# Patient Record
Sex: FEMALE | Race: WHITE | NOT HISPANIC OR LATINO | Employment: OTHER | URBAN - METROPOLITAN AREA
[De-identification: names, ages, dates, MRNs, and addresses within clinical notes are randomized per-mention and may not be internally consistent; named-entity substitution may affect disease eponyms.]

---

## 2017-01-13 DIAGNOSIS — I48.0 PAROXYSMAL ATRIAL FIBRILLATION (H): Primary | ICD-10-CM

## 2017-02-16 ENCOUNTER — OFFICE VISIT (OUTPATIENT)
Dept: OBGYN | Facility: CLINIC | Age: 82
End: 2017-02-16
Payer: MEDICARE

## 2017-02-16 VITALS
DIASTOLIC BLOOD PRESSURE: 78 MMHG | WEIGHT: 105 LBS | BODY MASS INDEX: 19.83 KG/M2 | SYSTOLIC BLOOD PRESSURE: 118 MMHG | HEART RATE: 60 BPM | HEIGHT: 61 IN

## 2017-02-16 DIAGNOSIS — M81.0 OSTEOPOROSIS, SENILE: Primary | ICD-10-CM

## 2017-02-16 PROCEDURE — 82310 ASSAY OF CALCIUM: CPT | Performed by: PATHOLOGY

## 2017-02-16 PROCEDURE — 36415 COLL VENOUS BLD VENIPUNCTURE: CPT | Performed by: OBSTETRICS & GYNECOLOGY

## 2017-02-16 PROCEDURE — 99213 OFFICE O/P EST LOW 20 MIN: CPT | Performed by: OBSTETRICS & GYNECOLOGY

## 2017-02-16 PROCEDURE — 82565 ASSAY OF CREATININE: CPT | Performed by: PATHOLOGY

## 2017-02-16 NOTE — PROGRESS NOTES
SUBJECTIVE:                                                   Mia Rodriguez is a 82 year old female who presents to clinic today for the following health issue(s):  Patient presents with:  Consult: prolia      Additional information:       HPI:  Here for f/u of osteoporosis. Last DEXA in   shows moderate osteoporosis of spine and hip. This appears stable. Done on new machine so can't do direct comparison.  No issues with pain, infection or side effects from Prolia. Started . Hx of IV Boniva before that.       Patient's last menstrual period was 1981..   Patient is not sexually active, .  Using hysterectomy for contraception.    reports that she quit smoking about 27 years ago. Her smoking use included Cigarettes. She has a 30.00 pack-year smoking history. She has never used smokeless tobacco.    STD testing offered?  Declined    Health maintenance updated:  yes    Today's PHQ-2 Score:   PHQ-2 (  [employer]) 2011   Q1: Little interest or pleasure in doing things 0   Q2: Feeling down, depressed or hopeless 0   PHQ-2 Score 0     Today's PHQ-9 Score: No flowsheet data found.  Today's TATE-7 Score: No flowsheet data found.    Problem list and histories reviewed & adjusted, as indicated.  Additional history: as documented.    Patient Active Problem List   Diagnosis     Cancer of breast (H)     Transient cerebral ischemia     Benign essential hypertension     Kidney stones     H/O: hysterectomy     Hyperlipidemia with target LDL less than 100     Shortness of breath     Atrial fibrillation (H)     Pulmonary hypertension (H)     COPD exacerbation (H)     Osteoporosis, senile     Past Surgical History   Procedure Laterality Date     Breast surgery  2008     Right Breast Lumpectomy     Biopsy  2008     Left /RightBreast Biopsy     Biopsy  2010     Right Breast Biopsy     Gyn surgery       LISA with ovaries intact-fibroid     Laser holmium lithotripsy ureter(s), insert stent,  combined  10/5/2012     Procedure: COMBINED CYSTOSCOPY, URETEROSCOPY, LASER HOLMIUM LITHOTRIPSY URETER(S), INSERT STENT;  RIGHT URETEROSCOPY ,right ureteral biopsy,WITH LASER HOLMIUM LITHOTRIPSY, INSERT STENT RIGHT URETER;  Surgeon: Kaushal Harris MD;  Location:  OR     Cystoscopy, dilate urethra, combined  10/5/2012     Procedure: COMBINED CYSTOSCOPY, DILATE URETHRA;  urethral dilation;  Surgeon: Kaushal Harris MD;  Location:  OR     Excise lesion lip N/A 10/22/2014     Procedure: EXCISE LESION LIP;  Surgeon: Brent Jolley MD;  Location:  SD     Laser holmium lithotripsy ureter(s), insert stent, combined Right 4/16/2015     Procedure: COMBINED CYSTOSCOPY, URETEROSCOPY, LASER HOLMIUM LITHOTRIPSY URETER(S), INSERT STENT;  Surgeon: Kaushal Harris MD;  Location:  OR     Cystoscopy, retrogrades, extract stone, combined Right 4/16/2015     Procedure: COMBINED CYSTOSCOPY, RETROGRADES, EXTRACT STONE;  Surgeon: Kaushal Harris MD;  Location:  OR      Social History   Substance Use Topics     Smoking status: Former Smoker     Packs/day: 1.00     Years: 30.00     Types: Cigarettes     Quit date: 12/1/1989     Smokeless tobacco: Never Used      Comment: Started:  Stopped:     Alcohol use No      Problem (# of Occurrences) Relation (Name,Age of Onset)    Alzheimer Disease (1) Mother    Breast Cancer (1) Maternal Grandmother    C.A.D. (1) Father    CANCER (1) Paternal Grandfather: stomach    CEREBROVASCULAR DISEASE (1) Father    Cardiovascular (1) Father    OSTEOPOROSIS (1) Mother    Unknown/Adopted (2) Maternal Grandfather, Paternal Grandmother: old age            Current Outpatient Prescriptions   Medication Sig     apixaban ANTICOAGULANT (ELIQUIS) 2.5 MG tablet Take 1 tablet (2.5 mg) by mouth 2 times daily     atorvastatin (LIPITOR) 40 MG tablet Take 1 tablet (40 mg) by mouth daily     diltiazem (DILACOR XR) 180 MG 24 hr ER capsule Take 1 capsule (180 mg) by mouth daily      "albuterol (PROAIR HFA, PROVENTIL HFA, VENTOLIN HFA) 108 (90 BASE) MCG/ACT inhaler Inhale 2 puffs into the lungs every 6 hours     denosumab (PROLIA) 60 MG/ML SOLN Inject 60 mg Subcutaneous. q 6 months     No current facility-administered medications for this visit.      Allergies   Allergen Reactions     Megace [Megestrol Acetate]      Increased LFTs         ROS:  12 point review of systems negative other than symptoms noted below.  Constitutional: Fatigue  Cardiovascular: Lightheadedness  Respiratory: Cough and Shortness of Breath  Psychiatric: Difficulty Sleeping    OBJECTIVE:     /78  Pulse 60  Ht 5' 0.5\" (1.537 m)  Wt 105 lb (47.6 kg)  LMP 12/07/1981  BMI 20.17 kg/m2  Body mass index is 20.17 kg/(m^2).    Exam:  Constitutional:  Appearance: Well nourished, well developed alert, in no acute distress  Neurologic/Psychiatric:  Mental Status:  Oriented X3      In-Clinic Test Results:  No results found for this or any previous visit (from the past 24 hour(s)).    ASSESSMENT/PLAN:                                                        ICD-10-CM    1. Osteoporosis, senile M81.0 Creatinine     Calcium     DX Hip/Pelvis/Spine         Will have labs today. RTC for Prolia injection and repeat DEXA.    Jhon Randle MD  [facility]  "

## 2017-02-16 NOTE — MR AVS SNAPSHOT
"              After Visit Summary   2017    Mia Rodriguez    MRN: [id number]           Patient Information     Date Of Birth          1934        Visit Information        Provider Department      2017 11:00 AM Jhon Randle MD [facility]        Today's Diagnoses     Osteoporosis, senile    -  1       Follow-ups after your visit        Future tests that were ordered for you today     Open Future Orders        Priority Expected Expires Ordered    DX Hip/Pelvis/Spine Routine  2018            Who to contact     If you have questions or need follow up information about today's clinic visit or your schedule please contact [facility] directly at [phone].  Normal or non-critical lab and imaging results will be communicated to you by MyChart, letter or phone within 4 business days after the clinic has received the results. If you do not hear from us within 7 days, please contact the clinic through [employer] or phone. If you have a critical or abnormal lab result, we will notify you by phone as soon as possible.  Submit refill requests through [employer] or call your pharmacy and they will forward the refill request to us. Please allow 3 business days for your refill to be completed.          Additional Information About Your Visit        MyChart Information     [employer] lets you send messages to your doctor, view your test results, renew your prescriptions, schedule appointments and more. To sign up, go to [email or web address] . Click on \"Log in\" on the left side of the screen, which will take you to the Welcome page. Then click on \"Sign up Now\" on the right side of the page.     You will be asked to enter the access code listed below, as well as some personal information. Please follow the directions to create your username and password.     Your access code is: [email or web address]  Expires: 3/7/2017 10:30 AM     Your access code will  " "in 90 days. If you need help or a new code, please call your [facility] clinic or [phone].        Care EveryWhere ID     This is your Care EveryWhere ID. This could be used by other organizations to access your [facility] medical records  [id number]        Your Vitals Were     Pulse Height Last Period BMI (Body Mass Index)          60 5' 0.5\" (1.537 m) 12/07/1981 20.17 kg/m2         Blood Pressure from Last 3 Encounters:   02/16/17 118/78   12/07/16 122/84   06/06/16 115/72    Weight from Last 3 Encounters:   02/16/17 105 lb (47.6 kg)   12/07/16 104 lb 8 oz (47.4 kg)   06/06/16 102 lb (46.3 kg)              We Performed the Following     Calcium     Creatinine        Primary Care Provider Office Phone # Fax #    Birgit Cordero [phone]       [facility] [address] [facility] [address]        Thank you!     Thank you for choosing [facility]  for your care. Our goal is always to provide you with excellent care. Hearing back from our patients is one way we can continue to improve our services. Please take a few minutes to complete the written survey that you may receive in the mail after your visit with us. Thank you!             Your Updated Medication List - Protect others around you: Learn how to safely use, store and throw away your medicines at www.disposemymeds.org.          This list is accurate as of: 2/16/17 11:35 AM.  Always use your most recent med list.                   Brand Name Dispense Instructions for use    albuterol 108 (90 BASE) MCG/ACT Inhaler    PROAIR HFA/PROVENTIL HFA/VENTOLIN HFA     Inhale 2 puffs into the lungs every 6 hours       apixaban ANTICOAGULANT 2.5 MG tablet    ELIQUIS    180 tablet    Take 1 tablet (2.5 mg) by mouth 2 times daily       atorvastatin 40 MG tablet    LIPITOR    90 tablet    Take 1 tablet (40 mg) by mouth daily       diltiazem 180 MG 24 hr capsule    DILACOR XR    90 capsule    Take 1 capsule (180 mg) by mouth daily "       PROLIA 60 MG/ML Soln injection   Generic drug:  denosumab      Inject 60 mg Subcutaneous. q 6 months

## 2017-02-17 LAB
CALCIUM SERPL-MCNC: 9.6 MG/DL (ref 8.5–10.1)
CREAT SERPL-MCNC: 0.93 MG/DL (ref 0.52–1.04)
GFR SERPL CREATININE-BSD FRML MDRD: 58 ML/MIN/1.7M2

## 2017-03-16 ENCOUNTER — ALLIED HEALTH/NURSE VISIT (OUTPATIENT)
Dept: NURSING | Facility: CLINIC | Age: 82
End: 2017-03-16
Payer: MEDICARE

## 2017-03-16 ENCOUNTER — RADIANT APPOINTMENT (OUTPATIENT)
Dept: BONE DENSITY | Facility: CLINIC | Age: 82
End: 2017-03-16
Payer: MEDICARE

## 2017-03-16 DIAGNOSIS — M81.0 OSTEOPOROSIS, SENILE: ICD-10-CM

## 2017-03-16 DIAGNOSIS — M81.0 OSTEOPOROSIS: Primary | ICD-10-CM

## 2017-03-16 PROCEDURE — 99207 ZZC NO CHARGE NURSE ONLY: CPT

## 2017-03-16 PROCEDURE — 96372 THER/PROPH/DIAG INJ SC/IM: CPT

## 2017-03-16 PROCEDURE — 77080 DXA BONE DENSITY AXIAL: CPT | Performed by: OBSTETRICS & GYNECOLOGY

## 2017-03-16 NOTE — MR AVS SNAPSHOT
"              After Visit Summary   3/16/2017    Mia Rodriguez    MRN: [id number]           Patient Information     Date Of Birth          1934        Visit Information        Provider Department      3/16/2017 9:30 AM [facility] Ysabel Thomson        Today's Diagnoses     Osteoporosis    -  1       Follow-ups after your visit        Who to contact     If you have questions or need follow up information about today's clinic visit or your schedule please contact [facility] directly at [phone].  Normal or non-critical lab and imaging results will be communicated to you by MyChart, letter or phone within 4 business days after the clinic has received the results. If you do not hear from us within 7 days, please contact the clinic through [employer] or phone. If you have a critical or abnormal lab result, we will notify you by phone as soon as possible.  Submit refill requests through [employer] or call your pharmacy and they will forward the refill request to us. Please allow 3 business days for your refill to be completed.          Additional Information About Your Visit        MyChart Information     [employer] lets you send messages to your doctor, view your test results, renew your prescriptions, schedule appointments and more. To sign up, go to [email or web address] . Click on \"Log in\" on the left side of the screen, which will take you to the Welcome page. Then click on \"Sign up Now\" on the right side of the page.     You will be asked to enter the access code listed below, as well as some personal information. Please follow the directions to create your username and password.     Your access code is: [email or web address]  Expires: 2017  9:45 AM     Your access code will  in 90 days. If you need help or a new code, please call your [facility] or [phone].        Care EveryWhere ID     This is your Care EveryWhere ID. This could be used by other " organizations to access your [facility] medical records  [id number]        Your Vitals Were     Last Period                   12/07/1981            Blood Pressure from Last 3 Encounters:   02/16/17 118/78   12/07/16 122/84   06/06/16 115/72    Weight from Last 3 Encounters:   02/16/17 105 lb (47.6 kg)   12/07/16 104 lb 8 oz (47.4 kg)   06/06/16 102 lb (46.3 kg)              We Performed the Following     INJECTION INTRAMUSCULAR OR SUB-Q        Primary Care Provider Office Phone # Fax #    Birgit Cordero [phone]       [facility] [address] [facility] [address]        Thank you!     Thank you for choosing [facility]  for your care. Our goal is always to provide you with excellent care. Hearing back from our patients is one way we can continue to improve our services. Please take a few minutes to complete the written survey that you may receive in the mail after your visit with us. Thank you!             Your Updated Medication List - Protect others around you: Learn how to safely use, store and throw away your medicines at www.disposemymeds.org.          This list is accurate as of: 3/16/17  9:45 AM.  Always use your most recent med list.                   Brand Name Dispense Instructions for use    albuterol 108 (90 BASE) MCG/ACT Inhaler    PROAIR HFA/PROVENTIL HFA/VENTOLIN HFA     Inhale 2 puffs into the lungs every 6 hours       apixaban ANTICOAGULANT 2.5 MG tablet    ELIQUIS    180 tablet    Take 1 tablet (2.5 mg) by mouth 2 times daily       atorvastatin 40 MG tablet    LIPITOR    90 tablet    Take 1 tablet (40 mg) by mouth daily       diltiazem 180 MG 24 hr capsule    DILACOR XR    90 capsule    Take 1 capsule (180 mg) by mouth daily       PROLIA 60 MG/ML Soln injection   Generic drug:  denosumab      Inject 60 mg Subcutaneous. q 6 months

## 2017-03-16 NOTE — NURSING NOTE
The following medication was given:     MEDICATION: Denosumab (Prolia) 60 mg/ml SOLN  ROUTE: SQ  SITE: Arm - Right  DOSE: 60mg  LOT #: [id number]  :  [employer]  EXPIRATION DATE:  12/18  NDC#: 5513-710-01    Pt has had injection in the past without side effect. Pt tolerated the injection with minimal pain and was brought back for her DEXA scan in stable condition.

## 2017-03-26 ENCOUNTER — HOSPITAL ENCOUNTER (EMERGENCY)
Facility: CLINIC | Age: 82
Discharge: HOME | End: 2017-03-26
Attending: EMERGENCY MEDICINE | Admitting: EMERGENCY MEDICINE
Payer: MEDICARE

## 2017-03-26 ENCOUNTER — APPOINTMENT (OUTPATIENT)
Dept: CT IMAGING | Facility: CLINIC | Age: 82
End: 2017-03-26
Attending: EMERGENCY MEDICINE
Payer: MEDICARE

## 2017-03-26 ENCOUNTER — TRANSFERRED RECORDS (OUTPATIENT)
Dept: HEALTH INFORMATION MANAGEMENT | Facility: CLINIC | Age: 82
End: 2017-03-26

## 2017-03-26 VITALS
DIASTOLIC BLOOD PRESSURE: 84 MMHG | WEIGHT: 103 LBS | OXYGEN SATURATION: 95 % | BODY MASS INDEX: 19.78 KG/M2 | SYSTOLIC BLOOD PRESSURE: 136 MMHG | RESPIRATION RATE: 20 BRPM | TEMPERATURE: 97.3 F

## 2017-03-26 DIAGNOSIS — R06.02 SOB (SHORTNESS OF BREATH): ICD-10-CM

## 2017-03-26 LAB
ANION GAP SERPL CALCULATED.3IONS-SCNC: 9 MMOL/L (ref 3–14)
BASOPHILS # BLD AUTO: 0 10E9/L (ref 0–0.2)
BASOPHILS NFR BLD AUTO: 0.3 %
BUN SERPL-MCNC: 28 MG/DL (ref 7–30)
CALCIUM SERPL-MCNC: 10.3 MG/DL (ref 8.5–10.1)
CHLORIDE SERPL-SCNC: 107 MMOL/L (ref 94–109)
CO2 SERPL-SCNC: 25 MMOL/L (ref 20–32)
CREAT SERPL-MCNC: 0.93 MG/DL (ref 0.52–1.04)
D DIMER PPP FEU-MCNC: 0.6 UG/ML FEU (ref 0–0.5)
DIFFERENTIAL METHOD BLD: ABNORMAL
EOSINOPHIL # BLD AUTO: 0.2 10E9/L (ref 0–0.7)
EOSINOPHIL NFR BLD AUTO: 2.4 %
ERYTHROCYTE [DISTWIDTH] IN BLOOD BY AUTOMATED COUNT: 13.4 % (ref 10–15)
GFR SERPL CREATININE-BSD FRML MDRD: 57 ML/MIN/1.7M2
GLUCOSE SERPL-MCNC: 86 MG/DL (ref 70–99)
HCT VFR BLD AUTO: 48.3 % (ref 35–47)
HGB BLD-MCNC: 16.9 G/DL (ref 11.7–15.7)
IMM GRANULOCYTES # BLD: 0 10E9/L (ref 0–0.4)
IMM GRANULOCYTES NFR BLD: 0.3 %
INTERPRETATION ECG - MUSE: NORMAL
LYMPHOCYTES # BLD AUTO: 1.5 10E9/L (ref 0.8–5.3)
LYMPHOCYTES NFR BLD AUTO: 20.9 %
MCH RBC QN AUTO: 33.2 PG (ref 26.5–33)
MCHC RBC AUTO-ENTMCNC: 35 G/DL (ref 31.5–36.5)
MCV RBC AUTO: 95 FL (ref 78–100)
MONOCYTES # BLD AUTO: 0.6 10E9/L (ref 0–1.3)
MONOCYTES NFR BLD AUTO: 9.1 %
NEUTROPHILS # BLD AUTO: 4.7 10E9/L (ref 1.6–8.3)
NEUTROPHILS NFR BLD AUTO: 67 %
NRBC # BLD AUTO: 0 10*3/UL
NRBC BLD AUTO-RTO: 0 /100
PLATELET # BLD AUTO: 165 10E9/L (ref 150–450)
POTASSIUM SERPL-SCNC: 4.1 MMOL/L (ref 3.4–5.3)
RBC # BLD AUTO: 5.09 10E12/L (ref 3.8–5.2)
SODIUM SERPL-SCNC: 141 MMOL/L (ref 133–144)
TROPONIN I SERPL-MCNC: NORMAL UG/L (ref 0–0.04)
TROPONIN I SERPL-MCNC: NORMAL UG/L (ref 0–0.04)
WBC # BLD AUTO: 7 10E9/L (ref 4–11)

## 2017-03-26 PROCEDURE — 93005 ELECTROCARDIOGRAM TRACING: CPT

## 2017-03-26 PROCEDURE — 71260 CT THORAX DX C+: CPT

## 2017-03-26 PROCEDURE — 80048 BASIC METABOLIC PNL TOTAL CA: CPT | Performed by: EMERGENCY MEDICINE

## 2017-03-26 PROCEDURE — 85379 FIBRIN DEGRADATION QUANT: CPT | Performed by: EMERGENCY MEDICINE

## 2017-03-26 PROCEDURE — 99285 EMERGENCY DEPT VISIT HI MDM: CPT | Mod: 25

## 2017-03-26 PROCEDURE — 25500064 ZZH RX 255 OP 636: Performed by: EMERGENCY MEDICINE

## 2017-03-26 PROCEDURE — 25000128 H RX IP 250 OP 636: Performed by: EMERGENCY MEDICINE

## 2017-03-26 PROCEDURE — 25000125 ZZHC RX 250: Performed by: EMERGENCY MEDICINE

## 2017-03-26 PROCEDURE — 85025 COMPLETE CBC W/AUTO DIFF WBC: CPT | Performed by: EMERGENCY MEDICINE

## 2017-03-26 PROCEDURE — 84484 ASSAY OF TROPONIN QUANT: CPT | Mod: 91 | Performed by: EMERGENCY MEDICINE

## 2017-03-26 RX ORDER — IOPAMIDOL 755 MG/ML
52 INJECTION, SOLUTION INTRAVASCULAR ONCE
Status: COMPLETED | OUTPATIENT
Start: 2017-03-26 | End: 2017-03-26

## 2017-03-26 RX ADMIN — SODIUM CHLORIDE 80 ML: 9 INJECTION, SOLUTION INTRAVENOUS at 17:46

## 2017-03-26 RX ADMIN — IOPAMIDOL 52 ML: 755 INJECTION, SOLUTION INTRAVENOUS at 17:46

## 2017-03-26 ASSESSMENT — ENCOUNTER SYMPTOMS
CHEST TIGHTNESS: 1
DIARRHEA: 0
CHILLS: 1
RHINORRHEA: 0
FEVER: 0
ABDOMINAL PAIN: 0
NAUSEA: 0
VOMITING: 0
FATIGUE: 1
SHORTNESS OF BREATH: 1
DYSURIA: 0

## 2017-03-26 NOTE — ED PROVIDER NOTES
History     Chief Complaint:  Shortness of breath     HPI   Mia Rodriguez is a 83 year old female with a history of CAD, COPD, atrial fibrillation, anticoagulated on Eliquis, who presents for evaluation of shortness of breath. The patient reports onset of increased exertional shortness of breath, fatigue and shaking this morning. The patient then developed chest tightness at noon while cooking which lasted for 1.5 hours. The patient then presented to urgent care where she had reported ECG changes and was sent to the ED for further evaluation and cardiac work up. Here, the patient states that she feels improved. She no longer has chest tightness and no shortness of breath while at rest. The patient did use several breathing treatments at home, last at 1300. The patient states that she has had similar symptoms intermittently in the past and has never been worked up for them. She states that this does not feel like COPD and her COPD has been stable recently. The patient denies any fevers, change in chronic cough, leg swelling, urinary symptoms, vomiting, nausea, abdominal pain or any other symptoms. She endorses medical compliance. She does not take aspirin. Patient has no history of CHF.      Cardiac/PE/DVT Risk Factors:  The patient has a history of hypertension and hyperlipidemia and is a former smoker.  She reports a family history of CAD.  She denies any personal or familial history of PE, DVT or clotting disorder.  She reports no recent travel, surgery or other immobilizations.  She is not on hormone therapy and has no known malignancy.      Allergies:  Megace [Megestrol Acetate]     Medications:    apixaban ANTICOAGULANT (ELIQUIS) 2.5 MG tablet  atorvastatin (LIPITOR) 40 MG tablet  diltiazem (DILACOR XR) 180 MG 24 hr ER capsule  albuterol (PROAIR HFA, PROVENTIL HFA, VENTOLIN HFA) 108 (90 BASE) MCG/ACT inhaler  denosumab (PROLIA) 60 MG/ML SOLN    Past Medical History:    Atrial fibrillation   Breast  cancer  COPD  CAD  Hypertension  Breast cancer  Osteoporosis  Renal disease  Thyroid disease  TIA  Kidney stones    Past Surgical History:    Bilateral breast biopsy   Cystoscopy   Lip lesion excision   Hysterectomy   Lithotripsy     Family History:    Mother - Alzheimer's disease, osteoporosis  Father - CAD  Grandmother - breast cancer    Social History:  Marital Status:  Single   Smoking status: Former smoker  Alcohol use: No   Presents to the ED with her daughter     Review of Systems   Constitutional: Positive for chills and fatigue. Negative for fever.   HENT: Negative for congestion and rhinorrhea.    Respiratory: Positive for chest tightness and shortness of breath.    Cardiovascular: Negative for chest pain.   Gastrointestinal: Negative for abdominal pain, diarrhea, nausea and vomiting.   Genitourinary: Negative for dysuria.   Skin: Negative for rash.   All other systems reviewed and are negative.      Physical Exam     Patient Vitals for the past 24 hrs:   BP Temp Temp src Heart Rate Resp SpO2 Weight   03/26/17 2012 136/84 - - - - - -   03/26/17 2000 - - - - - 95 % -   03/26/17 1720 - - - - 20 - -   03/26/17 1714 (!) 133/96 - - 73 - 93 % -   03/26/17 1625 - - - 68 18 93 % -   03/26/17 1555 - - - - - 96 % -   03/26/17 1554 141/89 - - - - - -   03/26/17 1537 135/77 97.3  F (36.3  C) Temporal 68 20 95 % 46.7 kg (103 lb)       Physical Exam  General: Resting comfortably on the gurney  Eyes:  The pupils are equal and round    Conjunctivae and sclerae are normal  ENT:    Moist mucous membranes  Neck:  Normal range of motion  CV:  Irregular rate and rhythm    Skin warm and well perfused   Resp:  Lungs are clear    Non-labored    No rales    No wheezing   GI:  Abdomen is soft, there is no rigidity    No distension    No rebound tenderness    No abdominal tenderness   MS:  Normal muscular tone    No leg swelling  Skin:  No rash or acute skin lesions noted  Neuro:   Awake, alert.      Speech is normal and  fluent.    Face is symmetric.     Moves all extremities  Psych:  Normal affect.  Appropriate interactions.     Emergency Department Course   ECG:  @ 1542  Indication: Shortness of breath   Vent. Rate 76 bpm. AR interval * ms. QRS duration 66 ms. QT/QTc 384/432 ms. P-R-T axis * 18 226.   Atrial fibrillation. Anterior infarct, age undetermined. T wave abnormality, consider inferolateral ischemia. Abnormal ECG.   Read @ 1610 by Dr. Morales.     Imaging:  Radiographic findings were communicated with the patient who voiced understanding of the findings.    CT Chest - PE Study  IMPRESSION:   1. No acute pulmonary embolus.  2. Prominent atherosclerotic vascular calcifications and  atherosclerotic change in the thoracic aorta.  3. Emphysema.  4. No acute pulmonary disease, probable dependent atelectasis lung  bases.  5. Granulomatous disease.  Preliminary radiology read.       Laboratory:  CBC:  WBC 7.0, HGB 16.9 (H),    BMP: GFR 57 (L), Ca 10.3 (H), otherwise WNL (Creatinine 0.93)   D dimer: 0.6 (H)    (1605) Troponin: <0.015  (1820) Troponin: <0.015    Emergency Department Course:  Nursing notes and vitals reviewed.  (1606) I performed an exam of the patient as documented above.    EKG was done, interpretation as above.   Blood was drawn from the patient. This was sent for laboratory testing, findings above.    The patient was sent for a chest CT while in the emergency department, findings above.      (1729) Patient update.      (2000) Findings and plan explained to the patient and family. Patient discharged home with instructions regarding supportive care, medications, and reasons to return. The importance of close follow-up was reviewed.     Impression & Plan      Medical Decision Making:  Mia Rodriguez is a 83 year old female who presents to the ED with shortness of breath. Her vital signs are normal. She is not hypoxic or tachypneic here in the ED. I do not hear any wheezing to suggest COPD and she does  not report a productive cough to suggest this. ECG was obtained that shows atrial fibrillation which she has a history of. No ischemic changes. She has inverted T waves in lateral leads though she has had this on prior ECG and unchanged. Labs obtained and D dimer is slightly elevated. She had 2x troponins in the ED that were also normal. She may be slightly dehydrated as her calcium and HGB were slightly elevated so she was given IV fluids. CT chest was performed and no PE was seen. She does have evidence of emphysema but no acute disease. Patient ambulated in the ED and did not become hypoxic. She overall feels well and feels comfortable with discharge home. They are going to follow up with cardiology. Discussed possibility of obtaining echocardiogram/stress test but they will folllow up with cardiology. Also recommended following up with PCP. Reasons to return to the ED were discussed with the patient.    Diagnosis:    ICD-10-CM   1. SOB (shortness of breath) R06.02     Disposition:  Patient is discharged to home.     Luis Eduardo Vidal  3/26/2017    EMERGENCY DEPARTMENT    I, Luis Eduardo Vidal, lorenzo serving as a scribe on 3/26/2017 at 4:06 PM to personally document services performed by Dr. Morales based on my observations and the provider's statements to me.       Sangeeta Morales MD  03/27/17 2967

## 2017-03-26 NOTE — ED AVS SNAPSHOT
Emergency Department    [address] [place] [address]    Phone:  [phone]    Fax:  [phone]                                       Mia Rodriguez   MRN: [id number]    Department:   Emergency Department   Date of Visit:  3/26/2017           After Visit Summary Signature Page     I have received my discharge instructions, and my questions have been answered. I have discussed any challenges I see with this plan with the nurse or doctor.    ..........................................................................................................................................  Patient/Patient Representative Signature      ..........................................................................................................................................  Patient Representative Print Name and Relationship to Patient    ..................................................               ................................................  Date                                            Time    ..........................................................................................................................................  Reviewed by Signature/Title    ...................................................              ..............................................  Date                                                            Time

## 2017-03-26 NOTE — ED AVS SNAPSHOT
Emergency Department    [address] [facility] [address]    Phone:  [phone]    Fax:  [phone]                                       Mia Rodriguez   MRN: [id number]    Department:   Emergency Department   Date of Visit:  3/26/2017           Patient Information     Date Of Birth          2/25/1934        Your diagnoses for this visit were:     SOB (shortness of breath)        You were seen by Sangeeta Morales MD.      Follow-up Information     Schedule an appointment as soon as possible for a visit with Birgit Cordero    Specialty:  Internal Medicine    Contact information:    [facility]  [address] [facility] [address] [facility] [address]  [phone]          Follow up with  Emergency Department.    Specialty:  EMERGENCY MEDICINE    Why:  If symptoms worsen    Contact information:    [address] [facility] [id number] [phone]        Discharge Instructions       Follow up with cardiology  May want to buy blood pressure cuff and monitor your blood pressure  Shortness of Breath (Dyspnea)  Shortness of breath is the feeling that you can't catch your breath or get enough air. It is also known as dyspnea.  Dyspnea can be caused by many different conditions. They include:    Acute asthma attack.    Worsening of chronic lung diseases such as chronic bronchitis and emphysema.     Heart failure. This is when weak heart muscle allows extra fluid to collect in the lungs.    Panic attacks or anxiety. Fear can cause rapid breathing (hyperventilation).    Pneumonia, or an infection in the lung tissue.    Exposure to toxic substances, fumes, smoke, or certain medicines.    Blood clot in the lung (pulmonary embolism). This is often from a piece of blood clot in a deep vein of the leg (deep vein thrombosis) that breaks off and travels to the lungs.     Heart attack or heart-related chest pain (angina).    Anemia.    Collapsed lung  (pneumothorax).    Dehydration.    Pregnancy.  Based on your visit today, the exact cause of your shortness of breath is not certain. Your tests don t show any of the serious causes of dyspnea. You may need other tests to find out if you have a serious problem. It s important to watch for any new symptoms or symptoms that get worse. Follow up with your healthcare provider as directed.  Home care  Follow these tips to take care of yourself at home:    When your symptoms are better, go back to your usual activities.    If you smoke, you should stop. Join a quit-smoking program or ask your healthcare provider for help.    Eat a healthy diet and get plenty of sleep.    Get regular exercise. Talk with your healthcare provider before starting to exercise, especially if you have other medical problems.    Cut down on the amount of caffeine and stimulants you consume.  Follow-up care  Follow up with your healthcare provider, or as advised.  If tests were done, you will be told if your treatment needs to be changed. You can call as directed for the results.  (Note: If an X-ray was taken, a specialist will review it. You will be notified of any new findings that may affect your care.)  Call 911 or get immediate medical care  Shortness of breath may be a sign of a serious medical problem. For example, it may be a problem with your heart or lungs. Call 911 if you have worsening shortness of breath or trouble breathing, especially with any of the symptoms below:    You are confused or it s difficult to wake you.    You faint or lose consciousness.    You have a fast heartbeat, or your heartbeat is irregular.     You are coughing up blood.    You have pain in your chest, arm, shoulder, neck, or upper back.    You break out in a sweat.  When to seek medical advice  Call your healthcare provider right away if any of these occur:    Slight shortness of breath or wheezing     Redness, pain or swelling in your leg, arm, or other body  area    Swelling in both legs or ankles    Fast weight gain    Dizziness or weakness    Fever of 100.4 F (38 C) or higher, or as directed by your healthcare provider    8413-0163 [employer] [address] [place] [address], [place] [address]. All rights reserved. This information is not intended as a substitute for professional medical care. Always follow your healthcare professional's instructions.          24 Hour Appointment Hotline       To make an appointment at any [facility], call [phone] ([phone]). If you don't have a family doctor or clinic, we will help you find one. [facility] clinics are conveniently located to serve the needs of you and your family.             Review of your medicines      Our records show that you are taking the medicines listed below. If these are incorrect, please call your family doctor or clinic.        Dose / Directions Last dose taken    albuterol 108 (90 BASE) MCG/ACT Inhaler   Commonly known as:  PROAIR HFA/PROVENTIL HFA/VENTOLIN HFA   Dose:  2 puff        Inhale 2 puffs into the lungs every 6 hours   Refills:  0        apixaban ANTICOAGULANT 2.5 MG tablet   Commonly known as:  ELIQUIS   Dose:  2.5 mg   Quantity:  180 tablet        Take 1 tablet (2.5 mg) by mouth 2 times daily   Refills:  3        atorvastatin 40 MG tablet   Commonly known as:  LIPITOR   Dose:  40 mg   Quantity:  90 tablet        Take 1 tablet (40 mg) by mouth daily   Refills:  3        diltiazem 180 MG 24 hr capsule   Commonly known as:  DILACOR XR   Dose:  180 mg   Quantity:  90 capsule        Take 1 capsule (180 mg) by mouth daily   Refills:  3        PROLIA 60 MG/ML Soln injection   Dose:  60 mg   Generic drug:  denosumab        Inject 60 mg Subcutaneous. q 6 months   Refills:  0                Procedures and tests performed during your visit     Basic metabolic panel    CBC with platelets differential    Chest CT, IV contrast only - PE protocol    D dimer quantitative    EKG 12 lead     Troponin I    Troponin I (now)      Orders Needing Specimen Collection     None      Pending Results     Date and Time Order Name Status Description    3/26/2017 1718 Chest CT, IV contrast only - PE protocol Preliminary             Pending Culture Results     No orders found from 3/24/2017 to 3/27/2017.             Test Results from your hospital stay     3/26/2017  4:21 PM - Interface, Flexilab Results      Component Results     Component Value Ref Range & Units Status    WBC 7.0 4.0 - 11.0 10e9/L Final    RBC Count 5.09 3.8 - 5.2 10e12/L Final    Hemoglobin 16.9 (H) 11.7 - 15.7 g/dL Final    Hematocrit 48.3 (H) 35.0 - 47.0 % Final    MCV 95 78 - 100 fl Final    MCH 33.2 (H) 26.5 - 33.0 pg Final    MCHC 35.0 31.5 - 36.5 g/dL Final    RDW 13.4 10.0 - 15.0 % Final    Platelet Count 165 150 - 450 10e9/L Final    Diff Method Automated Method  Final    % Neutrophils 67.0 % Final    % Lymphocytes 20.9 % Final    % Monocytes 9.1 % Final    % Eosinophils 2.4 % Final    % Basophils 0.3 % Final    % Immature Granulocytes 0.3 % Final    Nucleated RBCs 0 0 /100 Final    Absolute Neutrophil 4.7 1.6 - 8.3 10e9/L Final    Absolute Lymphocytes 1.5 0.8 - 5.3 10e9/L Final    Absolute Monocytes 0.6 0.0 - 1.3 10e9/L Final    Absolute Eosinophils 0.2 0.0 - 0.7 10e9/L Final    Absolute Basophils 0.0 0.0 - 0.2 10e9/L Final    Abs Immature Granulocytes 0.0 0 - 0.4 10e9/L Final    Absolute Nucleated RBC 0.0  Final         3/26/2017  4:44 PM - Interface, Flexilab Results      Component Results     Component Value Ref Range & Units Status    Sodium 141 133 - 144 mmol/L Final    Potassium 4.1 3.4 - 5.3 mmol/L Final    Chloride 107 94 - 109 mmol/L Final    Carbon Dioxide 25 20 - 32 mmol/L Final    Anion Gap 9 3 - 14 mmol/L Final    Glucose 86 70 - 99 mg/dL Final    Urea Nitrogen 28 7 - 30 mg/dL Final    Creatinine 0.93 0.52 - 1.04 mg/dL Final    GFR Estimate 57 (L) >60 mL/min/1.7m2 Final    Non  GFR Calc    GFR Estimate If  Black 70 >60 mL/min/1.7m2 Final    African American GFR Calc    Calcium 10.3 (H) 8.5 - 10.1 mg/dL Final         3/26/2017  5:14 PM - Interface, Flexilab Results      Component Results     Component Value Ref Range & Units Status    D Dimer 0.6 (H) 0.0 - 0.50 ug/ml FEU Final    This D-dimer assay is intended for use in conjuntion with a clinical pretest   probability assessment model to exclude pulmonary embolism (PE) and as an aid   in the diagnosis of deep venous thrombosis (DVT) in outpatients suspected of   PE   or DVT. The cut-off value is 0.5 g/mL FEU.           3/26/2017  4:51 PM - Interface, Flexilab Results      Component Results     Component Value Ref Range & Units Status    Troponin I ES  0.000 - 0.045 ug/L Final    <0.015  The 99th percentile for upper reference range is 0.045 ug/L.  Troponin values in   the range of 0.045 - 0.120 ug/L may be associated with risks of adverse   clinical events.           3/26/2017  6:57 PM - Interface, Flexilab Results      Component Results     Component Value Ref Range & Units Status    Troponin I ES  0.000 - 0.045 ug/L Final    <0.015  The 99th percentile for upper reference range is 0.045 ug/L.  Troponin values in   the range of 0.045 - 0.120 ug/L may be associated with risks of adverse   clinical events.           3/26/2017  6:07 PM - Interface, Radiant Ib      Narrative     CT CHEST PULMONARY EMBOLISM WITH CONTRAST3/26/2017 5:50 PM    HISTORY: Elevated D-dimer, shortness of breath.    TECHNIQUE: Axial images from thoracic inlet to diaphragm. 52 mL  Isovue-370 IV contrast  Radiation dose for this scan was reduced using  automated exposure control, adjustment of the mA and/or kV according  to patient size, or iterative reconstruction technique.    COMPARISON: 3/2/2004 CT chest.    FINDINGS:   CHEST: Prominent atherosclerotic changes in the thoracic aorta which  is poorly opacified on this scan. Coronary artery calcifications. No  evidence for aortic aneurysm, mid  ascending aorta 3.6 cm in diameter.    No acute pulmonary embolus. A few borderline prominent mediastinal  nodes in the aorticopulmonary window. Tiny calcified granuloma at the  anterior inferior right middle lobe series 5 image 106 and splenic  calcifications compatible with granulomatous disease.    Marked emphysematous changes. Posterior lower lobe opacities would be  consistent with dependent atelectasis. The lungs are otherwise clear.  No aggressive bone lesions.        Impression     IMPRESSION:   1. No acute pulmonary embolus.  2. Prominent atherosclerotic vascular calcifications and  atherosclerotic change in the thoracic aorta.  3. Emphysema.  4. No acute pulmonary disease, probable dependent atelectasis lung  bases.  5. Granulomatous disease.                Clinical Quality Measure: Blood Pressure Screening     Your blood pressure was checked while you were in the emergency department today. The last reading we obtained was  BP: (!) 133/96 . Please read the guidelines below about what these numbers mean and what you should do about them.  If your systolic blood pressure (the top number) is less than 120 and your diastolic blood pressure (the bottom number) is less than 80, then your blood pressure is normal. There is nothing more that you need to do about it.  If your systolic blood pressure (the top number) is 120-139 or your diastolic blood pressure (the bottom number) is 80-89, your blood pressure may be higher than it should be. You should have your blood pressure rechecked within a year by a primary care provider.  If your systolic blood pressure (the top number) is 140 or greater or your diastolic blood pressure (the bottom number) is 90 or greater, you may have high blood pressure. High blood pressure is treatable, but if left untreated over time it can put you at risk for heart attack, stroke, or kidney failure. You should have your blood pressure rechecked by a primary care provider within the  "next 4 weeks.  If your provider in the emergency department today gave you specific instructions to follow-up with your doctor or provider even sooner than that, you should follow that instruction and not wait for up to 4 weeks for your follow-up visit.        Thank you for choosing [facility]       Thank you for choosing [facility] for your care. Our goal is always to provide you with excellent care. Hearing back from our patients is one way we can continue to improve our services. Please take a few minutes to complete the written survey that you may receive in the mail after you visit with us. Thank you!        [employer] Information     [employer] lets you send messages to your doctor, view your test results, renew your prescriptions, schedule appointments and more. To sign up, go to [email or web address] . Click on \"Log in\" on the left side of the screen, which will take you to the Welcome page. Then click on \"Sign up Now\" on the right side of the page.     You will be asked to enter the access code listed below, as well as some personal information. Please follow the directions to create your username and password.     Your access code is: [email or web address]  Expires: 2017  9:45 AM     Your access code will  in 90 days. If you need help or a new code, please call your [facility] clinic or [phone].        Care EveryWhere ID     This is your Care EveryWhere ID. This could be used by other organizations to access your [facility] medical records  [id number]        After Visit Summary       This is your record. Keep this with you and show to your community pharmacist(s) and doctor(s) at your next visit.                  "

## 2017-03-27 ENCOUNTER — OFFICE VISIT (OUTPATIENT)
Dept: CARDIOLOGY | Facility: CLINIC | Age: 82
End: 2017-03-27
Payer: MEDICARE

## 2017-03-27 ENCOUNTER — TELEPHONE (OUTPATIENT)
Dept: CARDIOLOGY | Facility: CLINIC | Age: 82
End: 2017-03-27

## 2017-03-27 VITALS
WEIGHT: 107.3 LBS | HEART RATE: 90 BPM | HEIGHT: 61 IN | DIASTOLIC BLOOD PRESSURE: 80 MMHG | BODY MASS INDEX: 20.26 KG/M2 | SYSTOLIC BLOOD PRESSURE: 132 MMHG

## 2017-03-27 DIAGNOSIS — R06.02 SHORTNESS OF BREATH: ICD-10-CM

## 2017-03-27 DIAGNOSIS — R07.9 ACUTE CHEST PAIN: Primary | ICD-10-CM

## 2017-03-27 DIAGNOSIS — I48.20 CHRONIC ATRIAL FIBRILLATION (H): ICD-10-CM

## 2017-03-27 DIAGNOSIS — E78.5 HYPERLIPIDEMIA WITH TARGET LDL LESS THAN 100: ICD-10-CM

## 2017-03-27 DIAGNOSIS — I27.20 PULMONARY HYPERTENSION (H): ICD-10-CM

## 2017-03-27 DIAGNOSIS — I10 BENIGN ESSENTIAL HYPERTENSION: ICD-10-CM

## 2017-03-27 PROCEDURE — 99214 OFFICE O/P EST MOD 30 MIN: CPT | Performed by: INTERNAL MEDICINE

## 2017-03-27 NOTE — TELEPHONE ENCOUNTER
Patient's daughter called in requesting cardiology visit with Dr. Canrtell. Was seen in Ed yesterday for sob/chest tightness. R/O for PE, ACS. Has persistent afib and rate was controlled. ED recommended cardiology visit to consider either echo or stress test. Echo in 2016 showed normal EF and 1-2+ TR. Last seen by Dr. Cantrell in December 2016. We will have patient come in this afternoon at 2:15. This slot was cleared by [facility]. [place]

## 2017-03-27 NOTE — ED NOTES
ERT escorted patient to the bathroom per patient request. Patient used the assistance of a walker of which she claimed helped her tremendously. Patient had stats of 92% when ambulating. Patient returned to 97% upon returning to bed.

## 2017-03-27 NOTE — PROGRESS NOTES
HPI and Plan:   See dictation    Orders Placed This Encounter   Procedures     NM Lexiscan stress test (nuc card)     Follow-Up with Cardiac Advanced Practice Provider       No orders of the defined types were placed in this encounter.      There are no discontinued medications.      Encounter Diagnoses   Name Primary?     Acute chest pain Yes     Benign essential hypertension      Hyperlipidemia with target LDL less than 100      Shortness of breath      Pulmonary hypertension (H)      Chronic atrial fibrillation (H)        CURRENT MEDICATIONS:  Current Outpatient Prescriptions   Medication Sig Dispense Refill     apixaban ANTICOAGULANT (ELIQUIS) 2.5 MG tablet Take 1 tablet (2.5 mg) by mouth 2 times daily 180 tablet 3     atorvastatin (LIPITOR) 40 MG tablet Take 1 tablet (40 mg) by mouth daily 90 tablet 3     diltiazem (DILACOR XR) 180 MG 24 hr ER capsule Take 1 capsule (180 mg) by mouth daily 90 capsule 3     albuterol (PROAIR HFA, PROVENTIL HFA, VENTOLIN HFA) 108 (90 BASE) MCG/ACT inhaler Inhale 2 puffs into the lungs every 6 hours       denosumab (PROLIA) 60 MG/ML SOLN Inject 60 mg Subcutaneous. q 6 months         ALLERGIES     Allergies   Allergen Reactions     Megace [Megestrol Acetate]      Increased LFTs         PAST MEDICAL HISTORY:  Past Medical History:   Diagnosis Date     Arrhythmia     atrial fib.-on coumadin     Atrial fibrillation (H)      Breast cancer (H)      COPD (chronic obstructive pulmonary disease) (H)      COPD exacerbation (H) 9/2/2015     Coronary artery disease      Hypertension      Malignant neoplasm (H) 6/2/2008    Right Breast Cancer     Osteoporosis      Renal disease     kidney stones     Thyroid disease     Hypothyroid     Unspecified cerebral artery occlusion with cerebral infarction     TIA     Uterine fibroid        PAST SURGICAL HISTORY:  Past Surgical History:   Procedure Laterality Date     BIOPSY  6/2/2008    Left /RightBreast Biopsy     BIOPSY  6/1/2010    Right Breast  Biopsy     BREAST SURGERY  6/12/2008    Right Breast Lumpectomy     CYSTOSCOPY, DILATE URETHRA, COMBINED  10/5/2012    Procedure: COMBINED CYSTOSCOPY, DILATE URETHRA;  urethral dilation;  Surgeon: Kaushal Harris MD;  Location:  OR     CYSTOSCOPY, RETROGRADES, EXTRACT STONE, COMBINED Right 4/16/2015    Procedure: COMBINED CYSTOSCOPY, RETROGRADES, EXTRACT STONE;  Surgeon: Kaushal Harris MD;  Location:  OR     EXCISE LESION LIP N/A 10/22/2014    Procedure: EXCISE LESION LIP;  Surgeon: Brent Jolley MD;  Location:  [facility]     GYN SURGERY  1981    LISA with ovaries intact-fibroid     LASER HOLMIUM LITHOTRIPSY URETER(S), INSERT STENT, COMBINED  10/5/2012    Procedure: COMBINED CYSTOSCOPY, URETEROSCOPY, LASER HOLMIUM LITHOTRIPSY URETER(S), INSERT STENT;  RIGHT URETEROSCOPY ,right ureteral biopsy,WITH LASER HOLMIUM LITHOTRIPSY, INSERT STENT RIGHT URETER;  Surgeon: Kaushal Harris MD;  Location:  OR     LASER HOLMIUM LITHOTRIPSY URETER(S), INSERT STENT, COMBINED Right 4/16/2015    Procedure: COMBINED CYSTOSCOPY, URETEROSCOPY, LASER HOLMIUM LITHOTRIPSY URETER(S), INSERT STENT;  Surgeon: Kaushal Harris MD;  Location:  OR       FAMILY HISTORY:  Family History   Problem Relation Age of Onset     Alzheimer Disease Mother      OSTEOPOROSIS Mother      C.A.D. Father      Cardiovascular Father      CEREBROVASCULAR DISEASE Father      Breast Cancer Maternal Grandmother      Unknown/Adopted Maternal Grandfather      Unknown/Adopted Paternal Grandmother      old age     CANCER Paternal Grandfather      stomach       SOCIAL HISTORY:  Social History     Social History     Marital status: Single     Spouse name: N/A     Number of children: 4     Years of education: N/A     Social History Main Topics     Smoking status: Former Smoker     Packs/day: 1.00     Years: 30.00     Types: Cigarettes     Quit date: 12/1/1989     Smokeless tobacco: Never Used      Comment: Started:  Stopped:     Alcohol use  "No     Drug use: No     Sexual activity: Not Currently     Birth control/ protection: Female Surgical      Comment: LISA     Other Topics Concern     Parent/Sibling W/ Cabg, Mi Or Angioplasty Before 65f 55m? No     Caffeine Concern Yes     2 cups caffeine per day     Sleep Concern Yes     Stress Concern No     Weight Concern No     Special Diet No     Back Care No     Exercise No     Seat Belt Yes     Social History Narrative       Review of Systems:  Skin:  Negative       Eyes:  Positive for glasses    ENT:  Negative      Respiratory:  Positive for dyspnea on exertion     Cardiovascular:  Negative      Gastroenterology: Negative      Genitourinary:  not assessed      Musculoskeletal:  Positive for joint stiffness knees after Prolia inj. but gone now  Neurologic:  Negative      Psychiatric:  Negative      Heme/Lymph/Imm:  Negative      Endocrine:  Negative        Physical Exam:  Vitals: /80  Pulse 90  Ht 1.537 m (5' 0.5\")  Wt 48.7 kg (107 lb 4.8 oz)  LMP 12/07/1981  BMI 20.61 kg/m2    Constitutional:  cooperative;alert and oriented;well developed;well nourished;in no acute distress        Skin:  warm and dry to the touch        Head:  normocephalic        Eyes:  pupils equal and round        ENT:  no pallor or cyanosis        Neck:  carotid pulses are full and equal bilaterally, JVP normal, no carotid bruit, no thyromegaly        Chest:  clear to auscultation          Cardiac: regular rhythm;normal S1 and S2                  Abdomen:  not assessed this visit        Vascular: not assessed this visit                                        Extremities and Back:  no edema              Neurological:  affect appropriate, oriented to time, person and place              CC  No referring provider defined for this encounter.              "

## 2017-03-27 NOTE — MR AVS SNAPSHOT
After Visit Summary   3/27/2017    Mia Rodriguez    MRN: [id number]           Patient Information     Date Of Birth          2/25/1934        Visit Information        Provider Department      3/27/2017 2:15 PM Parish Cantrell MD [facility]        Today's Diagnoses     Acute chest pain    -  1    Benign essential hypertension        Hyperlipidemia with target LDL less than 100        Shortness of breath        Pulmonary hypertension (H)        Chronic atrial fibrillation (H)           Follow-ups after your visit        Additional Services     Follow-Up with Cardiac Advanced Practice Provider                 Future tests that were ordered for you today     Open Future Orders        Priority Expected Expires Ordered    Follow-Up with Cardiac Advanced Practice Provider Routine 3/31/2017 3/27/2018 3/27/2017    NM Lexiscan stress test (nuc card) Routine 3/28/2017 3/27/2018 3/27/2017            Who to contact     If you have questions or need follow up information about today's clinic visit or your schedule please contact [facility] directly at [phone].  Normal or non-critical lab and imaging results will be communicated to you by Fotofeedbackhart, letter or phone within 4 business days after the clinic has received the results. If you do not hear from us within 7 days, please contact the clinic through Fotofeedbackhart or phone. If you have a critical or abnormal lab result, we will notify you by phone as soon as possible.  Submit refill requests through [employer] or call your pharmacy and they will forward the refill request to us. Please allow 3 business days for your refill to be completed.          Additional Information About Your Visit        Fotofeedbackhart Information     [employer] lets you send messages to your doctor, view your test results, renew your prescriptions, schedule appointments and more. To sign up, go to  [email or web address] . Click on \"Log in\" on the left side of the screen, which will take you to the Welcome page. Then click on \"Sign up Now\" on the right side of the page.     You will be asked to enter the access code listed below, as well as some personal information. Please follow the directions to create your username and password.     Your access code is: [email or web address]  Expires: 2017  9:45 AM     Your access code will  in 90 days. If you need help or a new code, please call your [facility] clinic or [phone].        Care EveryWhere ID     This is your Care EveryWhere ID. This could be used by other organizations to access your [facility] medical records  [id number]        Your Vitals Were     Pulse Height Last Period BMI (Body Mass Index)          90 1.537 m (5' 0.5\") 1981 20.61 kg/m2         Blood Pressure from Last 3 Encounters:   17 132/80   17 136/84   17 118/78    Weight from Last 3 Encounters:   17 48.7 kg (107 lb 4.8 oz)   17 46.7 kg (103 lb)   17 47.6 kg (105 lb)               Primary Care Provider Office Phone # Fax #    Birgit Cordero [phone]       [address] [address]        Thank you!     Thank you for choosing [facility] [place]  for your care. Our goal is always to provide you with excellent care. Hearing back from our patients is one way we can continue to improve our services. Please take a few minutes to complete the written survey that you may receive in the mail after your visit with us. Thank you!             Your Updated Medication List - Protect others around you: Learn how to safely use, store and throw away your medicines at www.disposemymeds.org.          This list is accurate as of: 3/27/17  2:54 PM.  Always use your most recent med list.                   Brand Name Dispense Instructions for use    albuterol 108 (90 BASE) MCG/ACT Inhaler    " PROAIR HFA/PROVENTIL HFA/VENTOLIN HFA     Inhale 2 puffs into the lungs every 6 hours       apixaban ANTICOAGULANT 2.5 MG tablet    ELIQUIS    180 tablet    Take 1 tablet (2.5 mg) by mouth 2 times daily       atorvastatin 40 MG tablet    LIPITOR    90 tablet    Take 1 tablet (40 mg) by mouth daily       diltiazem 180 MG 24 hr capsule    DILACOR XR    90 capsule    Take 1 capsule (180 mg) by mouth daily       PROLIA 60 MG/ML Soln injection   Generic drug:  denosumab      Inject 60 mg Subcutaneous. q 6 months

## 2017-03-27 NOTE — LETTER
3/27/2017    Birgit Cordero  [facility]   [address]  [facility] [address]    RE: Mia Rodriguez       Dear Colleague,    I had the opportunity to see Ms. Mia Rodriguez in Cardiology Clinic today at the [facility] in [place] for evaluation of chest pain symptoms.  As you may recall, I have seen Ms. Rodriguez many times over the years for management of her chronic atrial fibrillation, hypertension and dyslipidemia.  She also has COPD and pulmonary hypertension.  I had an opening today, and she came in to follow up on her emergency room visit yesterday.      She woke up yesterday feeling short of breath.  Prior to yesterday, she was feeling quite well without any concerning symptoms or issues.  Her shortness of breath persisted throughout the morning, and at about noon she developed chest tightness symptoms.  She had planned to go shopping with her daughter but canceled that shopping trip, which alarmed her daughter.  They sent her to the local urgent care, and from there she was sent to the emergency room.  Her EKG demonstrated atrial fibrillation with some downsloping inferior and lateral ST segment depression and T-wave inversion, although the EKG was quite similar to previous ones but perhaps with less significant depression.  Her troponins were negative, and her D-dimer was slightly abnormal, which prompted a CT scan of the chest.  She did not have evidence of pulmonary embolism, although she did have evidence of coronary calcifications and atherosclerotic disease within her aorta.  She was discharged from the emergency room after her symptoms resolved, and she is here today to discuss them further.      She has not had further problems with shortness of breath or chest tightness today but has had continued problems with low energy.      PHYSICAL EXAMINATION:  On examination today her blood pressure is 132/80, heart rate 90 and weight 107 pounds.  Her lungs are clear.  Heart  rhythm is irregularly irregular without cardiac murmur.  She has no carotid bruits or edema.     Outpatient Encounter Prescriptions as of 3/27/2017   Medication Sig Dispense Refill     apixaban ANTICOAGULANT (ELIQUIS) 2.5 MG tablet Take 1 tablet (2.5 mg) by mouth 2 times daily 180 tablet 3     atorvastatin (LIPITOR) 40 MG tablet Take 1 tablet (40 mg) by mouth daily 90 tablet 3     diltiazem (DILACOR XR) 180 MG 24 hr ER capsule Take 1 capsule (180 mg) by mouth daily 90 capsule 3     albuterol (PROAIR HFA, PROVENTIL HFA, VENTOLIN HFA) 108 (90 BASE) MCG/ACT inhaler Inhale 2 puffs into the lungs every 6 hours       denosumab (PROLIA) 60 MG/ML SOLN Inject 60 mg Subcutaneous. q 6 months       No facility-administered encounter medications on file as of 3/27/2017.       IMPRESSIONS:  Ms. Mia Rodriguez is an 83-year-old woman with hypertension, dyslipidemia and chronic atrial fibrillation.  She has some coronary calcifications on CT scan and yesterday had an episode of shortness of breath developing into chest tightness concerning for angina.  She had no evidence of myocardial infarction in the emergency room, and she is following up here to discuss this further.      She has not had a stress test since 2009, and I suggested we reevaluate her coronary artery disease status with an exercise nuclear perfusion imaging study.  If she is not able to achieve adequate heart rate then we can convert that to a Lexiscan nuclear perfusion study.  I will have her followup after that with one of our advanced practice providers to go over those results and discuss any further evaluation that might be necessary such as coronary angiography.  I will not make any medication changes today.      Thank you for allowing me to participate in Ms. Rodriguez's care.     Sincerely,    ROBERT BARRETO MD     [facility]

## 2017-03-27 NOTE — DISCHARGE INSTRUCTIONS
Follow up with cardiology  May want to buy blood pressure cuff and monitor your blood pressure  Shortness of Breath (Dyspnea)  Shortness of breath is the feeling that you can't catch your breath or get enough air. It is also known as dyspnea.  Dyspnea can be caused by many different conditions. They include:    Acute asthma attack.    Worsening of chronic lung diseases such as chronic bronchitis and emphysema.     Heart failure. This is when weak heart muscle allows extra fluid to collect in the lungs.    Panic attacks or anxiety. Fear can cause rapid breathing (hyperventilation).    Pneumonia, or an infection in the lung tissue.    Exposure to toxic substances, fumes, smoke, or certain medicines.    Blood clot in the lung (pulmonary embolism). This is often from a piece of blood clot in a deep vein of the leg (deep vein thrombosis) that breaks off and travels to the lungs.     Heart attack or heart-related chest pain (angina).    Anemia.    Collapsed lung (pneumothorax).    Dehydration.    Pregnancy.  Based on your visit today, the exact cause of your shortness of breath is not certain. Your tests don t show any of the serious causes of dyspnea. You may need other tests to find out if you have a serious problem. It s important to watch for any new symptoms or symptoms that get worse. Follow up with your healthcare provider as directed.  Home care  Follow these tips to take care of yourself at home:    When your symptoms are better, go back to your usual activities.    If you smoke, you should stop. Join a quit-smoking program or ask your healthcare provider for help.    Eat a healthy diet and get plenty of sleep.    Get regular exercise. Talk with your healthcare provider before starting to exercise, especially if you have other medical problems.    Cut down on the amount of caffeine and stimulants you consume.  Follow-up care  Follow up with your healthcare provider, or as advised.  If tests were done, you will be  told if your treatment needs to be changed. You can call as directed for the results.  (Note: If an X-ray was taken, a specialist will review it. You will be notified of any new findings that may affect your care.)  Call 911 or get immediate medical care  Shortness of breath may be a sign of a serious medical problem. For example, it may be a problem with your heart or lungs. Call 911 if you have worsening shortness of breath or trouble breathing, especially with any of the symptoms below:    You are confused or it s difficult to wake you.    You faint or lose consciousness.    You have a fast heartbeat, or your heartbeat is irregular.     You are coughing up blood.    You have pain in your chest, arm, shoulder, neck, or upper back.    You break out in a sweat.  When to seek medical advice  Call your healthcare provider right away if any of these occur:    Slight shortness of breath or wheezing     Redness, pain or swelling in your leg, arm, or other body area    Swelling in both legs or ankles    Fast weight gain    Dizziness or weakness    Fever of 100.4 F (38 C) or higher, or as directed by your healthcare provider    0737-8152 [employer]. [address] [place] [address], [place] [address]. All rights reserved. This information is not intended as a substitute for professional medical care. Always follow your healthcare professional's instructions.

## 2017-03-28 NOTE — PROGRESS NOTES
HISTORY OF PRESENT ILLNESS:  I had the opportunity to see Ms. Mia Rodriguez in Cardiology Clinic today at the [facility] in [place] for evaluation of chest pain symptoms.  As you may recall, I have seen Ms. Rodriguez many times over the years for management of her chronic atrial fibrillation, hypertension and dyslipidemia.  She also has COPD and pulmonary hypertension.  I had an opening today, and she came in to follow up on her emergency room visit yesterday.      She woke up yesterday feeling short of breath.  Prior to yesterday, she was feeling quite well without any concerning symptoms or issues.  Her shortness of breath persisted throughout the morning, and at about noon she developed chest tightness symptoms.  She had planned to go shopping with her daughter but canceled that shopping trip, which alarmed her daughter.  They sent her to the local urgent care, and from there she was sent to the emergency room.  Her EKG demonstrated atrial fibrillation with some downsloping inferior and lateral ST segment depression and T-wave inversion, although the EKG was quite similar to previous ones but perhaps with less significant depression.  Her troponins were negative, and her D-dimer was slightly abnormal, which prompted a CT scan of the chest.  She did not have evidence of pulmonary embolism, although she did have evidence of coronary calcifications and atherosclerotic disease within her aorta.  She was discharged from the emergency room after her symptoms resolved, and she is here today to discuss them further.      She has not had further problems with shortness of breath or chest tightness today but has had continued problems with low energy.      PHYSICAL EXAMINATION:  On examination today her blood pressure is 132/80, heart rate 90 and weight 107 pounds.  Her lungs are clear.  Heart rhythm is irregularly irregular without cardiac murmur.  She has no carotid bruits or edema.      IMPRESSIONS:  Ms.  Rosalind Bernabe is an 83-year-old woman with hypertension, dyslipidemia and chronic atrial fibrillation.  She has some coronary calcifications on CT scan and yesterday had an episode of shortness of breath developing into chest tightness concerning for angina.  She had no evidence of myocardial infarction in the emergency room, and she is following up here to discuss this further.      She has not had a stress test since , and I suggested we reevaluate her coronary artery disease status with an exercise nuclear perfusion imaging study.  If she is not able to achieve adequate heart rate then we can convert that to a Lexiscan nuclear perfusion study.  I will have her followup after that with one of our advanced practice providers to go over those results and discuss any further evaluation that might be necessary such as coronary angiography.  I will not make any medication changes today.      Thank you for allowing me to participate in Ms. Bernabe's care.      Robert Barreto MD, FACC       cc:   [place] [address]         ROBERT BARRETO MD, FACC             D: 2017 15:01   T: 2017 02:09   MT: REBECCA      Name:     ROSALIND BERNABE   MRN:      [id number]        Account:      [id number]   :      1934           Service Date: 2017      Document: [id number]

## 2017-03-29 ENCOUNTER — DOCUMENTATION ONLY (OUTPATIENT)
Dept: CARDIOLOGY | Facility: CLINIC | Age: 82
End: 2017-03-29

## 2017-03-29 ENCOUNTER — HOSPITAL ENCOUNTER (OUTPATIENT)
Dept: CARDIOLOGY | Facility: CLINIC | Age: 82
Discharge: HOME | End: 2017-03-29
Attending: INTERNAL MEDICINE | Admitting: INTERNAL MEDICINE
Payer: MEDICARE

## 2017-03-29 VITALS — SYSTOLIC BLOOD PRESSURE: 128 MMHG | HEART RATE: 61 BPM | DIASTOLIC BLOOD PRESSURE: 70 MMHG

## 2017-03-29 DIAGNOSIS — R07.9 ACUTE CHEST PAIN: ICD-10-CM

## 2017-03-29 PROCEDURE — 93016 CV STRESS TEST SUPVJ ONLY: CPT | Performed by: INTERNAL MEDICINE

## 2017-03-29 PROCEDURE — 78452 HT MUSCLE IMAGE SPECT MULT: CPT

## 2017-03-29 PROCEDURE — 78452 HT MUSCLE IMAGE SPECT MULT: CPT | Mod: 26 | Performed by: INTERNAL MEDICINE

## 2017-03-29 PROCEDURE — A9502 TC99M TETROFOSMIN: HCPCS | Performed by: INTERNAL MEDICINE

## 2017-03-29 PROCEDURE — 34300033 ZZH RX 343: Performed by: INTERNAL MEDICINE

## 2017-03-29 PROCEDURE — 93018 CV STRESS TEST I&R ONLY: CPT | Performed by: INTERNAL MEDICINE

## 2017-03-29 PROCEDURE — 25000128 H RX IP 250 OP 636: Performed by: INTERNAL MEDICINE

## 2017-03-29 RX ORDER — ALBUTEROL SULFATE 90 UG/1
2 AEROSOL, METERED RESPIRATORY (INHALATION) EVERY 5 MIN PRN
Status: DISCONTINUED | OUTPATIENT
Start: 2017-03-29 | End: 2017-03-30 | Stop reason: HOSPADM

## 2017-03-29 RX ORDER — AMINOPHYLLINE 25 MG/ML
50-100 INJECTION, SOLUTION INTRAVENOUS
Status: DISCONTINUED | OUTPATIENT
Start: 2017-03-29 | End: 2017-03-30 | Stop reason: HOSPADM

## 2017-03-29 RX ORDER — REGADENOSON 0.08 MG/ML
0.4 INJECTION, SOLUTION INTRAVENOUS ONCE
Status: COMPLETED | OUTPATIENT
Start: 2017-03-29 | End: 2017-03-29

## 2017-03-29 RX ORDER — ACYCLOVIR 200 MG/1
0-1 CAPSULE ORAL
Status: DISCONTINUED | OUTPATIENT
Start: 2017-03-29 | End: 2017-03-30 | Stop reason: HOSPADM

## 2017-03-29 RX ADMIN — REGADENOSON 0.4 MG: 0.08 INJECTION, SOLUTION INTRAVENOUS at 12:55

## 2017-03-29 RX ADMIN — TETROFOSMIN 8.91 MCI.: 0.23 INJECTION, POWDER, LYOPHILIZED, FOR SOLUTION INTRAVENOUS at 13:00

## 2017-03-29 RX ADMIN — TETROFOSMIN 3.23 MCI.: 0.23 INJECTION, POWDER, LYOPHILIZED, FOR SOLUTION INTRAVENOUS at 11:23

## 2017-03-29 NOTE — PROGRESS NOTES
Results of NM Lexiscan stress test 3/29/17 shown below. Ordered at OV w/ Dr. Cantrell 3/27/17 due to a recent episode of shortness of breath/chest pain. NM stress test showing no evidence of ischemia or infarction. Patient reported chest tigtness/shortness of breath with the Lexiscan infusion. Call made to patient to update her that stress test did not show evidence of any blockages. Patient stated that the chest tightness that she experienced during the stress test was brief, resolved, and she has not had any further episodes of chest pain/shortness of breath. Patient is scheduled for f/u w/ Xuan Arrington CNP 4/7/17. Advised that she keep that appt and call if she has any further episodes of chest pain/shortness of breath in the mean time. She stated understanding and agreement with this plan. Will route to Dr. Cantrell for review. DEZ Barrera RN    Study Result   GATED MYOCARDIAL PERFUSION SCINTIGRAPHY WITH INTRAVENOUS PHARMACOLOGIC  VASODILATATION LEXISCAN -ONE DAY STUDY     3/29/2017 1:02 PM ROSALIND BERNABE 83 years Female 2/25/1934.     Indication/Clinical History: Chest pain     Impression  1. Myocardial perfusion imaging using single isotope technique  demonstrated likely normal perfusion without evidence of significant  ischemia or injury. Of note, the patient complained of shortness of  breath and chest tightness with Lexiscan infusion however and clinical  correlation is recommended  2. Gated images demonstrated normal wall motion and thickening. The  left ventricular systolic function is 63% at rest and 61% post stress.  3. Compared to the prior study from to/17/2009, prior study also  described normal perfusion .     Procedure  Pharmacologic stress testing was performed with Lexiscan at a rate of  0.08 mg/ml rapid bolus injection, for 15 seconds, 0.4 mg/5ml  intravenously. Low-level exercise was not performed along with the  vasodilator infusion. The heart rate was 61 at baseline and shyann to  82 beats per  minute during the Lexiscan infusion. The rest blood  pressure was 128/70 mmHg and was 108/58 mm Hg during Lexiscan  infusion. The patient experienced shortness of breath and chest  tightness during the test.     Myocardial perfusion imaging was performed at rest, approximately 45  minutes after the injection intravenously of 3.23 mCi of Tc-99m  Myoview. At peak pharmacologic effect, 10-20 seconds after Lexiscan,   the patient was injected intravenously with 8.91 mCi of Tc-99m  Myoview. The post-stress tomographic imaging was performed  approximately 60 minutes after stress.     EKG Findings  The resting EKG demonstrated atrial fibrillation (versus flutter  suggested on several strips) with nonspecific ST-T abnormalities. The  stress EKG demonstrated no ST-T abnormalities over baseline that would  be diagnostic of ischemia or injury.     Tomographic Findings  Overall, the study quality is good . On the stress images, perfusion  is normal. With the exception of a small mild area of decreased uptake  at the anteroseptal apex. On the rest images, perfusion is normal with  the exception of a small mild area of decreased uptake at the  anteroseptal apex . This most likely reflects breast attenuation  artifact Gated images demonstrated normal wall motion and thickening.  The left ventricular ejection fraction was calculated to be 61% post  stress. TID was at the upper limits of normal for Lexiscan study at  1.33. Substantial visual transient ischemic dilatation is not seen on  review of images. It appears artifactual likely related to the small  cardiac volume     CELI BENSON MD

## 2017-04-07 ENCOUNTER — OFFICE VISIT (OUTPATIENT)
Dept: CARDIOLOGY | Facility: CLINIC | Age: 82
End: 2017-04-07
Attending: INTERNAL MEDICINE
Payer: MEDICARE

## 2017-04-07 VITALS
DIASTOLIC BLOOD PRESSURE: 78 MMHG | HEIGHT: 61 IN | HEART RATE: 94 BPM | WEIGHT: 107 LBS | SYSTOLIC BLOOD PRESSURE: 124 MMHG | BODY MASS INDEX: 20.2 KG/M2

## 2017-04-07 DIAGNOSIS — R07.9 ACUTE CHEST PAIN: ICD-10-CM

## 2017-04-07 PROCEDURE — 99213 OFFICE O/P EST LOW 20 MIN: CPT | Performed by: NURSE PRACTITIONER

## 2017-04-07 NOTE — PROGRESS NOTES
"Cardiology Clinic Progress Note  Mia Rodriguez MRN# [id number]   YOB: 1934 Age: 83 year old     Reason for visit: Follow up nuclear stress test           Assessment and Plan:     1. Shortness of breath    No ischemia or injury noted on Lexiscan nuclear stress test    Recommend pulmonary evaluation    Follow up with Dr. Cantrell in December    2. Chronic atrial fibrillation    Rate controlled on Diltiazem    Anticoagulated on Eliquis         History of Presenting Illness:    Mia Rodriguez is a very pleasant 83 year old patient of Dr. Cantrell who presents today for a follow up to review her nuclear stress test.  She has a past medical history significant for chronic atrial fibrillation with a history of TIA while on Coumadin.  She is now on Eliquis.  She also has COPD, pulmonary hypertension, hypertension and dyslipidemia.     She was recently seen by Dr. Cantrell following an emergency room visit for shortness of breath and chest tightness.  She presented to urgent care and then was directed to the emergency room.  The EKG demonstrated atrial fibrillation with some downsloping inferior and lateral ST segment depression and T-wave inversion, although the EKG was quite similar to previous ones but perhaps with less significant depression.  She had serial troponins which were negative, but her d-dimer was slightly abnormal.  She underwent a CT scan and did not have evidence of a pulmonary embolism but did show coronary calcifications and atherosclerotic disease within her aorta.  Dr. Cantrell recommended that she undergo a Lexiscan nuclear stress test for further evaluation.    This test test demonstrated normal perfusion without evidence of ischemia or injury.  Her LV systolic function was normal with a LVEF 63% at rest and 61% post.  She did complain of shortness of breath and chest tightness during the Lexiscan infusion.  She states after her stress test she felt like her lungs\" cleared up\" and she " "was able to breathe easier.  Since that time her shortness of breath has returned.  She notes that it is associated with prolonged episodes of standing.  He does have underlying COPD and pulmonary hypertension and has not been evaluated by pulmonology.  She does have an albuterol inhaler that she uses as needed.  Reviewed with the patient and her daughter in detail the results of the stress test and recommended a pursue a pulmonary cause for her symptoms of shortness of breath.          This note was completed in part using Dragon voice recognition software. Although reviewed after completion, some word and grammatical errors may occur       Review of Systems:   Review of Systems:  Skin:  Negative     Eyes:  Positive for glasses  ENT:  Negative    Respiratory:  Positive for dyspnea on exertion  Cardiovascular:    Positive for;lightheadedness  Gastroenterology: Negative    Genitourinary:  Positive for nocturia  Musculoskeletal:  Negative joint stiffness  Neurologic:  Negative    Psychiatric:  Negative    Heme/Lymph/Imm:  Negative    Endocrine:  Negative                Physical Exam:     Vitals: /78  Pulse 94  Ht 1.537 m (5' 0.5\")  Wt 48.5 kg (107 lb)  LMP 12/07/1981  BMI 20.55 kg/m2  Constitutional:  cooperative;alert and oriented;well developed;well nourished;in no acute distress        Skin:  warm and dry to the touch        Head:  normocephalic        Eyes:  pupils equal and round        ENT:  no pallor or cyanosis        Neck:           Chest:  clear to auscultation        Cardiac: regular rhythm;normal S1 and S2                  Abdomen:  not assessed this visit        Vascular: not assessed this visit                                      Extremities and Back:  no edema        Neurological:  no gross motor deficits;affect appropriate                 Medications:     Current Outpatient Prescriptions   Medication Sig Dispense Refill     apixaban ANTICOAGULANT (ELIQUIS) 2.5 MG tablet Take 1 tablet (2.5 " mg) by mouth 2 times daily 180 tablet 3     atorvastatin (LIPITOR) 40 MG tablet Take 1 tablet (40 mg) by mouth daily 90 tablet 3     diltiazem (DILACOR XR) 180 MG 24 hr ER capsule Take 1 capsule (180 mg) by mouth daily 90 capsule 3     albuterol (PROAIR HFA, PROVENTIL HFA, VENTOLIN HFA) 108 (90 BASE) MCG/ACT inhaler Inhale 2 puffs into the lungs every 6 hours       denosumab (PROLIA) 60 MG/ML SOLN Inject 60 mg Subcutaneous. q 6 months             Family History   Problem Relation Age of Onset     Alzheimer Disease Mother      OSTEOPOROSIS Mother      C.A.D. Father      Cardiovascular Father      CEREBROVASCULAR DISEASE Father      Breast Cancer Maternal Grandmother      Unknown/Adopted Maternal Grandfather      Unknown/Adopted Paternal Grandmother      old age     CANCER Paternal Grandfather      stomach       Social History     Social History     Marital status: Single     Spouse name: N/A     Number of children: 4     Years of education: N/A     Occupational History     Not on file.     Social History Main Topics     Smoking status: Former Smoker     Packs/day: 1.00     Years: 30.00     Types: Cigarettes     Quit date: 12/1/1989     Smokeless tobacco: Never Used      Comment: Started:  Stopped:     Alcohol use No     Drug use: No     Sexual activity: Not Currently     Birth control/ protection: Female Surgical      Comment: LISA     Other Topics Concern     Parent/Sibling W/ Cabg, Mi Or Angioplasty Before 65f 55m? No     Caffeine Concern Yes     2 cups caffeine per day     Sleep Concern Yes     Stress Concern No     Weight Concern No     Special Diet No     Back Care No     Exercise No     Seat Belt Yes     Social History Narrative            Past Medical History:     Past Medical History:   Diagnosis Date     Arrhythmia     atrial fib.-on coumadin     Atrial fibrillation (H)      Breast cancer (H)      COPD (chronic obstructive pulmonary disease) (H)      COPD exacerbation (H) 9/2/2015     Coronary artery disease       Hypertension      Malignant neoplasm (H) 6/2/2008    Right Breast Cancer     Osteoporosis      Renal disease     kidney stones     Thyroid disease     Hypothyroid     Unspecified cerebral artery occlusion with cerebral infarction     TIA     Uterine fibroid               Past Surgical History:     Past Surgical History:   Procedure Laterality Date     BIOPSY  6/2/2008    Left /RightBreast Biopsy     BIOPSY  6/1/2010    Right Breast Biopsy     BREAST SURGERY  6/12/2008    Right Breast Lumpectomy     CYSTOSCOPY, DILATE URETHRA, COMBINED  10/5/2012    Procedure: COMBINED CYSTOSCOPY, DILATE URETHRA;  urethral dilation;  Surgeon: Kaushal Harris MD;  Location:  OR     CYSTOSCOPY, RETROGRADES, EXTRACT STONE, COMBINED Right 4/16/2015    Procedure: COMBINED CYSTOSCOPY, RETROGRADES, EXTRACT STONE;  Surgeon: Kaushal Harris MD;  Location:  OR     EXCISE LESION LIP N/A 10/22/2014    Procedure: EXCISE LESION LIP;  Surgeon: Brent Jolley MD;  Location: [facility]     GYN SURGERY  1981    LISA with ovaries intact-fibroid     LASER HOLMIUM LITHOTRIPSY URETER(S), INSERT STENT, COMBINED  10/5/2012    Procedure: COMBINED CYSTOSCOPY, URETEROSCOPY, LASER HOLMIUM LITHOTRIPSY URETER(S), INSERT STENT;  RIGHT URETEROSCOPY ,right ureteral biopsy,WITH LASER HOLMIUM LITHOTRIPSY, INSERT STENT RIGHT URETER;  Surgeon: Kaushal Harris MD;  Location:  OR     LASER HOLMIUM LITHOTRIPSY URETER(S), INSERT STENT, COMBINED Right 4/16/2015    Procedure: COMBINED CYSTOSCOPY, URETEROSCOPY, LASER HOLMIUM LITHOTRIPSY URETER(S), INSERT STENT;  Surgeon: Kaushal Harris MD;  Location:  OR              Allergies:   Megace [megestrol acetate]       Data:   All laboratory data reviewed:    LAST CHOLESTEROL:  Lab Results   Component Value Date    CHOL 149 05/06/2016     Lab Results   Component Value Date    HDL 71 05/06/2016     Lab Results   Component Value Date    LDL 64 05/06/2016     Lab Results   Component Value Date     TRIG 69 05/06/2016     Lab Results   Component Value Date    CHOLHDLRATIO 3.5 02/09/2014       LAST BMP:  Lab Results   Component Value Date     03/26/2017      Lab Results   Component Value Date    POTASSIUM 4.1 03/26/2017     Lab Results   Component Value Date    CHLORIDE 107 03/26/2017     Lab Results   Component Value Date    CYNDEE 10.3 03/26/2017     Lab Results   Component Value Date    CO2 25 03/26/2017     Lab Results   Component Value Date    BUN 28 03/26/2017     Lab Results   Component Value Date    CR 0.93 03/26/2017     Lab Results   Component Value Date    GLC 86 03/26/2017       LAST CBC:  Lab Results   Component Value Date    WBC 7.0 03/26/2017     Lab Results   Component Value Date    RBC 5.09 03/26/2017     Lab Results   Component Value Date    HGB 16.9 03/26/2017     Lab Results   Component Value Date    HCT 48.3 03/26/2017     Lab Results   Component Value Date    MCV 95 03/26/2017     Lab Results   Component Value Date    MCH 33.2 03/26/2017     Lab Results   Component Value Date    MCHC 35.0 03/26/2017     Lab Results   Component Value Date    RDW 13.4 03/26/2017     Lab Results   Component Value Date     03/26/2017         HARRISON MAYNARD, APRN, CNP

## 2017-04-07 NOTE — MR AVS SNAPSHOT
"              After Visit Summary   2017    Mia Rodriguez    MRN: [id number]           Patient Information     Date Of Birth          1934        Visit Information        Provider Department      2017 10:50 AM Xuan Correa APRN CNP [facility]        Today's Diagnoses     Acute chest pain          Care Instructions    Follow up with Dr. Cantrell in December or sooner if needed        Follow-ups after your visit        Who to contact     If you have questions or need follow up information about today's clinic visit or your schedule please contact [facility] directly at [phone].  Normal or non-critical lab and imaging results will be communicated to you by MyChart, letter or phone within 4 business days after the clinic has received the results. If you do not hear from us within 7 days, please contact the clinic through MyChart or phone. If you have a critical or abnormal lab result, we will notify you by phone as soon as possible.  Submit refill requests through [employer] or call your pharmacy and they will forward the refill request to us. Please allow 3 business days for your refill to be completed.          Additional Information About Your Visit        MyChart Information     [employer] lets you send messages to your doctor, view your test results, renew your prescriptions, schedule appointments and more. To sign up, go to [email or web address] . Click on \"Log in\" on the left side of the screen, which will take you to the Welcome page. Then click on \"Sign up Now\" on the right side of the page.     You will be asked to enter the access code listed below, as well as some personal information. Please follow the directions to create your username and password.     Your access code is: [email or web address]  Expires: 2017  9:45 AM     Your access code will  in 90 days. If you need help or a new code, please call " "your [place] clinic or [phone].        Care EveryWhere ID     This is your Care EveryWhere ID. This could be used by other organizations to access your [place] medical records  [id number]        Your Vitals Were     Pulse Height Last Period BMI (Body Mass Index)          94 1.537 m (5' 0.5\") 12/07/1981 20.55 kg/m2         Blood Pressure from Last 3 Encounters:   04/07/17 124/78   03/29/17 128/70   03/27/17 132/80    Weight from Last 3 Encounters:   04/07/17 48.5 kg (107 lb)   03/27/17 48.7 kg (107 lb 4.8 oz)   03/26/17 46.7 kg (103 lb)              We Performed the Following     Follow-Up with Cardiac Advanced Practice Provider        Primary Care Provider Office Phone # Fax #    Birgit Cordero [phone]       [facility] [address] [facility] [address]        Thank you!     Thank you for choosing [facility] [place]  for your care. Our goal is always to provide you with excellent care. Hearing back from our patients is one way we can continue to improve our services. Please take a few minutes to complete the written survey that you may receive in the mail after your visit with us. Thank you!             Your Updated Medication List - Protect others around you: Learn how to safely use, store and throw away your medicines at www.disposemymeds.org.          This list is accurate as of: 4/7/17 11:08 AM.  Always use your most recent med list.                   Brand Name Dispense Instructions for use    albuterol 108 (90 BASE) MCG/ACT Inhaler    PROAIR HFA/PROVENTIL HFA/VENTOLIN HFA     Inhale 2 puffs into the lungs every 6 hours       apixaban ANTICOAGULANT 2.5 MG tablet    ELIQUIS    180 tablet    Take 1 tablet (2.5 mg) by mouth 2 times daily       atorvastatin 40 MG tablet    LIPITOR    90 tablet    Take 1 tablet (40 mg) by mouth daily       diltiazem 180 MG 24 hr capsule    DILACOR XR    90 capsule    Take 1 capsule (180 mg) by mouth daily       " PROLIA 60 MG/ML Soln injection   Generic drug:  denosumab      Inject 60 mg Subcutaneous. q 6 months

## 2017-04-07 NOTE — LETTER
4/7/2017    Birgit Cordero  [facility]   [address]  [place] [address]    RE: Mia Rodriguez       Dear Colleague,    I had the pleasure of seeing Mia Rodriguez in the [facility].    Cardiology Clinic Progress Note  Mia Rodriguez MRN# [id number]   YOB: 1934 Age: 83 year old     Reason for visit: Follow up nuclear stress test           Assessment and Plan:     1. Shortness of breath    No ischemia or injury noted on Lexiscan nuclear stress test    Recommend pulmonary evaluation    Follow up with Dr. Cantrell in December    2. Chronic atrial fibrillation    Rate controlled on Diltiazem    Anticoagulated on Eliquis         History of Presenting Illness:    Mia Rodriguez is a very pleasant 83 year old patient of Dr. Cantrell who presents today for a follow up to review her nuclear stress test.  She has a past medical history significant for chronic atrial fibrillation with a history of TIA while on Coumadin.  She is now on Eliquis.  She also has COPD, pulmonary hypertension, hypertension and dyslipidemia.     She was recently seen by Dr. Cantrell following an emergency room visit for shortness of breath and chest tightness.  She presented to urgent care and then was directed to the emergency room.  The EKG demonstrated atrial fibrillation with some downsloping inferior and lateral ST segment depression and T-wave inversion, although the EKG was quite similar to previous ones but perhaps with less significant depression.  She had serial troponins which were negative, but her d-dimer was slightly abnormal.  She underwent a CT scan and did not have evidence of a pulmonary embolism but did show coronary calcifications and atherosclerotic disease within her aorta.  Dr. Cantrell recommended that she undergo a Lexiscan nuclear stress test for further evaluation.    This test test demonstrated normal perfusion without evidence of ischemia or injury.   "Her LV systolic function was normal with a LVEF 63% at rest and 61% post.  She did complain of shortness of breath and chest tightness during the Lexiscan infusion.  She states after her stress test she felt like her lungs\" cleared up\" and she was able to breathe easier.  Since that time her shortness of breath has returned.  She notes that it is associated with prolonged episodes of standing.  He does have underlying COPD and pulmonary hypertension and has not been evaluated by pulmonology.  She does have an albuterol inhaler that she uses as needed.  Reviewed with the patient and her daughter in detail the results of the stress test and recommended a pursue a pulmonary cause for her symptoms of shortness of breath.          This note was completed in part using Dragon voice recognition software. Although reviewed after completion, some word and grammatical errors may occur       Review of Systems:   Review of Systems:  Skin:  Negative     Eyes:  Positive for glasses  ENT:  Negative    Respiratory:  Positive for dyspnea on exertion  Cardiovascular:    Positive for;lightheadedness  Gastroenterology: Negative    Genitourinary:  Positive for nocturia  Musculoskeletal:  Negative joint stiffness  Neurologic:  Negative    Psychiatric:  Negative    Heme/Lymph/Imm:  Negative    Endocrine:  Negative                Physical Exam:     Vitals: /78  Pulse 94  Ht 1.537 m (5' 0.5\")  Wt 48.5 kg (107 lb)  LMP 12/07/1981  BMI 20.55 kg/m2  Constitutional:  cooperative;alert and oriented;well developed;well nourished;in no acute distress        Skin:  warm and dry to the touch        Head:  normocephalic        Eyes:  pupils equal and round        ENT:  no pallor or cyanosis        Neck:           Chest:  clear to auscultation        Cardiac: regular rhythm;normal S1 and S2                  Abdomen:  not assessed this visit        Vascular: not assessed this visit                                      Extremities and Back:  " no edema        Neurological:  no gross motor deficits;affect appropriate                 Medications:     Current Outpatient Prescriptions   Medication Sig Dispense Refill     apixaban ANTICOAGULANT (ELIQUIS) 2.5 MG tablet Take 1 tablet (2.5 mg) by mouth 2 times daily 180 tablet 3     atorvastatin (LIPITOR) 40 MG tablet Take 1 tablet (40 mg) by mouth daily 90 tablet 3     diltiazem (DILACOR XR) 180 MG 24 hr ER capsule Take 1 capsule (180 mg) by mouth daily 90 capsule 3     albuterol (PROAIR HFA, PROVENTIL HFA, VENTOLIN HFA) 108 (90 BASE) MCG/ACT inhaler Inhale 2 puffs into the lungs every 6 hours       denosumab (PROLIA) 60 MG/ML SOLN Inject 60 mg Subcutaneous. q 6 months             Family History   Problem Relation Age of Onset     Alzheimer Disease Mother      OSTEOPOROSIS Mother      C.A.D. Father      Cardiovascular Father      CEREBROVASCULAR DISEASE Father      Breast Cancer Maternal Grandmother      Unknown/Adopted Maternal Grandfather      Unknown/Adopted Paternal Grandmother      old age     CANCER Paternal Grandfather      stomach       Social History     Social History     Marital status: Single     Spouse name: N/A     Number of children: 4     Years of education: N/A     Occupational History     Not on file.     Social History Main Topics     Smoking status: Former Smoker     Packs/day: 1.00     Years: 30.00     Types: Cigarettes     Quit date: 12/1/1989     Smokeless tobacco: Never Used      Comment: Started:  Stopped:     Alcohol use No     Drug use: No     Sexual activity: Not Currently     Birth control/ protection: Female Surgical      Comment: [facility]     Other Topics Concern     Parent/Sibling W/ Cabg, Mi Or Angioplasty Before 65f 55m? No     Caffeine Concern Yes     2 cups caffeine per day     Sleep Concern Yes     Stress Concern No     Weight Concern No     Special Diet No     Back Care No     Exercise No     Seat Belt Yes     Social History Narrative            Past Medical History:     Past  Medical History:   Diagnosis Date     Arrhythmia     atrial fib.-on coumadin     Atrial fibrillation (H)      Breast cancer (H)      COPD (chronic obstructive pulmonary disease) (H)      COPD exacerbation (H) 9/2/2015     Coronary artery disease      Hypertension      Malignant neoplasm (H) 6/2/2008    Right Breast Cancer     Osteoporosis      Renal disease     kidney stones     Thyroid disease     Hypothyroid     Unspecified cerebral artery occlusion with cerebral infarction     TIA     Uterine fibroid               Past Surgical History:     Past Surgical History:   Procedure Laterality Date     BIOPSY  6/2/2008    Left /RightBreast Biopsy     BIOPSY  6/1/2010    Right Breast Biopsy     BREAST SURGERY  6/12/2008    Right Breast Lumpectomy     CYSTOSCOPY, DILATE URETHRA, COMBINED  10/5/2012    Procedure: COMBINED CYSTOSCOPY, DILATE URETHRA;  urethral dilation;  Surgeon: Kaushal Harris MD;  Location:  OR     CYSTOSCOPY, RETROGRADES, EXTRACT STONE, COMBINED Right 4/16/2015    Procedure: COMBINED CYSTOSCOPY, RETROGRADES, EXTRACT STONE;  Surgeon: Kaushal Harris MD;  Location:  OR     EXCISE LESION LIP N/A 10/22/2014    Procedure: EXCISE LESION LIP;  Surgeon: Brent Jolley MD;  Location: [facility]     GYN SURGERY  1981    LISA with ovaries intact-fibroid     LASER HOLMIUM LITHOTRIPSY URETER(S), INSERT STENT, COMBINED  10/5/2012    Procedure: COMBINED CYSTOSCOPY, URETEROSCOPY, LASER HOLMIUM LITHOTRIPSY URETER(S), INSERT STENT;  RIGHT URETEROSCOPY ,right ureteral biopsy,WITH LASER HOLMIUM LITHOTRIPSY, INSERT STENT RIGHT URETER;  Surgeon: Kaushal Harris MD;  Location:  OR     LASER HOLMIUM LITHOTRIPSY URETER(S), INSERT STENT, COMBINED Right 4/16/2015    Procedure: COMBINED CYSTOSCOPY, URETEROSCOPY, LASER HOLMIUM LITHOTRIPSY URETER(S), INSERT STENT;  Surgeon: Kaushal Harris MD;  Location:  OR              Allergies:   Megace [megestrol acetate]       Data:   All laboratory data  reviewed:    LAST CHOLESTEROL:  Lab Results   Component Value Date    CHOL 149 05/06/2016     Lab Results   Component Value Date    HDL 71 05/06/2016     Lab Results   Component Value Date    LDL 64 05/06/2016     Lab Results   Component Value Date    TRIG 69 05/06/2016     Lab Results   Component Value Date    CHOLHDLRATIO 3.5 02/09/2014       LAST BMP:  Lab Results   Component Value Date     03/26/2017      Lab Results   Component Value Date    POTASSIUM 4.1 03/26/2017     Lab Results   Component Value Date    CHLORIDE 107 03/26/2017     Lab Results   Component Value Date    CYNDEE 10.3 03/26/2017     Lab Results   Component Value Date    CO2 25 03/26/2017     Lab Results   Component Value Date    BUN 28 03/26/2017     Lab Results   Component Value Date    CR 0.93 03/26/2017     Lab Results   Component Value Date    GLC 86 03/26/2017       LAST CBC:  Lab Results   Component Value Date    WBC 7.0 03/26/2017     Lab Results   Component Value Date    RBC 5.09 03/26/2017     Lab Results   Component Value Date    HGB 16.9 03/26/2017     Lab Results   Component Value Date    HCT 48.3 03/26/2017     Lab Results   Component Value Date    MCV 95 03/26/2017     Lab Results   Component Value Date    MCH 33.2 03/26/2017     Lab Results   Component Value Date    MCHC 35.0 03/26/2017     Lab Results   Component Value Date    RDW 13.4 03/26/2017     Lab Results   Component Value Date     03/26/2017     Thank you for allowing me to participate in the care of your patient.    Sincerely,     SONAM CEBALLOS [facility]

## 2017-09-01 ENCOUNTER — TELEPHONE (OUTPATIENT)
Dept: OBGYN | Facility: CLINIC | Age: 82
End: 2017-09-01

## 2017-09-01 DIAGNOSIS — M81.0 OSTEOPOROSIS: ICD-10-CM

## 2017-09-01 DIAGNOSIS — M81.0 OSTEOPOROSIS, SENILE: ICD-10-CM

## 2017-09-01 DIAGNOSIS — Z13.228 SCREENING FOR METABOLIC DISORDER: Primary | ICD-10-CM

## 2017-09-01 NOTE — TELEPHONE ENCOUNTER
OK to continue with Prolia after last DEXA. She is due in September. Any labs needed?    Benefits read out: No PA needed, ok for buy/bill. Subject to $183 deductible (met) and a 20% coinsurnace for the admin and cost of Prolia.

## 2017-09-03 ENCOUNTER — HOSPITAL ENCOUNTER (OUTPATIENT)
Facility: CLINIC | Age: 82
Setting detail: OBSERVATION
Discharge: HOME | End: 2017-09-04
Attending: EMERGENCY MEDICINE | Admitting: HOSPITALIST
Payer: MEDICARE

## 2017-09-03 ENCOUNTER — APPOINTMENT (OUTPATIENT)
Dept: CT IMAGING | Facility: CLINIC | Age: 82
End: 2017-09-03
Attending: EMERGENCY MEDICINE
Payer: MEDICARE

## 2017-09-03 DIAGNOSIS — N20.1 URETERAL STONE: ICD-10-CM

## 2017-09-03 DIAGNOSIS — N13.30 HYDRONEPHROSIS, UNSPECIFIED HYDRONEPHROSIS TYPE: ICD-10-CM

## 2017-09-03 PROBLEM — N13.8 NEPHROPATHY, OBSTRUCTIVE: Status: ACTIVE | Noted: 2017-09-03

## 2017-09-03 LAB
ALBUMIN UR-MCNC: NEGATIVE MG/DL
ANION GAP SERPL CALCULATED.3IONS-SCNC: 9 MMOL/L (ref 3–14)
APPEARANCE UR: ABNORMAL
BACTERIA #/AREA URNS HPF: ABNORMAL /HPF
BASOPHILS # BLD AUTO: 0 10E9/L (ref 0–0.2)
BASOPHILS NFR BLD AUTO: 0.2 %
BILIRUB UR QL STRIP: NEGATIVE
BUN SERPL-MCNC: 14 MG/DL (ref 7–30)
CALCIUM SERPL-MCNC: 8.8 MG/DL (ref 8.5–10.1)
CHLORIDE SERPL-SCNC: 108 MMOL/L (ref 94–109)
CO2 SERPL-SCNC: 24 MMOL/L (ref 20–32)
COLOR UR AUTO: ABNORMAL
CREAT SERPL-MCNC: 0.83 MG/DL (ref 0.52–1.04)
DIFFERENTIAL METHOD BLD: ABNORMAL
EOSINOPHIL # BLD AUTO: 0.1 10E9/L (ref 0–0.7)
EOSINOPHIL NFR BLD AUTO: 1.1 %
ERYTHROCYTE [DISTWIDTH] IN BLOOD BY AUTOMATED COUNT: 13.8 % (ref 10–15)
GFR SERPL CREATININE-BSD FRML MDRD: 65 ML/MIN/1.7M2
GLUCOSE SERPL-MCNC: 106 MG/DL (ref 70–99)
GLUCOSE UR STRIP-MCNC: NEGATIVE MG/DL
HCT VFR BLD AUTO: 44.7 % (ref 35–47)
HGB BLD-MCNC: 15.3 G/DL (ref 11.7–15.7)
HGB UR QL STRIP: ABNORMAL
HYALINE CASTS #/AREA URNS LPF: 3 /LPF (ref 0–2)
IMM GRANULOCYTES # BLD: 0 10E9/L (ref 0–0.4)
IMM GRANULOCYTES NFR BLD: 0.2 %
KETONES UR STRIP-MCNC: 5 MG/DL
LEUKOCYTE ESTERASE UR QL STRIP: ABNORMAL
LYMPHOCYTES # BLD AUTO: 1 10E9/L (ref 0.8–5.3)
LYMPHOCYTES NFR BLD AUTO: 9 %
MCH RBC QN AUTO: 32.4 PG (ref 26.5–33)
MCHC RBC AUTO-ENTMCNC: 34.2 G/DL (ref 31.5–36.5)
MCV RBC AUTO: 95 FL (ref 78–100)
MONOCYTES # BLD AUTO: 0.7 10E9/L (ref 0–1.3)
MONOCYTES NFR BLD AUTO: 7 %
MUCOUS THREADS #/AREA URNS LPF: PRESENT /LPF
NEUTROPHILS # BLD AUTO: 8.7 10E9/L (ref 1.6–8.3)
NEUTROPHILS NFR BLD AUTO: 82.5 %
NITRATE UR QL: NEGATIVE
NRBC # BLD AUTO: 0 10*3/UL
NRBC BLD AUTO-RTO: 0 /100
PH UR STRIP: 5.5 PH (ref 5–7)
PLATELET # BLD AUTO: 154 10E9/L (ref 150–450)
POTASSIUM SERPL-SCNC: 3.8 MMOL/L (ref 3.4–5.3)
RBC # BLD AUTO: 4.72 10E12/L (ref 3.8–5.2)
RBC #/AREA URNS AUTO: 7 /HPF (ref 0–2)
SODIUM SERPL-SCNC: 141 MMOL/L (ref 133–144)
SOURCE: ABNORMAL
SP GR UR STRIP: 1.01 (ref 1–1.03)
SQUAMOUS #/AREA URNS AUTO: 2 /HPF (ref 0–1)
UROBILINOGEN UR STRIP-MCNC: NORMAL MG/DL (ref 0–2)
WBC # BLD AUTO: 10.5 10E9/L (ref 4–11)
WBC #/AREA URNS AUTO: 7 /HPF (ref 0–2)

## 2017-09-03 PROCEDURE — 96361 HYDRATE IV INFUSION ADD-ON: CPT

## 2017-09-03 PROCEDURE — 96376 TX/PRO/DX INJ SAME DRUG ADON: CPT

## 2017-09-03 PROCEDURE — 25000132 ZZH RX MED GY IP 250 OP 250 PS 637: Mod: GY | Performed by: HOSPITALIST

## 2017-09-03 PROCEDURE — G0378 HOSPITAL OBSERVATION PER HR: HCPCS

## 2017-09-03 PROCEDURE — 80048 BASIC METABOLIC PNL TOTAL CA: CPT | Performed by: EMERGENCY MEDICINE

## 2017-09-03 PROCEDURE — 96375 TX/PRO/DX INJ NEW DRUG ADDON: CPT

## 2017-09-03 PROCEDURE — 85025 COMPLETE CBC W/AUTO DIFF WBC: CPT | Performed by: EMERGENCY MEDICINE

## 2017-09-03 PROCEDURE — 25000128 H RX IP 250 OP 636: Performed by: HOSPITALIST

## 2017-09-03 PROCEDURE — 25000128 H RX IP 250 OP 636: Performed by: EMERGENCY MEDICINE

## 2017-09-03 PROCEDURE — 96374 THER/PROPH/DIAG INJ IV PUSH: CPT

## 2017-09-03 PROCEDURE — 99285 EMERGENCY DEPT VISIT HI MDM: CPT | Mod: 25

## 2017-09-03 PROCEDURE — 81001 URINALYSIS AUTO W/SCOPE: CPT | Performed by: EMERGENCY MEDICINE

## 2017-09-03 PROCEDURE — 99220 ZZC INITIAL OBSERVATION CARE,LEVL III: CPT | Performed by: HOSPITALIST

## 2017-09-03 PROCEDURE — 74176 CT ABD & PELVIS W/O CONTRAST: CPT

## 2017-09-03 RX ORDER — PROCHLORPERAZINE MALEATE 5 MG
5 TABLET ORAL EVERY 6 HOURS PRN
Status: DISCONTINUED | OUTPATIENT
Start: 2017-09-03 | End: 2017-09-04 | Stop reason: HOSPADM

## 2017-09-03 RX ORDER — ONDANSETRON 2 MG/ML
4 INJECTION INTRAMUSCULAR; INTRAVENOUS EVERY 6 HOURS PRN
Status: DISCONTINUED | OUTPATIENT
Start: 2017-09-03 | End: 2017-09-04 | Stop reason: HOSPADM

## 2017-09-03 RX ORDER — ONDANSETRON 4 MG/1
4 TABLET, ORALLY DISINTEGRATING ORAL EVERY 6 HOURS PRN
Status: DISCONTINUED | OUTPATIENT
Start: 2017-09-03 | End: 2017-09-04 | Stop reason: HOSPADM

## 2017-09-03 RX ORDER — ACETAMINOPHEN 325 MG/1
650 TABLET ORAL EVERY 4 HOURS PRN
Status: DISCONTINUED | OUTPATIENT
Start: 2017-09-03 | End: 2017-09-04 | Stop reason: HOSPADM

## 2017-09-03 RX ORDER — LIDOCAINE 40 MG/G
CREAM TOPICAL
Status: DISCONTINUED | OUTPATIENT
Start: 2017-09-03 | End: 2017-09-04 | Stop reason: HOSPADM

## 2017-09-03 RX ORDER — SODIUM CHLORIDE 9 MG/ML
INJECTION, SOLUTION INTRAVENOUS CONTINUOUS
Status: DISCONTINUED | OUTPATIENT
Start: 2017-09-03 | End: 2017-09-04 | Stop reason: HOSPADM

## 2017-09-03 RX ORDER — HYDROMORPHONE HYDROCHLORIDE 1 MG/ML
.3-.5 INJECTION, SOLUTION INTRAMUSCULAR; INTRAVENOUS; SUBCUTANEOUS
Status: DISCONTINUED | OUTPATIENT
Start: 2017-09-03 | End: 2017-09-04 | Stop reason: HOSPADM

## 2017-09-03 RX ORDER — MORPHINE SULFATE 2 MG/ML
2 INJECTION, SOLUTION INTRAMUSCULAR; INTRAVENOUS
Status: DISCONTINUED | OUTPATIENT
Start: 2017-09-03 | End: 2017-09-03

## 2017-09-03 RX ORDER — ALBUTEROL SULFATE 90 UG/1
2 AEROSOL, METERED RESPIRATORY (INHALATION) EVERY 4 HOURS PRN
Status: DISCONTINUED | OUTPATIENT
Start: 2017-09-03 | End: 2017-09-04 | Stop reason: HOSPADM

## 2017-09-03 RX ORDER — POLYETHYLENE GLYCOL 3350 17 G/17G
17 POWDER, FOR SOLUTION ORAL DAILY PRN
Status: DISCONTINUED | OUTPATIENT
Start: 2017-09-03 | End: 2017-09-04 | Stop reason: HOSPADM

## 2017-09-03 RX ORDER — OXYCODONE HYDROCHLORIDE 5 MG/1
5-10 TABLET ORAL
Status: DISCONTINUED | OUTPATIENT
Start: 2017-09-03 | End: 2017-09-04 | Stop reason: HOSPADM

## 2017-09-03 RX ORDER — ACETAMINOPHEN 650 MG/1
650 SUPPOSITORY RECTAL EVERY 4 HOURS PRN
Status: DISCONTINUED | OUTPATIENT
Start: 2017-09-03 | End: 2017-09-04 | Stop reason: HOSPADM

## 2017-09-03 RX ORDER — BISACODYL 10 MG
10 SUPPOSITORY, RECTAL RECTAL DAILY PRN
Status: DISCONTINUED | OUTPATIENT
Start: 2017-09-03 | End: 2017-09-04 | Stop reason: HOSPADM

## 2017-09-03 RX ORDER — TAMSULOSIN HYDROCHLORIDE 0.4 MG/1
0.4 CAPSULE ORAL DAILY
Status: DISCONTINUED | OUTPATIENT
Start: 2017-09-03 | End: 2017-09-04 | Stop reason: HOSPADM

## 2017-09-03 RX ORDER — DILTIAZEM HYDROCHLORIDE 180 MG/1
180 CAPSULE, EXTENDED RELEASE ORAL DAILY
Status: DISCONTINUED | OUTPATIENT
Start: 2017-09-04 | End: 2017-09-04 | Stop reason: HOSPADM

## 2017-09-03 RX ORDER — ONDANSETRON 2 MG/ML
4 INJECTION INTRAMUSCULAR; INTRAVENOUS ONCE
Status: COMPLETED | OUTPATIENT
Start: 2017-09-03 | End: 2017-09-03

## 2017-09-03 RX ORDER — NALOXONE HYDROCHLORIDE 0.4 MG/ML
.1-.4 INJECTION, SOLUTION INTRAMUSCULAR; INTRAVENOUS; SUBCUTANEOUS
Status: DISCONTINUED | OUTPATIENT
Start: 2017-09-03 | End: 2017-09-04 | Stop reason: HOSPADM

## 2017-09-03 RX ORDER — AMOXICILLIN 250 MG
1-2 CAPSULE ORAL 2 TIMES DAILY PRN
Status: DISCONTINUED | OUTPATIENT
Start: 2017-09-03 | End: 2017-09-04 | Stop reason: HOSPADM

## 2017-09-03 RX ORDER — PROCHLORPERAZINE 25 MG
12.5 SUPPOSITORY, RECTAL RECTAL EVERY 12 HOURS PRN
Status: DISCONTINUED | OUTPATIENT
Start: 2017-09-03 | End: 2017-09-04 | Stop reason: HOSPADM

## 2017-09-03 RX ADMIN — SODIUM CHLORIDE 1000 ML: 9 INJECTION, SOLUTION INTRAVENOUS at 09:41

## 2017-09-03 RX ADMIN — MORPHINE SULFATE 2 MG: 2 INJECTION, SOLUTION INTRAMUSCULAR; INTRAVENOUS at 10:40

## 2017-09-03 RX ADMIN — MORPHINE SULFATE 2 MG: 2 INJECTION, SOLUTION INTRAMUSCULAR; INTRAVENOUS at 09:41

## 2017-09-03 RX ADMIN — MORPHINE SULFATE 2 MG: 2 INJECTION, SOLUTION INTRAMUSCULAR; INTRAVENOUS at 10:13

## 2017-09-03 RX ADMIN — SODIUM CHLORIDE: 9 INJECTION, SOLUTION INTRAVENOUS at 12:53

## 2017-09-03 RX ADMIN — TAMSULOSIN HYDROCHLORIDE 0.4 MG: 0.4 CAPSULE ORAL at 14:03

## 2017-09-03 RX ADMIN — ONDANSETRON 4 MG: 2 SOLUTION INTRAMUSCULAR; INTRAVENOUS at 09:41

## 2017-09-03 ASSESSMENT — ENCOUNTER SYMPTOMS
FEVER: 0
FLANK PAIN: 1
ABDOMINAL PAIN: 1

## 2017-09-03 NOTE — PLAN OF CARE
"Problem: Goal Outcome Summary  Goal: Goal Outcome Summary  Outcome: No Change  VS stable, minimal pain, has some nausea, no emesis, up with one assist and did well.  C/o being cold all of the time.  Dr. De La Rosa was consulted and \"will take pt to surgery tomorrow\", family is informed, start some clear liquid diet at this time, will NPO after midnight. Continue to monitor.      "

## 2017-09-03 NOTE — IP AVS SNAPSHOT
[facility] Observation Unit    [address] [place] [id number]    Phone:  [phone]                                       After Visit Summary   9/3/2017    Mia Rodriguez    MRN: [id number]           After Visit Summary Signature Page     I have received my discharge instructions, and my questions have been answered. I have discussed any challenges I see with this plan with the nurse or doctor.    ..........................................................................................................................................  Patient/Patient Representative Signature      ..........................................................................................................................................  Patient Representative Print Name and Relationship to Patient    ..................................................               ................................................  Date                                            Time    ..........................................................................................................................................  Reviewed by Signature/Title    ...................................................              ..............................................  Date                                                            Time

## 2017-09-03 NOTE — PLAN OF CARE
Problem: Discharge Planning  Goal: Discharge Planning (Adult, OB, Behavioral, Peds)  Outcome: No Change  Cont to be chilled, extra linen on. vss tolerating diet up to rest room voiding in small amounts with out pain or diff. Slight c/o of pain flank area.

## 2017-09-03 NOTE — ED NOTES
Bed: ED13  Expected date:   Expected time:   Means of arrival:   Comments:  411  83 F flank pain  0908

## 2017-09-03 NOTE — PLAN OF CARE
Observation goals PRIOR TO DISCHARGE     Comments: -diagnostic tests and consults completed and resulted: not met  -vital signs normal or at patient baseline: met  -tolerating oral intake to maintain hydration: not met, NPO awaiting for urologist  -adequate pain control on oral analgesics: partially met  -returns to baseline functional status: partially met  -safe disposition plan has been identified: partially met  Nurse to notify provider when observation goals have been met and patient is ready for discharge.        VS stable, minimal pain at this time, awaiting for urologist, NPO at this time, voiding once.  IV continues at 50 cc/hr.

## 2017-09-03 NOTE — IP AVS SNAPSHOT
MRN:[id number]                      After Visit Summary   9/3/2017    Mia Rodriguez    MRN: [id number]           Thank you!     Thank you for choosing [facility] for your care. Our goal is always to provide you with excellent care. Hearing back from our patients is one way we can continue to improve our services. Please take a few minutes to complete the written survey that you may receive in the mail after you visit with us. Thank you!        Patient Information     Date Of Birth          2/25/1934        About your hospital stay     You were admitted on:  September 3, 2017 [facility]    You were discharged on:  September 4, 2017        Reason for your hospital stay       Right sided kidney stone.                  Who to Call     For medical emergencies, please call 911.  For non-urgent questions about your medical care, please call your primary care provider or clinic, [phone]  For questions related to your surgery, please call your surgery clinic        Attending Provider     Provider Juan Conte MD Emergency Medicine    [facility]       Primary Care Provider Office Phone # Fax #    Birgit ROMERO Gil [phone]      After Care Instructions     Activity       Your activity upon discharge: activity as tolerated.            Diet       Follow this diet upon discharge:    Regular Diet Adult                  Follow-up Appointments     Follow-up and recommended labs and tests        Follow up with Dr. Cordero or Dr. Harris as needed.                  Further instructions from your care team       Follow up with Dr. Cordero if you develop signs of infection like fever, chills, burning with urination, urinary frequency, urinary urgency, abdominal pain, nausea, or vomiting.    Follow up with Dr. Harris as needed.    Continue to sip on fluids throughout the day.    Pending Results   "   No orders found for last 3 day(s).            Statement of Approval     Ordered          17 0924  I have reviewed and agree with all the recommendations and orders detailed in this document.  EFFECTIVE NOW     Approved and electronically signed by:  Barthell, Joanna Kersten Ulmen, PA-C             Admission Information     Date & Time Provider Department Dept. Phone    9/3/2017 Konrad Lock MD [facility] Observation Unit [phone]      Your Vitals Were     Blood Pressure Pulse Temperature Respirations Height Weight    107/65 (BP Location: Right arm) 105 99.2  F (37.3  C) (Oral) 16 1.549 m (5' 1\") 46.1 kg (101 lb 9.6 oz)    Last Period Pulse Oximetry BMI (Body Mass Index)             1981 91% 19.2 kg/m2         [employer] Information     [employer] lets you send messages to your doctor, view your test results, renew your prescriptions, schedule appointments and more. To sign up, go to [email or web address] . Click on \"Log in\" on the left side of the screen, which will take you to the Welcome page. Then click on \"Sign up Now\" on the right side of the page.     You will be asked to enter the access code listed below, as well as some personal information. Please follow the directions to create your username and password.     Your access code is: [email or web address]  Expires: 12/3/2017  9:26 AM     Your access code will  in 90 days. If you need help or a new code, please call your [facility] clinic or [phone].        Care EveryWhere ID     This is your Care EveryWhere ID. This could be used by other organizations to access your [facility] medical records  [id number]        Equal Access to Services     [facility] LIZBETH : Miladis Sparks, mayo durán, reynaldo preciadoalnahun fountain, fatimah duvall. So [facility] [phone].    ATENCIÓN: Si habla español, tiene a velasquez disposición servicios gratuitos de asistencia lingüística. Llame al [phone].    We comply with " applicable federal civil rights laws and [place] laws. We do not discriminate on the basis of race, color, national origin, age, disability sex, sexual orientation or gender identity.               Review of your medicines      CONTINUE these medicines which have NOT CHANGED        Dose / Directions    albuterol 108 (90 BASE) MCG/ACT Inhaler   Commonly known as:  PROAIR HFA/PROVENTIL HFA/VENTOLIN HFA        Dose:  2 puff   Inhale 2 puffs into the lungs every 6 hours   Refills:  0       apixaban ANTICOAGULANT 2.5 MG tablet   Commonly known as:  ELIQUIS   Used for:  Paroxysmal atrial fibrillation (H)        Dose:  2.5 mg   Take 1 tablet (2.5 mg) by mouth 2 times daily   Quantity:  180 tablet   Refills:  3       atorvastatin 40 MG tablet   Commonly known as:  LIPITOR   Used for:  Hyperlipidemia LDL goal <100        Dose:  40 mg   Take 1 tablet (40 mg) by mouth daily   Quantity:  90 tablet   Refills:  3       BOOST PO        Dose:  1 Bottle   Take 1 Bottle by mouth daily   Refills:  0       diltiazem 180 MG 24 hr capsule   Commonly known as:  DILACOR XR   Used for:  Atrial fibrillation (H)        Dose:  180 mg   Take 1 capsule (180 mg) by mouth daily   Quantity:  90 capsule   Refills:  3       MELATONIN PO        Dose:  2.5 mg   Take 2.5 mg by mouth nightly as needed   Refills:  0       PROLIA 60 MG/ML Soln injection   Generic drug:  denosumab        Dose:  60 mg   Inject 60 mg Subcutaneous. q 6 months   Refills:  0                Protect others around you: Learn how to safely use, store and throw away your medicines at www.disposemymeds.org.             Medication List: This is a list of all your medications and when to take them. Check marks below indicate your daily home schedule. Keep this list as a reference.      Medications           Morning Afternoon Evening Bedtime As Needed    albuterol 108 (90 BASE) MCG/ACT Inhaler   Commonly known as:  PROAIR HFA/PROVENTIL HFA/VENTOLIN HFA   Inhale 2 puffs into the lungs  every 6 hours                                   apixaban ANTICOAGULANT 2.5 MG tablet   Commonly known as:  ELIQUIS   Take 1 tablet (2.5 mg) by mouth 2 times daily                                      atorvastatin 40 MG tablet   Commonly known as:  LIPITOR   Take 1 tablet (40 mg) by mouth daily                                   BOOST PO   Take 1 Bottle by mouth daily                                   diltiazem 180 MG 24 hr capsule   Commonly known as:  DILACOR XR   Take 1 capsule (180 mg) by mouth daily   Last time this was given:  180 mg on 9/4/2017  7:58 AM                                   MELATONIN PO   Take 2.5 mg by mouth nightly as needed                                   PROLIA 60 MG/ML Soln injection   Inject 60 mg Subcutaneous. q 6 months   Generic drug:  denosumab

## 2017-09-03 NOTE — H&P
[facility]    History and Physical  Hospitalist       Date of Admission:  9/3/2017  Date of Service (when I saw the patient): 09/03/17    Assessment & Plan   Mia Rodriguez is a markedly pleasant 83 year old woman with multiple prior episodes of nephrolithiasis, as well as chronic atrial fibrillation on Eliquis, hypertension, dyslipidemia, COPD, pulmonary hypertension, prior breast cancer and osteoporosis; who presents with acute right sided flank pain and is found to have obstructive right ureterolithiasis.    1) Obstructive right ureterolithiasis: Ms. Rodriguez is followed by Dr. Harris of Urology and has had many prior episodes of stones; most recently she had lithotripsy in 2015. The stones are calcium oxalate reportedly by analysis. She presents for acute onset right flank pain last night; she is afebrile in the ED with stable vital signs. CBC and BMP are unremarkable. CT shows a 0.8 mm right UV junction stone causing hydronephrosis and perinephric stranding; there is also a non-obstructive left intra-renal stone.     -- Observation for pain relief    -- NPO for now; Urology consulted    -- Tylenol, Oxycodone, IV Dilaudid as needed for pain    -- Cautious IV NS 50 cc/hour    -- Anti-emetics    -- Will start Flomax    -- Will hold off on antibiotics but given perinephric stranding would have a low threshold to start them    2) Chronic atrial fibrillation: Holding Eliquis; continue Diltiazem    3) Dyslipidemia: Resume statin at discharge    4) COPD and pulmonary hypertension: Lexiscan 3/2017 was negative for inducible ischemia.    -- Continue prn Albuterol    5) Osteoporosis: She takes Prolia injections for this as an outpatient    DVT Prophylaxis: Pneumatic Compression Devices  Code Status: Full Code    Disposition: Expected discharge in 1-2 days pending Urology procedures.    Konrad Lock MD    Primary Care Physician   *Birgit Cordero    Chief Complaint   Right flank  pain    History is obtained from the patient, her daughter at the bedside, and the ED physician whom I have spoken with    History of Present Illness   Mia Rodriguez is a markedly pleasant 83 year old woman who presents with sudden onset pain in the right flank that started last night and is sharp, severe, and constant in onset, while at rest or on exertion, radiating to the right lower part of the belly, associated with nausea (no vomiting), and relieved partially by Morphine given in the ED. She denies fever, chills, sweats, dysuria, hematuria, or other acute complaints.    Past Medical History    I have reviewed this patient's medical history and updated it with pertinent information if needed.   Past Medical History:   Diagnosis Date     Arrhythmia     atrial fib.-on coumadin     Atrial fibrillation (H)      Breast cancer (H)      COPD (chronic obstructive pulmonary disease) (H)      COPD exacerbation (H) 9/2/2015     Coronary artery disease      Hypertension      Malignant neoplasm (H) 6/2/2008    Right Breast Cancer     Osteoporosis      Renal disease     kidney stones     Thyroid disease     Hypothyroid     Unspecified cerebral artery occlusion with cerebral infarction     TIA     Uterine fibroid        Past Surgical History   I have reviewed this patient's surgical history and updated it with pertinent information if needed.  Past Surgical History:   Procedure Laterality Date     BIOPSY  6/2/2008    Left /RightBreast Biopsy     BIOPSY  6/1/2010    Right Breast Biopsy     BREAST SURGERY  6/12/2008    Right Breast Lumpectomy     CYSTOSCOPY, DILATE URETHRA, COMBINED  10/5/2012    Procedure: COMBINED CYSTOSCOPY, DILATE URETHRA;  urethral dilation;  Surgeon: Kaushal Harris MD;  Location:  OR     CYSTOSCOPY, RETROGRADES, EXTRACT STONE, COMBINED Right 4/16/2015    Procedure: COMBINED CYSTOSCOPY, RETROGRADES, EXTRACT STONE;  Surgeon: Kaushal Harris MD;  Location:  OR     EXCISE LESION LIP  N/A 10/22/2014    Procedure: EXCISE LESION LIP;  Surgeon: Brent Jolley MD;  Location:  SD     GYN SURGERY  1981    LISA with ovaries intact-fibroid     LASER HOLMIUM LITHOTRIPSY URETER(S), INSERT STENT, COMBINED  10/5/2012    Procedure: COMBINED CYSTOSCOPY, URETEROSCOPY, LASER HOLMIUM LITHOTRIPSY URETER(S), INSERT STENT;  RIGHT URETEROSCOPY ,right ureteral biopsy,WITH LASER HOLMIUM LITHOTRIPSY, INSERT STENT RIGHT URETER;  Surgeon: Kaushal Harris MD;  Location:  OR     LASER HOLMIUM LITHOTRIPSY URETER(S), INSERT STENT, COMBINED Right 4/16/2015    Procedure: COMBINED CYSTOSCOPY, URETEROSCOPY, LASER HOLMIUM LITHOTRIPSY URETER(S), INSERT STENT;  Surgeon: Kaushal Harris MD;  Location:  OR       Prior to Admission Medications   Prior to Admission Medications   Prescriptions Last Dose Informant Patient Reported? Taking?   MELATONIN PO Past Week at pm  Yes Yes   Sig: Take 2.5 mg by mouth nightly as needed   Nutritional Supplements (BOOST PO)   Yes Yes   Sig: Take 1 Bottle by mouth daily   albuterol (PROAIR HFA, PROVENTIL HFA, VENTOLIN HFA) 108 (90 BASE) MCG/ACT inhaler Past Week at prn  Yes Yes   Sig: Inhale 2 puffs into the lungs every 6 hours   apixaban ANTICOAGULANT (ELIQUIS) 2.5 MG tablet 9/3/2017 at am  No Yes   Sig: Take 1 tablet (2.5 mg) by mouth 2 times daily   atorvastatin (LIPITOR) 40 MG tablet 9/2/2017 at pm  No Yes   Sig: Take 1 tablet (40 mg) by mouth daily   denosumab (PROLIA) 60 MG/ML SOLN 5/1/2017 Self Yes Yes   Sig: Inject 60 mg Subcutaneous. q 6 months   diltiazem (DILACOR XR) 180 MG 24 hr ER capsule 9/3/2017 at am  No Yes   Sig: Take 1 capsule (180 mg) by mouth daily      Facility-Administered Medications: None     Allergies   Allergies   Allergen Reactions     Megace [Megestrol Acetate]      Increased LFTs         Social History   I have reviewed this patient's social history and updated it with pertinent information if needed. Mia Albarraneula  reports that she quit  smoking about 27 years ago. Her smoking use included Cigarettes. She has a 30.00 pack-year smoking history. She has never used smokeless tobacco. She reports that she does not drink alcohol or use illicit drugs.    Family History   Family history assessed and, except as above, is non-contributory.    Family History   Problem Relation Age of Onset     Alzheimer Disease Mother      OSTEOPOROSIS Mother      C.A.D. Father      Cardiovascular Father      CEREBROVASCULAR DISEASE Father      Breast Cancer Maternal Grandmother      Unknown/Adopted Maternal Grandfather      Unknown/Adopted Paternal Grandmother      old age     CANCER Paternal Grandfather      stomach       Review of Systems   The 10 point Review of Systems is negative other than noted in the HPI or here.     Physical Exam   Temp: 97.7  F (36.5  C) Temp src: Oral BP: (!) 158/102 Pulse: 79   Resp: 16 SpO2: 95 % O2 Device: None (Room air)    Vital Signs with Ranges  Temp:  [97.7  F (36.5  C)] 97.7  F (36.5  C)  Pulse:  [79] 79  Resp:  [16] 16  BP: (158)/(102) 158/102  SpO2:  [95 %] 95 %  0 lbs 0 oz    Constitutional: Alert and oriented to person, place and time; no apparent distress; appears thin and frail  HEENT: normocephalic moist mucus membranes  Respiratory: lungs clear to auscultation bilaterally  Cardiovascular: Irregular S1 S2 no murmurs rubs or gallops  GI: abdomen soft non tender non distended bowel sounds positive  Lymph/Hematologic: no pallor, no cervical lymphadenopathy  Skin: no rash, good turgor  Musculoskeletal: no clubbing, cyanosis or edema  Neurologic: extra-ocular muscles intact; moves all four extremities  Psychiatric: appropriate affect, insight and judgment    Data   Data reviewed today:  I personally reviewed the abdominal CT image(s) showing right UV junction stone .    Recent Labs  Lab 09/03/17  0935   WBC 10.5   HGB 15.3   MCV 95         POTASSIUM 3.8   CHLORIDE 108   CO2 24   BUN 14   CR 0.83   ANIONGAP 9   CYNDEE 8.8   GLC  106*       Recent Results (from the past 24 hour(s))   CT Abdomen Pelvis w/o Contrast    Narrative    CT ABDOMEN PELVIS WITHOUT CONTRAST 9/3/2017 9:57 AM    TECHNIQUE: Images from diaphragm to pubic symphysis without oral or IV  contrast.  Radiation dose for this scan was reduced using automated exposure  control, adjustment of the mA and/or kV according to patient size, or  iterative reconstruction technique.    HISTORY: Flank pain, evaluate kidney stone, right    COMPARISON: 6/6/2016 CT abdomen and pelvis    FINDINGS:   Abdomen and Pelvis: 0.8 cm stone at the right ureteral vesicle  junction with prominent right hydronephrosis and hydroureter. The  right renal pelvis is 3.8 cm AP dimension and right pelvic ureter 1.6  cm in diameter. There is perinephric fluid on the right, no additional  right urinary tract calculi.    On the left there is no hydronephrosis or ureteral calculi.  Nonobstructing stone lower pole left kidney 0.7 cm in size.  Atelectasis at the lung bases. Splenic and few hepatic calcifications  compatible with granulomatous disease. Atherosclerotic vascular  calcifications. Large fatty mass anterior proximal left thigh  partially included on this exam suggests a lipoma although this is  incompletely included or characterized, 7 cm transverse and 4 cm AP  dimension where visualized.    Uncomplicated sigmoid diverticulosis. No acute appearing bowel  abnormality. No aggressive bone lesions.      Impression    IMPRESSION:   1. 0.8 cm right ureteral vesicle junction stone with marked right  hydronephrosis and perinephric stranding.    2. Nonobstructing 0.7 cm lower left intrarenal kidney stone without  ureteral calculi or hydronephrosis.  3. Uncomplicated diverticulosis of the sigmoid colon.  4. Redemonstrated fatty mass anterior upper right thigh which is  incompletely included and incompletely characterized on this exam but  stable since 6/6/2016 where visualized.               ANITRA TREJO MD

## 2017-09-03 NOTE — PHARMACY-ADMISSION MEDICATION HISTORY
Admission medication history interview status for the 9/3/2017  admission is complete. See EPIC admission navigator for prior to admission medications     Medication history source reliability:Good    Actions taken by pharmacist (provider contacted, etc):None     Additional medication history information not noted on PTA med list :    1. Medications added: Melatonin and Boost  2. Medications deleted: none  3. Medications modified: none    Medication reconciliation/reorder completed by provider prior to medication history? No    Time spent in this activity: 15 minutes    Prior to Admission medications    Medication Sig Last Dose Taking? Auth Provider   MELATONIN PO Take 2.5 mg by mouth nightly as needed Past Week at pm Yes Unknown, Entered By History   Nutritional Supplements (BOOST PO) Take 1 Bottle by mouth daily  Yes Unknown, Entered By History   apixaban ANTICOAGULANT (ELIQUIS) 2.5 MG tablet Take 1 tablet (2.5 mg) by mouth 2 times daily 9/3/2017 at am Yes Parish Cantrell MD   atorvastatin (LIPITOR) 40 MG tablet Take 1 tablet (40 mg) by mouth daily 9/2/2017 at pm Yes Parish Cantrell MD   diltiazem (DILACOR XR) 180 MG 24 hr ER capsule Take 1 capsule (180 mg) by mouth daily 9/3/2017 at am Yes Parish Cantrell MD   albuterol (PROAIR HFA, PROVENTIL HFA, VENTOLIN HFA) 108 (90 BASE) MCG/ACT inhaler Inhale 2 puffs into the lungs every 6 hours Past Week at prn Yes Reported, Patient   denosumab (PROLIA) 60 MG/ML SOLN Inject 60 mg Subcutaneous. q 6 months 5/1/2017 Yes Reported, Patient

## 2017-09-04 ENCOUNTER — SURGERY (OUTPATIENT)
Age: 82
End: 2017-09-04

## 2017-09-04 VITALS
WEIGHT: 101.6 LBS | HEART RATE: 105 BPM | TEMPERATURE: 99.2 F | HEIGHT: 61 IN | OXYGEN SATURATION: 91 % | DIASTOLIC BLOOD PRESSURE: 65 MMHG | SYSTOLIC BLOOD PRESSURE: 107 MMHG | RESPIRATION RATE: 16 BRPM | BODY MASS INDEX: 19.18 KG/M2

## 2017-09-04 PROCEDURE — 25000132 ZZH RX MED GY IP 250 OP 250 PS 637: Mod: GY | Performed by: HOSPITALIST

## 2017-09-04 PROCEDURE — A9270 NON-COVERED ITEM OR SERVICE: HCPCS | Mod: GY | Performed by: HOSPITALIST

## 2017-09-04 PROCEDURE — 96361 HYDRATE IV INFUSION ADD-ON: CPT

## 2017-09-04 PROCEDURE — G0378 HOSPITAL OBSERVATION PER HR: HCPCS

## 2017-09-04 PROCEDURE — 99217 ZZC OBSERVATION CARE DISCHARGE: CPT | Performed by: PHYSICIAN ASSISTANT

## 2017-09-04 RX ORDER — CEFAZOLIN SODIUM 1 G/3ML
1 INJECTION, POWDER, FOR SOLUTION INTRAMUSCULAR; INTRAVENOUS SEE ADMIN INSTRUCTIONS
Status: CANCELLED | OUTPATIENT
Start: 2017-09-04

## 2017-09-04 RX ORDER — CHLORHEXIDINE GLUCONATE 40 MG/ML
SOLUTION TOPICAL ONCE
Status: CANCELLED | OUTPATIENT
Start: 2017-09-04 | End: 2017-09-04

## 2017-09-04 RX ORDER — CEFAZOLIN SODIUM 2 G/100ML
2 INJECTION, SOLUTION INTRAVENOUS
Status: CANCELLED | OUTPATIENT
Start: 2017-09-04

## 2017-09-04 RX ADMIN — DILTIAZEM HYDROCHLORIDE 180 MG: 180 CAPSULE, EXTENDED RELEASE ORAL at 07:58

## 2017-09-04 NOTE — PLAN OF CARE
"Problem: Discharge Planning  Goal: Discharge Planning (Adult, OB, Behavioral, Peds)  Outcome: No Change  PRIMARY DIAGNOSIS: ACUTE RENAL COLIC     OUTPATIENT/OBSERVATION GOALS TO BE MET BEFORE DISCHARGE  1. Pain Status: Improved-controlled with oral pain medications.     2. Tolerating adequate PO diet: Yes     3. Surgical Intervention planned: Yes     4. Cleared by consultants (if involved): No     5. Return to near baseline physical activity: No     Discharge Planner Nurse   Safe discharge environment identified: Yes  Barriers to discharge: Yes  Patient rates her pain at a \"2\" right flank area.  Rests comfortably in bed. Urine strained chaparro with no stone seen.  She is up to the BR with SBA and her walker.  NPO after 3am for urology procedure.  Patient states she has had many stones in the past.         Entered by: Prashanth Corey 09/04/2017 1:54 AM     Please review provider order for any additional goals.   Nurse to notify provider when observation goals have been met and patient is ready for discharge.      "

## 2017-09-04 NOTE — PLAN OF CARE
Problem: Discharge Planning  Goal: Discharge Planning (Adult, OB, Behavioral, Peds)  Outcome: Improving  -diagnostic tests and consults completed and resulted: met  -vital signs normal or at patient baseline: met  -tolerating oral intake to maintain hydration: not met  -adequate pain control on oral analgesics: met    VSS on RA. Denies pain. Ambulating SBA. Will order breakfast and discharge later this morning.

## 2017-09-04 NOTE — PLAN OF CARE
Problem: Goal Outcome Summary  Goal: Goal Outcome Summary  Outcome: Improving  Patient denied any pain or discomfort.  Rests comfortably in bed. Urine strained blood mixed with one stone seen.  She is up to the BR with SBA and her walker.  NPO after 3am for urology procedure.  Patient states she has had many stones in the past. Continue to monitor.

## 2017-09-04 NOTE — PROVIDER NOTIFICATION
Updated Dr. De La Rosa that pt passed stone overnight. Dr. De La Rosa stated he will cancel procedure for today, pt can eat and discharge home this morning.

## 2017-09-04 NOTE — PLAN OF CARE
Problem: Discharge Planning  Goal: Discharge Planning (Adult, OB, Behavioral, Peds)  PRIMARY DIAGNOSIS: ACUTE RENAL COLIC     OUTPATIENT/OBSERVATION GOALS TO BE MET BEFORE DISCHARGE  1. Pain Status: Pain free.     2. Tolerating adequate PO diet: Yes     3. Surgical Intervention planned: N/A     4. Cleared by consultants (if involved): Yes     5. Return to near baseline physical activity: Yes     Discharge Planner Nurse   Safe discharge environment identified: Yes  Barriers to discharge: No       Entered by: Birgit Larson 09/04/2017 10:24 AM     Please review provider order for any additional goals.   Nurse to notify provider when observation goals have been met and patient is ready for discharge.     Denies pain. Tolerating regular diet. Ambulating SBA. Will discharge home this morning.

## 2017-09-04 NOTE — DISCHARGE SUMMARY
[facility]    Discharge Summary  Hospitalist    Date of Admission:  9/3/2017  Date of Discharge:  9/4/2017  Discharging Provider: JoAnna K. Barthell, PA-C    Discharge Diagnoses   Right sided ureteral stone  Hydronephrosis, unspecified hydronephrosis type  Chronic atrial fibrillation on Eliquis anticoagulation  COPD  Hypertension  History of Present Illness   Mia Rodriguez is a markedly pleasant 83 year old woman with multiple prior episodes of nephrolithiasis, as well as chronic atrial fibrillation on Eliquis, hypertension, dyslipidemia, COPD, pulmonary hypertension, prior breast cancer and osteoporosis; who presents with acute right sided flank pain and is found to have obstructive right ureterolithiasis.    For complete details of admission, please see H&P by Dr. Konrad Lock from 9/3/2017.    Hospital Course   Mia Rodriguez was admitted on 9/3/2017.  The following problems were addressed during her hospitalization:     1) Obstructive right ureterolithiasis: Ms. Rodriguez is followed by Dr. Harris of Urology and has had many prior episodes of stones; most recently she had lithotripsy in 2015. The stones are calcium oxalate reportedly by analysis. She presents for acute onset right flank pain last night; she is afebrile in the ED with stable vital signs. CBC and BMP are unremarkable. CT shows a 0.8 mm right UV junction stone causing hydronephrosis and perinephric stranding; there is also a non-obstructive left intra-renal stone.   - There was perinephric stranding on CT, but afebrile and no other sxs for infection therefore abx not initiated.  - Urology was consulted, however patient passed stone on her own with resolution of pain. Tolerating PO. Stone analysis not sent.  - Follow up with Birgit Cordero and/or Dr. Harris as needed.    2) Chronic atrial fibrillation: rate controlled. Continues on Eliquis and Diltiazem     3) Dyslipidemia: Continues on statin     4) COPD and  pulmonary hypertension: Lexiscan 3/2017 was negative for inducible ischemia.    -- Continue prn Albuterol     5) Osteoporosis: She takes Prolia injections for this as an outpatient    This patient was discussed with Dr. Lock of the Hospitalist Service who agrees with current plans as outlined above.    JoAnna K. Barthell, PA-C    Significant Results and Procedures   As below.    Code Status   Full Code       Primary Care Physician   Birgit Cordero    Physical Exam   Temp: 99.2  F (37.3  C) Temp src: Oral BP: 107/65 Pulse: 105   Resp: 16 SpO2: 91 % O2 Device: None (Room air) Oxygen Delivery: 1.5 LPM  Vitals:    09/03/17 1200 09/04/17 0601   Weight: 46.7 kg (103 lb) 46.1 kg (101 lb 9.6 oz)     Vital Signs with Ranges  Temp:  [96.2  F (35.7  C)-99.2  F (37.3  C)] 99.2  F (37.3  C)  Pulse:  [] 105  Resp:  [16-18] 16  BP: (107-123)/(61-70) 107/65  SpO2:  [85 %-92 %] 91 %  I/O last 3 completed shifts:  In: 255.83 [P.O.:170; I.V.:85.83]  Out: 550 [Urine:550]  Constitutional: Pleasant elderly female who appears stated age. Looks comfortable sitting upright in bed..  Respiratory: Breath sounds CTA. No increased work of breathing.  Cardiovascular: RRR, no rub or murmur. No peripheral edema.  GI: Soft, non-tender, non-distended. Bowel sounds present. No CVA tenderness.  Skin/Integumen: Warm, dry, no rashes or lesions.    Discharge Disposition   Discharged to home  Condition at discharge: Stable    Consultations This Hospital Stay   UROLOGY IP CONSULT    Time Spent on this Encounter   I, JoAnna K. Barthell, personally saw the patient today and spent less than or equal to 30 minutes discharging this patient.    Discharge Orders     Reason for your hospital stay   Right sided kidney stone.     Follow-up and recommended labs and tests    Follow up with Dr. Cordero or Dr. Harris as needed.     Activity   Your activity upon discharge: activity as tolerated.     Full Code     Diet   Follow this diet upon discharge:     Regular Diet Adult       Discharge Medications   Current Discharge Medication List      CONTINUE these medications which have NOT CHANGED    Details   MELATONIN PO Take 2.5 mg by mouth nightly as needed      Nutritional Supplements (BOOST PO) Take 1 Bottle by mouth daily      apixaban ANTICOAGULANT (ELIQUIS) 2.5 MG tablet Take 1 tablet (2.5 mg) by mouth 2 times daily  Qty: 180 tablet, Refills: 3    Associated Diagnoses: Paroxysmal atrial fibrillation (H)      atorvastatin (LIPITOR) 40 MG tablet Take 1 tablet (40 mg) by mouth daily  Qty: 90 tablet, Refills: 3    Associated Diagnoses: Hyperlipidemia LDL goal <100      diltiazem (DILACOR XR) 180 MG 24 hr ER capsule Take 1 capsule (180 mg) by mouth daily  Qty: 90 capsule, Refills: 3    Associated Diagnoses: Atrial fibrillation (H)      albuterol (PROAIR HFA, PROVENTIL HFA, VENTOLIN HFA) 108 (90 BASE) MCG/ACT inhaler Inhale 2 puffs into the lungs every 6 hours      denosumab (PROLIA) 60 MG/ML SOLN Inject 60 mg Subcutaneous. q 6 months           Allergies   Allergies   Allergen Reactions     Megace [Megestrol Acetate]      Increased LFTs       Data   Most Recent 3 CBC's:  Recent Labs   Lab Test  09/03/17   0935  03/26/17   1605  06/05/16   0657   WBC  10.5  7.0  8.1   HGB  15.3  16.9*  14.8   MCV  95  95  94   PLT  154  165  151      Most Recent 3 BMP's:  Recent Labs   Lab Test  09/03/17   0935  03/26/17   1605  02/16/17   1110   06/05/16   0657   NA  141  141   --    --   145*   POTASSIUM  3.8  4.1   --    --   3.8   CHLORIDE  108  107   --    --   113*   CO2  24  25   --    --   21   BUN  14  28   --    --   19   CR  0.83  0.93  0.93   --   0.74   ANIONGAP  9  9   --    --   11   CYNDEE  8.8  10.3*  9.6   < >  8.4*   GLC  106*  86   --    --   90    < > = values in this interval not displayed.     Most Recent 2 LFT's:  Recent Labs   Lab Test  05/06/16   1554  10/02/14   1136  09/26/14   1755   AST   --   13  15   ALT  35  15  15   ALKPHOS   --   46  46   BILITOTAL   --    0.5  1.0     Most Recent INR's and Anticoagulation Dosing History:  Anticoagulation Dose History     Recent Dosing and Labs Latest Ref Rng & Units 2/17/2009 10/5/2012 2/8/2014 2/9/2014 2/10/2014 10/2/2014 10/22/2014    Warfarin 2.5 mg - - - - - 2.5 mg - -    Warfarin 3 mg - - - 3 mg 3 mg - - -    INR 0.86 - 1.14 3.45(H) 1.08 1.31(H) 1.36(H) 1.80(H) 1.78(H) 1.03        Most Recent 3 Troponin's:  Recent Labs   Lab Test  03/26/17   1820  03/26/17   1605  08/17/15   1506   TROPI  <0.015  The 99th percentile for upper reference range is 0.045 ug/L.  Troponin values in   the range of 0.045 - 0.120 ug/L may be associated with risks of adverse   clinical events.    <0.015  The 99th percentile for upper reference range is 0.045 ug/L.  Troponin values in   the range of 0.045 - 0.120 ug/L may be associated with risks of adverse   clinical events.    <0.015  The 99th percentile for upper reference range is 0.045 ug/L.  Troponin values in   the range of 0.045 - 0.120 ug/L may be associated with risks of adverse   clinical events.       Most Recent Cholesterol Panel:  Recent Labs   Lab Test  05/06/16   1554   CHOL  149   LDL  64   HDL  71   TRIG  69     Most Recent 6 Bacteria Isolates From Any Culture (See EPIC Reports for Culture Details):  Recent Labs   Lab Test  09/26/14   1927   CULT  10,000 to 50,000 colonies/mL Proteus mirabilis*     Most Recent TSH, T4 and A1c Labs:  Recent Labs   Lab Test  02/08/14   1735  08/07/12   1025   TSH  2.76  2.24   T4   --   1.84     Results for orders placed or performed during the hospital encounter of 09/03/17   CT Abdomen Pelvis w/o Contrast    Narrative    CT ABDOMEN PELVIS WITHOUT CONTRAST 9/3/2017 9:57 AM    TECHNIQUE: Images from diaphragm to pubic symphysis without oral or IV  contrast.  Radiation dose for this scan was reduced using automated exposure  control, adjustment of the mA and/or kV according to patient size, or  iterative reconstruction technique.    HISTORY: Flank pain,  evaluate kidney stone, right    COMPARISON: 6/6/2016 CT abdomen and pelvis    FINDINGS:   Abdomen and Pelvis: 0.8 cm stone at the right ureteral vesicle  junction with prominent right hydronephrosis and hydroureter. The  right renal pelvis is 3.8 cm AP dimension and right pelvic ureter 1.6  cm in diameter. There is perinephric fluid on the right, no additional  right urinary tract calculi.    On the left there is no hydronephrosis or ureteral calculi.  Nonobstructing stone lower pole left kidney 0.7 cm in size.  Atelectasis at the lung bases. Splenic and few hepatic calcifications  compatible with granulomatous disease. Atherosclerotic vascular  calcifications. Large fatty mass anterior proximal left thigh  partially included on this exam suggests a lipoma although this is  incompletely included or characterized, 7 cm transverse and 4 cm AP  dimension where visualized.    Uncomplicated sigmoid diverticulosis. No acute appearing bowel  abnormality. No aggressive bone lesions.      Impression    IMPRESSION:   1. 0.8 cm right ureteral vesicle junction stone with marked right  hydronephrosis and perinephric stranding.    2. Nonobstructing 0.7 cm lower left intrarenal kidney stone without  ureteral calculi or hydronephrosis.  3. Uncomplicated diverticulosis of the sigmoid colon.  4. Redemonstrated fatty mass anterior upper right thigh which is  incompletely included and incompletely characterized on this exam but  stable since 6/6/2016 where visualized.               ANITRA TREJO MD

## 2017-09-04 NOTE — PLAN OF CARE
"Problem: Discharge Planning  Goal: Discharge Planning (Adult, OB, Behavioral, Peds)  Outcome: No Change  PRIMARY DIAGNOSIS: ACUTE RENAL COLIC     OUTPATIENT/OBSERVATION GOALS TO BE MET BEFORE DISCHARGE  1. Pain Status: Improved-controlled with oral pain medications.     2. Tolerating adequate PO diet: Yes     3. Surgical Intervention planned: Yes     4. Cleared by consultants (if involved): No     5. Return to near baseline physical activity: No     Discharge Planner Nurse   Safe discharge environment identified: Yes  Barriers to discharge: Yes  Patient rates her pain at a \"2\" right flank area.  Rests comfortably in bed. Urine strained chaparro with no stone seen.  She is up to the BR with SBA and her walker.  NPO after 3am for urology procedure.  Patient states she has had many stones in the past.         Entered by: Kandi Silva 09/03/2017 9:06 PM     Please review provider order for any additional goals.   Nurse to notify provider when observation goals have been met and patient is ready for discharge.      "

## 2017-09-04 NOTE — PLAN OF CARE
Problem: Discharge Planning  Goal: Discharge Planning (Adult, OB, Behavioral, Peds)  Outcome: No Change  PRIMARY DIAGNOSIS: ACUTE RENAL COLIC     OUTPATIENT/OBSERVATION GOALS TO BE MET BEFORE DISCHARGE  1. Pain Status: Improved-controlled with oral pain medications.     2. Tolerating adequate PO diet: Yes     3. Surgical Intervention planned: Yes     4. Cleared by consultants (if involved): No     5. Return to near baseline physical activity: No     Discharge Planner Nurse   Safe discharge environment identified: Yes  Barriers to discharge: Yes  Patient Denied any pain or discomfort.  Rests comfortably in bed. Urine strained and one stone came out. [facility]  She is up to the BR with SBA and her walker.  NPO after 3am for urology procedure.  Patient states she has had many stones in the past.         Entered by: Prashanth Corey 09/04/2017 5:16 AM     Please review provider order for any additional goals.   Nurse to notify provider when observation goals have been met and patient is ready for discharge.

## 2017-09-04 NOTE — DISCHARGE SUMMARY
Home medication regimen discussed with patient and patient verbalizes understanding. Diet and activity discussed with patient and patient verbalizes understanding. Upcoming appointments also discussed. Patient had no questions.

## 2017-09-04 NOTE — DISCHARGE INSTRUCTIONS
Follow up with Dr. Cordero if you develop signs of infection like fever, chills, burning with urination, urinary frequency, urinary urgency, abdominal pain, nausea, or vomiting.    Follow up with Dr. Harris as needed.    Continue to sip on fluids throughout the day.

## 2017-10-16 DIAGNOSIS — I48.91 ATRIAL FIBRILLATION (H): ICD-10-CM

## 2017-10-16 DIAGNOSIS — E78.5 HYPERLIPIDEMIA LDL GOAL <100: ICD-10-CM

## 2017-10-16 RX ORDER — ATORVASTATIN CALCIUM 40 MG/1
40 TABLET, FILM COATED ORAL DAILY
Qty: 90 TABLET | Refills: 1 | Status: SHIPPED | OUTPATIENT
Start: 2017-10-16 | End: 2018-05-01

## 2017-10-16 RX ORDER — DILTIAZEM HYDROCHLORIDE 180 MG/1
180 CAPSULE, EXTENDED RELEASE ORAL DAILY
Qty: 90 CAPSULE | Refills: 1 | Status: SHIPPED | OUTPATIENT
Start: 2017-10-16 | End: 2018-04-17

## 2017-11-09 NOTE — TELEPHONE ENCOUNTER
Scheduling (Annalise) on the phone stating that pt's daughter is calling and wanting to get her mother's appt set up for labs/Prolia. Informed that the lab appt and Prolia appt have to be on separate days because need to know if labs are ok that she can even receive the Prolia. Informed Annalise that Prolia injection can be scheduled one to two days after her lab appt. Annalise voiced understanding and that she would inform the daughter the this information. Creatinine lab ordered/futured out also, Dr. Randle aware and ok withlab being added on also, even though it's not on the [facility] standard labs (Calcium, Magnesium, Phosphorous).    When pt comes in for labs she will be getting: Creatinine, Calcium, Magnesium and Phosphorous drawn.

## 2017-11-09 NOTE — TELEPHONE ENCOUNTER
LM on daughter voicemail, as in past calls to Agustina she would rather we call daughter as she arranges appts.

## 2017-11-17 ENCOUNTER — RADIANT APPOINTMENT (OUTPATIENT)
Dept: MAMMOGRAPHY | Facility: CLINIC | Age: 82
End: 2017-11-17
Payer: MEDICARE

## 2017-11-17 DIAGNOSIS — Z12.31 ENCOUNTER FOR SCREENING MAMMOGRAM FOR MALIGNANT NEOPLASM OF BREAST: Primary | ICD-10-CM

## 2017-11-17 DIAGNOSIS — M81.0 OSTEOPOROSIS: ICD-10-CM

## 2017-11-17 DIAGNOSIS — Z13.228 SCREENING FOR METABOLIC DISORDER: ICD-10-CM

## 2017-11-17 PROCEDURE — 84100 ASSAY OF PHOSPHORUS: CPT | Performed by: OBSTETRICS & GYNECOLOGY

## 2017-11-17 PROCEDURE — 82565 ASSAY OF CREATININE: CPT | Performed by: OBSTETRICS & GYNECOLOGY

## 2017-11-17 PROCEDURE — 36415 COLL VENOUS BLD VENIPUNCTURE: CPT | Performed by: OBSTETRICS & GYNECOLOGY

## 2017-11-17 PROCEDURE — G0202 SCR MAMMO BI INCL CAD: HCPCS | Mod: TC

## 2017-11-17 PROCEDURE — 83735 ASSAY OF MAGNESIUM: CPT | Performed by: OBSTETRICS & GYNECOLOGY

## 2017-11-18 LAB
CREAT SERPL-MCNC: 0.94 MG/DL (ref 0.52–1.04)
GFR SERPL CREATININE-BSD FRML MDRD: 57 ML/MIN/1.7M2
MAGNESIUM SERPL-MCNC: 2.3 MG/DL (ref 1.6–2.3)
PHOSPHATE SERPL-MCNC: 3.8 MG/DL (ref 2.5–4.5)

## 2017-11-28 ENCOUNTER — TELEPHONE (OUTPATIENT)
Dept: NURSING | Facility: CLINIC | Age: 82
End: 2017-11-28

## 2017-11-28 DIAGNOSIS — Z13.228 SCREENING FOR METABOLIC DISORDER: ICD-10-CM

## 2017-11-28 PROCEDURE — 36415 COLL VENOUS BLD VENIPUNCTURE: CPT | Performed by: OBSTETRICS & GYNECOLOGY

## 2017-11-28 PROCEDURE — 82310 ASSAY OF CALCIUM: CPT | Performed by: OBSTETRICS & GYNECOLOGY

## 2017-11-28 NOTE — TELEPHONE ENCOUNTER
Sebaceous cysts or type of infection on the leg, pt's daughter wondering if she needs to wait for her Prolia injection. Informed pt's daughter to have MD she is seeing today look at it and if they state it is ok for her to receive Prolia injection, ok to get it.   Also informed pt's daughter that pt needed calcium levels draw prior to injection but that she could still get the shot today according to on call MD Dr. Holbrook. Pt's daughter stated if calcium is a lab value they need to monitor for the injection she would rather wait for the results before her mother gets the injection. Lab appt scheduled for today, injection appointment cancelled. Pt's daughter stated they would schedule Prolia injection after calcium results came back. Closing encounter.

## 2017-11-28 NOTE — TELEPHONE ENCOUNTER
"Pt coming into clinic today for Prolia injection.     11/17/17 Lab results:  Creatinine: result of 0.94 (reference range: 0.52-1.04)  GRF Estimate: result of 57: Low (reference range: >60)  Phosphorus: result of 3.8 (reference range: 2.5-4.5)  Magnesium: result of 2.3 (reference range: 1.6-203)    Calcium was futured out from 9/5/17 and was not drawn at the time her other labs were drawn (1/1/17/17). Most recent Calcium on file is 9/3/17 (from [facility] lab result) with result of 8.8 (reference range: 8.5-10.1).   Dr. Holbrook aware pt did not have Calcium drawn when rest of labs were and gave verbal that \"Ok\" to give Prolia injection today. After review of labs noticed that pt's calcium level has decreased since she received her last Prolia on 3/16/17.    Want to confirm that with the drop in calcium level from 9.6 (reference range: 8.5-10.1) on 2/16/17, she then received the Prolia on 3/16/17, lab on 9/3/17 was then 8.8. Want to confirm still ok for pt to receive?      Routing to Dr. Holbrook (Dr. Randle out of office).  "

## 2017-11-29 LAB — CALCIUM SERPL-MCNC: 9.7 MG/DL (ref 8.5–10.1)

## 2017-12-01 ENCOUNTER — ALLIED HEALTH/NURSE VISIT (OUTPATIENT)
Dept: NURSING | Facility: CLINIC | Age: 82
End: 2017-12-01
Payer: MEDICARE

## 2017-12-01 DIAGNOSIS — M81.0 OSTEOPOROSIS, SENILE: Primary | ICD-10-CM

## 2017-12-01 PROCEDURE — 99207 ZZC NO CHARGE NURSE ONLY: CPT

## 2017-12-01 PROCEDURE — 96372 THER/PROPH/DIAG INJ SC/IM: CPT

## 2017-12-01 NOTE — MR AVS SNAPSHOT
After Visit Summary   12/1/2017    Mia Rodriguez    MRN: [id number]           Patient Information     Date Of Birth          2/25/1934        Visit Information        Provider Department      12/1/2017 11:00 AM [facility]        Today's Diagnoses     Osteoporosis, senile    -  1       Follow-ups after your visit        Your next 10 appointments already scheduled     Jan 12, 2018  8:40 AM CST   LAB with [facility]   [facility] ([facility])    [address] [place] [address] [address]  [place]  [place] [address] [phone]           Please do not eat 10-12 hours before your appointment if you are coming in fasting for labs on lipids, cholesterol, or glucose (sugar). This does not apply to pregnant women. Water, hot tea and black coffee (with nothing added) are okay. Do not drink other fluids, diet soda or chew gum.            Jan 12, 2018  9:45 AM CST   Return Visit with Parish Cantrell MD   [facility] ([facility])    [address] [place], [place] [id number]  [place] [address] [phone]              Who to contact     If you have questions or need follow up information about today's clinic visit or your schedule please contact [facility] directly at [phone].  Normal or non-critical lab and imaging results will be communicated to you by MyChart, letter or phone within 4 business days after the clinic has received the results. If you do not hear from us within 7 days, please contact the clinic through MyChart or phone. If you have a critical or abnormal lab result, we will notify you by phone as soon as possible.  Submit refill requests through [facility] or call your pharmacy and they will forward the refill request to us. Please allow 3 business days for your refill to be completed.          Additional Information About Your Visit        MyChart Information      "[employer] lets you send messages to your doctor, view your test results, renew your prescriptions, schedule appointments and more. To sign up, go to [email or web address] . Click on \"Log in\" on the left side of the screen, which will take you to the Welcome page. Then click on \"Sign up Now\" on the right side of the page.     You will be asked to enter the access code listed below, as well as some personal information. Please follow the directions to create your username and password.     Your access code is: [email or web address]  Expires: 12/3/2017  8:26 AM     Your access code will  in 90 days. If you need help or a new code, please call your [facility] clinic or [phone].        Care EveryWhere ID     This is your Care EveryWhere ID. This could be used by other organizations to access your [facility] medical records  [id number]        Your Vitals Were     Last Period                   1981            Blood Pressure from Last 3 Encounters:   17 107/65   17 124/78   17 128/70    Weight from Last 3 Encounters:   17 101 lb 9.6 oz (46.1 kg)   17 107 lb (48.5 kg)   17 107 lb 4.8 oz (48.7 kg)              We Performed the Following     C DENOSUMAB INJECTION, 1 MG     INJECTION INTRAMUSCULAR OR SUB-Q        Primary Care Provider Office Phone # Fax #    Birgit Cordero [phone]       [facility] [address] [facility] [address]        Equal Access to Services     [facility]: Hadii aad ku hadasho Soomaali, waaxda luqadaha, qaybta kaalmada essie, fatimah field . So [facility] [phone].    ATENCIÓN: Si habla español, tiene a velasquez disposición servicios gratuitos de asistencia lingüística. Llame al [phone].    We comply with applicable federal civil rights laws and [place] laws. We do not discriminate on the basis of race, color, national origin, age, disability, sex, sexual orientation, or gender identity.          "   Thank you!     Thank you for choosing [facility] FOR [facility]  for your care. Our goal is always to provide you with excellent care. Hearing back from our patients is one way we can continue to improve our services. Please take a few minutes to complete the written survey that you may receive in the mail after your visit with us. Thank you!             Your Updated Medication List - Protect others around you: Learn how to safely use, store and throw away your medicines at www.disposemymeds.org.          This list is accurate as of: 12/1/17 11:28 AM.  Always use your most recent med list.                   Brand Name Dispense Instructions for use Diagnosis    albuterol 108 (90 BASE) MCG/ACT Inhaler    PROAIR HFA/PROVENTIL HFA/VENTOLIN HFA     Inhale 2 puffs into the lungs every 6 hours        apixaban ANTICOAGULANT 2.5 MG tablet    ELIQUIS    180 tablet    Take 1 tablet (2.5 mg) by mouth 2 times daily    Paroxysmal atrial fibrillation (H)       atorvastatin 40 MG tablet    LIPITOR    90 tablet    Take 1 tablet (40 mg) by mouth daily    Hyperlipidemia LDL goal <100       BOOST PO      Take 1 Bottle by mouth daily        diltiazem 180 MG 24 hr capsule    DILACOR XR    90 capsule    Take 1 capsule (180 mg) by mouth daily    Atrial fibrillation (H)       MELATONIN PO      Take 2.5 mg by mouth nightly as needed        PROLIA 60 MG/ML Soln injection   Generic drug:  denosumab      Inject 60 mg Subcutaneous. q 6 months

## 2017-12-01 NOTE — NURSING NOTE
The following medication was given:     MEDICATION: Denosumab (Prolia) 60 mg/ml SOLN  ROUTE: SQ  SITE: Arm - Left  DOSE: 60 mg  LOT #: [id number]  :  [employer]  EXPIRATION DATE:  9/19  NDC#: 00959-810-77     Pt here for Prolia injection. Patient given manufacture information regarding risks and side effects. Pt had no questions. Used name and birth date as identifiers. Tolerated injection well with minimal pain. Waited in clinic for 10 mins. Discharged to home ins table condition.

## 2018-01-12 ENCOUNTER — OFFICE VISIT (OUTPATIENT)
Dept: CARDIOLOGY | Facility: CLINIC | Age: 83
End: 2018-01-12
Attending: INTERNAL MEDICINE
Payer: MEDICARE

## 2018-01-12 VITALS
WEIGHT: 95 LBS | BODY MASS INDEX: 18.65 KG/M2 | HEART RATE: 67 BPM | OXYGEN SATURATION: 96 % | DIASTOLIC BLOOD PRESSURE: 74 MMHG | SYSTOLIC BLOOD PRESSURE: 129 MMHG | HEIGHT: 60 IN

## 2018-01-12 DIAGNOSIS — I27.20 PULMONARY HYPERTENSION (H): ICD-10-CM

## 2018-01-12 DIAGNOSIS — R06.02 SHORTNESS OF BREATH: ICD-10-CM

## 2018-01-12 DIAGNOSIS — E78.5 HYPERLIPIDEMIA LDL GOAL <100: ICD-10-CM

## 2018-01-12 DIAGNOSIS — I48.20 CHRONIC ATRIAL FIBRILLATION (H): Primary | ICD-10-CM

## 2018-01-12 LAB
ALT SERPL W P-5'-P-CCNC: 10 U/L (ref 5–30)
CHOLEST SERPL-MCNC: 188 MG/DL
HDLC SERPL-MCNC: 65 MG/DL
LDLC SERPL CALC-MCNC: 97 MG/DL
NONHDLC SERPL-MCNC: 123 MG/DL
TRIGL SERPL-MCNC: 128 MG/DL

## 2018-01-12 PROCEDURE — 84460 ALANINE AMINO (ALT) (SGPT): CPT | Performed by: INTERNAL MEDICINE

## 2018-01-12 PROCEDURE — 80061 LIPID PANEL: CPT | Performed by: INTERNAL MEDICINE

## 2018-01-12 PROCEDURE — 36415 COLL VENOUS BLD VENIPUNCTURE: CPT | Performed by: INTERNAL MEDICINE

## 2018-01-12 PROCEDURE — 99214 OFFICE O/P EST MOD 30 MIN: CPT | Performed by: INTERNAL MEDICINE

## 2018-01-12 NOTE — MR AVS SNAPSHOT
"              After Visit Summary   1/12/2018    Mia Rodriguez    MRN: [id number]           Patient Information     Date Of Birth          2/25/1934        Visit Information        Provider Department      1/12/2018 9:45 AM Parish Cantrell MD [facility]        Today's Diagnoses     Chronic atrial fibrillation (H)    -  1    Pulmonary hypertension        Shortness of breath        Hyperlipidemia LDL goal <100           Follow-ups after your visit        Additional Services     Follow-Up with Cardiologist                 Future tests that were ordered for you today     Open Future Orders        Priority Expected Expires Ordered    Basic metabolic panel Routine 1/12/2019 1/13/2019 1/12/2018    Lipid Profile Routine 1/12/2019 1/13/2019 1/12/2018    Follow-Up with Cardiologist Routine 1/12/2019 1/13/2019 1/12/2018            Who to contact     If you have questions or need follow up information about today's clinic visit or your schedule please contact [facility] directly at [phone].  Normal or non-critical lab and imaging results will be communicated to you by [employer], letter or phone within 4 business days after the clinic has received the results. If you do not hear from us within 7 days, please contact the clinic through [employer] or phone. If you have a critical or abnormal lab result, we will notify you by phone as soon as possible.  Submit refill requests through [employer] or call your pharmacy and they will forward the refill request to us. Please allow 3 business days for your refill to be completed.          Additional Information About Your Visit        Nuvehart Information     [employer] lets you send messages to your doctor, view your test results, renew your prescriptions, schedule appointments and more. To sign up, go to [email or web address] . Click on \"Log in\" on the left side of the screen, which will take you to the " "Welcome page. Then click on \"Sign up Now\" on the right side of the page.     You will be asked to enter the access code listed below, as well as some personal information. Please follow the directions to create your username and password.     Your access code is: [email or web address]  Expires: 2018 10:28 AM     Your access code will  in 90 days. If you need help or a new code, please call your [place] clinic or [phone].        Care EveryWhere ID     This is your Care EveryWhere ID. This could be used by other organizations to access your [place] medical records  [id number]        Your Vitals Were     Pulse Height Last Period Pulse Oximetry BMI (Body Mass Index)       67 1.524 m (5') 1981 96% 18.55 kg/m2        Blood Pressure from Last 3 Encounters:   18 129/74   17 107/65   17 124/78    Weight from Last 3 Encounters:   18 43.1 kg (95 lb)   17 46.1 kg (101 lb 9.6 oz)   17 48.5 kg (107 lb)              We Performed the Following     Follow-Up with Cardiologist        Primary Care Provider Office Phone # Fax #    Birgit Cordero [phone]       [facility] [address]  [facility] [address]        Equal Access to Services     [facility]: Hadii aad ku hadasho Sosunshineali, waaxda luqadaha, qaybta kaalmada adeegyada, fatimah field . So [facility] [phone].    ATENCIÓN: Si habla español, tiene a velasquez disposición servicios gratuitos de asistencia lingüística. Llame al [phone].    We comply with applicable federal civil rights laws and [place] laws. We do not discriminate on the basis of race, color, national origin, age, disability, sex, sexual orientation, or gender identity.            Thank you!     Thank you for choosing [facility]  for your care. Our goal is always to provide you with excellent care. Hearing back from our patients is one way we can continue to improve our " services. Please take a few minutes to complete the written survey that you may receive in the mail after your visit with us. Thank you!             Your Updated Medication List - Protect others around you: Learn how to safely use, store and throw away your medicines at www.disposemymeds.org.          This list is accurate as of: 1/12/18 10:28 AM.  Always use your most recent med list.                   Brand Name Dispense Instructions for use Diagnosis    albuterol 108 (90 BASE) MCG/ACT Inhaler    PROAIR HFA/PROVENTIL HFA/VENTOLIN HFA     Inhale 2 puffs into the lungs every 6 hours        apixaban ANTICOAGULANT 2.5 MG tablet    ELIQUIS    180 tablet    Take 1 tablet (2.5 mg) by mouth 2 times daily    Paroxysmal atrial fibrillation (H)       atorvastatin 40 MG tablet    LIPITOR    90 tablet    Take 1 tablet (40 mg) by mouth daily    Hyperlipidemia LDL goal <100       BOOST PO      Take 1 Bottle by mouth daily        diltiazem 180 MG 24 hr capsule    DILACOR XR    90 capsule    Take 1 capsule (180 mg) by mouth daily    Atrial fibrillation (H)       MELATONIN PO      Take 2.5 mg by mouth nightly as needed        PROLIA 60 MG/ML Soln injection   Generic drug:  denosumab      Inject 60 mg Subcutaneous. q 6 months

## 2018-01-12 NOTE — LETTER
1/12/2018      Birgit Cordero  [facility] [address]  [facility] [address]      RE: Mia Ceballosjack       Dear Colleague,    I had the pleasure of seeing Mia Rodriguez in the [facility].    HISTORY OF PRESENT ILLNESS:  I had the opportunity to see Ms. Mia Rodriguez in Cardiology Clinic today at the [facility] in [place] for reevaluation of chronic atrial fibrillation and discussion of some recent chest pain symptoms.  I saw her in 03/2017 at my last visit because she had had some chest tightness and was in the emergency room.  She has had some shortness of breath symptoms leading up to that chest tightness but there was no clear cardiac abnormality identified during her emergency room visit.  I referred her for stress testing with nuclear imaging, which looked fine.  There was no evidence of any significant coronary artery disease identified on that study.  Since then, she has not had any recurrent chest tightness symptoms.      She continues to have shortness of breath as her primary concern.  She gets short of breath with any more strenuous activity such as walking or climbing stairs.  She is not short of breath at rest.  She does carry a diagnosis of COPD and only has an albuterol inhaler.  She had been on some different inhalers in the past which had unpleasant side effects.      She does have some occasional mild lightheadedness symptoms when she is upright.  These have not caused her any severe concern and she has not had syncope.  She is wondering about her medications in this regard.      PHYSICAL EXAMINATION:  Today her blood pressure is 129/74, heart rate 67, weight 95 pounds.  She is down 12 pounds since last spring.  Her lungs sound clear.  Heart rhythm is irregularly irregular without significant murmur.  She has no carotid bruits, no edema.      IMPRESSIONS:  Ms. Regino Rodriguez is an 83-year-old woman with hypertension,  dyslipidemia and chronic atrial fibrillation.  She has a history of some coronary calcifications on CT scan and some chest tightness but her stress test was normal last spring.  She has chronic shortness of breath with exertion which is probably mostly due to COPD.  She has some mild orthostatic lightheadedness symptoms but her blood pressure and heart rate appear to be well controlled with medical therapy.  I will not make any changes.  I did encourage her to stay well hydrated and take it slow and not to get up too fast.      I will have her follow up with me again in 1 year for reevaluation of these issues.       Outpatient Encounter Prescriptions as of 1/12/2018   Medication Sig Dispense Refill     atorvastatin (LIPITOR) 40 MG tablet Take 1 tablet (40 mg) by mouth daily 90 tablet 1     diltiazem (DILACOR XR) 180 MG 24 hr capsule Take 1 capsule (180 mg) by mouth daily 90 capsule 1     MELATONIN PO Take 2.5 mg by mouth nightly as needed       Nutritional Supplements (BOOST PO) Take 1 Bottle by mouth daily       apixaban ANTICOAGULANT (ELIQUIS) 2.5 MG tablet Take 1 tablet (2.5 mg) by mouth 2 times daily 180 tablet 3     albuterol (PROAIR HFA, PROVENTIL HFA, VENTOLIN HFA) 108 (90 BASE) MCG/ACT inhaler Inhale 2 puffs into the lungs every 6 hours       denosumab (PROLIA) 60 MG/ML SOLN Inject 60 mg Subcutaneous. q 6 months       No facility-administered encounter medications on file as of 1/12/2018.        Again, thank you for allowing me to participate in the care of your patient.      Sincerely,    ROBERT BARRETO MD     [facility]

## 2018-01-12 NOTE — PROGRESS NOTES
HPI and Plan:   See dictation    Orders Placed This Encounter   Procedures     Basic metabolic panel     Lipid Profile     Follow-Up with Cardiologist       No orders of the defined types were placed in this encounter.      There are no discontinued medications.      Encounter Diagnoses   Name Primary?     Chronic atrial fibrillation (H) Yes     Pulmonary hypertension      Shortness of breath      Hyperlipidemia LDL goal <100        CURRENT MEDICATIONS:  Current Outpatient Prescriptions   Medication Sig Dispense Refill     atorvastatin (LIPITOR) 40 MG tablet Take 1 tablet (40 mg) by mouth daily 90 tablet 1     diltiazem (DILACOR XR) 180 MG 24 hr capsule Take 1 capsule (180 mg) by mouth daily 90 capsule 1     MELATONIN PO Take 2.5 mg by mouth nightly as needed       Nutritional Supplements (BOOST PO) Take 1 Bottle by mouth daily       apixaban ANTICOAGULANT (ELIQUIS) 2.5 MG tablet Take 1 tablet (2.5 mg) by mouth 2 times daily 180 tablet 3     albuterol (PROAIR HFA, PROVENTIL HFA, VENTOLIN HFA) 108 (90 BASE) MCG/ACT inhaler Inhale 2 puffs into the lungs every 6 hours       denosumab (PROLIA) 60 MG/ML SOLN Inject 60 mg Subcutaneous. q 6 months         ALLERGIES     Allergies   Allergen Reactions     Megace [Megestrol Acetate]      Increased LFTs         PAST MEDICAL HISTORY:  Past Medical History:   Diagnosis Date     Arrhythmia     atrial fib.-on coumadin     Atrial fibrillation (H)      Breast cancer (H)      COPD (chronic obstructive pulmonary disease) (H)      COPD exacerbation (H) 9/2/2015     Coronary artery disease      Hypertension      Malignant neoplasm (H) 6/2/2008    Right Breast Cancer     Osteoporosis      Renal disease     kidney stones     Thyroid disease     Hypothyroid     Unspecified cerebral artery occlusion with cerebral infarction     TIA     Uterine fibroid        PAST SURGICAL HISTORY:  Past Surgical History:   Procedure Laterality Date     BIOPSY  6/2/2008    Left /RightBreast Biopsy     BIOPSY   6/1/2010    Right Breast Biopsy     BREAST SURGERY  6/12/2008    Right Breast Lumpectomy     CYSTOSCOPY, DILATE URETHRA, COMBINED  10/5/2012    Procedure: COMBINED CYSTOSCOPY, DILATE URETHRA;  urethral dilation;  Surgeon: Kaushal Harris MD;  Location:  OR     CYSTOSCOPY, RETROGRADES, EXTRACT STONE, COMBINED Right 4/16/2015    Procedure: COMBINED CYSTOSCOPY, RETROGRADES, EXTRACT STONE;  Surgeon: Kaushal Harris MD;  Location:  OR     EXCISE LESION LIP N/A 10/22/2014    Procedure: EXCISE LESION LIP;  Surgeon: Brent Jolley MD;  Location:  SD     GYN SURGERY  1981    LISA with ovaries intact-fibroid     LASER HOLMIUM LITHOTRIPSY URETER(S), INSERT STENT, COMBINED  10/5/2012    Procedure: COMBINED CYSTOSCOPY, URETEROSCOPY, LASER HOLMIUM LITHOTRIPSY URETER(S), INSERT STENT;  RIGHT URETEROSCOPY ,right ureteral biopsy,WITH LASER HOLMIUM LITHOTRIPSY, INSERT STENT RIGHT URETER;  Surgeon: Kaushal Harris MD;  Location:  OR     LASER HOLMIUM LITHOTRIPSY URETER(S), INSERT STENT, COMBINED Right 4/16/2015    Procedure: COMBINED CYSTOSCOPY, URETEROSCOPY, LASER HOLMIUM LITHOTRIPSY URETER(S), INSERT STENT;  Surgeon: Kaushal Harris MD;  Location:  OR       FAMILY HISTORY:  Family History   Problem Relation Age of Onset     Alzheimer Disease Mother      OSTEOPOROSIS Mother      C.A.D. Father      Cardiovascular Father      CEREBROVASCULAR DISEASE Father      Breast Cancer Maternal Grandmother      Unknown/Adopted Maternal Grandfather      Unknown/Adopted Paternal Grandmother      old age     CANCER Paternal Grandfather      stomach       SOCIAL HISTORY:  Social History     Social History     Marital status: Single     Spouse name: N/A     Number of children: 4     Years of education: N/A     Social History Main Topics     Smoking status: Former Smoker     Packs/day: 1.00     Years: 30.00     Types: Cigarettes     Quit date: 12/1/1989     Smokeless tobacco: Never Used      Comment: Started:   Stopped:     Alcohol use No     Drug use: No     Sexual activity: Not Currently     Birth control/ protection: Female Surgical      Comment: LISA     Other Topics Concern     Parent/Sibling W/ Cabg, Mi Or Angioplasty Before 65f 55m? No     Caffeine Concern Yes     2 cups caffeine per day     Sleep Concern Yes     Stress Concern No     Weight Concern No     Special Diet No     Back Care No     Exercise No     Seat Belt Yes     Social History Narrative       Review of Systems:  Skin:  Negative       Eyes:  Positive for glasses    ENT:  Negative      Respiratory:  Positive for dyspnea on exertion;cough     Cardiovascular:  Negative;palpitations;syncope or near-syncope;cyanosis;edema exercise intolerance;lightheadedness;Positive for;fatigue    Gastroenterology: Positive for poor appetite    Genitourinary:  Negative      Musculoskeletal:  Positive for back pain    Neurologic:  Negative      Psychiatric:  Positive for sleep disturbances    Heme/Lymph/Imm:  Positive for weight loss;easy bruising    Endocrine:  Negative        Physical Exam:  Vitals: /74 (BP Location: Right arm, Cuff Size: Adult Small)  Pulse 67  Ht 1.524 m (5')  Wt 43.1 kg (95 lb)  LMP 12/07/1981  SpO2 96%  BMI 18.55 kg/m2    Constitutional:  cooperative, alert and oriented, well developed, well nourished, in no acute distress        Skin:  warm and dry to the touch          Head:  normocephalic        Eyes:  pupils equal and round, conjunctivae and lids unremarkable, sclera white, no xanthalasma, EOMS intact, no nystagmus        Lymph:No Cervical lymphadenopathy present     ENT:  no pallor or cyanosis        Neck:  carotid pulses are full and equal bilaterally, JVP normal, no carotid bruit        Respiratory:  clear to auscultation         Cardiac:   irregularly irregular rhythm   no presence of murmur          not assessed this visit                                        GI:  abdomen soft;BS normoactive        Extremities and Muscular  Skeletal:  no deformities, clubbing, cyanosis, erythema observed              Neurological:  no gross motor deficits;affect appropriate        Psych:  affect appropriate, oriented to time, person and place        CC  Parish Cantrell MD  [address]  [place] [address]

## 2018-01-12 NOTE — PROGRESS NOTES
HISTORY OF PRESENT ILLNESS:  I had the opportunity to see Ms. Mia Rodriguez in Cardiology Clinic today at the [facility] in [place] for reevaluation of chronic atrial fibrillation and discussion of some recent chest pain symptoms.  I saw her in 03/2017 at my last visit because she had had some chest tightness and was in the emergency room.  She has had some shortness of breath symptoms leading up to that chest tightness but there was no clear cardiac abnormality identified during her emergency room visit.  I referred her for stress testing with nuclear imaging, which looked fine.  There was no evidence of any significant coronary artery disease identified on that study.  Since then, she has not had any recurrent chest tightness symptoms.      She continues to have shortness of breath as her primary concern.  She gets short of breath with any more strenuous activity such as walking or climbing stairs.  She is not short of breath at rest.  She does carry a diagnosis of COPD and only has an albuterol inhaler.  She had been on some different inhalers in the past which had unpleasant side effects.      She does have some occasional mild lightheadedness symptoms when she is upright.  These have not caused her any severe concern and she has not had syncope.  She is wondering about her medications in this regard.      PHYSICAL EXAMINATION:  Today her blood pressure is 129/74, heart rate 67, weight 95 pounds.  She is down 12 pounds since last spring.  Her lungs sound clear.  Heart rhythm is irregularly irregular without significant murmur.  She has no carotid bruits, no edema.      IMPRESSIONS:  Ms. Regino Rodriguez is an 83-year-old woman with hypertension, dyslipidemia and chronic atrial fibrillation.  She has a history of some coronary calcifications on CT scan and some chest tightness but her stress test was normal last spring.  She has chronic shortness of breath with exertion which is probably mostly due  to COPD.  She has some mild orthostatic lightheadedness symptoms but her blood pressure and heart rate appear to be well controlled with medical therapy.  I will not make any changes.  I did encourage her to stay well hydrated and take it slow and not to get up too fast.      I will have her follow up with me again in 1 year for reevaluation of these issues.      cc:      Birgit Cordero MD    [address] [place] [address]         ROBERT BARRETO MD, [facility]             D: 2018 10:33   T: 2018 11:41   MT: EVELINA      Name:     ROSALIND BERNABE   MRN:      [id number]        Account:      [id number]   :      1934           Service Date: 2018      Document: [id number]

## 2018-01-25 DIAGNOSIS — I48.0 PAROXYSMAL ATRIAL FIBRILLATION (H): ICD-10-CM

## 2018-04-17 DIAGNOSIS — I48.20 CHRONIC ATRIAL FIBRILLATION (H): ICD-10-CM

## 2018-04-17 RX ORDER — DILTIAZEM HYDROCHLORIDE 180 MG/1
180 CAPSULE, EXTENDED RELEASE ORAL DAILY
Qty: 90 CAPSULE | Refills: 3 | Status: SHIPPED | OUTPATIENT
Start: 2018-04-17 | End: 2018-09-06

## 2018-05-01 DIAGNOSIS — E78.5 HYPERLIPIDEMIA LDL GOAL <100: ICD-10-CM

## 2018-05-01 RX ORDER — ATORVASTATIN CALCIUM 40 MG/1
40 TABLET, FILM COATED ORAL DAILY
Qty: 90 TABLET | Refills: 3 | Status: SHIPPED | OUTPATIENT
Start: 2018-05-01 | End: 2019-06-10

## 2018-05-04 ENCOUNTER — TELEPHONE (OUTPATIENT)
Dept: OBGYN | Facility: CLINIC | Age: 83
End: 2018-05-04

## 2018-05-04 NOTE — TELEPHONE ENCOUNTER
Benefits reverification sent. Patient due to see Dr. Randle, last seen 2/2017. LM on daughter number to schedule appointment to see Dr. Randle prior to being able to do prolia.    RNs: appears patient is UTD on labs, please verify. Please do not sign encounter.

## 2018-05-04 NOTE — TELEPHONE ENCOUNTER
Pt's daughter Bonita calling to find out if her mom needs any labs done for her prolia shot. She would like to have the labs done at the same time of her clinic appt when they come into see Dr Randle. Will send msg to Dr Randle to clarify he he wants labs more updated from !!/2017. She will call back next week to hear what Dr Randle would like done.  Routing to Dr Randle to advise.

## 2018-05-11 NOTE — TELEPHONE ENCOUNTER
Benefits read as no PA needed, ok for buy/bill. Subject to $183 ded ($107.71 met), and 20% coinsurance for the admin and cost of Prolia. Left voicemail with daughter that they can call back to get benefits, otherwise needs to schedule to see Dr. Randle and schedule for Prolia on 6/2/18 or shortly after to stay on track.

## 2018-06-04 ENCOUNTER — TELEPHONE (OUTPATIENT)
Dept: NURSING | Facility: CLINIC | Age: 83
End: 2018-06-04

## 2018-06-04 NOTE — TELEPHONE ENCOUNTER
Pt calling to question when can she have her next prolia injection. Pt appears to be UTD on her labwork. She needs to schedule her appt. Transferred to scheduling

## 2018-06-05 ENCOUNTER — ALLIED HEALTH/NURSE VISIT (OUTPATIENT)
Dept: NURSING | Facility: CLINIC | Age: 83
End: 2018-06-05
Payer: MEDICARE

## 2018-06-05 DIAGNOSIS — M81.0 OSTEOPOROSIS, SENILE: Primary | ICD-10-CM

## 2018-06-05 PROCEDURE — 96372 THER/PROPH/DIAG INJ SC/IM: CPT

## 2018-06-05 NOTE — PROGRESS NOTES
The following medication was given:     MEDICATION: Denosumab (Prolia) 60 mg/ml SOLN  ROUTE: SQ  SITE: Arm - Left  DOSE: 60mg  LOT #: [id number]  :  [employer]  EXPIRATION DATE:  06/2020  NDC#: 77636-736-58     Pt is accompanied by her daughter Bonita today. She reports no concerns about previous injection. Her last labs 11/17/17 were WNL.   Pt was provided with Prolia patient packet from administration kit. She verbalizes understanding of possible AE and reports she is familiar with the informational contents of the packet.   Pt elected to have injection on L side again today; reports she sleeps on right side.   Due to injection administration, patient instructed to remain in clinic for 15 minutes afterwards, and to report any adverse reaction to me immediately. Pt declines to wait today, but she does agree to call clinic immediately with any injection site concerns. She was discharged home in stable condition. Pt to return for labs around 11/2018 and for next injection after 12/5/2018.     Noted in completing pt's chart that she has not been seen by Dr. Randle since 2/2017; called pt's daughter Bonita (ok per most recent CTC) to have her call and schedule Annual appt asap. She has clinic number to make this appt.

## 2018-06-05 NOTE — MR AVS SNAPSHOT
After Visit Summary   6/5/2018    Mia Rodriguez    MRN: [id number]           Patient Information     Date Of Birth          2/25/1934        Visit Information        Provider Department      6/5/2018 1:50 PM WE TRIAGE [facility] Janet Kohli        Today's Diagnoses     Osteoporosis, senile    -  1       Follow-ups after your visit        Follow-up notes from your care team     Return in about 1 day (around 6/6/2018) for PT NEEDS ANNUAL EXAM, Physical Exam.      Who to contact     If you have questions or need follow up information about today's clinic visit or your schedule please contact [facility] JANET KOHLI directly at [phone].  Normal or non-critical lab and imaging results will be communicated to you by MyChart, letter or phone within 4 business days after the clinic has received the results. If you do not hear from us within 7 days, please contact the clinic through MyChart or phone. If you have a critical or abnormal lab result, we will notify you by phone as soon as possible.  Submit refill requests through [email or web address] or call your pharmacy and they will forward the refill request to us. Please allow 3 business days for your refill to be completed.          Additional Information About Your Visit        Care EveryWhere ID     This is your Care EveryWhere ID. This could be used by other organizations to access your [facility] medical records  [id number]        Your Vitals Were     Last Period                   12/07/1981            Blood Pressure from Last 3 Encounters:   01/12/18 129/74   09/04/17 107/65   04/07/17 124/78    Weight from Last 3 Encounters:   01/12/18 95 lb (43.1 kg)   09/04/17 101 lb 9.6 oz (46.1 kg)   04/07/17 107 lb (48.5 kg)              We Performed the Following     C DENOSUMAB INJECTION, 1 MG     INJECTION INTRAMUSCULAR OR SUB-Q        Primary Care Provider Office Phone # Fax #    Birgit Cordero [phone]       SHANE JOEL  [address]  [place] [address]        Equal Access to Services     ISABELA NIEVES : Hadii aad ku hadhermiloo Sosunshineali, waaxda luqadaha, qaybta kaalmada garrettevertkate, fatimah wei radhamargaret samaniegokristoferariel duvall. So [facility] [phone].    ATENCIÓN: Si habla español, tiene a velasquez disposición servicios gratuitos de asistencia lingüística. [facility] [phone].    We comply with applicable federal civil rights laws and [place] laws. We do not discriminate on the basis of race, color, national origin, age, disability, sex, sexual orientation, or gender identity.            Thank you!     Thank you for choosing [facility] [place]  for your care. Our goal is always to provide you with excellent care. Hearing back from our patients is one way we can continue to improve our services. Please take a few minutes to complete the written survey that you may receive in the mail after your visit with us. Thank you!             Your Updated Medication List - Protect others around you: Learn how to safely use, store and throw away your medicines at www.disposemymeds.org.          This list is accurate as of 6/5/18  2:47 PM.  Always use your most recent med list.                   Brand Name Dispense Instructions for use Diagnosis    albuterol 108 (90 Base) MCG/ACT Inhaler    PROAIR HFA/PROVENTIL HFA/VENTOLIN HFA     Inhale 2 puffs into the lungs every 6 hours        apixaban ANTICOAGULANT 2.5 MG tablet    ELIQUIS    180 tablet    Take 1 tablet (2.5 mg) by mouth 2 times daily    Paroxysmal atrial fibrillation (H)       atorvastatin 40 MG tablet    LIPITOR    90 tablet    Take 1 tablet (40 mg) by mouth daily    Hyperlipidemia LDL goal <100       BOOST PO      Take 1 Bottle by mouth daily        diltiazem 180 MG 24 hr capsule    DILACOR XR    90 capsule    Take 1 capsule (180 mg) by mouth daily    Chronic atrial fibrillation (H)       MELATONIN PO      Take 2.5 mg by mouth nightly as needed        PROLIA 60 MG/ML Soln  injection   Generic drug:  denosumab      Inject 60 mg Subcutaneous. q 6 months

## 2018-06-26 ENCOUNTER — OFFICE VISIT (OUTPATIENT)
Dept: OBGYN | Facility: CLINIC | Age: 83
End: 2018-06-26
Payer: MEDICARE

## 2018-06-26 VITALS
SYSTOLIC BLOOD PRESSURE: 112 MMHG | DIASTOLIC BLOOD PRESSURE: 72 MMHG | WEIGHT: 96 LBS | BODY MASS INDEX: 18.12 KG/M2 | HEIGHT: 61 IN | HEART RATE: 68 BPM

## 2018-06-26 DIAGNOSIS — Z85.3 PERSONAL HISTORY OF BREAST CANCER: ICD-10-CM

## 2018-06-26 DIAGNOSIS — M81.0 OSTEOPOROSIS, SENILE: Primary | ICD-10-CM

## 2018-06-26 PROCEDURE — 99213 OFFICE O/P EST LOW 20 MIN: CPT | Performed by: OBSTETRICS & GYNECOLOGY

## 2018-06-26 NOTE — PROGRESS NOTES
"  Mia is a 84 year old  female who presents for Medicare Limited exam.     Do you have a Health Care Directive?: {HEALTHCARE DIRECTIVE STATUS:764081}    Fall risk:   {Fall Risk for Medicare Annual Wellness Visit:156174::\"Fall Risk Assessment completed per order.\"}    HPI :  ***    GYNECOLOGIC HISTORY:  Patient's last menstrual period was 1981..   reports that she quit smoking about 28 years ago. Her smoking use included Cigarettes. She has a 30.00 pack-year smoking history. She has never used smokeless tobacco.  {Tobacco Cessation -- Delete if patient is a non-smoker:944460}  STD testing offered?  {GC/CHLAMYDIA:425179}  Last PHQ-9 score on record= No flowsheet data found.  Last GAD7 score on record= No flowsheet data found.    HEALTH MAINTENANCE:  Cholesterol: (  Cholesterol   Date Value Ref Range Status   2018 188 <200 mg/dL Final   2016 149 <200 mg/dL Final      Last Mammo: 17, Result: normal, Next Mammo: {[facility] next mammo:692611::\"today\"}   Pap: LISA  DEXA:  3/16/17  Colonoscopy:  ***, Result:  {[facility] normal:656551::\"normal\"}, Next Colonoscopy: *** years.    HISTORY:  Obstetric History       T0      L4     SAB0   TAB0   Ectopic0   Multiple0   Live Births0       # Outcome Date GA Lbr Edwin/2nd Weight Sex Delivery Anes PTL Lv   4 Para            3 Para            2 Para            1 Para                 Past Medical History:   Diagnosis Date     Arrhythmia     atrial fib.-on coumadin     Atrial fibrillation (H)      Breast cancer (H)      COPD (chronic obstructive pulmonary disease) (H)      COPD exacerbation (H) 2015     Coronary artery disease      Hypertension      Malignant neoplasm (H) 2008    Right Breast Cancer     Osteoporosis      Renal disease     kidney stones     Thyroid disease     Hypothyroid     Unspecified cerebral artery occlusion with cerebral infarction     TIA     Uterine fibroid      Past Surgical History:   Procedure Laterality Date     BIOPSY  " 6/2/2008    Left /RightBreast Biopsy     BIOPSY  6/1/2010    Right Breast Biopsy     BREAST SURGERY  6/12/2008    Right Breast Lumpectomy     CYSTOSCOPY, DILATE URETHRA, COMBINED  10/5/2012    Procedure: COMBINED CYSTOSCOPY, DILATE URETHRA;  urethral dilation;  Surgeon: Kaushal Harris MD;  Location:  OR     CYSTOSCOPY, RETROGRADES, EXTRACT STONE, COMBINED Right 4/16/2015    Procedure: COMBINED CYSTOSCOPY, RETROGRADES, EXTRACT STONE;  Surgeon: Kaushal Harris MD;  Location:  OR     EXCISE LESION LIP N/A 10/22/2014    Procedure: EXCISE LESION LIP;  Surgeon: Brent Jolley MD;  Location:  SD     GYN SURGERY  1981    LISA with ovaries intact-fibroid     LASER HOLMIUM LITHOTRIPSY URETER(S), INSERT STENT, COMBINED  10/5/2012    Procedure: COMBINED CYSTOSCOPY, URETEROSCOPY, LASER HOLMIUM LITHOTRIPSY URETER(S), INSERT STENT;  RIGHT URETEROSCOPY ,right ureteral biopsy,WITH LASER HOLMIUM LITHOTRIPSY, INSERT STENT RIGHT URETER;  Surgeon: Kaushal Harris MD;  Location:  OR     LASER HOLMIUM LITHOTRIPSY URETER(S), INSERT STENT, COMBINED Right 4/16/2015    Procedure: COMBINED CYSTOSCOPY, URETEROSCOPY, LASER HOLMIUM LITHOTRIPSY URETER(S), INSERT STENT;  Surgeon: Kaushal Harris MD;  Location:  OR     Family History   Problem Relation Age of Onset     Alzheimer Disease Mother      Osteoperosis Mother      C.A.D. Father      Cardiovascular Father      Cerebrovascular Disease Father      Breast Cancer Maternal Grandmother      Unknown/Adopted Maternal Grandfather      Unknown/Adopted Paternal Grandmother      old age     Cancer Paternal Grandfather      stomach     Social History     Social History     Marital status: Single     Spouse name: N/A     Number of children: 4     Years of education: N/A     Social History Main Topics     Smoking status: Former Smoker     Packs/day: 1.00     Years: 30.00     Types: Cigarettes     Quit date: 12/1/1989     Smokeless tobacco: Never Used       Comment: Started:  Stopped:     Alcohol use No     Drug use: No     Sexual activity: Not Currently     Birth control/ protection: Female Surgical      Comment: LISA     Other Topics Concern     Parent/Sibling W/ Cabg, Mi Or Angioplasty Before 65f 55m? No     Caffeine Concern Yes     2 cups caffeine per day     Sleep Concern Yes     Stress Concern No     Weight Concern No     Special Diet No     Back Care No     Exercise No     Seat Belt Yes     Social History Narrative     Current Outpatient Prescriptions   Medication Sig     albuterol (PROAIR HFA, PROVENTIL HFA, VENTOLIN HFA) 108 (90 BASE) MCG/ACT inhaler Inhale 2 puffs into the lungs every 6 hours     apixaban ANTICOAGULANT (ELIQUIS) 2.5 MG tablet Take 1 tablet (2.5 mg) by mouth 2 times daily     atorvastatin (LIPITOR) 40 MG tablet Take 1 tablet (40 mg) by mouth daily     denosumab (PROLIA) 60 MG/ML SOLN Inject 60 mg Subcutaneous. q 6 months     diltiazem (DILACOR XR) 180 MG 24 hr capsule Take 1 capsule (180 mg) by mouth daily     MELATONIN PO Take 2.5 mg by mouth nightly as needed     Nutritional Supplements (BOOST PO) Take 1 Bottle by mouth daily     No current facility-administered medications for this visit.      Allergies   Allergen Reactions     Megace [Megestrol Acetate]      Increased LFTs         Past medical, surgical, social and family history were reviewed and updated in [facility].    EXAM:  LMP 12/07/1981   BMI: There is no height or weight on file to calculate BMI.    Constitutional: Appearance: Well nourished, well developed alert, in no acute distress  Breasts: Inspection of Breasts:  No lymphadenopathy present    Palpation of Breasts and Axillae:  No masses present on palpation, no  breast tenderness    Axillary Lymph Nodes:  No lymphadenopathy present  Neurologic/Psychiatric:    Mental Status:  Oriented X3     {Pelvic Exam:940772}    There is no height or weight on file to calculate BMI.  {Weight Management Plan -- Delete if patient has a normal  "BMI:586532}   reports that she quit smoking about 28 years ago. Her smoking use included Cigarettes. She has a 30.00 pack-year smoking history. She has never used smokeless tobacco.  {Tobacco Cessation -- Delete if patient is a non-smoker:801247}    ASSESSMENT:  84 year old female with satisfactory annual exam.    ICD-10-CM    1. Encounter for gynecological examination without abnormal finding Z01.419        COUNSELING:   {Female:699414::\"Reviewed preventive health counseling, as reflected in patient instructions\"}    PLAN/PATIENT INSTRUCTIONS:    There are no Patient Instructions on file for this visit.    Jhon Randle MD          "

## 2018-06-26 NOTE — PROGRESS NOTES
SUBJECTIVE:                                                   Mia Rodriguez is a 84 year old female who presents to clinic today for the following health issue(s):  Patient presents with:  Medication Follow-up: here to follow up on Prolia        HPI:  Getting Prolia twice a year for osteoporosis. No side effects. Wants to continue. Last DEXA was last year.Different machine but overall same if not better.  Needs labs yearly.     Declines physical exam.  PMH significant for breast cancer    Patient's last menstrual period was 1981..   Patient is not sexually active, .  Using hysterectomy and not sexually active for contraception.    reports that she quit smoking about 28 years ago. Her smoking use included Cigarettes. She has a 30.00 pack-year smoking history. She has never used smokeless tobacco.    STD testing offered?  Declined    Health maintenance updated:  no    Today's PHQ-2 Score:   PHQ-2 (  [employer]) 2011   Q1: Little interest or pleasure in doing things 0   Q2: Feeling down, depressed or hopeless 0   PHQ-2 Score 0     Today's PHQ-9 Score: No flowsheet data found.  Today's TATE-7 Score: No flowsheet data found.    Problem list and histories reviewed & adjusted, as indicated.  Additional history: as documented.    Patient Active Problem List   Diagnosis     Cancer of breast (H)     Transient cerebral ischemia     Benign essential hypertension     Kidney stones     H/O: hysterectomy     Hyperlipidemia with target LDL less than 100     Shortness of breath     Atrial fibrillation (H)     Pulmonary hypertension     COPD exacerbation (H)     Osteoporosis, senile     Nephropathy, obstructive     Past Surgical History:   Procedure Laterality Date     BIOPSY  2008    Left /RightBreast Biopsy     BIOPSY  2010    Right Breast Biopsy     BREAST SURGERY  2008    Right Breast Lumpectomy     CYSTOSCOPY, DILATE URETHRA, COMBINED  10/5/2012    Procedure: COMBINED CYSTOSCOPY, DILATE  URETHRA;  urethral dilation;  Surgeon: Kaushal Harris MD;  Location:  OR     CYSTOSCOPY, RETROGRADES, EXTRACT STONE, COMBINED Right 4/16/2015    Procedure: COMBINED CYSTOSCOPY, RETROGRADES, EXTRACT STONE;  Surgeon: Kaushal Harris MD;  Location:  OR     EXCISE LESION LIP N/A 10/22/2014    Procedure: EXCISE LESION LIP;  Surgeon: Brent Jolley MD;  Location:  SD     GYN SURGERY  1981    LISA with ovaries intact-fibroid     LASER HOLMIUM LITHOTRIPSY URETER(S), INSERT STENT, COMBINED  10/5/2012    Procedure: COMBINED CYSTOSCOPY, URETEROSCOPY, LASER HOLMIUM LITHOTRIPSY URETER(S), INSERT STENT;  RIGHT URETEROSCOPY ,right ureteral biopsy,WITH LASER HOLMIUM LITHOTRIPSY, INSERT STENT RIGHT URETER;  Surgeon: Kaushal Harris MD;  Location:  OR     LASER HOLMIUM LITHOTRIPSY URETER(S), INSERT STENT, COMBINED Right 4/16/2015    Procedure: COMBINED CYSTOSCOPY, URETEROSCOPY, LASER HOLMIUM LITHOTRIPSY URETER(S), INSERT STENT;  Surgeon: Kaushal Harris MD;  Location:  OR      Social History   Substance Use Topics     Smoking status: Former Smoker     Packs/day: 1.00     Years: 30.00     Types: Cigarettes     Quit date: 12/1/1989     Smokeless tobacco: Never Used      Comment: Started:  Stopped:     Alcohol use No      Problem (# of Occurrences) Relation (Name,Age of Onset)    Alzheimer Disease (1) Mother    Breast Cancer (1) Maternal Grandmother    C.A.D. (1) Father    Cancer (1) Paternal Grandfather: stomach    Cardiovascular (1) Father    Cerebrovascular Disease (1) Father    Osteoperosis (1) Mother    Unknown/Adopted (2) Maternal Grandfather, Paternal Grandmother: old age            Current Outpatient Prescriptions   Medication Sig     albuterol (PROAIR HFA, PROVENTIL HFA, VENTOLIN HFA) 108 (90 BASE) MCG/ACT inhaler Inhale 2 puffs into the lungs every 6 hours     apixaban ANTICOAGULANT (ELIQUIS) 2.5 MG tablet Take 1 tablet (2.5 mg) by mouth 2 times daily     atorvastatin (LIPITOR) 40 MG  "tablet Take 1 tablet (40 mg) by mouth daily     denosumab (PROLIA) 60 MG/ML SOLN Inject 60 mg Subcutaneous. q 6 months     diltiazem (DILACOR XR) 180 MG 24 hr capsule Take 1 capsule (180 mg) by mouth daily     MELATONIN PO Take 2.5 mg by mouth nightly as needed     Nutritional Supplements (BOOST PO) Take 1 Bottle by mouth daily     No current facility-administered medications for this visit.      Allergies   Allergen Reactions     Megace [Megestrol Acetate]      Increased LFTs         ROS:  12 point review of systems negative other than symptoms noted below.  Cardiovascular: Lightheadedness  Respiratory: Shortness of Breath  Neurologic: Dizziness  Musculoskeletal: Joint Pain    OBJECTIVE:     /72  Pulse 68  Ht 5' 0.5\" (1.537 m)  Wt 96 lb (43.5 kg)  LMP 12/07/1981  BMI 18.44 kg/m2  Body mass index is 18.44 kg/(m^2).    Exam:  Constitutional:  Appearance: Well nourished, well developed alert, in no acute distress  Neurologic/Psychiatric:  Mental Status:  Oriented X3      In-Clinic Test Results:  No results found for this or any previous visit (from the past 24 hour(s)).    ASSESSMENT/PLAN:                                                        ICD-10-CM    1. Osteoporosis, senile M81.0    2. Personal history of breast cancer Z85.3        Will need labs in Nov and has future labs from cardiology as well.  OK for prolia if labs ok.  DEXA next year.      Jhon Randle MD  [facility]  "

## 2018-06-26 NOTE — MR AVS SNAPSHOT
"              After Visit Summary   6/26/2018    Mia Rodriguez    MRN: [id number]           Patient Information     Date Of Birth          2/25/1934        Visit Information        Provider Department      6/26/2018 1:30 PM Jhon Randle MD [facility] [place]        Today's Diagnoses     Osteoporosis, senile    -  1    Personal history of breast cancer           Follow-ups after your visit        Future tests that were ordered for you today     Open Future Orders        Priority Expected Expires Ordered    Calcium Routine 12/1/2018 1/1/2019 6/26/2018    Creatinine Routine 12/1/2018 1/1/2019 6/26/2018    Phosphorus Routine 12/1/2018 1/1/2019 6/26/2018    Magnesium Routine 12/1/2018 1/1/2019 6/26/2018            Who to contact     If you have questions or need follow up information about today's clinic visit or your schedule please contact [facility] [place] directly at [phone].  Normal or non-critical lab and imaging results will be communicated to you by MyChart, letter or phone within 4 business days after the clinic has received the results. If you do not hear from us within 7 days, please contact the clinic through MyChart or phone. If you have a critical or abnormal lab result, we will notify you by phone as soon as possible.  Submit refill requests through [employer] or call your pharmacy and they will forward the refill request to us. Please allow 3 business days for your refill to be completed.          Additional Information About Your Visit        Care EveryWhere ID     This is your Care EveryWhere ID. This could be used by other organizations to access your [place] medical records  [id number]        Your Vitals Were     Pulse Height Last Period BMI (Body Mass Index)          68 5' 0.5\" (1.537 m) 12/07/1981 18.44 kg/m2         Blood Pressure from Last 3 Encounters:   06/26/18 112/72   01/12/18 129/74   09/04/17 107/65    Weight from Last 3 Encounters:   06/26/18 " 96 lb (43.5 kg)   01/12/18 95 lb (43.1 kg)   09/04/17 101 lb 9.6 oz (46.1 kg)               Primary Care Provider Office Phone # Fax #    Birgit Cordero [phone]       [facility] [place] [address] [place] [address]        Equal Access to Services     [facility] Hospital[facility] : Hadii aad ku hadasho Soomaali, waaxda luqadaha, qaybta kaalmada adeegyada, waxay idiin hayaan adeeg kharash la'aan . So [facility] [phone].    ATENCIÓN: Si habla español, tiene a velasquez disposición servicios gratuitos de asistencia lingüística. Edwin al [phone].    We comply with applicable federal civil rights laws and [place] laws. We do not discriminate on the basis of race, color, national origin, age, disability, sex, sexual orientation, or gender identity.            Thank you!     Thank you for choosing [facility]  for your care. Our goal is always to provide you with excellent care. Hearing back from our patients is one way we can continue to improve our services. Please take a few minutes to complete the written survey that you may receive in the mail after your visit with us. Thank you!             Your Updated Medication List - Protect others around you: Learn how to safely use, store and throw away your medicines at www.disposemymeds.org.          This list is accurate as of 6/26/18  5:45 PM.  Always use your most recent med list.                   Brand Name Dispense Instructions for use Diagnosis    albuterol 108 (90 Base) MCG/ACT Inhaler    PROAIR HFA/PROVENTIL HFA/VENTOLIN HFA     Inhale 2 puffs into the lungs every 6 hours        apixaban ANTICOAGULANT 2.5 MG tablet    ELIQUIS    180 tablet    Take 1 tablet (2.5 mg) by mouth 2 times daily    Paroxysmal atrial fibrillation (H)       atorvastatin 40 MG tablet    LIPITOR    90 tablet    Take 1 tablet (40 mg) by mouth daily    Hyperlipidemia LDL goal <100       BOOST PO      Take 1 Bottle by mouth daily        diltiazem 180 MG 24 hr capsule     DILACOR XR    90 capsule    Take 1 capsule (180 mg) by mouth daily    Chronic atrial fibrillation (H)       MELATONIN PO      Take 2.5 mg by mouth nightly as needed        PROLIA 60 MG/ML Soln injection   Generic drug:  denosumab      Inject 60 mg Subcutaneous. q 6 months

## 2018-09-05 ENCOUNTER — CARE COORDINATION (OUTPATIENT)
Dept: CARDIOLOGY | Facility: CLINIC | Age: 83
End: 2018-09-05

## 2018-09-05 DIAGNOSIS — I48.20 CHRONIC ATRIAL FIBRILLATION (H): Primary | ICD-10-CM

## 2018-09-05 NOTE — TELEPHONE ENCOUNTER
I am not sure about the brand change of diltiazem and the effectiveness of the medication. I can see that the HR is too high now and the BP looks ok. I would suggest that we increase the diltiazem to 240 mg daily. If she is not comfortable with that, we could do a 24 hour holter monitor to confirm the high HR's before we change the medication. No need for an OV at this time unless they want to come in and see an TARIQ. EE

## 2018-09-05 NOTE — PROGRESS NOTES
"Received VM from pt's daughter, Yamilka, who reports pt is having \"dizzy spells\" and requests return call.       Dr. Cantrell's OV note from 1/12/18 states:  \"IMPRESSIONS:  Ms. Regino Rodriguez is an 83-year-old woman with hypertension, dyslipidemia and chronic atrial fibrillation.  She has a history of some coronary calcifications on CT scan and some chest tightness but her stress test was normal last spring.  She has chronic shortness of breath with exertion which is probably mostly due to COPD.  She has some mild orthostatic lightheadedness symptoms but her blood pressure and heart rate appear to be well controlled with medical therapy.  I will not make any changes.  I did encourage her to stay well hydrated and take it slow and not to get up too fast.\"    Called and spoke with Yamilka.  She reports pt has reported increased dizziness when standing (not getting up from sitting) for the past week.  She confirms pt is staying well hydrated and does get up slowly when going from sitting to standing.  Pt picked up new Rx of diltiazem a week ago and is wondering if her increased dizziness could be related to the fact that the diltiazem  has changed.  Yamilka reports pt checks BP and HR daily after medications and recent BP readings have been 113/75 and 120/82 and recent HR readings have been between 95-105bpm (at OV on 1/12/18 BP was 129/74 and HR was 67). Pt continues to have SOB which is not new, but seems to have \"less oomph\" lately.  Yamilka reports pt does not eat a lot and pt's wt has been around 95# recently (at OV with Dr. Cantrell on 1/12/18 wt was 95#).  Yamilka states pt is wondering if she needs to be seen by Dr. Cantrell sooner than 1/2019.  Inquired if pt has been seen by her [facility], Dr. Cordero, recently.  Yamilka reports no recent PMD visit recently (per Care Everywhere, most recent PMD visit was 10/9/17).     Discussed with Yamilka that will review with Dr. Cantrell and call back with recommendations " when received, which may not be until later this week.  She verbalized understanding and agrees with this plan.     Routed to Dr. Cantrell for review.    NESTOR Mitchell 3:43 PM 9/5/2018

## 2018-09-05 NOTE — PROGRESS NOTES
I left message for pt's daughter to call back to review 's recommendations regarding recent episodes of dizziness and elevated HRs. Jocelynn BAEZ September 5, 2018, 4:59 PM

## 2018-09-06 RX ORDER — DILTIAZEM HYDROCHLORIDE 240 MG/1
240 CAPSULE, EXTENDED RELEASE ORAL DAILY
Qty: 30 CAPSULE | Refills: 11 | Status: SHIPPED | OUTPATIENT
Start: 2018-09-06 | End: 2018-09-14

## 2018-09-06 NOTE — PROGRESS NOTES
Pt's daughter called back to review instructions from . Pt's daughter said pt does not like wearing the heart monitor. She will have pt try the diltiazem 240 mg daily. Pt checks her BP and HR 2x per day. She will call us in one week with update on whether she is noticing improvement in her BPs and HRs, and dizziness. Script sent to pt's pharmacy.

## 2018-09-14 ENCOUNTER — HOSPITAL ENCOUNTER (OUTPATIENT)
Dept: CARDIOLOGY | Facility: CLINIC | Age: 83
Discharge: HOME | End: 2018-09-14
Attending: INTERNAL MEDICINE | Admitting: INTERNAL MEDICINE
Payer: MEDICARE

## 2018-09-14 DIAGNOSIS — I48.20 CHRONIC ATRIAL FIBRILLATION (H): ICD-10-CM

## 2018-09-14 PROCEDURE — 93226 XTRNL ECG REC<48 HR SCAN A/R: CPT | Performed by: INTERNAL MEDICINE

## 2018-09-14 PROCEDURE — 93227 XTRNL ECG REC<48 HR R&I: CPT | Performed by: INTERNAL MEDICINE

## 2018-09-14 RX ORDER — DILTIAZEM HYDROCHLORIDE 180 MG/1
180 CAPSULE, EXTENDED RELEASE ORAL DAILY
COMMUNITY
Start: 2018-09-14 | End: 2019-05-07

## 2018-09-14 NOTE — PROGRESS NOTES
Pt's daughter called back and said pt went back to the smaller dose of diltiazem (from 280 back to 140 mg daily) after only a couple of days because she thought the larger dose worsened her dizziness. Pt continues to have intermittent episodes of dizziness and pounding sensation in her chest, along with occasional shortness of breath. Pt going to see PMD today.  offered pt a 24 hour holter monitor when pt's daughter called to report pt's symptoms. She would like to proceed with pt having that at this time. I spoke to scheduling and was able to get a spot to have 24 hour monitor placed today. Pt's daughter has been checking BP and HR at home and has gotten following readings recently: 150/99, , 139/94, 110/73, 143/95, 137/98. HR usus ally 90s-100 when she has checked it. Jocelynn BAEZ September 14, 2018, 1:42 PM

## 2018-09-14 NOTE — PROGRESS NOTES
I left message for pt's daughter to call back with an update on how she is feeling, and whether HR and dizziness has improved. Jocelynn BAEZ September 14, 2018, 10:14 AM

## 2018-09-14 NOTE — PROGRESS NOTES
Pt's daughter left message that she does have an update on pt. Also, pt is going to OV with her PMD today. Left message for her to call back to review. Jocelynn BAEZ September 14, 2018, 1:28 PM

## 2018-09-20 LAB — INTERPRETATION MONITOR -MUSE: NORMAL

## 2018-09-20 NOTE — PROGRESS NOTES
24 hour holter monitor results from 9/14/18 noted. Will send update to  and call back pt's daughter with further recommendations. Holter done because pt has been having intermittent episodes of dizziness and pounding sensation in her chest and occasional SOB. Pt tried increasing her diltiazem to 280 mg daily, but did not tolerate as she felt it made her dizziness worse. She is now taking 140 mg daily. Will send update on holter to . No f/u scheduled at this time. Message left for pt's daughter to call back to review. Jocelynn BAEZ September 20, 2018, 1:59     Holter showed chronic afib.   Heart Rates  50 Minimum at 11:17:04 15-Sep  88 Average  157 Maximum at 18:31:28 14-Sep  20368 Beats in tachycardia (>=100 bpm), 19% total  575 Beats in bradycardia (<=60 bpm), < 1% total  1.92 Seconds Max R-R at 23:46:18 14-Sep

## 2018-09-20 NOTE — PROGRESS NOTES
Pt's daughter called back and I reviewed holter results with her. She stated pt saw PMD on 9/14 and he thought she was dehydrated. Pt has been drinking more fluids, and is feeling a little better, but still having episodes of dizziness. Pt's daughter concerned that she is not as strong and energetic lately. She would like to know what type of f/u  thinks she needs at this time.

## 2018-09-20 NOTE — PROGRESS NOTES
I reviewed holter results with . He recommended pt see ISELA ROJAS to discuss treatment of her afib further. I called pt's daughter back and set pt up with  on 9/25@ 1:15 in [place]. Jocelynn BAEZ

## 2018-09-25 ENCOUNTER — OFFICE VISIT (OUTPATIENT)
Dept: CARDIOLOGY | Facility: CLINIC | Age: 83
End: 2018-09-25
Attending: INTERNAL MEDICINE
Payer: MEDICARE

## 2018-09-25 VITALS
WEIGHT: 96 LBS | BODY MASS INDEX: 18.85 KG/M2 | SYSTOLIC BLOOD PRESSURE: 134 MMHG | DIASTOLIC BLOOD PRESSURE: 78 MMHG | OXYGEN SATURATION: 93 % | HEART RATE: 80 BPM | HEIGHT: 60 IN

## 2018-09-25 DIAGNOSIS — I48.20 CHRONIC ATRIAL FIBRILLATION (H): ICD-10-CM

## 2018-09-25 PROCEDURE — 99214 OFFICE O/P EST MOD 30 MIN: CPT | Performed by: INTERNAL MEDICINE

## 2018-09-25 NOTE — LETTER
9/25/2018      Birgit Cordero  [facility] [address]  [facility] [address]      RE: Mia Albarraneula       Dear Colleague,    I had the pleasure of seeing Mia Rodriguez in the [facility].    Service Date: 09/25/2018      REASON FOR VISIT:  Evaluation of permanent atrial fibrillation.      HISTORY OF PRESENT ILLNESS:  Ms. Rodriguez is a delightful 84-year-old lady with a history of hypertension, hyperlipidemia, TIA, breast cancer (previous right partial mastectomy) and permanent atrial fibrillation who is here for evaluation.      The patient has a history of permanent atrial fibrillation and has been rate control strategy.  She is followed by Dr. Cantrell.  Most recently she became more symptomatic, having episodes of palpitations throughout the day.  A Holter monitor confirmed episodes of AFib with RVR, and she was referred here for evaluation.      At the moment, she is doing well.  She denies any other symptoms such as chest pain or shortness of breath.  She admits to having intermittent episodes of lightheadedness as well, which she blames is due to the palpitations.  Holter monitor revealed an average heart rate of 88, however, minimum of 50, maximum of 157.  No pauses greater than 2 seconds were seen.  Nuclear stress test obtained in 03/2017 was negative for ischemia and some imaging artifact suggestive of breast attenuation.  Echocardiogram obtained in 05/2016 revealed normal cardiac function, EF of 60%-65%.      ASSESSMENT AND PLAN:   1.  Permanent atrial fibrillation.  She has been a long time of rate control strategy with diltiazem.  However, now her heart rates are more elevated and she became more symptomatic.  We discussed options including increase the dose of diltiazem and add another medication (metoprolol or digoxin).  We also discussed about possibility of a pacemaker and AV node ablation.  I explained the procedure in detail.  At this time, she would  like to attempt a higher dose of diltiazem.  We will increase diltiazem from 180 to 240.  She will follow up here in a month to reevaluate symptomatology.   2.  Embolic prevention.  CHADS-VASc score of 6.  Continue anticoagulation with Eliquis indefinitely.   3.  Hypertension.  Blood pressure is well controlled.         RONAK MA MD             D: 2018   T: 2018   MT: REBECCA      Name:     ROSALIND BERNABE   MRN:      [id number]        Account:      [id number]   :      1934           Service Date: 2018      Document: [id number]         Outpatient Encounter Prescriptions as of 2018   Medication Sig Dispense Refill     albuterol (PROAIR HFA, PROVENTIL HFA, VENTOLIN HFA) 108 (90 BASE) MCG/ACT inhaler Inhale 2 puffs into the lungs every 6 hours       apixaban ANTICOAGULANT (ELIQUIS) 2.5 MG tablet Take 1 tablet (2.5 mg) by mouth 2 times daily 180 tablet 3     atorvastatin (LIPITOR) 40 MG tablet Take 1 tablet (40 mg) by mouth daily 90 tablet 3     denosumab (PROLIA) 60 MG/ML SOLN Inject 60 mg Subcutaneous. q 6 months       diltiazem (DILACOR XR) 180 MG 24 hr capsule Take 1 capsule (180 mg) by mouth daily       MELATONIN PO Take 2.5 mg by mouth nightly as needed       Nutritional Supplements (BOOST PO) Take 1 Bottle by mouth daily       No facility-administered encounter medications on file as of 2018.        Again, thank you for allowing me to participate in the care of your patient.      Sincerely,    Ronak Ma MD     [facility]

## 2018-09-25 NOTE — MR AVS SNAPSHOT
After Visit Summary   9/25/2018    Mia Rodriguez    MRN: [id number]           Patient Information     Date Of Birth          2/25/1934        Visit Information        Provider Department      9/25/2018 1:15 PM Ronak Lindsey MD [facility]   [place]        Today's Diagnoses     Chronic atrial fibrillation (H)           Follow-ups after your visit        Additional Services     Follow-Up with Cardiac Advanced Practice Provider                 Future tests that were ordered for you today     Open Future Orders        Priority Expected Expires Ordered    Follow-Up with Cardiac Advanced Practice Provider Routine 10/25/2018 9/25/2019 9/25/2018            Who to contact     If you have questions or need follow up information about today's clinic visit or your schedule please contact [facility] directly at [phone].  Normal or non-critical lab and imaging results will be communicated to you by MyChart, letter or phone within 4 business days after the clinic has received the results. If you do not hear from us within 7 days, please contact the clinic through MyChart or phone. If you have a critical or abnormal lab result, we will notify you by phone as soon as possible.  Submit refill requests through [employer] or call your pharmacy and they will forward the refill request to us. Please allow 3 business days for your refill to be completed.          Additional Information About Your Visit        Care EveryWhere ID     This is your Care EveryWhere ID. This could be used by other organizations to access your [place] medical records  [id number]        Your Vitals Were     Pulse Height Last Period Pulse Oximetry BMI (Body Mass Index)       80 1.524 m (5') 12/07/1981 93% 18.75 kg/m2        Blood Pressure from Last 3 Encounters:   09/25/18 134/78   06/26/18 112/72   01/12/18 129/74    Weight from Last 3 Encounters:   09/25/18 43.5  kg (96 lb)   06/26/18 43.5 kg (96 lb)   01/12/18 43.1 kg (95 lb)              We Performed the Following     Follow-Up with Electrophysiologist        Primary Care Provider Office Phone # Fax #    Birgit Cordero [phone]       [facility] [place] [address] [facility] [address]        Equal Access to Services     MELISSA NIEVES : Hadii aad ku hadasho Soomaali, waaxda luqadaha, qaybta kaalmada adeegyada, waxay idiin hayaan adeeg kharash la'aan . So [facility] [phone].    ATENCIÓN: Si habla espvnagie, tiene a velasquez disposición servicios gratuitos de asistencia lingüística. Edwin al [phone].    We comply with applicable federal civil rights laws and [place] laws. We do not discriminate on the basis of race, color, national origin, age, disability, sex, sexual orientation, or gender identity.            Thank you!     Thank you for choosing [facility]  for your care. Our goal is always to provide you with excellent care. Hearing back from our patients is one way we can continue to improve our services. Please take a few minutes to complete the written survey that you may receive in the mail after your visit with us. Thank you!             Your Updated Medication List - Protect others around you: Learn how to safely use, store and throw away your medicines at www.disposemymeds.org.          This list is accurate as of 9/25/18  1:59 PM.  Always use your most recent med list.                   Brand Name Dispense Instructions for use Diagnosis    albuterol 108 (90 Base) MCG/ACT inhaler    PROAIR HFA/PROVENTIL HFA/VENTOLIN HFA     Inhale 2 puffs into the lungs every 6 hours        apixaban ANTICOAGULANT 2.5 MG tablet    ELIQUIS    180 tablet    Take 1 tablet (2.5 mg) by mouth 2 times daily    Paroxysmal atrial fibrillation (H)       atorvastatin 40 MG tablet    LIPITOR    90 tablet    Take 1 tablet (40 mg) by mouth daily    Hyperlipidemia LDL goal <100       BOOST  PO      Take 1 Bottle by mouth daily        diltiazem 180 MG 24 hr capsule    DILACOR XR     Take 1 capsule (180 mg) by mouth daily    Chronic atrial fibrillation (H)       MELATONIN PO      Take 2.5 mg by mouth nightly as needed        PROLIA 60 MG/ML Soln injection   Generic drug:  denosumab      Inject 60 mg Subcutaneous. q 6 months

## 2018-09-25 NOTE — LETTER
9/25/2018    Birgit Cordero  [facility] [address]  [place] [address]    RE: Mia Frankel Michael       Dear Colleague,    I had the pleasure of seeing Mia Rodriguez in the [facility].    [id number]    Electrophysiology/ Clinic Note         H&P and Plan:       Ronak Lindsey MD    Physical Exam:  Vitals: /78 (BP Location: Left arm, Patient Position: Chair, Cuff Size: Adult Small)  Pulse 80  Ht 1.524 m (5')  Wt 43.5 kg (96 lb)  LMP 12/07/1981  SpO2 93%  BMI 18.75 kg/m2    Constitutional:  AAO x3.  Pt is in NAD.  HEAD: normocephalic.  SKIN: Skin normal color, texture and turgor with no lesions or eruptions.  Eyes: PERRL, EOMI.  ENT:  Supple, normal JVP. No lymphadenopathy or thyroid enlargement.  Chest:  CTAB.  Cardiac:  IIR, variable sound of S1 and Normal S2.No murmurs rubs or gallop.    Abdomen:  Normal BS.  Soft, non-tender and non-distended.  No rebound or guarding.    Extremities:  Pedious pulses palpable B/L.  No LE edema noticed.   Neurological: Strength and sensation grossly symmetric and intact throughout.       CURRENT MEDICATIONS:  Current Outpatient Prescriptions   Medication Sig Dispense Refill     albuterol (PROAIR HFA, PROVENTIL HFA, VENTOLIN HFA) 108 (90 BASE) MCG/ACT inhaler Inhale 2 puffs into the lungs every 6 hours       apixaban ANTICOAGULANT (ELIQUIS) 2.5 MG tablet Take 1 tablet (2.5 mg) by mouth 2 times daily 180 tablet 3     atorvastatin (LIPITOR) 40 MG tablet Take 1 tablet (40 mg) by mouth daily 90 tablet 3     denosumab (PROLIA) 60 MG/ML SOLN Inject 60 mg Subcutaneous. q 6 months       diltiazem (DILACOR XR) 180 MG 24 hr capsule Take 1 capsule (180 mg) by mouth daily       MELATONIN PO Take 2.5 mg by mouth nightly as needed       Nutritional Supplements (BOOST PO) Take 1 Bottle by mouth daily         ALLERGIES     Allergies   Allergen Reactions     Megace [Megestrol Acetate]      Increased LFTs         PAST MEDICAL HISTORY:  Past  Medical History:   Diagnosis Date     Arrhythmia     atrial fib.-on coumadin     Atrial fibrillation (H)      Breast cancer (H)      COPD (chronic obstructive pulmonary disease) (H)      COPD exacerbation (H) 9/2/2015     Coronary artery disease      Hypertension      Malignant neoplasm (H) 6/2/2008    Right Breast Cancer     Osteoporosis      Renal disease     kidney stones     Thyroid disease     Hypothyroid     Unspecified cerebral artery occlusion with cerebral infarction     TIA     Uterine fibroid        PAST SURGICAL HISTORY:  Past Surgical History:   Procedure Laterality Date     BIOPSY  6/2/2008    Left /RightBreast Biopsy     BIOPSY  6/1/2010    Right Breast Biopsy     BREAST SURGERY  6/12/2008    Right Breast Lumpectomy     CYSTOSCOPY, DILATE URETHRA, COMBINED  10/5/2012    Procedure: COMBINED CYSTOSCOPY, DILATE URETHRA;  urethral dilation;  Surgeon: Kaushal Harris MD;  Location:  OR     CYSTOSCOPY, RETROGRADES, EXTRACT STONE, COMBINED Right 4/16/2015    Procedure: COMBINED CYSTOSCOPY, RETROGRADES, EXTRACT STONE;  Surgeon: Kaushal Harris MD;  Location:  OR     EXCISE LESION LIP N/A 10/22/2014    Procedure: EXCISE LESION LIP;  Surgeon: Brent Jolley MD;  Location: [facility]     GYN SURGERY  1981    LISA with ovaries intact-fibroid     LASER HOLMIUM LITHOTRIPSY URETER(S), INSERT STENT, COMBINED  10/5/2012    Procedure: COMBINED CYSTOSCOPY, URETEROSCOPY, LASER HOLMIUM LITHOTRIPSY URETER(S), INSERT STENT;  RIGHT URETEROSCOPY ,right ureteral biopsy,WITH LASER HOLMIUM LITHOTRIPSY, INSERT STENT RIGHT URETER;  Surgeon: Kaushal Harris MD;  Location:  OR     LASER HOLMIUM LITHOTRIPSY URETER(S), INSERT STENT, COMBINED Right 4/16/2015    Procedure: COMBINED CYSTOSCOPY, URETEROSCOPY, LASER HOLMIUM LITHOTRIPSY URETER(S), INSERT STENT;  Surgeon: Kaushal Harris MD;  Location:  OR       FAMILY HISTORY:  Family History   Problem Relation Age of Onset     Alzheimer Disease Mother       Osteoporosis Mother      C.A.D. Father      Cardiovascular Father      Cerebrovascular Disease Father      Breast Cancer Maternal Grandmother      Unknown/Adopted Maternal Grandfather      Unknown/Adopted Paternal Grandmother      old age     Cancer Paternal Grandfather      stomach       SOCIAL HISTORY:  Social History     Social History     Marital status: Single     Spouse name: N/A     Number of children: 4     Years of education: N/A     Social History Main Topics     Smoking status: Former Smoker     Packs/day: 1.00     Years: 30.00     Types: Cigarettes     Quit date: 12/1/1989     Smokeless tobacco: Never Used      Comment: Started:  Stopped:     Alcohol use No     Drug use: No     Sexual activity: Not Currently     Birth control/ protection: Female Surgical      Comment: [facility]     Other Topics Concern     Parent/Sibling W/ Cabg, Mi Or Angioplasty Before 65f 55m? No     Caffeine Concern Yes     2 cups caffeine per day     Sleep Concern Yes     Stress Concern No     Weight Concern No     Special Diet No     Back Care No     Exercise No     Seat Belt Yes     Social History Narrative       Review of Systems:  Skin:  Negative rash   Eyes:  Positive for glasses  ENT:  Negative hearing loss  Respiratory:  Positive for dyspnea on exertion;cough  Cardiovascular:  Negative;palpitations;syncope or near-syncope;cyanosis;edema;dizziness exercise intolerance;lightheadedness;Positive for;fatigue;palpitations  Gastroenterology: Positive for poor appetite  Genitourinary:  Negative nocturia  Musculoskeletal:  Positive for back pain  Neurologic:  Negative    Psychiatric:  Positive for sleep disturbances  Heme/Lymph/Imm:  Positive for weight loss;easy bruising  Endocrine:  Negative thyroid disorder      Recent Lab Results:  LIPID RESULTS:  Lab Results   Component Value Date    CHOL 188 01/12/2018    HDL 65 01/12/2018    LDL 97 01/12/2018    TRIG 128 01/12/2018    CHOLHDLRATIO 3.5 02/09/2014       LIVER ENZYME RESULTS:  Lab  Results   Component Value Date    AST 13 10/02/2014    ALT 10 01/12/2018       CBC RESULTS:  Lab Results   Component Value Date    WBC 10.5 09/03/2017    RBC 4.72 09/03/2017    HGB 15.3 09/03/2017    HCT 44.7 09/03/2017    MCV 95 09/03/2017    MCH 32.4 09/03/2017    MCHC 34.2 09/03/2017    RDW 13.8 09/03/2017     09/03/2017       BMP RESULTS:  Lab Results   Component Value Date     09/03/2017    POTASSIUM 3.8 09/03/2017    CHLORIDE 108 09/03/2017    CO2 24 09/03/2017    ANIONGAP 9 09/03/2017     (H) 09/03/2017    BUN 14 09/03/2017    CR 0.94 11/17/2017    GFRESTIMATED 57 (L) 11/17/2017    GFRESTBLACK 69 11/17/2017    CYNDEE 9.7 11/28/2017        A1C RESULTS:  No results found for: A1C    INR RESULTS:  Lab Results   Component Value Date    INR 1.03 10/22/2014    INR 1.78 (H) 10/02/2014         ECHOCARDIOGRAM  No results found for this or any previous visit (from the past 8760 hour(s)).      Orders Placed This Encounter   Procedures     Follow-Up with Cardiac Advanced Practice Provider     No orders of the defined types were placed in this encounter.    There are no discontinued medications.      Encounter Diagnosis   Name Primary?     Chronic atrial fibrillation (H)          CC  Parish Cantrell MD  [address]  [place] [address]                Thank you for allowing me to participate in the care of your patient.      Sincerely,     Ronak Lindsey MD     [facility]    cc:   Parish Cantrell MD  [address]  [place] [address]

## 2018-09-25 NOTE — PROGRESS NOTES
[id number]    Electrophysiology/ Clinic Note         H&P and Plan:       Ronak Lindsey MD    Physical Exam:  Vitals: /78 (BP Location: Left arm, Patient Position: Chair, Cuff Size: Adult Small)  Pulse 80  Ht 1.524 m (5')  Wt 43.5 kg (96 lb)  LMP 12/07/1981  SpO2 93%  BMI 18.75 kg/m2    Constitutional:  AAO x3.  Pt is in NAD.  HEAD: normocephalic.  SKIN: Skin normal color, texture and turgor with no lesions or eruptions.  Eyes: PERRL, EOMI.  ENT:  Supple, normal JVP. No lymphadenopathy or thyroid enlargement.  Chest:  CTAB.  Cardiac:  IIR, variable sound of S1 and Normal S2.No murmurs rubs or gallop.    Abdomen:  Normal BS.  Soft, non-tender and non-distended.  No rebound or guarding.    Extremities:  Pedious pulses palpable B/L.  No LE edema noticed.   Neurological: Strength and sensation grossly symmetric and intact throughout.       CURRENT MEDICATIONS:  Current Outpatient Prescriptions   Medication Sig Dispense Refill     albuterol (PROAIR HFA, PROVENTIL HFA, VENTOLIN HFA) 108 (90 BASE) MCG/ACT inhaler Inhale 2 puffs into the lungs every 6 hours       apixaban ANTICOAGULANT (ELIQUIS) 2.5 MG tablet Take 1 tablet (2.5 mg) by mouth 2 times daily 180 tablet 3     atorvastatin (LIPITOR) 40 MG tablet Take 1 tablet (40 mg) by mouth daily 90 tablet 3     denosumab (PROLIA) 60 MG/ML SOLN Inject 60 mg Subcutaneous. q 6 months       diltiazem (DILACOR XR) 180 MG 24 hr capsule Take 1 capsule (180 mg) by mouth daily       MELATONIN PO Take 2.5 mg by mouth nightly as needed       Nutritional Supplements (BOOST PO) Take 1 Bottle by mouth daily         ALLERGIES     Allergies   Allergen Reactions     Megace [Megestrol Acetate]      Increased LFTs         PAST MEDICAL HISTORY:  Past Medical History:   Diagnosis Date     Arrhythmia     atrial fib.-on coumadin     Atrial fibrillation (H)      Breast cancer (H)      COPD (chronic obstructive pulmonary disease) (H)      COPD exacerbation (H) 9/2/2015     Coronary artery  disease      Hypertension      Malignant neoplasm (H) 6/2/2008    Right Breast Cancer     Osteoporosis      Renal disease     kidney stones     Thyroid disease     Hypothyroid     Unspecified cerebral artery occlusion with cerebral infarction     TIA     Uterine fibroid        PAST SURGICAL HISTORY:  Past Surgical History:   Procedure Laterality Date     BIOPSY  6/2/2008    Left /RightBreast Biopsy     BIOPSY  6/1/2010    Right Breast Biopsy     BREAST SURGERY  6/12/2008    Right Breast Lumpectomy     CYSTOSCOPY, DILATE URETHRA, COMBINED  10/5/2012    Procedure: COMBINED CYSTOSCOPY, DILATE URETHRA;  urethral dilation;  Surgeon: Kaushal Harris MD;  Location:  OR     CYSTOSCOPY, RETROGRADES, EXTRACT STONE, COMBINED Right 4/16/2015    Procedure: COMBINED CYSTOSCOPY, RETROGRADES, EXTRACT STONE;  Surgeon: Kaushal Harris MD;  Location:  OR     EXCISE LESION LIP N/A 10/22/2014    Procedure: EXCISE LESION LIP;  Surgeon: Brent Jolley MD;  Location: [facility]     GYN SURGERY  1981    LISA with ovaries intact-fibroid     LASER HOLMIUM LITHOTRIPSY URETER(S), INSERT STENT, COMBINED  10/5/2012    Procedure: COMBINED CYSTOSCOPY, URETEROSCOPY, LASER HOLMIUM LITHOTRIPSY URETER(S), INSERT STENT;  RIGHT URETEROSCOPY ,right ureteral biopsy,WITH LASER HOLMIUM LITHOTRIPSY, INSERT STENT RIGHT URETER;  Surgeon: Kaushal Harris MD;  Location:  OR     LASER HOLMIUM LITHOTRIPSY URETER(S), INSERT STENT, COMBINED Right 4/16/2015    Procedure: COMBINED CYSTOSCOPY, URETEROSCOPY, LASER HOLMIUM LITHOTRIPSY URETER(S), INSERT STENT;  Surgeon: Kaushal Harris MD;  Location:  OR       FAMILY HISTORY:  Family History   Problem Relation Age of Onset     Alzheimer Disease Mother      Osteoporosis Mother      C.A.D. Father      Cardiovascular Father      Cerebrovascular Disease Father      Breast Cancer Maternal Grandmother      Unknown/Adopted Maternal Grandfather      Unknown/Adopted Paternal Grandmother      old  age     Cancer Paternal Grandfather      stomach       SOCIAL HISTORY:  Social History     Social History     Marital status: Single     Spouse name: N/A     Number of children: 4     Years of education: N/A     Social History Main Topics     Smoking status: Former Smoker     Packs/day: 1.00     Years: 30.00     Types: Cigarettes     Quit date: 12/1/1989     Smokeless tobacco: Never Used      Comment: Started:  Stopped:     Alcohol use No     Drug use: No     Sexual activity: Not Currently     Birth control/ protection: Female Surgical      Comment: LISA     Other Topics Concern     Parent/Sibling W/ Cabg, Mi Or Angioplasty Before 65f 55m? No     Caffeine Concern Yes     2 cups caffeine per day     Sleep Concern Yes     Stress Concern No     Weight Concern No     Special Diet No     Back Care No     Exercise No     Seat Belt Yes     Social History Narrative       Review of Systems:  Skin:  Negative rash   Eyes:  Positive for glasses  ENT:  Negative hearing loss  Respiratory:  Positive for dyspnea on exertion;cough  Cardiovascular:  Negative;palpitations;syncope or near-syncope;cyanosis;edema;dizziness exercise intolerance;lightheadedness;Positive for;fatigue;palpitations  Gastroenterology: Positive for poor appetite  Genitourinary:  Negative nocturia  Musculoskeletal:  Positive for back pain  Neurologic:  Negative    Psychiatric:  Positive for sleep disturbances  Heme/Lymph/Imm:  Positive for weight loss;easy bruising  Endocrine:  Negative thyroid disorder      Recent Lab Results:  LIPID RESULTS:  Lab Results   Component Value Date    CHOL 188 01/12/2018    HDL 65 01/12/2018    LDL 97 01/12/2018    TRIG 128 01/12/2018    CHOLHDLRATIO 3.5 02/09/2014       LIVER ENZYME RESULTS:  Lab Results   Component Value Date    AST 13 10/02/2014    ALT 10 01/12/2018       CBC RESULTS:  Lab Results   Component Value Date    WBC 10.5 09/03/2017    RBC 4.72 09/03/2017    HGB 15.3 09/03/2017    HCT 44.7 09/03/2017    MCV 95 09/03/2017     MCH 32.4 09/03/2017    MCHC 34.2 09/03/2017    RDW 13.8 09/03/2017     09/03/2017       BMP RESULTS:  Lab Results   Component Value Date     09/03/2017    POTASSIUM 3.8 09/03/2017    CHLORIDE 108 09/03/2017    CO2 24 09/03/2017    ANIONGAP 9 09/03/2017     (H) 09/03/2017    BUN 14 09/03/2017    CR 0.94 11/17/2017    GFRESTIMATED 57 (L) 11/17/2017    GFRESTBLACK 69 11/17/2017    CYNDEE 9.7 11/28/2017        A1C RESULTS:  No results found for: A1C    INR RESULTS:  Lab Results   Component Value Date    INR 1.03 10/22/2014    INR 1.78 (H) 10/02/2014         ECHOCARDIOGRAM  No results found for this or any previous visit (from the past 8760 hour(s)).      Orders Placed This Encounter   Procedures     Follow-Up with Cardiac Advanced Practice Provider     No orders of the defined types were placed in this encounter.    There are no discontinued medications.      Encounter Diagnosis   Name Primary?     Chronic atrial fibrillation (H)          CC  Parish Cantrell MD  [address]  [place] [id number]

## 2018-09-26 NOTE — PROGRESS NOTES
Service Date: 09/25/2018      REASON FOR VISIT:  Evaluation of permanent atrial fibrillation.      HISTORY OF PRESENT ILLNESS:  Ms. Rodriguez is a delightful 84-year-old lady with a history of hypertension, hyperlipidemia, TIA, breast cancer (previous right partial mastectomy) and permanent atrial fibrillation who is here for evaluation.      The patient has a history of permanent atrial fibrillation and has been rate control strategy.  She is followed by Dr. Cantrell.  Most recently she became more symptomatic, having episodes of palpitations throughout the day.  A Holter monitor confirmed episodes of AFib with RVR, and she was referred here for evaluation.      At the moment, she is doing well.  She denies any other symptoms such as chest pain or shortness of breath.  She admits having intermittent episodes of lightheadedness as well, which she blames to the arrhythmia.      Holter monitor revealed an average heart rate of 88, however, minimum of 50, maximum of 157.  No pauses greater than 2 seconds were seen.  Nuclear stress test obtained in 03/2017 was negative for ischemia and some imaging artifact suggestive of breast attenuation.  Echocardiogram obtained in 05/2016 revealed normal cardiac function, EF of 60%-65%.      ASSESSMENT AND PLAN:   1.  Permanent atrial fibrillation.  She has been a long time of rate control strategy with diltiazem.  However, now her heart rates are more elevated and she became more symptomatic.  We discussed options including increase the dose of diltiazem and add another medication (metoprolol or digoxin).  We also discussed about possibility of a pacemaker and AV node ablation.  I explained the procedure in detail.      At this time, she would like to attempt a higher dose of diltiazem.  We will increase diltiazem from 180 to 240.  She will follow up here in a month to reevaluate symptomatology.     2.  Embolic prevention.  CHADS-VASc score of 6.  Continue anticoagulation with Eliquis  indefinitely.   3.  Hypertension.  Blood pressure is well controlled.         MANA MA MD             D: 2018   T: 2018   MT: REBECCA      Name:     ROSALIND BERNABE   MRN:      [id number]        Account:      [id number]   :      1934           Service Date: 2018      Document: [id number]

## 2018-10-05 ENCOUNTER — CARE COORDINATION (OUTPATIENT)
Dept: CARDIOLOGY | Facility: CLINIC | Age: 83
End: 2018-10-05

## 2018-10-05 NOTE — PROGRESS NOTES
Pt's daughter called and stated that  mentioned possibly enrolling pt in the cannabis registry for her insomnia. She would like to know if pt can proceed with this option for her insomnia. I reviewed with  and he said pt should see PMD to discuss further as that is not his realm of expertise. Jocelynn BAEZ October 5, 2018, 4:32 PM

## 2018-10-26 ENCOUNTER — OFFICE VISIT (OUTPATIENT)
Dept: CARDIOLOGY | Facility: CLINIC | Age: 83
End: 2018-10-26
Attending: INTERNAL MEDICINE
Payer: MEDICARE

## 2018-10-26 VITALS
HEART RATE: 91 BPM | HEIGHT: 60 IN | WEIGHT: 98 LBS | DIASTOLIC BLOOD PRESSURE: 77 MMHG | BODY MASS INDEX: 19.24 KG/M2 | SYSTOLIC BLOOD PRESSURE: 144 MMHG

## 2018-10-26 DIAGNOSIS — I48.20 CHRONIC ATRIAL FIBRILLATION (H): Primary | ICD-10-CM

## 2018-10-26 PROCEDURE — 93000 ELECTROCARDIOGRAM COMPLETE: CPT | Performed by: PHYSICIAN ASSISTANT

## 2018-10-26 PROCEDURE — 99214 OFFICE O/P EST MOD 30 MIN: CPT | Performed by: PHYSICIAN ASSISTANT

## 2018-10-26 NOTE — LETTER
"10/26/2018    Birgit NICK Gil  [facility] [address]  [facility] [address]    RE: Mia Frankel iMchael       Dear Colleague,    I had the pleasure of seeing Mia Rodriguez in the [facility].    HPI:   I had the pleasure of seeing Agustina and her daughter Yamilka when she came for follow up of atrial fibrillation and recent increase in Diltiazem to 240 mg daily. She sees Dr. Cantrell and Dr. Lindsey for her history of:    1. Permanent AFib, with rate control/AC strategy with E no no slight liquis for CHADSVASc 6 (HTN, CVA, age, sex). She has not had a TIA since switching warfarin to Eliquis  2. Benign Essential HTN  3. CAD noted by calcification seen on Chest CT 3/2017. Follow up stress test 3/2017 was normal.    Agustina saw Dr. Cantrell last in 1/2018 at which time no changes were made.  In 9/2018, her daughter contacted our office due to \"dizzy spells.\"  Her daughter noted that heart rates were getting into the low 100s, and she had less \".\"  A 24-hour Holter monitor was ordered, which revealed atrial fibrillation with an average heart rate of 88.  Range was  bpm.  She then met with Dr. Lindsey, and due to episodes of rapid atrial fibrillation, discussed other options for rate control.  They opted to increase her diltiazem from 180 mg daily to 240 mg daily with close follow-up.    Agustina tells me that she was only able to last on the higher dose of diltiazem for 5 days.  She then stopped it and went back to diltiazem 180 mg due to significant fatigue.  She states her blood pressure and heart rate did not drop significantly while she was on the higher dose of diltiazem.  Since she got back on diltiazem 180 mg daily, she actually feels like she is been quite a bit better.  She states her palpitations have \"calms down.\"  She is not had chest pain, pressure, tightness or edema.  She continues to note \"unsteadiness,\" and her daughter points out that they have seen physical therapy " "for this exact issue and Agustina felt like the exercises were unreasonable and therefore did not do them.      EKG today, which I overread, showed atrial fibrillation at 87 bpm.   Echo 5/2016 showed an EF 60-65%   Stress Test 3/2017 was negative for ischemia.    Assessment & Plan:    1. Chronic AFib    Remains on low dose Eliquis given weight/age without bleeding issues or recurrent TIA    Heart rate today in the 80s and palpitations have largely improved    Did not tolerate Diltiazem 240 given fatigue    PLAN:    Discussed options for improved rate control, including switching Diltiazem to metoprolol, adding low-dose metoprolol to Diltiazem or adding digoxin. Dr. Lindsey noted that AVN ablation/PPM could be done as well    As sxs have improved, she'd like to consider her options and make no changes today but will call me in the coming weeks with how she is doing    Offered Follow-up with me to discuss again, but as has appt with Dr. Cantrell 1/2019, she will plan to just call me with her decision and wait to come in for Dr. Cantrell's appt    Continue AC with Eliquis given CHADSVASc 6 (HTN, TIA, age, sex)      2. Hypertension    SPBs at home 130s, and notes lightheadedness worsens when SBP <120 mmHg    Increase in Diltiazem did not seem to improve SBP    PLAN:    No changes today but continue to monitor at home    3. Orthostasis and feeling \"off-balance\"    2 different sensations - one is lightheadedness when stands up quickly, which did not worsen with increased Diltiazem dose. Feels this is better when SBP >120 mmHg    \"Off balance\" has been looked at before and PT exercises recommended, which pt opted not to proceed with    PLAN:    Explained rationale of exercises from PT to improve core strength, etc    Continue to monitor    Barbara Ovalle PA-C, MSPAS      Orders Placed This Encounter   Procedures     EKG 12-lead complete w/read - Clinics (performed today)     No orders of the defined types were placed in this " encounter.    There are no discontinued medications.      Encounter Diagnosis   Name Primary?     Chronic atrial fibrillation (H)        CURRENT MEDICATIONS:  Current Outpatient Prescriptions   Medication Sig Dispense Refill     albuterol (PROAIR HFA, PROVENTIL HFA, VENTOLIN HFA) 108 (90 BASE) MCG/ACT inhaler Inhale 2 puffs into the lungs every 6 hours       apixaban ANTICOAGULANT (ELIQUIS) 2.5 MG tablet Take 1 tablet (2.5 mg) by mouth 2 times daily 180 tablet 3     atorvastatin (LIPITOR) 40 MG tablet Take 1 tablet (40 mg) by mouth daily 90 tablet 3     denosumab (PROLIA) 60 MG/ML SOLN Inject 60 mg Subcutaneous. q 6 months       diltiazem (DILACOR XR) 180 MG 24 hr capsule Take 1 capsule (180 mg) by mouth daily       MELATONIN PO Take 2.5 mg by mouth nightly as needed       Nutritional Supplements (BOOST PO) Take 1 Bottle by mouth daily         ALLERGIES     Allergies   Allergen Reactions     Megace [Megestrol Acetate]      Increased LFTs         PAST MEDICAL HISTORY:  Past Medical History:   Diagnosis Date     Arrhythmia     atrial fib.-on coumadin     Atrial fibrillation (H)      Breast cancer (H)      COPD (chronic obstructive pulmonary disease) (H)      COPD exacerbation (H) 9/2/2015     Coronary artery disease      Hypertension      Malignant neoplasm (H) 6/2/2008    Right Breast Cancer     Osteoporosis      Renal disease     kidney stones     Thyroid disease     Hypothyroid     Unspecified cerebral artery occlusion with cerebral infarction     TIA     Uterine fibroid        PAST SURGICAL HISTORY:  Past Surgical History:   Procedure Laterality Date     BIOPSY  6/2/2008    Left /RightBreast Biopsy     BIOPSY  6/1/2010    Right Breast Biopsy     BREAST SURGERY  6/12/2008    Right Breast Lumpectomy     CYSTOSCOPY, DILATE URETHRA, COMBINED  10/5/2012    Procedure: COMBINED CYSTOSCOPY, DILATE URETHRA;  urethral dilation;  Surgeon: Kaushal Harris MD;  Location:  OR     CYSTOSCOPY, RETROGRADES, EXTRACT STONE,  COMBINED Right 4/16/2015    Procedure: COMBINED CYSTOSCOPY, RETROGRADES, EXTRACT STONE;  Surgeon: Kaushal Harris MD;  Location:  OR     EXCISE LESION LIP N/A 10/22/2014    Procedure: EXCISE LESION LIP;  Surgeon: Brent Jolley MD;  Location:  [facility]     GYN SURGERY  1981    LISA with ovaries intact-fibroid     LASER HOLMIUM LITHOTRIPSY URETER(S), INSERT STENT, COMBINED  10/5/2012    Procedure: COMBINED CYSTOSCOPY, URETEROSCOPY, LASER HOLMIUM LITHOTRIPSY URETER(S), INSERT STENT;  RIGHT URETEROSCOPY ,right ureteral biopsy,WITH LASER HOLMIUM LITHOTRIPSY, INSERT STENT RIGHT URETER;  Surgeon: Kaushal Harris MD;  Location:  OR     LASER HOLMIUM LITHOTRIPSY URETER(S), INSERT STENT, COMBINED Right 4/16/2015    Procedure: COMBINED CYSTOSCOPY, URETEROSCOPY, LASER HOLMIUM LITHOTRIPSY URETER(S), INSERT STENT;  Surgeon: Kaushal Harris MD;  Location:  OR       FAMILY HISTORY:  Family History   Problem Relation Age of Onset     Alzheimer Disease Mother      Osteoporosis Mother      C.A.D. Father      Cardiovascular Father      Cerebrovascular Disease Father      Breast Cancer Maternal Grandmother      Unknown/Adopted Maternal Grandfather      Unknown/Adopted Paternal Grandmother      old age     Cancer Paternal Grandfather      stomach       SOCIAL HISTORY:  Social History     Social History     Marital status: Single     Spouse name: N/A     Number of children: 4     Years of education: N/A     Social History Main Topics     Smoking status: Former Smoker     Packs/day: 1.00     Years: 30.00     Types: Cigarettes     Quit date: 12/1/1989     Smokeless tobacco: Never Used      Comment: Started:  Stopped:     Alcohol use No     Drug use: No     Sexual activity: Not Currently     Birth control/ protection: Female Surgical      Comment: LISA     Other Topics Concern     Parent/Sibling W/ Cabg, Mi Or Angioplasty Before 65f 55m? No     Caffeine Concern Yes     2 cups caffeine per day     Sleep Concern Yes      Stress Concern No     Weight Concern No     Special Diet No     Back Care No     Exercise No     Seat Belt Yes     Social History Narrative       Review of Systems:  Skin:  Negative rash   Eyes:  Positive for glasses  ENT:  Negative hearing loss  Respiratory:  Positive for dyspnea on exertion;cough  Cardiovascular:  Negative;palpitations;syncope or near-syncope;cyanosis;edema;dizziness exercise intolerance;lightheadedness;Positive for;fatigue;palpitations  Gastroenterology: Positive for poor appetite  Genitourinary:  Negative nocturia  Musculoskeletal:  Positive for back pain  Neurologic:  Negative    Psychiatric:  Positive for sleep disturbances  Heme/Lymph/Imm:  Positive for weight loss;easy bruising  Endocrine:  Negative thyroid disorder    Physical Exam:  Vitals: /77  Pulse 91  Ht 1.524 m (5')  Wt 44.5 kg (98 lb)  LMP 12/07/1981  BMI 19.14 kg/m2    Constitutional:  cooperative, alert and oriented, well developed, well nourished, in no acute distress        Skin:  warm and dry to the touch        Head:  normocephalic        Eyes:  pupils equal and round, conjunctivae and lids unremarkable, sclera white, no xanthalasma, EOMS intact, no nystagmus        ENT:  no pallor or cyanosis        Neck:  carotid pulses are full and equal bilaterally, JVP normal, no carotid bruit        Chest:  clear to auscultation        Cardiac:   irregularly irregular rhythm   no presence of murmur            Abdomen:  abdomen soft;BS normoactive        Vascular: not assessed this visit                                      Extremities and Back:  no deformities, clubbing, cyanosis, erythema observed        Neurological:  no gross motor deficits;affect appropriate          Recent Lab Results:  LIPID RESULTS:  Lab Results   Component Value Date    CHOL 188 01/12/2018    HDL 65 01/12/2018    LDL 97 01/12/2018    TRIG 128 01/12/2018    CHOLHDLRATIO 3.5 02/09/2014       LIVER ENZYME RESULTS:  Lab Results   Component Value Date     AST 13 10/02/2014    ALT 10 01/12/2018       CBC RESULTS:  Lab Results   Component Value Date    WBC 10.5 09/03/2017    RBC 4.72 09/03/2017    HGB 15.3 09/03/2017    HCT 44.7 09/03/2017    MCV 95 09/03/2017    MCH 32.4 09/03/2017    MCHC 34.2 09/03/2017    RDW 13.8 09/03/2017     09/03/2017       BMP RESULTS:  Lab Results   Component Value Date     09/03/2017    POTASSIUM 3.8 09/03/2017    CHLORIDE 108 09/03/2017    CO2 24 09/03/2017    ANIONGAP 9 09/03/2017     (H) 09/03/2017    BUN 14 09/03/2017    CR 0.94 11/17/2017    GFRESTIMATED 57 (L) 11/17/2017    GFRESTBLACK 69 11/17/2017    CYNDEE 9.7 11/28/2017        A1C RESULTS:  No results found for: A1C    INR RESULTS:  Lab Results   Component Value Date    INR 1.03 10/22/2014    INR 1.78 (H) 10/02/2014                 Thank you for allowing me to participate in the care of your patient.      Sincerely,     Kirstin Ovalle PA-C     [facility]    cc:   Ronka Lindsey MD  [address]  [place] [id number]

## 2018-10-26 NOTE — LETTER
"10/26/2018    Birgit NICK Gil  [facility] [address]  [facility] [address]    RE: Mia Frankel Michael       Dear Colleague,    I had the pleasure of seeing Mia Rodriguez in the [facility].    HPI:   I had the pleasure of seeing Agustina and her daughter Yamilka when she came for follow up of atrial fibrillation and recent increase in Diltiazem to 240 mg daily. She sees Dr. Cantrell and Dr. Lindsey for her history of:    1. Permanent AFib, with rate control/AC strategy with E no no slight liquis for CHADSVASc 6 (HTN, CVA, age, sex). She has not had a TIA since switching warfarin to Eliquis  2. Benign Essential HTN  3. CAD noted by calcification seen on Chest CT 3/2017. Follow up stress test 3/2017 was normal.    Agustina saw Dr. Cantrell last in 1/2018 at which time no changes were made.  In 9/2018, her daughter contacted our office due to \"dizzy spells.\"  Her daughter noted that heart rates were getting into the low 100s, and she had less \".\"  A 24-hour Holter monitor was ordered, which revealed atrial fibrillation with an average heart rate of 88.  Range was  bpm.  She then met with Dr. Lindsey, and due to episodes of rapid atrial fibrillation, discussed other options for rate control.  They opted to increase her diltiazem from 180 mg daily to 240 mg daily with close follow-up.    Agustina tells me that she was only able to last on the higher dose of diltiazem for 5 days.  She then stopped it and went back to diltiazem 180 mg due to significant fatigue.  She states her blood pressure and heart rate did not drop significantly while she was on the higher dose of diltiazem.  Since she got back on diltiazem 180 mg daily, she actually feels like she is been quite a bit better.  She states her palpitations have \"calms down.\"  She is not had chest pain, pressure, tightness or edema.  She continues to note \"unsteadiness,\" and her daughter points out that they have seen physical therapy " "for this exact issue and Agustina felt like the exercises were unreasonable and therefore did not do them.      EKG today, which I overread, showed atrial fibrillation at 87 bpm.   Echo 5/2016 showed an EF 60-65%   Stress Test 3/2017 was negative for ischemia.    Assessment & Plan:    1. Chronic AFib    Remains on low dose Eliquis given weight/age without bleeding issues or recurrent TIA    Heart rate today in the 80s and palpitations have largely improved    Did not tolerate Diltiazem 240 given fatigue    PLAN:    Discussed options for improved rate control, including switching Diltiazem to metoprolol, adding low-dose metoprolol to Diltiazem or adding digoxin. Dr. Lindsey noted that AVN ablation/PPM could be done as well    As sxs have improved, she'd like to consider her options and make no changes today but will call me in the coming weeks with how she is doing    Offered Follow-up with me to discuss again, but as has appt with Dr. Cantrell 1/2019, she will plan to just call me with her decision and wait to come in for Dr. Cantrell's appt    Continue AC with Eliquis given CHADSVASc 6 (HTN, TIA, age, sex)      2. Hypertension    SPBs at home 130s, and notes lightheadedness worsens when SBP <120 mmHg    Increase in Diltiazem did not seem to improve SBP    PLAN:    No changes today but continue to monitor at home    3. Orthostasis and feeling \"off-balance\"    2 different sensations - one is lightheadedness when stands up quickly, which did not worsen with increased Diltiazem dose. Feels this is better when SBP >120 mmHg    \"Off balance\" has been looked at before and PT exercises recommended, which pt opted not to proceed with    PLAN:    Explained rationale of exercises from PT to improve core strength, etc    Continue to monitor    Barbara Ovalle PA-C, MSPAS      Orders Placed This Encounter   Procedures     EKG 12-lead complete w/read - Clinics (performed today)     No orders of the defined types were placed in this " encounter.    There are no discontinued medications.      Encounter Diagnosis   Name Primary?     Chronic atrial fibrillation (H)        CURRENT MEDICATIONS:  Current Outpatient Prescriptions   Medication Sig Dispense Refill     albuterol (PROAIR HFA, PROVENTIL HFA, VENTOLIN HFA) 108 (90 BASE) MCG/ACT inhaler Inhale 2 puffs into the lungs every 6 hours       apixaban ANTICOAGULANT (ELIQUIS) 2.5 MG tablet Take 1 tablet (2.5 mg) by mouth 2 times daily 180 tablet 3     atorvastatin (LIPITOR) 40 MG tablet Take 1 tablet (40 mg) by mouth daily 90 tablet 3     denosumab (PROLIA) 60 MG/ML SOLN Inject 60 mg Subcutaneous. q 6 months       diltiazem (DILACOR XR) 180 MG 24 hr capsule Take 1 capsule (180 mg) by mouth daily       MELATONIN PO Take 2.5 mg by mouth nightly as needed       Nutritional Supplements (BOOST PO) Take 1 Bottle by mouth daily         ALLERGIES     Allergies   Allergen Reactions     Megace [Megestrol Acetate]      Increased LFTs         PAST MEDICAL HISTORY:  Past Medical History:   Diagnosis Date     Arrhythmia     atrial fib.-on coumadin     Atrial fibrillation (H)      Breast cancer (H)      COPD (chronic obstructive pulmonary disease) (H)      COPD exacerbation (H) 9/2/2015     Coronary artery disease      Hypertension      Malignant neoplasm (H) 6/2/2008    Right Breast Cancer     Osteoporosis      Renal disease     kidney stones     Thyroid disease     Hypothyroid     Unspecified cerebral artery occlusion with cerebral infarction     TIA     Uterine fibroid        PAST SURGICAL HISTORY:  Past Surgical History:   Procedure Laterality Date     BIOPSY  6/2/2008    Left /RightBreast Biopsy     BIOPSY  6/1/2010    Right Breast Biopsy     BREAST SURGERY  6/12/2008    Right Breast Lumpectomy     CYSTOSCOPY, DILATE URETHRA, COMBINED  10/5/2012    Procedure: COMBINED CYSTOSCOPY, DILATE URETHRA;  urethral dilation;  Surgeon: Kaushal Harris MD;  Location:  OR     CYSTOSCOPY, RETROGRADES, EXTRACT STONE,  COMBINED Right 4/16/2015    Procedure: COMBINED CYSTOSCOPY, RETROGRADES, EXTRACT STONE;  Surgeon: Kaushal Harris MD;  Location:  OR     EXCISE LESION LIP N/A 10/22/2014    Procedure: EXCISE LESION LIP;  Surgeon: Brent Jolley MD;  Location:  [facility]     GYN SURGERY  1981    LISA with ovaries intact-fibroid     LASER HOLMIUM LITHOTRIPSY URETER(S), INSERT STENT, COMBINED  10/5/2012    Procedure: COMBINED CYSTOSCOPY, URETEROSCOPY, LASER HOLMIUM LITHOTRIPSY URETER(S), INSERT STENT;  RIGHT URETEROSCOPY ,right ureteral biopsy,WITH LASER HOLMIUM LITHOTRIPSY, INSERT STENT RIGHT URETER;  Surgeon: Kaushal Harris MD;  Location:  OR     LASER HOLMIUM LITHOTRIPSY URETER(S), INSERT STENT, COMBINED Right 4/16/2015    Procedure: COMBINED CYSTOSCOPY, URETEROSCOPY, LASER HOLMIUM LITHOTRIPSY URETER(S), INSERT STENT;  Surgeon: Kaushal Harris MD;  Location:  OR       FAMILY HISTORY:  Family History   Problem Relation Age of Onset     Alzheimer Disease Mother      Osteoporosis Mother      C.A.D. Father      Cardiovascular Father      Cerebrovascular Disease Father      Breast Cancer Maternal Grandmother      Unknown/Adopted Maternal Grandfather      Unknown/Adopted Paternal Grandmother      old age     Cancer Paternal Grandfather      stomach       SOCIAL HISTORY:  Social History     Social History     Marital status: Single     Spouse name: N/A     Number of children: 4     Years of education: N/A     Social History Main Topics     Smoking status: Former Smoker     Packs/day: 1.00     Years: 30.00     Types: Cigarettes     Quit date: 12/1/1989     Smokeless tobacco: Never Used      Comment: Started:  Stopped:     Alcohol use No     Drug use: No     Sexual activity: Not Currently     Birth control/ protection: Female Surgical      Comment: [facility]     Other Topics Concern     Parent/Sibling W/ Cabg, Mi Or Angioplasty Before 65f 55m? No     Caffeine Concern Yes     2 cups caffeine per day     Sleep Concern Yes      Stress Concern No     Weight Concern No     Special Diet No     Back Care No     Exercise No     Seat Belt Yes     Social History Narrative       Review of Systems:  Skin:  Negative rash   Eyes:  Positive for glasses  ENT:  Negative hearing loss  Respiratory:  Positive for dyspnea on exertion;cough  Cardiovascular:  Negative;palpitations;syncope or near-syncope;cyanosis;edema;dizziness exercise intolerance;lightheadedness;Positive for;fatigue;palpitations  Gastroenterology: Positive for poor appetite  Genitourinary:  Negative nocturia  Musculoskeletal:  Positive for back pain  Neurologic:  Negative    Psychiatric:  Positive for sleep disturbances  Heme/Lymph/Imm:  Positive for weight loss;easy bruising  Endocrine:  Negative thyroid disorder    Physical Exam:  Vitals: /77  Pulse 91  Ht 1.524 m (5')  Wt 44.5 kg (98 lb)  LMP 12/07/1981  BMI 19.14 kg/m2    Constitutional:  cooperative, alert and oriented, well developed, well nourished, in no acute distress        Skin:  warm and dry to the touch        Head:  normocephalic        Eyes:  pupils equal and round, conjunctivae and lids unremarkable, sclera white, no xanthalasma, EOMS intact, no nystagmus        ENT:  no pallor or cyanosis        Neck:  carotid pulses are full and equal bilaterally, JVP normal, no carotid bruit        Chest:  clear to auscultation        Cardiac:   irregularly irregular rhythm   no presence of murmur            Abdomen:  abdomen soft;BS normoactive        Vascular: not assessed this visit                                      Extremities and Back:  no deformities, clubbing, cyanosis, erythema observed        Neurological:  no gross motor deficits;affect appropriate          Recent Lab Results:  LIPID RESULTS:  Lab Results   Component Value Date    CHOL 188 01/12/2018    HDL 65 01/12/2018    LDL 97 01/12/2018    TRIG 128 01/12/2018    CHOLHDLRATIO 3.5 02/09/2014       LIVER ENZYME RESULTS:  Lab Results   Component Value Date     AST 13 10/02/2014    ALT 10 01/12/2018       CBC RESULTS:  Lab Results   Component Value Date    WBC 10.5 09/03/2017    RBC 4.72 09/03/2017    HGB 15.3 09/03/2017    HCT 44.7 09/03/2017    MCV 95 09/03/2017    MCH 32.4 09/03/2017    MCHC 34.2 09/03/2017    RDW 13.8 09/03/2017     09/03/2017       BMP RESULTS:  Lab Results   Component Value Date     09/03/2017    POTASSIUM 3.8 09/03/2017    CHLORIDE 108 09/03/2017    CO2 24 09/03/2017    ANIONGAP 9 09/03/2017     (H) 09/03/2017    BUN 14 09/03/2017    CR 0.94 11/17/2017    GFRESTIMATED 57 (L) 11/17/2017    GFRESTBLACK 69 11/17/2017    CYNDEE 9.7 11/28/2017        A1C RESULTS:  No results found for: A1C    INR RESULTS:  Lab Results   Component Value Date    INR 1.03 10/22/2014    INR 1.78 (H) 10/02/2014         Thank you for allowing me to participate in the care of your patient.    Sincerely,     Kirstin Ovalle PA-C     [facility]

## 2018-10-26 NOTE — PROGRESS NOTES
"HPI:   I had the pleasure of seeing Agustina and her daughter Yamilka when she came for follow up of atrial fibrillation and recent increase in Diltiazem to 240 mg daily. She sees Dr. Cantrell and Dr. Lindsey for her history of:    1. Permanent AFib, with rate control/AC strategy with E no no slight liquis for CHADSVASc 6 (HTN, CVA, age, sex). She has not had a TIA since switching warfarin to Eliquis  2. Benign Essential HTN  3. CAD noted by calcification seen on Chest CT 3/2017. Follow up stress test 3/2017 was normal.    Agustina saw Dr. Cantrell last in 1/2018 at which time no changes were made.  In 9/2018, her daughter contacted our office due to \"dizzy spells.\"  Her daughter noted that heart rates were getting into the low 100s, and she had less \".\"  A 24-hour Holter monitor was ordered, which revealed atrial fibrillation with an average heart rate of 88.  Range was  bpm.  She then met with Dr. Lindsey, and due to episodes of rapid atrial fibrillation, discussed other options for rate control.  They opted to increase her diltiazem from 180 mg daily to 240 mg daily with close follow-up.    Agustina tells me that she was only able to last on the higher dose of diltiazem for 5 days.  She then stopped it and went back to diltiazem 180 mg due to significant fatigue.  She states her blood pressure and heart rate did not drop significantly while she was on the higher dose of diltiazem.  Since she got back on diltiazem 180 mg daily, she actually feels like she is been quite a bit better.  She states her palpitations have \"calms down.\"  She is not had chest pain, pressure, tightness or edema.  She continues to note \"unsteadiness,\" and her daughter points out that they have seen physical therapy for this exact issue and Agustina felt like the exercises were unreasonable and therefore did not do them.      EKG today, which I overread, showed atrial fibrillation at 87 bpm.   Echo 5/2016 showed an EF 60-65%   Stress Test 3/2017 was negative " "for ischemia.    Assessment & Plan:    1. Chronic AFib    Remains on low dose Eliquis given weight/age without bleeding issues or recurrent TIA    Heart rate today in the 80s and palpitations have largely improved    Did not tolerate Diltiazem 240 given fatigue    PLAN:    Discussed options for improved rate control, including switching Diltiazem to metoprolol, adding low-dose metoprolol to Diltiazem or adding digoxin. Dr. Lindsey noted that AVN ablation/PPM could be done as well    As sxs have improved, she'd like to consider her options and make no changes today but will call me in the coming weeks with how she is doing    Offered Follow-up with me to discuss again, but as has appt with Dr. Cantrell 1/2019, she will plan to just call me with her decision and wait to come in for Dr. Cantrell's appt    Continue AC with Eliquis given CHADSVASc 6 (HTN, TIA, age, sex)      2. Hypertension    SPBs at home 130s, and notes lightheadedness worsens when SBP <120 mmHg    Increase in Diltiazem did not seem to improve SBP    PLAN:    No changes today but continue to monitor at home    3. Orthostasis and feeling \"off-balance\"    2 different sensations - one is lightheadedness when stands up quickly, which did not worsen with increased Diltiazem dose. Feels this is better when SBP >120 mmHg    \"Off balance\" has been looked at before and PT exercises recommended, which pt opted not to proceed with    PLAN:    Explained rationale of exercises from PT to improve core strength, etc    Continue to monitor    Barbara Ovalle PA-C, MSPAS      Orders Placed This Encounter   Procedures     EKG 12-lead complete w/read - Clinics (performed today)     No orders of the defined types were placed in this encounter.    There are no discontinued medications.      Encounter Diagnosis   Name Primary?     Chronic atrial fibrillation (H)        CURRENT MEDICATIONS:  Current Outpatient Prescriptions   Medication Sig Dispense Refill     albuterol (PROAIR HFA, " PROVENTIL HFA, VENTOLIN HFA) 108 (90 BASE) MCG/ACT inhaler Inhale 2 puffs into the lungs every 6 hours       apixaban ANTICOAGULANT (ELIQUIS) 2.5 MG tablet Take 1 tablet (2.5 mg) by mouth 2 times daily 180 tablet 3     atorvastatin (LIPITOR) 40 MG tablet Take 1 tablet (40 mg) by mouth daily 90 tablet 3     denosumab (PROLIA) 60 MG/ML SOLN Inject 60 mg Subcutaneous. q 6 months       diltiazem (DILACOR XR) 180 MG 24 hr capsule Take 1 capsule (180 mg) by mouth daily       MELATONIN PO Take 2.5 mg by mouth nightly as needed       Nutritional Supplements (BOOST PO) Take 1 Bottle by mouth daily         ALLERGIES     Allergies   Allergen Reactions     Megace [Megestrol Acetate]      Increased LFTs         PAST MEDICAL HISTORY:  Past Medical History:   Diagnosis Date     Arrhythmia     atrial fib.-on coumadin     Atrial fibrillation (H)      Breast cancer (H)      COPD (chronic obstructive pulmonary disease) (H)      COPD exacerbation (H) 9/2/2015     Coronary artery disease      Hypertension      Malignant neoplasm (H) 6/2/2008    Right Breast Cancer     Osteoporosis      Renal disease     kidney stones     Thyroid disease     Hypothyroid     Unspecified cerebral artery occlusion with cerebral infarction     TIA     Uterine fibroid        PAST SURGICAL HISTORY:  Past Surgical History:   Procedure Laterality Date     BIOPSY  6/2/2008    Left /RightBreast Biopsy     BIOPSY  6/1/2010    Right Breast Biopsy     BREAST SURGERY  6/12/2008    Right Breast Lumpectomy     CYSTOSCOPY, DILATE URETHRA, COMBINED  10/5/2012    Procedure: COMBINED CYSTOSCOPY, DILATE URETHRA;  urethral dilation;  Surgeon: Kaushal Harris MD;  Location:  OR     CYSTOSCOPY, RETROGRADES, EXTRACT STONE, COMBINED Right 4/16/2015    Procedure: COMBINED CYSTOSCOPY, RETROGRADES, EXTRACT STONE;  Surgeon: Kaushal Harris MD;  Location: [facility]     EXCISE LESION LIP N/A 10/22/2014    Procedure: EXCISE LESION LIP;  Surgeon: Brent Jolley MD;   Location:  [facility]     GYN SURGERY  1981    LISA with ovaries intact-fibroid     LASER HOLMIUM LITHOTRIPSY URETER(S), INSERT STENT, COMBINED  10/5/2012    Procedure: COMBINED CYSTOSCOPY, URETEROSCOPY, LASER HOLMIUM LITHOTRIPSY URETER(S), INSERT STENT;  RIGHT URETEROSCOPY ,right ureteral biopsy,WITH LASER HOLMIUM LITHOTRIPSY, INSERT STENT RIGHT URETER;  Surgeon: Kaushal Harris MD;  Location:  OR     LASER HOLMIUM LITHOTRIPSY URETER(S), INSERT STENT, COMBINED Right 4/16/2015    Procedure: COMBINED CYSTOSCOPY, URETEROSCOPY, LASER HOLMIUM LITHOTRIPSY URETER(S), INSERT STENT;  Surgeon: Kaushal Harris MD;  Location:  OR       FAMILY HISTORY:  Family History   Problem Relation Age of Onset     Alzheimer Disease Mother      Osteoporosis Mother      C.A.D. Father      Cardiovascular Father      Cerebrovascular Disease Father      Breast Cancer Maternal Grandmother      Unknown/Adopted Maternal Grandfather      Unknown/Adopted Paternal Grandmother      old age     Cancer Paternal Grandfather      stomach       SOCIAL HISTORY:  Social History     Social History     Marital status: Single     Spouse name: N/A     Number of children: 4     Years of education: N/A     Social History Main Topics     Smoking status: Former Smoker     Packs/day: 1.00     Years: 30.00     Types: Cigarettes     Quit date: 12/1/1989     Smokeless tobacco: Never Used      Comment: Started:  Stopped:     Alcohol use No     Drug use: No     Sexual activity: Not Currently     Birth control/ protection: Female Surgical      Comment: [facility]     Other Topics Concern     Parent/Sibling W/ Cabg, Mi Or Angioplasty Before 65f 55m? No     Caffeine Concern Yes     2 cups caffeine per day     Sleep Concern Yes     Stress Concern No     Weight Concern No     Special Diet No     Back Care No     Exercise No     Seat Belt Yes     Social History Narrative       Review of Systems:  Skin:  Negative rash   Eyes:  Positive for glasses  ENT:  Negative hearing  loss  Respiratory:  Positive for dyspnea on exertion;cough  Cardiovascular:  Negative;palpitations;syncope or near-syncope;cyanosis;edema;dizziness exercise intolerance;lightheadedness;Positive for;fatigue;palpitations  Gastroenterology: Positive for poor appetite  Genitourinary:  Negative nocturia  Musculoskeletal:  Positive for back pain  Neurologic:  Negative    Psychiatric:  Positive for sleep disturbances  Heme/Lymph/Imm:  Positive for weight loss;easy bruising  Endocrine:  Negative thyroid disorder    Physical Exam:  Vitals: /77  Pulse 91  Ht 1.524 m (5')  Wt 44.5 kg (98 lb)  LMP 12/07/1981  BMI 19.14 kg/m2    Constitutional:  cooperative, alert and oriented, well developed, well nourished, in no acute distress        Skin:  warm and dry to the touch        Head:  normocephalic        Eyes:  pupils equal and round, conjunctivae and lids unremarkable, sclera white, no xanthalasma, EOMS intact, no nystagmus        ENT:  no pallor or cyanosis        Neck:  carotid pulses are full and equal bilaterally, JVP normal, no carotid bruit        Chest:  clear to auscultation        Cardiac:   irregularly irregular rhythm   no presence of murmur            Abdomen:  abdomen soft;BS normoactive        Vascular: not assessed this visit                                      Extremities and Back:  no deformities, clubbing, cyanosis, erythema observed        Neurological:  no gross motor deficits;affect appropriate          Recent Lab Results:  LIPID RESULTS:  Lab Results   Component Value Date    CHOL 188 01/12/2018    HDL 65 01/12/2018    LDL 97 01/12/2018    TRIG 128 01/12/2018    CHOLHDLRATIO 3.5 02/09/2014       LIVER ENZYME RESULTS:  Lab Results   Component Value Date    AST 13 10/02/2014    ALT 10 01/12/2018       CBC RESULTS:  Lab Results   Component Value Date    WBC 10.5 09/03/2017    RBC 4.72 09/03/2017    HGB 15.3 09/03/2017    HCT 44.7 09/03/2017    MCV 95 09/03/2017    MCH 32.4 09/03/2017    MCHC 34.2  09/03/2017    RDW 13.8 09/03/2017     09/03/2017       BMP RESULTS:  Lab Results   Component Value Date     09/03/2017    POTASSIUM 3.8 09/03/2017    CHLORIDE 108 09/03/2017    CO2 24 09/03/2017    ANIONGAP 9 09/03/2017     (H) 09/03/2017    BUN 14 09/03/2017    CR 0.94 11/17/2017    GFRESTIMATED 57 (L) 11/17/2017    GFRESTBLACK 69 11/17/2017    CYNDEE 9.7 11/28/2017        A1C RESULTS:  No results found for: A1C    INR RESULTS:  Lab Results   Component Value Date    INR 1.03 10/22/2014    INR 1.78 (H) 10/02/2014

## 2018-10-26 NOTE — MR AVS SNAPSHOT
After Visit Summary   10/26/2018    Mia Rodriguez    MRN: [id number]           Patient Information     Date Of Birth          2/25/1934        Visit Information        Provider Department      10/26/2018 1:10 PM Kirstin Ovalle PA-C [facility]        Today's Diagnoses     Chronic atrial fibrillation (H)          Care Instructions    1. EKG today showed atrial fibrillation with adequate heart rate control.    2. As you were not able to tolerate Diltiazem 240 mg daily, stay on the 180 mg capsules    3. See Dr. Cantrell 1/2019 with labs but CALL if issues prior (especially with palpitations) - my nurses are Luz Maria and Pat: [phone]          Follow-ups after your visit        Who to contact     If you have questions or need follow up information about today's clinic visit or your schedule please contact [facility] directly at [phone].  Normal or non-critical lab and imaging results will be communicated to you by MyChart, letter or phone within 4 business days after the clinic has received the results. If you do not hear from us within 7 days, please contact the clinic through MyChart or phone. If you have a critical or abnormal lab result, we will notify you by phone as soon as possible.  Submit refill requests through [employer] or call your pharmacy and they will forward the refill request to us. Please allow 3 business days for your refill to be completed.          Additional Information About Your Visit        Care EveryWhere ID     This is your Care EveryWhere ID. This could be used by other organizations to access your [facility] medical records  [id number]        Your Vitals Were     Pulse Height Last Period BMI (Body Mass Index)          91 1.524 m (5') 12/07/1981 19.14 kg/m2         Blood Pressure from Last 3 Encounters:   10/26/18 144/77   09/25/18 134/78   06/26/18 112/72    Weight from Last 3  Encounters:   10/26/18 44.5 kg (98 lb)   09/25/18 43.5 kg (96 lb)   06/26/18 43.5 kg (96 lb)              We Performed the Following     EKG 12-lead complete w/read - Clinics (performed today)     Follow-Up with Cardiac Advanced Practice Provider        Primary Care Provider Office Phone # Fax #    Birgit Cordero [phone]       [facility] [address] [facility] [address]        Equal Access to Services     ISABELA NIEVES : Hadii aad ku hadasho Soomaali, waaxda luqadaha, qaybta kaalmada adeegyada, waxay idiin hayaan adeeg kharash lacarl . So [facility] [phone].    ATENCIÓN: Si habla español, tiene a velasquez disposición servicios gratuitos de asistencia lingüística. [facility] [phone].    We comply with applicable federal civil rights laws and [place] laws. We do not discriminate on the basis of race, color, national origin, age, disability, sex, sexual orientation, or gender identity.            Thank you!     Thank you for choosing [facility]  for your care. Our goal is always to provide you with excellent care. Hearing back from our patients is one way we can continue to improve our services. Please take a few minutes to complete the written survey that you may receive in the mail after your visit with us. Thank you!             Your Updated Medication List - Protect others around you: Learn how to safely use, store and throw away your medicines at www.disposemymeds.org.          This list is accurate as of 10/26/18  1:44 PM.  Always use your most recent med list.                   Brand Name Dispense Instructions for use Diagnosis    albuterol 108 (90 Base) MCG/ACT inhaler    PROAIR HFA/PROVENTIL HFA/VENTOLIN HFA     Inhale 2 puffs into the lungs every 6 hours        apixaban ANTICOAGULANT 2.5 MG tablet    ELIQUIS    180 tablet    Take 1 tablet (2.5 mg) by mouth 2 times daily    Paroxysmal atrial fibrillation (H)       atorvastatin 40 MG tablet     LIPITOR    90 tablet    Take 1 tablet (40 mg) by mouth daily    Hyperlipidemia LDL goal <100       BOOST PO      Take 1 Bottle by mouth daily        diltiazem 180 MG 24 hr capsule    DILACOR XR     Take 1 capsule (180 mg) by mouth daily    Chronic atrial fibrillation (H)       MELATONIN PO      Take 2.5 mg by mouth nightly as needed        PROLIA 60 MG/ML Soln injection   Generic drug:  denosumab      Inject 60 mg Subcutaneous. q 6 months

## 2018-10-26 NOTE — PATIENT INSTRUCTIONS
1. EKG today showed atrial fibrillation with adequate heart rate control.    2. As you were not able to tolerate Diltiazem 240 mg daily, stay on the 180 mg capsules    3. See Dr. Cantrell 1/2019 with labs but CALL if issues prior (especially with palpitations) - my nurses are Luz Maria and Pat: [phone]

## 2018-11-12 ENCOUNTER — TELEPHONE (OUTPATIENT)
Dept: OBGYN | Facility: CLINIC | Age: 83
End: 2018-11-12

## 2018-11-12 NOTE — TELEPHONE ENCOUNTER
Patient due for Prolia 12/6/18, patient will need labs per last OV note and labs are pended-to be done 2 weeks ahead of time..     Benefits: subject to $183 ded (has been met) and 20% co-ins for the admin and cost of prolia.     As always left detailed message on daughter sánchez voicemail about when to schedule.

## 2018-11-23 ENCOUNTER — TRANSFERRED RECORDS (OUTPATIENT)
Dept: HEALTH INFORMATION MANAGEMENT | Facility: CLINIC | Age: 83
End: 2018-11-23

## 2018-11-23 DIAGNOSIS — M81.0 OSTEOPOROSIS, SENILE: ICD-10-CM

## 2018-11-23 DIAGNOSIS — Z23 NEED FOR PROPHYLACTIC VACCINATION AND INOCULATION AGAINST INFLUENZA: Primary | ICD-10-CM

## 2018-11-23 PROCEDURE — 84100 ASSAY OF PHOSPHORUS: CPT | Performed by: OBSTETRICS & GYNECOLOGY

## 2018-11-23 PROCEDURE — G0008 ADMIN INFLUENZA VIRUS VAC: HCPCS

## 2018-11-23 PROCEDURE — 90662 IIV NO PRSV INCREASED AG IM: CPT

## 2018-11-23 PROCEDURE — 36415 COLL VENOUS BLD VENIPUNCTURE: CPT | Performed by: OBSTETRICS & GYNECOLOGY

## 2018-11-23 PROCEDURE — 82565 ASSAY OF CREATININE: CPT | Performed by: OBSTETRICS & GYNECOLOGY

## 2018-11-23 PROCEDURE — 83735 ASSAY OF MAGNESIUM: CPT | Performed by: OBSTETRICS & GYNECOLOGY

## 2018-11-23 PROCEDURE — 82310 ASSAY OF CALCIUM: CPT | Performed by: OBSTETRICS & GYNECOLOGY

## 2018-11-23 NOTE — PROGRESS NOTES

## 2018-11-24 LAB
CALCIUM SERPL-MCNC: 9.5 MG/DL (ref 8.5–10.1)
CREAT SERPL-MCNC: 0.89 MG/DL (ref 0.52–1.04)
GFR SERPL CREATININE-BSD FRML MDRD: 60 ML/MIN/1.7M2
MAGNESIUM SERPL-MCNC: 1.8 MG/DL (ref 1.6–2.3)
PHOSPHATE SERPL-MCNC: 2.9 MG/DL (ref 2.5–4.5)

## 2018-11-29 ENCOUNTER — TELEPHONE (OUTPATIENT)
Dept: NURSING | Facility: CLINIC | Age: 83
End: 2018-11-29

## 2018-11-29 NOTE — TELEPHONE ENCOUNTER
Pt and daughter calling to inquire if she has had any thyroid labs drawn at her last appt with Dr Randle. They are currently at her PCP having some labs drawn.  Reviewed labs- No thyroid levels drawn.

## 2019-01-10 ENCOUNTER — ALLIED HEALTH/NURSE VISIT (OUTPATIENT)
Dept: NURSING | Facility: CLINIC | Age: 84
End: 2019-01-10
Payer: MEDICARE

## 2019-01-10 DIAGNOSIS — M81.0 OSTEOPOROSIS, SENILE: Primary | ICD-10-CM

## 2019-01-10 PROCEDURE — 96372 THER/PROPH/DIAG INJ SC/IM: CPT

## 2019-01-10 NOTE — NURSING NOTE
Prior to injection, verified patient identity using patient's name and date of birth.  Due to injection administration, patient instructed to remain in clinic for 15 minutes  afterwards, and to report any adverse reaction to me immediately.    Indication: Prolia  (denosumab) is a prescription medicine used to treat osteoporosis in patients who:   Are at high risk for fracture, meaning patients who have had a fracture related to osteoporosis, or who have multiple risk factors for fracture   Cannot use another osteoporosis medicine or other osteoporosis medicines did not work well   The timeline for early/late injections would be 4 weeks early and any time after the 6 month yuniel. If a patient receives their injection late, then the subsequent injection would be 6 months from the date that they actually received the injection    1.  When was the last injection?  6/5/2018  2.  Did they check with their insurance for this calendar year?  yes  3.  Is there an order in the chart?  yes  4.  Has the patient had dental work involving the bone in the past month or will have work in the next 6 months?  Nothing but routine cleanings    The following steps were completed to comply with the REMS program for Prolia:  Reviewed information in the Medication Guide and Patient Counseling Chart, including the serious risks of Prolia  and the symptoms of each risk.  Advised patient to seek prompt medical attention if they have signs or symptoms of any of the serious risks.  Provided each patient a copy of the Medication Guide and Patient Brochure. Pt declined to keep.    Drug Amount Wasted:  Yes: 0 mg/ml   Vial/Syringe: Syringe  Expiration Date:  11/2020

## 2019-01-14 DIAGNOSIS — I25.10 CAD (CORONARY ARTERY DISEASE): Primary | ICD-10-CM

## 2019-01-15 ENCOUNTER — OFFICE VISIT (OUTPATIENT)
Dept: CARDIOLOGY | Facility: CLINIC | Age: 84
End: 2019-01-15
Payer: MEDICARE

## 2019-01-15 VITALS
BODY MASS INDEX: 18.53 KG/M2 | WEIGHT: 94.4 LBS | HEIGHT: 60 IN | DIASTOLIC BLOOD PRESSURE: 68 MMHG | HEART RATE: 96 BPM | SYSTOLIC BLOOD PRESSURE: 112 MMHG

## 2019-01-15 DIAGNOSIS — I48.20 CHRONIC ATRIAL FIBRILLATION (H): Primary | ICD-10-CM

## 2019-01-15 DIAGNOSIS — I25.10 CAD (CORONARY ARTERY DISEASE): ICD-10-CM

## 2019-01-15 DIAGNOSIS — I10 BENIGN ESSENTIAL HYPERTENSION: ICD-10-CM

## 2019-01-15 DIAGNOSIS — E78.5 HYPERLIPIDEMIA LDL GOAL <100: ICD-10-CM

## 2019-01-15 LAB
ANION GAP SERPL CALCULATED.3IONS-SCNC: 10.9 MMOL/L (ref 6–17)
BUN SERPL-MCNC: 15 MG/DL (ref 7–30)
CALCIUM SERPL-MCNC: 9.7 MG/DL (ref 8.5–10.5)
CHLORIDE SERPL-SCNC: 108 MMOL/L (ref 98–107)
CHOLEST SERPL-MCNC: 158 MG/DL
CO2 SERPL-SCNC: 25 MMOL/L (ref 23–29)
CREAT SERPL-MCNC: 0.92 MG/DL (ref 0.7–1.3)
GFR SERPL CREATININE-BSD FRML MDRD: 58 ML/MIN/{1.73_M2}
GLUCOSE SERPL-MCNC: 113 MG/DL (ref 70–105)
HDLC SERPL-MCNC: 51 MG/DL
LDLC SERPL CALC-MCNC: 88 MG/DL
NONHDLC SERPL-MCNC: 107 MG/DL
POTASSIUM SERPL-SCNC: 3.9 MMOL/L (ref 3.5–5.1)
SODIUM SERPL-SCNC: 140 MMOL/L (ref 136–145)
TRIGL SERPL-MCNC: 93 MG/DL

## 2019-01-15 PROCEDURE — 80048 BASIC METABOLIC PNL TOTAL CA: CPT | Performed by: INTERNAL MEDICINE

## 2019-01-15 PROCEDURE — 36415 COLL VENOUS BLD VENIPUNCTURE: CPT | Performed by: INTERNAL MEDICINE

## 2019-01-15 PROCEDURE — 99214 OFFICE O/P EST MOD 30 MIN: CPT | Performed by: INTERNAL MEDICINE

## 2019-01-15 PROCEDURE — 80061 LIPID PANEL: CPT | Performed by: INTERNAL MEDICINE

## 2019-01-15 RX ORDER — LEVOTHYROXINE SODIUM 25 UG/1
25 TABLET ORAL
COMMUNITY
Start: 2019-01-02 | End: 2020-10-06

## 2019-01-15 ASSESSMENT — MIFFLIN-ST. JEOR: SCORE: 799.7

## 2019-01-15 NOTE — LETTER
1/15/2019      Birgit Cordero  [facility] [address]  [facility] [address]      RE: Mia Rodriguez       Dear Colleague,    I had the pleasure of seeing Mia Rodriguez in the [facility].    Service Date: 01/15/2019      HISTORY OF PRESENT ILLNESS:  I had the opportunity to see Ms. Agustina Rodriguez in Cardiology Clinic today at the [facility] in [place] for re-evaluation of chronic atrial fibrillation and rapid ventricular rates.  She has had chronic atrial fibrillation for many years managed with a strategy of rate control and anticoagulation.  Over the past several years, it seems that her heart rates have been increasing and the medication that we have been using for rate control has not been effective.  Unfortunately, her blood pressures have been lower and our attempts at increasing her rate control medications have resulted in lightheadedness and fatigue.  I referred her to Electrophysiology for consideration of AV node ablation and pacemaker implantation and she met with Dr. Lindsey on 09/25/2018.  He confirmed the rapid heart rates with Holter monitoring and agreed with the consideration for AV node ablation and pacemaker implantation.  She chose not to do that procedure, but rather try increasing her diltiazem instead.  Unfortunately, she cannot tolerate a dose of diltiazem of 240 mg a day due to fatigue and went back down to 180.  She seems to feel better with that.      Unfortunately, she also continues to have significant shortness of breath with exertion.  Even walking a short distance causes her to be out of breath.  She may have some underlying pulmonary disease, but she says that the inhalers do not really help much.  Her last echo in 2016 demonstrated normal left ventricular size and function and a nuclear stress test in 2017 also showed normal left ventricular function.  We have not clearly had evidence of a rate-related  cardiomyopathy.        On examination today, her blood pressure is 112/68, heart rate 96 and weight 94 pounds.  She has lost about 4 pounds since October.  Her lungs are clear.  Heart rhythm is irregular without significant murmur and she has no edema.      IMPRESSIONS:  Ms. Agustina Bernabe is an 84-year-old woman with chronic atrial fibrillation, now with somewhat rapid rates, which have been challenging to control.  She has shortness of breath with exertion, even with walking just short distances, which I suspect may be due in part to her rapid AFib.  I suggested that she reconsider the AV node ablation pacemaker option, but had difficulty promising her that her breathing would be normal again.  I suggested that we do not have any other good options for medical management at this point and that AV node ablation and pacemaker implantation was really our only option.  She understands but still wishes to defer that procedure.  I discussed the risks of that procedure including pneumothorax, pacemaker infection, bleeding and the fact that the risk of pneumothorax may be higher given her very thin and small frame.      She wished to come back and see me again in 6 months to discuss these issues again and I agreed.  I will set that up.      cc:   [place] [id number]         ROBERT BARRETO MD, [facility]             D: 01/15/2019   T: 01/15/2019   MT: KASI      Name:     ROSALIND BERNABE   MRN:      [id number]        Account:      [id number]   :      1934           Service Date: 01/15/2019      Document: [id number]         Outpatient Encounter Medications as of 1/15/2019   Medication Sig Dispense Refill     albuterol (PROAIR HFA, PROVENTIL HFA, VENTOLIN HFA) 108 (90 BASE) MCG/ACT inhaler Inhale 2 puffs into the lungs every 6 hours       atorvastatin (LIPITOR) 40 MG tablet Take 1 tablet (40 mg) by mouth daily 90 tablet 3     denosumab (PROLIA) 60 MG/ML  SOLN Inject 60 mg Subcutaneous. q 6 months       diltiazem (DILACOR XR) 180 MG 24 hr capsule Take 1 capsule (180 mg) by mouth daily       levothyroxine (SYNTHROID/LEVOTHROID) 25 MCG tablet Take 25 mcg by mouth       MELATONIN PO Take 2.5 mg by mouth nightly as needed       Nutritional Supplements (BOOST PO) Take 1 Bottle by mouth daily       apixaban ANTICOAGULANT (ELIQUIS) 2.5 MG tablet Take 1 tablet (2.5 mg) by mouth 2 times daily 180 tablet 3     [DISCONTINUED] denosumab (PROLIA) 60 MG/ML SOLN Inject 1 mL (60 mg) Subcutaneous once for 1 dose 1 mL 1     No facility-administered encounter medications on file as of 1/15/2019.        Again, thank you for allowing me to participate in the care of your patient.      Sincerely,    ROBERT BARRETO MD     [facility]

## 2019-01-15 NOTE — PROGRESS NOTES
HPI and Plan:   See dictation    Orders Placed This Encounter   Procedures     Follow-Up with Cardiologist       Orders Placed This Encounter   Medications     levothyroxine (SYNTHROID/LEVOTHROID) 25 MCG tablet     Sig: Take 25 mcg by mouth       Medications Discontinued During This Encounter   Medication Reason     denosumab (PROLIA) 60 MG/ML SOLN Therapy completed         Encounter Diagnoses   Name Primary?     Chronic atrial fibrillation (H) Yes     Hyperlipidemia LDL goal <100      Benign essential hypertension        CURRENT MEDICATIONS:  Current Outpatient Medications   Medication Sig Dispense Refill     albuterol (PROAIR HFA, PROVENTIL HFA, VENTOLIN HFA) 108 (90 BASE) MCG/ACT inhaler Inhale 2 puffs into the lungs every 6 hours       atorvastatin (LIPITOR) 40 MG tablet Take 1 tablet (40 mg) by mouth daily 90 tablet 3     denosumab (PROLIA) 60 MG/ML SOLN Inject 60 mg Subcutaneous. q 6 months       diltiazem (DILACOR XR) 180 MG 24 hr capsule Take 1 capsule (180 mg) by mouth daily       levothyroxine (SYNTHROID/LEVOTHROID) 25 MCG tablet Take 25 mcg by mouth       MELATONIN PO Take 2.5 mg by mouth nightly as needed       Nutritional Supplements (BOOST PO) Take 1 Bottle by mouth daily       apixaban ANTICOAGULANT (ELIQUIS) 2.5 MG tablet Take 1 tablet (2.5 mg) by mouth 2 times daily 180 tablet 3       ALLERGIES     Allergies   Allergen Reactions     Megace [Megestrol Acetate]      Increased LFTs         PAST MEDICAL HISTORY:  Past Medical History:   Diagnosis Date     Arrhythmia     atrial fib.-on coumadin     Atrial fibrillation (H)      Breast cancer (H)      COPD (chronic obstructive pulmonary disease) (H)      COPD exacerbation (H) 9/2/2015     Coronary artery disease      Hypertension      Malignant neoplasm (H) 6/2/2008    Right Breast Cancer     Osteoporosis      Renal disease     kidney stones     Thyroid disease     Hypothyroid     Unspecified cerebral artery occlusion with cerebral infarction     TIA      Uterine fibroid        PAST SURGICAL HISTORY:  Past Surgical History:   Procedure Laterality Date     BIOPSY  6/2/2008    Left /RightBreast Biopsy     BIOPSY  6/1/2010    Right Breast Biopsy     BREAST SURGERY  6/12/2008    Right Breast Lumpectomy     CYSTOSCOPY, DILATE URETHRA, COMBINED  10/5/2012    Procedure: COMBINED CYSTOSCOPY, DILATE URETHRA;  urethral dilation;  Surgeon: Kaushal Harris MD;  Location:  OR     CYSTOSCOPY, RETROGRADES, EXTRACT STONE, COMBINED Right 4/16/2015    Procedure: COMBINED CYSTOSCOPY, RETROGRADES, EXTRACT STONE;  Surgeon: Kaushal Harris MD;  Location:  OR     EXCISE LESION LIP N/A 10/22/2014    Procedure: EXCISE LESION LIP;  Surgeon: Brent Jolley MD;  Location:  SD     GYN SURGERY  1981    LISA with ovaries intact-fibroid     LASER HOLMIUM LITHOTRIPSY URETER(S), INSERT STENT, COMBINED  10/5/2012    Procedure: COMBINED CYSTOSCOPY, URETEROSCOPY, LASER HOLMIUM LITHOTRIPSY URETER(S), INSERT STENT;  RIGHT URETEROSCOPY ,right ureteral biopsy,WITH LASER HOLMIUM LITHOTRIPSY, INSERT STENT RIGHT URETER;  Surgeon: Kaushal Harris MD;  Location:  OR     LASER HOLMIUM LITHOTRIPSY URETER(S), INSERT STENT, COMBINED Right 4/16/2015    Procedure: COMBINED CYSTOSCOPY, URETEROSCOPY, LASER HOLMIUM LITHOTRIPSY URETER(S), INSERT STENT;  Surgeon: Kaushal aHrris MD;  Location:  OR       FAMILY HISTORY:  Family History   Problem Relation Age of Onset     Alzheimer Disease Mother      Osteoporosis Mother      C.A.D. Father      Cardiovascular Father      Cerebrovascular Disease Father      Breast Cancer Maternal Grandmother      Unknown/Adopted Maternal Grandfather      Unknown/Adopted Paternal Grandmother         old age     Cancer Paternal Grandfather         stomach       SOCIAL HISTORY:  Social History     Socioeconomic History     Marital status: Single     Spouse name: Not on file     Number of children: 4     Years of education: Not on file     Highest  education level: Not on file   Social Needs     Financial resource strain: Not on file     Food insecurity - worry: Not on file     Food insecurity - inability: Not on file     Transportation needs - medical: Not on file     Transportation needs - non-medical: Not on file   Occupational History     Not on file   Tobacco Use     Smoking status: Former Smoker     Packs/day: 1.00     Years: 30.00     Pack years: 30.00     Types: Cigarettes     Last attempt to quit: 1989     Years since quittin.1     Smokeless tobacco: Never Used     Tobacco comment: Started:  Stopped:   Substance and Sexual Activity     Alcohol use: No     Alcohol/week: 0.0 oz     Drug use: No     Sexual activity: Not Currently     Birth control/protection: Female Surgical     Comment: LISA   Other Topics Concern     Parent/sibling w/ CABG, MI or angioplasty before 65F 55M? No      Service Not Asked     Blood Transfusions Not Asked     Caffeine Concern Yes     Comment: 2 cups caffeine per day     Occupational Exposure Not Asked     Hobby Hazards Not Asked     Sleep Concern Yes     Stress Concern No     Weight Concern No     Special Diet No     Back Care No     Exercise No     Bike Helmet Not Asked     Seat Belt Yes     Self-Exams Not Asked   Social History Narrative     Not on file       Review of Systems:  Skin:  Negative rash     Eyes:  Positive for glasses recent cataract surgeries both eyes  ENT:  Negative hearing loss hearing aids  Respiratory:  Positive for dyspnea on exertion;cough COPD    Cardiovascular:  Negative lightheadedness;Positive for;fatigue;palpitations Occ palpitations   Gastroenterology: Positive for poor appetite    Genitourinary:  Negative nocturia    Musculoskeletal:  Negative      Neurologic:  Negative   TIA, 10/2/2014  Psychiatric:  Positive for sleep disturbances trouble staying asleep   Heme/Lymph/Imm:  Positive for weight loss;easy bruising    Endocrine:  Positive for thyroid disorder      Physical  Exam:  Vitals: /68   Pulse 96   Ht 1.524 m (5')   Wt 42.8 kg (94 lb 6.4 oz)   LMP 12/07/1981   BMI 18.44 kg/m      Constitutional:  cooperative, alert and oriented, well developed, well nourished, in no acute distress        Skin:  warm and dry to the touch          Head:  normocephalic        Eyes:  pupils equal and round, conjunctivae and lids unremarkable, sclera white, no xanthalasma, EOMS intact, no nystagmus        Lymph:No Cervical lymphadenopathy present     ENT:  no pallor or cyanosis        Neck:  carotid pulses are full and equal bilaterally, JVP normal, no carotid bruit        Respiratory:  clear to auscultation         Cardiac:   irregularly irregular rhythm   no presence of murmur          not assessed this visit                                        GI:  abdomen soft;BS normoactive        Extremities and Muscular Skeletal:  no deformities, clubbing, cyanosis, erythema observed              Neurological:  no gross motor deficits;affect appropriate        Psych:  affect appropriate, oriented to time, person and place        CC  No referring provider defined for this encounter.

## 2019-01-15 NOTE — PROGRESS NOTES
Service Date: 01/15/2019      HISTORY OF PRESENT ILLNESS:  I had the opportunity to see Ms. Agustina Rodriguez in Cardiology Clinic today at the [facility] in [place] for re-evaluation of chronic atrial fibrillation and rapid ventricular rates.  She has had chronic atrial fibrillation for many years managed with a strategy of rate control and anticoagulation.  Over the past several years, it seems that her heart rates have been increasing and the medication that we have been using for rate control has not been effective.  Unfortunately, her blood pressures have been lower and our attempts at increasing her rate control medications have resulted in lightheadedness and fatigue.  I referred her to Electrophysiology for consideration of AV node ablation and pacemaker implantation and she met with Dr. Lindsey on 09/25/2018.  He confirmed the rapid heart rates with Holter monitoring and agreed with the consideration for AV node ablation and pacemaker implantation.  She chose not to do that procedure, but rather try increasing her diltiazem instead.  Unfortunately, she cannot tolerate a dose of diltiazem of 240 mg a day due to fatigue and went back down to 180.  She seems to feel better with that.      Unfortunately, she also continues to have significant shortness of breath with exertion.  Even walking a short distance causes her to be out of breath.  She may have some underlying pulmonary disease, but she says that the inhalers do not really help much.  Her last echo in 2016 demonstrated normal left ventricular size and function and a nuclear stress test in 2017 also showed normal left ventricular function.  We have not clearly had evidence of a rate-related cardiomyopathy.        On examination today, her blood pressure is 112/68, heart rate 96 and weight 94 pounds.  She has lost about 4 pounds since October.  Her lungs are clear.  Heart rhythm is irregular without significant murmur and she has no edema.       IMPRESSIONS:  Ms. Agustina Bernabe is an 84-year-old woman with chronic atrial fibrillation, now with somewhat rapid rates, which have been challenging to control.  She has shortness of breath with exertion, even with walking just short distances, which I suspect may be due in part to her rapid AFib.  I suggested that she reconsider the AV node ablation pacemaker option, but had difficulty promising her that her breathing would be normal again.  I suggested that we do not have any other good options for medical management at this point and that AV node ablation and pacemaker implantation was really our only option.  She understands but still wishes to defer that procedure.  I discussed the risks of that procedure including pneumothorax, pacemaker infection, bleeding and the fact that the risk of pneumothorax may be higher given her very thin and small frame.      She wished to come back and see me again in 6 months to discuss these issues again and I agreed.  I will set that up.      cc:   [place] [address]         RBOERT BARRETO MD, [facility]             D: 01/15/2019   T: 01/15/2019   MT: KASI      Name:     ROSALIND BERNABE   MRN:      [id number]        Account:      [id number]   :      1934           Service Date: 01/15/2019      Document: [id number]

## 2019-01-15 NOTE — LETTER
1/15/2019    Birgit Cordero  [facility] [address] [place] [address]    RE: Mia Rodriguez       Dear Colleague,    I had the pleasure of seeing Mia Rodriguez in the [facility].    HPI and Plan:   See dictation    Orders Placed This Encounter   Procedures     Follow-Up with Cardiologist       Orders Placed This Encounter   Medications     levothyroxine (SYNTHROID/LEVOTHROID) 25 MCG tablet     Sig: Take 25 mcg by mouth       Medications Discontinued During This Encounter   Medication Reason     denosumab (PROLIA) 60 MG/ML SOLN Therapy completed         Encounter Diagnoses   Name Primary?     Chronic atrial fibrillation (H) Yes     Hyperlipidemia LDL goal <100      Benign essential hypertension        CURRENT MEDICATIONS:  Current Outpatient Medications   Medication Sig Dispense Refill     albuterol (PROAIR HFA, PROVENTIL HFA, VENTOLIN HFA) 108 (90 BASE) MCG/ACT inhaler Inhale 2 puffs into the lungs every 6 hours       atorvastatin (LIPITOR) 40 MG tablet Take 1 tablet (40 mg) by mouth daily 90 tablet 3     denosumab (PROLIA) 60 MG/ML SOLN Inject 60 mg Subcutaneous. q 6 months       diltiazem (DILACOR XR) 180 MG 24 hr capsule Take 1 capsule (180 mg) by mouth daily       levothyroxine (SYNTHROID/LEVOTHROID) 25 MCG tablet Take 25 mcg by mouth       MELATONIN PO Take 2.5 mg by mouth nightly as needed       Nutritional Supplements (BOOST PO) Take 1 Bottle by mouth daily       apixaban ANTICOAGULANT (ELIQUIS) 2.5 MG tablet Take 1 tablet (2.5 mg) by mouth 2 times daily 180 tablet 3       ALLERGIES     Allergies   Allergen Reactions     Megace [Megestrol Acetate]      Increased LFTs         PAST MEDICAL HISTORY:  Past Medical History:   Diagnosis Date     Arrhythmia     atrial fib.-on coumadin     Atrial fibrillation (H)      Breast cancer (H)      COPD (chronic obstructive pulmonary disease) (H)      COPD exacerbation (H) 9/2/2015     Coronary artery disease       Hypertension      Malignant neoplasm (H) 6/2/2008    Right Breast Cancer     Osteoporosis      Renal disease     kidney stones     Thyroid disease     Hypothyroid     Unspecified cerebral artery occlusion with cerebral infarction     TIA     Uterine fibroid        PAST SURGICAL HISTORY:  Past Surgical History:   Procedure Laterality Date     BIOPSY  6/2/2008    Left /RightBreast Biopsy     BIOPSY  6/1/2010    Right Breast Biopsy     BREAST SURGERY  6/12/2008    Right Breast Lumpectomy     CYSTOSCOPY, DILATE URETHRA, COMBINED  10/5/2012    Procedure: COMBINED CYSTOSCOPY, DILATE URETHRA;  urethral dilation;  Surgeon: Kaushal Harris MD;  Location:  OR     CYSTOSCOPY, RETROGRADES, EXTRACT STONE, COMBINED Right 4/16/2015    Procedure: COMBINED CYSTOSCOPY, RETROGRADES, EXTRACT STONE;  Surgeon: Kaushal Harris MD;  Location:  OR     EXCISE LESION LIP N/A 10/22/2014    Procedure: EXCISE LESION LIP;  Surgeon: Brent Jolley MD;  Location: [facility]     GYN SURGERY  1981    LISA with ovaries intact-fibroid     LASER HOLMIUM LITHOTRIPSY URETER(S), INSERT STENT, COMBINED  10/5/2012    Procedure: COMBINED CYSTOSCOPY, URETEROSCOPY, LASER HOLMIUM LITHOTRIPSY URETER(S), INSERT STENT;  RIGHT URETEROSCOPY ,right ureteral biopsy,WITH LASER HOLMIUM LITHOTRIPSY, INSERT STENT RIGHT URETER;  Surgeon: Kaushal Harris MD;  Location:  OR     LASER HOLMIUM LITHOTRIPSY URETER(S), INSERT STENT, COMBINED Right 4/16/2015    Procedure: COMBINED CYSTOSCOPY, URETEROSCOPY, LASER HOLMIUM LITHOTRIPSY URETER(S), INSERT STENT;  Surgeon: Kaushal Harris MD;  Location:  OR       FAMILY HISTORY:  Family History   Problem Relation Age of Onset     Alzheimer Disease Mother      Osteoporosis Mother      C.A.D. Father      Cardiovascular Father      Cerebrovascular Disease Father      Breast Cancer Maternal Grandmother      Unknown/Adopted Maternal Grandfather      Unknown/Adopted Paternal Grandmother         old age      Cancer Paternal Grandfather         stomach       SOCIAL HISTORY:  Social History     Socioeconomic History     Marital status: Single     Spouse name: Not on file     Number of children: 4     Years of education: Not on file     Highest education level: Not on file   Social Needs     Financial resource strain: Not on file     Food insecurity - worry: Not on file     Food insecurity - inability: Not on file     Transportation needs - medical: Not on file     Transportation needs - non-medical: Not on file   Occupational History     Not on file   Tobacco Use     Smoking status: Former Smoker     Packs/day: 1.00     Years: 30.00     Pack years: 30.00     Types: Cigarettes     Last attempt to quit: 1989     Years since quittin.1     Smokeless tobacco: Never Used     Tobacco comment: Started:  Stopped:   Substance and Sexual Activity     Alcohol use: No     Alcohol/week: 0.0 oz     Drug use: No     Sexual activity: Not Currently     Birth control/protection: Female Surgical     Comment: LISA   Other Topics Concern     Parent/sibling w/ CABG, MI or angioplasty before 65F 55M? No      Service Not Asked     Blood Transfusions Not Asked     Caffeine Concern Yes     Comment: 2 cups caffeine per day     Occupational Exposure Not Asked     Hobby Hazards Not Asked     Sleep Concern Yes     Stress Concern No     Weight Concern No     Special Diet No     Back Care No     Exercise No     Bike Helmet Not Asked     Seat Belt Yes     Self-Exams Not Asked   Social History Narrative     Not on file       Review of Systems:  Skin:  Negative rash     Eyes:  Positive for glasses recent cataract surgeries both eyes  ENT:  Negative hearing loss hearing aids  Respiratory:  Positive for dyspnea on exertion;cough COPD    Cardiovascular:  Negative lightheadedness;Positive for;fatigue;palpitations Occ palpitations   Gastroenterology: Positive for poor appetite    Genitourinary:  Negative nocturia    Musculoskeletal:  Negative       Neurologic:  Negative   TIA, 10/2/2014  Psychiatric:  Positive for sleep disturbances trouble staying asleep   Heme/Lymph/Imm:  Positive for weight loss;easy bruising    Endocrine:  Positive for thyroid disorder      Physical Exam:  Vitals: /68   Pulse 96   Ht 1.524 m (5')   Wt 42.8 kg (94 lb 6.4 oz)   LMP 12/07/1981   BMI 18.44 kg/m       Constitutional:  cooperative, alert and oriented, well developed, well nourished, in no acute distress        Skin:  warm and dry to the touch          Head:  normocephalic        Eyes:  pupils equal and round, conjunctivae and lids unremarkable, sclera white, no xanthalasma, EOMS intact, no nystagmus        Lymph:No Cervical lymphadenopathy present     ENT:  no pallor or cyanosis        Neck:  carotid pulses are full and equal bilaterally, JVP normal, no carotid bruit        Respiratory:  clear to auscultation         Cardiac:   irregularly irregular rhythm   no presence of murmur          not assessed this visit                                        GI:  abdomen soft;BS normoactive        Extremities and Muscular Skeletal:  no deformities, clubbing, cyanosis, erythema observed              Neurological:  no gross motor deficits;affect appropriate        Psych:  affect appropriate, oriented to time, person and place        CC  No referring provider defined for this encounter.                Thank you for allowing me to participate in the care of your patient.      Sincerely,     ROBERT BARRETO MD     [facility]    cc:   No referring provider defined for this encounter.

## 2019-02-04 DIAGNOSIS — I48.0 PAROXYSMAL ATRIAL FIBRILLATION (H): ICD-10-CM

## 2019-05-02 ENCOUNTER — TELEPHONE (OUTPATIENT)
Dept: OBGYN | Facility: CLINIC | Age: 84
End: 2019-05-02

## 2019-05-02 NOTE — TELEPHONE ENCOUNTER
Labs were drawn last November 23,18  Ca, creat, phos, mag  Notes Recorded by Jhon Randle MD on 11/25/2018 at 8:48 AM  Labs are stable.Ok for Prolia    Routing back to KR

## 2019-05-02 NOTE — TELEPHONE ENCOUNTER
Patient is due for injection 7/10/18. Please review and order any needed labs and route back to [facility].    Benefit re-verification faxed to [employer].

## 2019-05-07 DIAGNOSIS — I48.20 CHRONIC ATRIAL FIBRILLATION (H): ICD-10-CM

## 2019-05-07 RX ORDER — DILTIAZEM HYDROCHLORIDE 180 MG/1
180 CAPSULE, EXTENDED RELEASE ORAL DAILY
Qty: 90 CAPSULE | Refills: 0 | Status: SHIPPED | OUTPATIENT
Start: 2019-05-07 | End: 2019-08-07

## 2019-05-28 ENCOUNTER — TELEPHONE (OUTPATIENT)
Dept: LAB | Facility: CLINIC | Age: 84
End: 2019-05-28

## 2019-05-28 NOTE — TELEPHONE ENCOUNTER
Dr Cantrell wanting pt to be seen by Dr Lindsey to discuss possible PPM.  None available in a reasonable time.  LM for pt daughter that this writer will check schedule in the AM and call her back with what is available. Torri

## 2019-05-29 NOTE — TELEPHONE ENCOUNTER
Second message left for pt daughter Yamilka to call back and discuss OV with Dr Lindsey.  I can get pt in on 6/7 at 1130 and if wanting to proceed with PPM/Ablation this can be done on 6/10. Await call back. Torri

## 2019-05-29 NOTE — TELEPHONE ENCOUNTER
Pt daughter Yamilka called back and made aware of the date of 6/7 with the possibility of doing the AVN and PPM on Monday 6/10.  Daughter states to make the OV and if this does not work will let clinic know. A hold has also been made for the 6/10 date. Torri

## 2019-06-07 ENCOUNTER — OFFICE VISIT (OUTPATIENT)
Dept: CARDIOLOGY | Facility: CLINIC | Age: 84
End: 2019-06-07
Payer: MEDICARE

## 2019-06-07 VITALS
OXYGEN SATURATION: 96 % | DIASTOLIC BLOOD PRESSURE: 84 MMHG | BODY MASS INDEX: 17.77 KG/M2 | WEIGHT: 90.5 LBS | HEART RATE: 94 BPM | SYSTOLIC BLOOD PRESSURE: 128 MMHG | HEIGHT: 60 IN

## 2019-06-07 DIAGNOSIS — R00.2 PALPITATIONS: ICD-10-CM

## 2019-06-07 DIAGNOSIS — I48.0 PAROXYSMAL ATRIAL FIBRILLATION (H): Primary | ICD-10-CM

## 2019-06-07 PROCEDURE — 99214 OFFICE O/P EST MOD 30 MIN: CPT | Performed by: INTERNAL MEDICINE

## 2019-06-07 PROCEDURE — 93000 ELECTROCARDIOGRAM COMPLETE: CPT | Performed by: INTERNAL MEDICINE

## 2019-06-07 ASSESSMENT — MIFFLIN-ST. JEOR: SCORE: 777.01

## 2019-06-07 NOTE — PROGRESS NOTES
Electrophysiology/ Clinic Note         H&P and Plan:     REASON FOR VISIT:  Evaluation of permanent atrial fibrillation.      HISTORY OF PRESENT ILLNESS:  Ms. Rodriguez is a delightful 85-year-old lady with a history of hypertension, hyperlipidemia, TIA, breast cancer (previous right partial mastectomy) and permanent atrial fibrillation who is here for consideration for pacemaker implantation and AV node ablation.      The patient has a long history of permanent atrial fibrillation and has been rate control strategy.  Pharmacotherapy has been limited due to borderline low blood pressure and she continues to be symptomatic despite of maximum tolerated dose of diltiazem.  She complains of palpitation and shortness of breath with minimal exertion.  She was recently evaluated by Dr. Cantrell, and was referred here for consideration for AV node ablation pacemaker implantation.      Today, she denies any other symptoms such as chest pain, syncope or syncope but she admits having intermittent episodes of lightheadedness.       Holter monitor:  average heart rate of 88, however, minimum of 50, maximum of 157.    Nuclear stress test (03/2017): negative for ischemia and some imaging artifact suggestive of breast attenuation. Echocardiogram (05/2016): normal cardiac function, EF of 60%-65%.      ASSESSMENT AND PLAN:   1.  Permanent atrial fibrillation.  She failed rate control strategy with diltiazem.  She seems to be symptomatic with A. fib having episodes of A. fib with RVR.  We discussed the possibility of adding digoxin or pursuing AV node ablation/pacemaker implantation which is a more definitive procedure.    I explained the procedure in detail.  She understands that there is a 1 to 2% risk of complication associated procedure and the procedure is irreversible.    After our discussion, she would like to think about the options.  We will follow-up with her over the phone next week and finalize plan.  In the meantime, I recommend  repeating an echocardiogram.     2.  Embolic prevention.  CHADS-VASc score of 6.  Continue anticoagulation with Eliquis indefinitely.   3.  Hypertension.  Blood pressure is well controlled.           Ronak Lindsey MD    Physical Exam:  Vitals: /84   Pulse 94   Ht 1.524 m (5')   Wt 41.1 kg (90 lb 8 oz)   LMP 12/07/1981   SpO2 96%   BMI 17.67 kg/m      Constitutional:  AAO x3.  Pt is in NAD.  HEAD: normocephalic.  SKIN: Skin normal color, texture and turgor with no lesions or eruptions.  Eyes: PERRL, EOMI.  ENT:  Supple, normal JVP. No lymphadenopathy or thyroid enlargement.  Chest:  CTAB.  Cardiac:  IIR, variable sound of S1 and Normal S2.  No murmurs rubs or gallop.    Abdomen:  Normal BS.  Soft, non-tender and non-distended.  No rebound or guarding.    Extremities:  Pedious pulses palpable B/L.  No LE edema noticed.   Neurological: Strength and sensation grossly symmetric and intact throughout.       CURRENT MEDICATIONS:  Current Outpatient Medications   Medication Sig Dispense Refill     albuterol (PROAIR HFA, PROVENTIL HFA, VENTOLIN HFA) 108 (90 BASE) MCG/ACT inhaler Inhale 2 puffs into the lungs every 6 hours       apixaban ANTICOAGULANT (ELIQUIS) 2.5 MG tablet Take 1 tablet (2.5 mg) by mouth 2 times daily 180 tablet 3     atorvastatin (LIPITOR) 40 MG tablet Take 1 tablet (40 mg) by mouth daily 90 tablet 3     denosumab (PROLIA) 60 MG/ML SOLN Inject 60 mg Subcutaneous. q 6 months       diltiazem ER (DILT-XR) 180 MG 24 hr capsule Take 1 capsule (180 mg) by mouth daily 90 capsule 0     levothyroxine (SYNTHROID/LEVOTHROID) 25 MCG tablet Take 25 mcg by mouth       MELATONIN PO Take 2.5 mg by mouth nightly as needed       Nutritional Supplements (BOOST PO) Take 1 Bottle by mouth daily         ALLERGIES     Allergies   Allergen Reactions     Megace [Megestrol Acetate]      Increased LFTs         PAST MEDICAL HISTORY:  Past Medical History:   Diagnosis Date     Arrhythmia     atrial fib.-on coumadin      Atrial fibrillation (H)      Breast cancer (H)      COPD (chronic obstructive pulmonary disease) (H)      COPD exacerbation (H) 9/2/2015     Coronary artery disease      Hypertension      Malignant neoplasm (H) 6/2/2008    Right Breast Cancer     Osteoporosis      Renal disease     kidney stones     Thyroid disease     Hypothyroid     Unspecified cerebral artery occlusion with cerebral infarction     TIA     Uterine fibroid        PAST SURGICAL HISTORY:  Past Surgical History:   Procedure Laterality Date     BIOPSY  6/2/2008    Left /RightBreast Biopsy     BIOPSY  6/1/2010    Right Breast Biopsy     BREAST SURGERY  6/12/2008    Right Breast Lumpectomy     CYSTOSCOPY, DILATE URETHRA, COMBINED  10/5/2012    Procedure: COMBINED CYSTOSCOPY, DILATE URETHRA;  urethral dilation;  Surgeon: Kaushal Harris MD;  Location:  OR     CYSTOSCOPY, RETROGRADES, EXTRACT STONE, COMBINED Right 4/16/2015    Procedure: COMBINED CYSTOSCOPY, RETROGRADES, EXTRACT STONE;  Surgeon: Kaushal Harris MD;  Location:  OR     EXCISE LESION LIP N/A 10/22/2014    Procedure: EXCISE LESION LIP;  Surgeon: Brent Jolley MD;  Location: [facility]     GYN SURGERY  1981    LISA with ovaries intact-fibroid     LASER HOLMIUM LITHOTRIPSY URETER(S), INSERT STENT, COMBINED  10/5/2012    Procedure: COMBINED CYSTOSCOPY, URETEROSCOPY, LASER HOLMIUM LITHOTRIPSY URETER(S), INSERT STENT;  RIGHT URETEROSCOPY ,right ureteral biopsy,WITH LASER HOLMIUM LITHOTRIPSY, INSERT STENT RIGHT URETER;  Surgeon: Kaushal Harris MD;  Location:  OR     LASER HOLMIUM LITHOTRIPSY URETER(S), INSERT STENT, COMBINED Right 4/16/2015    Procedure: COMBINED CYSTOSCOPY, URETEROSCOPY, LASER HOLMIUM LITHOTRIPSY URETER(S), INSERT STENT;  Surgeon: Kaushal Harris MD;  Location:  OR       FAMILY HISTORY:  Family History   Problem Relation Age of Onset     Alzheimer Disease Mother      Osteoporosis Mother      C.A.D. Father      Cardiovascular Father       Cerebrovascular Disease Father      Breast Cancer Maternal Grandmother      Unknown/Adopted Maternal Grandfather      Unknown/Adopted Paternal Grandmother         old age     Cancer Paternal Grandfather         stomach       SOCIAL HISTORY:  Social History     Socioeconomic History     Marital status: Single     Spouse name: Not on file     Number of children: 4     Years of education: Not on file     Highest education level: Not on file   Occupational History     Not on file   Social Needs     Financial resource strain: Not on file     Food insecurity:     Worry: Not on file     Inability: Not on file     Transportation needs:     Medical: Not on file     Non-medical: Not on file   Tobacco Use     Smoking status: Former Smoker     Packs/day: 1.00     Years: 30.00     Pack years: 30.00     Types: Cigarettes     Last attempt to quit: 1989     Years since quittin.5     Smokeless tobacco: Never Used     Tobacco comment: Started:  Stopped:   Substance and Sexual Activity     Alcohol use: No     Alcohol/week: 0.0 oz     Drug use: No     Sexual activity: Not Currently     Birth control/protection: Female Surgical     Comment: [facility]   Lifestyle     Physical activity:     Days per week: Not on file     Minutes per session: Not on file     Stress: Not on file   Relationships     Social connections:     Talks on phone: Not on file     Gets together: Not on file     Attends [employer] service: Not on file     Active member of club or organization: Not on file     Attends meetings of clubs or organizations: Not on file     Relationship status: Not on file     Intimate partner violence:     Fear of current or ex partner: Not on file     Emotionally abused: Not on file     Physically abused: Not on file     Forced sexual activity: Not on file   Other Topics Concern     Parent/sibling w/ CABG, MI or angioplasty before 65F 55M? No      Service Not Asked     Blood Transfusions Not Asked     Caffeine Concern Yes      Comment: 2 cups caffeine per day     Occupational Exposure Not Asked     Hobby Hazards Not Asked     Sleep Concern Yes     Stress Concern No     Weight Concern No     Special Diet No     Back Care No     Exercise No     Bike Helmet Not Asked     Seat Belt Yes     Self-Exams Not Asked   Social History Narrative     Not on file       Review of Systems:  Skin:        Eyes:       ENT:       Respiratory:       Cardiovascular:       Gastroenterology:      Genitourinary:       Musculoskeletal:       Neurologic:       Psychiatric:       Heme/Lymph/Imm:       Endocrine:           Recent Lab Results:  LIPID RESULTS:  Lab Results   Component Value Date    CHOL 158 01/15/2019    HDL 51 01/15/2019    LDL 88 01/15/2019    TRIG 93 01/15/2019    CHOLHDLRATIO 3.5 02/09/2014       LIVER ENZYME RESULTS:  Lab Results   Component Value Date    AST 13 10/02/2014    ALT 10 01/12/2018       CBC RESULTS:  Lab Results   Component Value Date    WBC 10.5 09/03/2017    RBC 4.72 09/03/2017    HGB 15.3 09/03/2017    HCT 44.7 09/03/2017    MCV 95 09/03/2017    MCH 32.4 09/03/2017    MCHC 34.2 09/03/2017    RDW 13.8 09/03/2017     09/03/2017       BMP RESULTS:  Lab Results   Component Value Date     01/15/2019    POTASSIUM 3.9 01/15/2019    CHLORIDE 108 (H) 01/15/2019    CO2 25 01/15/2019    ANIONGAP 10.9 01/15/2019     (H) 01/15/2019    BUN 15 01/15/2019    CR 0.92 01/15/2019    GFRESTIMATED 58 (L) 01/15/2019    GFRESTBLACK 70 01/15/2019    CYNDEE 9.7 01/15/2019        A1C RESULTS:  No results found for: A1C    INR RESULTS:  Lab Results   Component Value Date    INR 1.03 10/22/2014    INR 1.78 (H) 10/02/2014         ECHOCARDIOGRAM  No results found for this or any previous visit (from the past 8760 hour(s)).      No orders of the defined types were placed in this encounter.    No orders of the defined types were placed in this encounter.    There are no discontinued medications.      No diagnosis found.      CC  No referring  provider defined for this encounter.

## 2019-06-07 NOTE — LETTER
6/7/2019    Birgit Cordero  [facility] [address]  [facility] [address]    RE: Mia Rodriguez       Dear Colleague,    I had the pleasure of seeing Mia Rodriguez in the [facility].    Electrophysiology/ Clinic Note         H&P and Plan:     REASON FOR VISIT:  Evaluation of permanent atrial fibrillation.      HISTORY OF PRESENT ILLNESS:  Ms. Rodriguez is a delightful 85-year-old lady with a history of hypertension, hyperlipidemia, TIA, breast cancer (previous right partial mastectomy) and permanent atrial fibrillation who is here for consideration for pacemaker implantation and AV node ablation.      The patient has a long history of permanent atrial fibrillation and has been rate control strategy.  Pharmacotherapy has been limited due to borderline low blood pressure and she continues to be symptomatic despite of maximum tolerated dose of diltiazem.  She complains of palpitation and shortness of breath with minimal exertion.  She was recently evaluated by Dr. Cantrell, and was referred here for consideration for AV node ablation pacemaker implantation.      Today,  she denies any other symptoms such as chest pain, syncope or syncope but she admits having intermittent episodes of lightheadedness.       Holter monitor:  average heart rate of 88, however, minimum of 50, maximum of 157.    Nuclear stress test (03/2017): negative for ischemia and some imaging artifact suggestive of breast attenuation. Echocardiogram (05/2016): normal cardiac function, EF of 60%-65%.      ASSESSMENT AND PLAN:   1.  Permanent atrial fibrillation.  She  failed rate control strategy with diltiazem.  She seems to be symptomatic with A. fib having episodes of A. fib with RVR.  We discussed the possibility of adding digoxin or pursuing AV node ablation/pacemaker implantation which is a more definitive procedure.    I explained the procedure in detail.  She understands that there is a 1 to 2%  risk of complication associated procedure and the procedure is irreversible.    After our discussion, she would like to think about the options.  We will follow-up with her over the phone next week and finalize plan.  In the meantime, I recommend repeating an echocardiogram.     2.  Embolic prevention.  CHADS-VASc score of 6.  Continue anticoagulation with Eliquis indefinitely.   3.  Hypertension.  Blood pressure is well controlled.           Ronak Lindsey MD    Physical Exam:  Vitals: /84   Pulse 94   Ht 1.524 m (5')   Wt 41.1 kg (90 lb 8 oz)   LMP 12/07/1981   SpO2 96%   BMI 17.67 kg/m       Constitutional:  AAO x3.  Pt is in NAD.  HEAD: normocephalic.  SKIN: Skin normal color, texture and turgor with no lesions or eruptions.  Eyes: PERRL, EOMI.  ENT:  Supple, normal JVP. No lymphadenopathy or thyroid enlargement.  Chest:  CTAB.  Cardiac:  IIR, variable sound of S1 and Normal S2.  No murmurs rubs or gallop.    Abdomen:  Normal BS.  Soft, non-tender and non-distended.  No rebound or guarding.    Extremities:  Pedious pulses palpable B/L.  No LE edema noticed.   Neurological: Strength and sensation grossly symmetric and intact throughout.       CURRENT MEDICATIONS:  Current Outpatient Medications   Medication Sig Dispense Refill     albuterol (PROAIR HFA, PROVENTIL HFA, VENTOLIN HFA) 108 (90 BASE) MCG/ACT inhaler Inhale 2 puffs into the lungs every 6 hours       apixaban ANTICOAGULANT (ELIQUIS) 2.5 MG tablet Take 1 tablet (2.5 mg) by mouth 2 times daily 180 tablet 3     atorvastatin (LIPITOR) 40 MG tablet Take 1 tablet (40 mg) by mouth daily 90 tablet 3     denosumab (PROLIA) 60 MG/ML SOLN Inject 60 mg Subcutaneous. q 6 months       diltiazem ER (DILT-XR) 180 MG 24 hr capsule Take 1 capsule (180 mg) by mouth daily 90 capsule 0     levothyroxine (SYNTHROID/LEVOTHROID) 25 MCG tablet Take 25 mcg by mouth       MELATONIN PO Take 2.5 mg by mouth nightly as needed       Nutritional Supplements (BOOST PO) Take  1 Bottle by mouth daily         ALLERGIES     Allergies   Allergen Reactions     Megace [Megestrol Acetate]      Increased LFTs         PAST MEDICAL HISTORY:  Past Medical History:   Diagnosis Date     Arrhythmia     atrial fib.-on coumadin     Atrial fibrillation (H)      Breast cancer (H)      COPD (chronic obstructive pulmonary disease) (H)      COPD exacerbation (H) 9/2/2015     Coronary artery disease      Hypertension      Malignant neoplasm (H) 6/2/2008    Right Breast Cancer     Osteoporosis      Renal disease     kidney stones     Thyroid disease     Hypothyroid     Unspecified cerebral artery occlusion with cerebral infarction     TIA     Uterine fibroid        PAST SURGICAL HISTORY:  Past Surgical History:   Procedure Laterality Date     BIOPSY  6/2/2008    Left /RightBreast Biopsy     BIOPSY  6/1/2010    Right Breast Biopsy     BREAST SURGERY  6/12/2008    Right Breast Lumpectomy     CYSTOSCOPY, DILATE URETHRA, COMBINED  10/5/2012    Procedure: COMBINED CYSTOSCOPY, DILATE URETHRA;  urethral dilation;  Surgeon: Kaushal Harris MD;  Location:  OR     CYSTOSCOPY, RETROGRADES, EXTRACT STONE, COMBINED Right 4/16/2015    Procedure: COMBINED CYSTOSCOPY, RETROGRADES, EXTRACT STONE;  Surgeon: Kaushal Harris MD;  Location:  OR     EXCISE LESION LIP N/A 10/22/2014    Procedure: EXCISE LESION LIP;  Surgeon: Brent Jolley MD;  Location: [facility]     GYN SURGERY  1981    LISA with ovaries intact-fibroid     LASER HOLMIUM LITHOTRIPSY URETER(S), INSERT STENT, COMBINED  10/5/2012    Procedure: COMBINED CYSTOSCOPY, URETEROSCOPY, LASER HOLMIUM LITHOTRIPSY URETER(S), INSERT STENT;  RIGHT URETEROSCOPY ,right ureteral biopsy,WITH LASER HOLMIUM LITHOTRIPSY, INSERT STENT RIGHT URETER;  Surgeon: Kaushal Harris MD;  Location:  OR     LASER HOLMIUM LITHOTRIPSY URETER(S), INSERT STENT, COMBINED Right 4/16/2015    Procedure: COMBINED CYSTOSCOPY, URETEROSCOPY, LASER HOLMIUM LITHOTRIPSY URETER(S),  INSERT STENT;  Surgeon: Kaushal Harris MD;  Location:  OR       FAMILY HISTORY:  Family History   Problem Relation Age of Onset     Alzheimer Disease Mother      Osteoporosis Mother      C.A.D. Father      Cardiovascular Father      Cerebrovascular Disease Father      Breast Cancer Maternal Grandmother      Unknown/Adopted Maternal Grandfather      Unknown/Adopted Paternal Grandmother         old age     Cancer Paternal Grandfather         stomach       SOCIAL HISTORY:  Social History     Socioeconomic History     Marital status: Single     Spouse name: Not on file     Number of children: 4     Years of education: Not on file     Highest education level: Not on file   Occupational History     Not on file   Social Needs     Financial resource strain: Not on file     Food insecurity:     Worry: Not on file     Inability: Not on file     Transportation needs:     Medical: Not on file     Non-medical: Not on file   Tobacco Use     Smoking status: Former Smoker     Packs/day: 1.00     Years: 30.00     Pack years: 30.00     Types: Cigarettes     Last attempt to quit: 1989     Years since quittin.5     Smokeless tobacco: Never Used     Tobacco comment: Started:  Stopped:   Substance and Sexual Activity     Alcohol use: No     Alcohol/week: 0.0 oz     Drug use: No     Sexual activity: Not Currently     Birth control/protection: Female Surgical     Comment: [facility]   Lifestyle     Physical activity:     Days per week: Not on file     Minutes per session: Not on file     Stress: Not on file   Relationships     Social connections:     Talks on phone: Not on file     Gets together: Not on file     Attends Mandaeism service: Not on file     Active member of club or organization: Not on file     Attends meetings of clubs or organizations: Not on file     Relationship status: Not on file     Intimate partner violence:     Fear of current or ex partner: Not on file     Emotionally abused: Not on file     Physically  abused: Not on file     Forced sexual activity: Not on file   Other Topics Concern     Parent/sibling w/ CABG, MI or angioplasty before 65F 55M? No      Service Not Asked     Blood Transfusions Not Asked     Caffeine Concern Yes     Comment: 2 cups caffeine per day     Occupational Exposure Not Asked     Hobby Hazards Not Asked     Sleep Concern Yes     Stress Concern No     Weight Concern No     Special Diet No     Back Care No     Exercise No     Bike Helmet Not Asked     Seat Belt Yes     Self-Exams Not Asked   Social History Narrative     Not on file       Review of Systems:  Skin:        Eyes:       ENT:       Respiratory:       Cardiovascular:       Gastroenterology:      Genitourinary:       Musculoskeletal:       Neurologic:       Psychiatric:       Heme/Lymph/Imm:       Endocrine:           Recent Lab Results:  LIPID RESULTS:  Lab Results   Component Value Date    CHOL 158 01/15/2019    HDL 51 01/15/2019    LDL 88 01/15/2019    TRIG 93 01/15/2019    CHOLHDLRATIO 3.5 02/09/2014       LIVER ENZYME RESULTS:  Lab Results   Component Value Date    AST 13 10/02/2014    ALT 10 01/12/2018       CBC RESULTS:  Lab Results   Component Value Date    WBC 10.5 09/03/2017    RBC 4.72 09/03/2017    HGB 15.3 09/03/2017    HCT 44.7 09/03/2017    MCV 95 09/03/2017    MCH 32.4 09/03/2017    MCHC 34.2 09/03/2017    RDW 13.8 09/03/2017     09/03/2017       BMP RESULTS:  Lab Results   Component Value Date     01/15/2019    POTASSIUM 3.9 01/15/2019    CHLORIDE 108 (H) 01/15/2019    CO2 25 01/15/2019    ANIONGAP 10.9 01/15/2019     (H) 01/15/2019    BUN 15 01/15/2019    CR 0.92 01/15/2019    GFRESTIMATED 58 (L) 01/15/2019    GFRESTBLACK 70 01/15/2019    CYNDEE 9.7 01/15/2019        A1C RESULTS:  No results found for: A1C    INR RESULTS:  Lab Results   Component Value Date    INR 1.03 10/22/2014    INR 1.78 (H) 10/02/2014         ECHOCARDIOGRAM  No results found for this or any previous visit (from the past  8760 hour(s)).      No orders of the defined types were placed in this encounter.    No orders of the defined types were placed in this encounter.    There are no discontinued medications.      No diagnosis found.      CC  No referring provider defined for this encounter.            Thank you for allowing me to participate in the care of your patient.    Sincerely,     Ronak Lindsey MD     [facility]

## 2019-06-10 ENCOUNTER — TELEPHONE (OUTPATIENT)
Dept: CARDIOLOGY | Facility: CLINIC | Age: 84
End: 2019-06-10

## 2019-06-10 DIAGNOSIS — E78.5 HYPERLIPIDEMIA LDL GOAL <100: ICD-10-CM

## 2019-06-10 RX ORDER — ATORVASTATIN CALCIUM 40 MG/1
40 TABLET, FILM COATED ORAL DAILY
Qty: 90 TABLET | Refills: 1 | Status: SHIPPED | OUTPATIENT
Start: 2019-06-10 | End: 2019-07-24

## 2019-06-10 NOTE — TELEPHONE ENCOUNTER
Left message with patient asking her to call us back. Also called her daughter - she didn't answer and voicemail was full. SK

## 2019-06-14 NOTE — TELEPHONE ENCOUNTER
Ok for buy and bill. NO PA needed. Subject to $185 ded which has been met, and a 20% co-insurance for the admin and cost of Prolia. Secondary benefits will cross over for coordination.    Phones were down when this work was done, so will need to call patient to give her benefits, but also she needs DEXA (was due in March) before Prolia along with follow up appointment to review DEXA after. Could do DEXA, Dr. Randle appointment and Prolia all in same day.

## 2019-06-17 NOTE — TELEPHONE ENCOUNTER
I called Agustina to relay information, and the need for DEXA and Dr. Randle appointment prior to Prolia if still appropriate to continue. She will call her daughter to arrange time to come for DEXA and to see Dr. Randle after. If appropriate she could do Prolia that same day as it looks like labs are UTD.

## 2019-06-19 ENCOUNTER — HOSPITAL ENCOUNTER (OUTPATIENT)
Dept: CARDIOLOGY | Facility: CLINIC | Age: 84
Discharge: HOME | End: 2019-06-19
Attending: INTERNAL MEDICINE | Admitting: INTERNAL MEDICINE
Payer: MEDICARE

## 2019-06-19 DIAGNOSIS — I48.0 PAROXYSMAL ATRIAL FIBRILLATION (H): ICD-10-CM

## 2019-06-19 PROCEDURE — 93306 TTE W/DOPPLER COMPLETE: CPT

## 2019-06-19 PROCEDURE — 93306 TTE W/DOPPLER COMPLETE: CPT | Mod: 26 | Performed by: INTERNAL MEDICINE

## 2019-06-25 ENCOUNTER — TELEPHONE (OUTPATIENT)
Dept: CARDIOLOGY | Facility: CLINIC | Age: 84
End: 2019-06-25

## 2019-06-25 NOTE — TELEPHONE ENCOUNTER
Patient called requesting results of recent echo done.  Called patient to let her know it has not been reviewed by Dr Lindsey yet.  Also informed her that he is out of the office until 7/1.  Will message him so he can review and provide recommendations.  NESTOR Ch

## 2019-07-03 ENCOUNTER — TELEPHONE (OUTPATIENT)
Dept: CARDIOLOGY | Facility: CLINIC | Age: 84
End: 2019-07-03

## 2019-07-03 DIAGNOSIS — I48.0 PAROXYSMAL ATRIAL FIBRILLATION (H): Primary | ICD-10-CM

## 2019-07-03 NOTE — TELEPHONE ENCOUNTER
"07/03/19 Spoke w daughter who was inquiring about results of recent ECHO. Informed daughter that per result note , \" Compared to prior study, there is no significant change\". Daughter wondering if they need to make another appt w Dr Lindsey or if they can discuss plan over the phone regarding recommendations moving forward for her mother. Will send question to Dr Lindsey for review. Daughter in agreement. Genia 2:16 pm  "

## 2019-07-03 NOTE — TELEPHONE ENCOUNTER
07/03/19 Daughter Yamilka called and LM inquiring about recent ECHO results. LM for callback. Genia 12:59 pm

## 2019-07-09 NOTE — TELEPHONE ENCOUNTER
"Dr. Lindsey reviewed patient's recent echo:   Notes recorded by Ronak Lindsey MD on 7/8/2019 at 1:59 PM CDT  Echo showed normal cardiac function. Options are add digoxin (0.125 daily) or pursuing AV node ablation/pacemaker implantation which is a more definitive procedure. Please update patient on results and discuss options. She may need to f/u with an [facility]AR[facility] to discuss further.  Thank you, Ronak Lindsey MD    Per Dr. Lindsey's OV note from 6/7/2019: \"Permanent atrial fibrillation.  She failed rate control strategy with diltiazem.  She seems to be symptomatic with A. fib having episodes of A. fib with RVR.  We discussed the possibility of adding digoxin or pursuing AV node ablation/pacemaker implantation which is a more definitive procedure... After our discussion, she would like to think about the options.  We will follow-up with her over the phone next week and finalize plan.  In the meantime, I recommend repeating an echocardiogram.\"    Called pt's daughter Yamilka, no answer, left voicemail to call EP RN number for non-urgent test results and a plan from Dr. Lindsey.   "

## 2019-07-10 RX ORDER — DIGOXIN 125 MCG
125 TABLET ORAL DAILY
Qty: 90 TABLET | Refills: 3 | Status: SHIPPED | OUTPATIENT
Start: 2019-07-10 | End: 2019-07-16

## 2019-07-10 NOTE — TELEPHONE ENCOUNTER
07/10/19 Spoke with daughter Yamilka and discussed recommendations per Dr Lindsey. Rx for Digoxin 0.125 mg every day sent to pts preferred pharmacy. 1 month f/u appt set up with Barbara RUIZ for 8/8 at 1:50 pm . Daughter voiced understanding. Genia 10:05 am

## 2019-07-16 ENCOUNTER — ALLIED HEALTH/NURSE VISIT (OUTPATIENT)
Dept: NURSING | Facility: CLINIC | Age: 84
End: 2019-07-16
Payer: MEDICARE

## 2019-07-16 ENCOUNTER — ANCILLARY PROCEDURE (OUTPATIENT)
Dept: BONE DENSITY | Facility: CLINIC | Age: 84
End: 2019-07-16
Payer: MEDICARE

## 2019-07-16 ENCOUNTER — OFFICE VISIT (OUTPATIENT)
Dept: OBGYN | Facility: CLINIC | Age: 84
End: 2019-07-16
Payer: MEDICARE

## 2019-07-16 VITALS
DIASTOLIC BLOOD PRESSURE: 68 MMHG | SYSTOLIC BLOOD PRESSURE: 102 MMHG | HEIGHT: 60 IN | BODY MASS INDEX: 17.08 KG/M2 | WEIGHT: 87 LBS

## 2019-07-16 DIAGNOSIS — Z78.0 ASYMPTOMATIC POSTMENOPAUSAL STATE: ICD-10-CM

## 2019-07-16 DIAGNOSIS — M81.0 OSTEOPOROSIS, SENILE: Primary | ICD-10-CM

## 2019-07-16 DIAGNOSIS — Z85.3 PERSONAL HISTORY OF BREAST CANCER: ICD-10-CM

## 2019-07-16 PROCEDURE — 77080 DXA BONE DENSITY AXIAL: CPT | Performed by: OBSTETRICS & GYNECOLOGY

## 2019-07-16 PROCEDURE — 99213 OFFICE O/P EST LOW 20 MIN: CPT | Mod: 25 | Performed by: OBSTETRICS & GYNECOLOGY

## 2019-07-16 PROCEDURE — 96372 THER/PROPH/DIAG INJ SC/IM: CPT

## 2019-07-16 ASSESSMENT — MIFFLIN-ST. JEOR: SCORE: 761.13

## 2019-07-16 NOTE — PROGRESS NOTES
"  Mia is a 85 year old  female who presents for Medicare Limited exam.     Do you have a Health Care Directive?: {HEALTHCARE DIRECTIVE STATUS:975341}    Fall risk:   {Fall Risk for Medicare Annual Wellness Visit:168866::\"Fall Risk Assessment completed per order.\"}    HPI :  ***    GYNECOLOGIC HISTORY:  Patient's last menstrual period was 1981..   reports that she quit smoking about 29 years ago. Her smoking use included cigarettes. She has a 30.00 pack-year smoking history. She has never used smokeless tobacco.  {Tobacco Cessation -- Delete if patient is a non-smoker:416703}  STD testing offered?  {GC/CHLAMYDIA:080177}  Last PHQ-9 score on record= No flowsheet data found.  Last GAD7 score on record= No flowsheet data found.    HEALTH MAINTENANCE:  Cholesterol: (  Cholesterol   Date Value Ref Range Status   01/15/2019 158 <200 mg/dL Final   2018 188 <200 mg/dL Final      Last Mammo: {plc mammo:952185::\"one year ago\"}, Result: {plc normal:497003::\"normal\"}, Next Mammo: {plc next mammo:639906::\"today\"}   Pap: (No results found for: PAP )  DEXA:  ***  Colonoscopy:  ***, Result:  {plc normal:226754::\"normal\"}, Next Colonoscopy: *** years.    HISTORY:  OB History    Para Term  AB Living   4 4 0 0 0 4   SAB TAB Ectopic Multiple Live Births   0 0 0 0 0      # Outcome Date GA Lbr Edwin/2nd Weight Sex Delivery Anes PTL Lv   4 Para            3 Para            2 Para            1 Para              Past Medical History:   Diagnosis Date     Arrhythmia     atrial fib.-on coumadin     Atrial fibrillation (H)      Breast cancer (H)      COPD (chronic obstructive pulmonary disease) (H)      COPD exacerbation (H) 2015     Coronary artery disease      Hypertension      Malignant neoplasm (H) 2008    Right Breast Cancer     Osteoporosis      Renal disease     kidney stones     Thyroid disease     Hypothyroid     Unspecified cerebral artery occlusion with cerebral infarction     TIA     Uterine " fibroid      Past Surgical History:   Procedure Laterality Date     BIOPSY  6/2/2008    Left /RightBreast Biopsy     BIOPSY  6/1/2010    Right Breast Biopsy     BREAST SURGERY  6/12/2008    Right Breast Lumpectomy     CYSTOSCOPY, DILATE URETHRA, COMBINED  10/5/2012    Procedure: COMBINED CYSTOSCOPY, DILATE URETHRA;  urethral dilation;  Surgeon: Kaushal Harris MD;  Location:  OR     CYSTOSCOPY, RETROGRADES, EXTRACT STONE, COMBINED Right 4/16/2015    Procedure: COMBINED CYSTOSCOPY, RETROGRADES, EXTRACT STONE;  Surgeon: Kaushal Harris MD;  Location:  OR     EXCISE LESION LIP N/A 10/22/2014    Procedure: EXCISE LESION LIP;  Surgeon: Brent Jolley MD;  Location:  [facility]     GYN SURGERY  1981    LISA with ovaries intact-fibroid     LASER HOLMIUM LITHOTRIPSY URETER(S), INSERT STENT, COMBINED  10/5/2012    Procedure: COMBINED CYSTOSCOPY, URETEROSCOPY, LASER HOLMIUM LITHOTRIPSY URETER(S), INSERT STENT;  RIGHT URETEROSCOPY ,right ureteral biopsy,WITH LASER HOLMIUM LITHOTRIPSY, INSERT STENT RIGHT URETER;  Surgeon: Kaushal Harris MD;  Location:  OR     LASER HOLMIUM LITHOTRIPSY URETER(S), INSERT STENT, COMBINED Right 4/16/2015    Procedure: COMBINED CYSTOSCOPY, URETEROSCOPY, LASER HOLMIUM LITHOTRIPSY URETER(S), INSERT STENT;  Surgeon: Kaushal Harris MD;  Location:  OR     Family History   Problem Relation Age of Onset     Alzheimer Disease Mother      Osteoporosis Mother      C.A.D. Father      Cardiovascular Father      Cerebrovascular Disease Father      Breast Cancer Maternal Grandmother      Unknown/Adopted Maternal Grandfather      Unknown/Adopted Paternal Grandmother         old age     Cancer Paternal Grandfather         stomach     Social History     Socioeconomic History     Marital status: Single     Spouse name: Not on file     Number of children: 4     Years of education: Not on file     Highest education level: Not on file   Occupational History     Not on file   Social  Needs     Financial resource strain: Not on file     Food insecurity:     Worry: Not on file     Inability: Not on file     Transportation needs:     Medical: Not on file     Non-medical: Not on file   Tobacco Use     Smoking status: Former Smoker     Packs/day: 1.00     Years: 30.00     Pack years: 30.00     Types: Cigarettes     Last attempt to quit: 1989     Years since quittin.6     Smokeless tobacco: Never Used     Tobacco comment: Started:  Stopped:   Substance and Sexual Activity     Alcohol use: No     Alcohol/week: 0.0 oz     Drug use: No     Sexual activity: Not Currently     Birth control/protection: Female Surgical     Comment: [facility]   Lifestyle     Physical activity:     Days per week: Not on file     Minutes per session: Not on file     Stress: Not on file   Relationships     Social connections:     Talks on phone: Not on file     Gets together: Not on file     Attends [employer] service: Not on file     Active member of club or organization: Not on file     Attends meetings of clubs or organizations: Not on file     Relationship status: Not on file     Intimate partner violence:     Fear of current or ex partner: Not on file     Emotionally abused: Not on file     Physically abused: Not on file     Forced sexual activity: Not on file   Other Topics Concern     Parent/sibling w/ CABG, MI or angioplasty before 65F 55M? No      Service Not Asked     Blood Transfusions Not Asked     Caffeine Concern Yes     Comment: 2 cups caffeine per day     Occupational Exposure Not Asked     Hobby Hazards Not Asked     Sleep Concern Yes     Stress Concern No     Weight Concern No     Special Diet No     Back Care No     Exercise No     Bike Helmet Not Asked     Seat Belt Yes     Self-Exams Not Asked   Social History Narrative     Not on file     Current Outpatient Medications   Medication Sig     albuterol (PROAIR HFA, PROVENTIL HFA, VENTOLIN HFA) 108 (90 BASE) MCG/ACT inhaler Inhale 2 puffs into the  "lungs every 6 hours     apixaban ANTICOAGULANT (ELIQUIS) 2.5 MG tablet Take 1 tablet (2.5 mg) by mouth 2 times daily     atorvastatin (LIPITOR) 40 MG tablet Take 1 tablet (40 mg) by mouth daily     denosumab (PROLIA) 60 MG/ML SOLN Inject 60 mg Subcutaneous. q 6 months     digoxin (LANOXIN) 125 MCG tablet Take 1 tablet (125 mcg) by mouth daily     diltiazem ER (DILT-XR) 180 MG 24 hr capsule Take 1 capsule (180 mg) by mouth daily     levothyroxine (SYNTHROID/LEVOTHROID) 25 MCG tablet Take 25 mcg by mouth Pt takes 1 1/2 tabs daily     MELATONIN PO Take 2.5 mg by mouth nightly as needed     Nutritional Supplements (BOOST PO) Take 1 Bottle by mouth daily     No current facility-administered medications for this visit.      Allergies   Allergen Reactions     Megace [Megestrol Acetate]      Increased LFTs         Past medical, surgical, social and family history were reviewed and updated in [facility].    EXAM:  [facility] 12/07/1981    BMI: There is no height or weight on file to calculate BMI.    Constitutional: Appearance: Well nourished, well developed alert, in no acute distress  Breasts: Inspection of Breasts:  No lymphadenopathy present    Palpation of Breasts and Axillae:  No masses present on palpation, no  breast tenderness    Axillary Lymph Nodes:  No lymphadenopathy present  Neurologic/Psychiatric:    Mental Status:  Oriented X3     {Pelvic Exam:902774}    There is no height or weight on file to calculate BMI.  {Weight Management Plan -- Delete if patient has a normal BMI:807453}   reports that she quit smoking about 29 years ago. Her smoking use included cigarettes. She has a 30.00 pack-year smoking history. She has never used smokeless tobacco.  {Tobacco Cessation -- Delete if patient is a non-smoker:936708}    ASSESSMENT:  85 year old female with satisfactory annual exam.  No diagnosis found.    COUNSELING:   {Female:913679::\"Reviewed preventive health counseling, as reflected in patient instructions\"}    PLAN/PATIENT " INSTRUCTIONS:    There are no Patient Instructions on file for this visit.    Jhon Randle MD

## 2019-07-16 NOTE — NURSING NOTE
Clinic Administered Medication Documentation      Prolia Documentation    Prior to injection, verified patient identity using patient's name and date of birth. Medication was administered. Please see MAR and medication order for additional information. Patient instructed to report any adverse reaction to staff immediately .    Indication: Prolia  (denosumab) is a prescription medicine used to treat osteoporosis in patients who:     Are at high risk for fracture, meaning patients who have had a fracture related to osteoporosis, or who have multiple risk factors for fracture.    Cannot use another osteoporosis medicine or other osteoporosis medicines did not work well.    The timeline for early/late injections would be 4 weeks early and any time after the 6 month yuniel. If a patient receives their injection late, then the subsequent injection would be 6 months from the date that they actually received the injection.    1.  When was the last injection?  1/10/2019  2.  Did they check with their insurance for this calendar year?  Yes  3.  Is there an order in the chart?  yes  4.  Has the patient had dental work involving the bone in the past month or will have work in the next 6 months?  no    The following steps were completed to comply with the REMS program for Prolia:    Reviewed information in the Medication Guide and Patient Counseling Chart, including the serious risks of Prolia  and the symptoms of each risk.    Advised patient to seek prompt medical attention if they have signs or symptoms of any of the serious risks.    Provided each patient a copy of the Medication Guide and Patient Brochure.    Was entire vial of medication used? Yes  Vial/Syringe: Syringe  Expiration Date:  07/2021    Pt tolerated injection and discharged home without incident.

## 2019-07-16 NOTE — PROGRESS NOTES
SUBJECTIVE:                                                   Mia Rodriguez is a 85 year old female who presents to clinic today for the following health issue(s):  Patient presents with:  Osteoporosis: Dexa and Prolia injection due        HPI:  DEXA today. Shows slight decrease in spine and hip. Pt is losing weight. Dr Cordero is managing rest of her health. Not due for annual labs until NOV for Prolia.   Pt has not side effects after injection.       Patient's last menstrual period was 1981..   Patient is not sexually active, .   reports that she quit smoking about 29 years ago. Her smoking use included cigarettes. She has a 30.00 pack-year smoking history. She has never used smokeless tobacco.  STD testing offered?  not sexually active  Health maintenance updated:  yes    Today's PHQ-2 Score:   PHQ-2 (  [employer]) 2011   Q1: Little interest or pleasure in doing things 0   Q2: Feeling down, depressed or hopeless 0   PHQ-2 Score 0     Today's PHQ-9 Score: No flowsheet data found.  Today's TATE-7 Score: No flowsheet data found.    Problem list and histories reviewed & adjusted, as indicated.  Additional history: as documented.    Patient Active Problem List   Diagnosis     Cancer of breast (H)     Transient cerebral ischemia     Benign essential hypertension     Kidney stones     H/O: hysterectomy     Hyperlipidemia with target LDL less than 100     Shortness of breath     Atrial fibrillation (H)     Pulmonary hypertension (H)     COPD exacerbation (H)     Osteoporosis, senile     Nephropathy, obstructive     Past Surgical History:   Procedure Laterality Date     BIOPSY  2008    Left /RightBreast Biopsy     BIOPSY  2010    Right Breast Biopsy     BREAST SURGERY  2008    Right Breast Lumpectomy     CYSTOSCOPY, DILATE URETHRA, COMBINED  10/5/2012    Procedure: COMBINED CYSTOSCOPY, DILATE URETHRA;  urethral dilation;  Surgeon: Kaushal Harris MD;  Location:  OR      CYSTOSCOPY, RETROGRADES, EXTRACT STONE, COMBINED Right 2015    Procedure: COMBINED CYSTOSCOPY, RETROGRADES, EXTRACT STONE;  Surgeon: Kaushal Harris MD;  Location:  OR     EXCISE LESION LIP N/A 10/22/2014    Procedure: EXCISE LESION LIP;  Surgeon: Brent Jolley MD;  Location:  [facility]     GYN SURGERY  1981    LISA with ovaries intact-fibroid     LASER HOLMIUM LITHOTRIPSY URETER(S), INSERT STENT, COMBINED  10/5/2012    Procedure: COMBINED CYSTOSCOPY, URETEROSCOPY, LASER HOLMIUM LITHOTRIPSY URETER(S), INSERT STENT;  RIGHT URETEROSCOPY ,right ureteral biopsy,WITH LASER HOLMIUM LITHOTRIPSY, INSERT STENT RIGHT URETER;  Surgeon: Kaushal Harris MD;  Location:  OR     LASER HOLMIUM LITHOTRIPSY URETER(S), INSERT STENT, COMBINED Right 2015    Procedure: COMBINED CYSTOSCOPY, URETEROSCOPY, LASER HOLMIUM LITHOTRIPSY URETER(S), INSERT STENT;  Surgeon: Kaushal Harris MD;  Location:  OR      Social History     Tobacco Use     Smoking status: Former Smoker     Packs/day: 1.00     Years: 30.00     Pack years: 30.00     Types: Cigarettes     Last attempt to quit: 1989     Years since quittin.6     Smokeless tobacco: Never Used     Tobacco comment: Started:  Stopped:   Substance Use Topics     Alcohol use: No     Alcohol/week: 0.0 oz      Problem (# of Occurrences) Relation (Name,Age of Onset)    Alzheimer Disease (1) Mother    Breast Cancer (1) Maternal Grandmother    C.A.D. (1) Father    Cancer (1) Paternal Grandfather: stomach    Cardiovascular (1) Father    Cerebrovascular Disease (1) Father    Osteoporosis (1) Mother    Unknown/Adopted (2) Maternal Grandfather, Paternal Grandmother: old age            Current Outpatient Medications   Medication Sig     apixaban ANTICOAGULANT (ELIQUIS) 2.5 MG tablet Take 1 tablet (2.5 mg) by mouth 2 times daily     atorvastatin (LIPITOR) 40 MG tablet Take 1 tablet (40 mg) by mouth daily     denosumab (PROLIA) 60 MG/ML SOLN Inject 60 mg  Subcutaneous. q 6 months     diltiazem ER (DILT-XR) 180 MG 24 hr capsule Take 1 capsule (180 mg) by mouth daily     levothyroxine (SYNTHROID/LEVOTHROID) 25 MCG tablet Take 25 mcg by mouth Pt takes 1 1/2 tabs daily     Nutritional Supplements (BOOST PO) Take 1 Bottle by mouth daily     No current facility-administered medications for this visit.      Allergies   Allergen Reactions     Digoxin Other (See Comments)     Weakness, SOB     Megace [Megestrol Acetate]      Increased LFTs         ROS:  12 point review of systems negative other than symptoms noted below.    OBJECTIVE:     /68   Ht 1.524 m (5')   Wt 39.5 kg (87 lb)   LMP 12/07/1981   BMI 16.99 kg/m    Body mass index is 16.99 kg/m .    Exam:  Constitutional:  Appearance: Well nourished, well developed alert, in no acute distress  Neurologic/Psychiatric:  Mental Status:  Oriented X3      In-Clinic Test Results:  No results found for this or any previous visit (from the past 24 hour(s)).    ASSESSMENT/PLAN:                                                        ICD-10-CM    1. Osteoporosis, senile M81.0    2. Personal history of breast cancer Z85.3          OK for Prolia today.   Labs when due for next injection. Will need Magnesium, phosphorus and Cr.    Jhon Randle MD  [facility] [place]

## 2019-07-17 NOTE — TELEPHONE ENCOUNTER
"07/17/19 Daughter Yamilka DEVON regarding her mom. Stated her mom had an \"adverse reaction\" over the weekend to her Digoxin and so she has stopped taking it. LM for Yamilka to call back. Genia 9:50 am  "

## 2019-07-17 NOTE — TELEPHONE ENCOUNTER
"07/17/19 Spoke with daughter who reported her mom has not \"felt well\" since starting on th Digoxin on 7/10 and so she stopped taking it on 7/12. Reports stomach upset, poor appetite and weakness and dizziness.  Daughter reported she has lost about 3 pounds. Daughter would like to move up appt to discuss poss PPM . Appt made w Madelaine Edouard Taylor for 7/24. Enc daughter to f/u with moms PCP if GI symptoms do not improve. Daughter voiced understanding. Genia 2:08 pm  "

## 2019-07-23 NOTE — PROGRESS NOTES
"HPI:  Mia Rodriguez is a 85 year old female who presents for H&P for AV node ablation and permanent pacemaker placement.   She is a patient of Dr. Lindsey and Dr. Cantrell. Her medical history includes     1. Permanent AFib, with rate control/AC strategy with Eliquis for CHADSVASc 6 (HTN, CVA, age, sex). She has not had a TIA since switching warfarin to Eliquis  2. Benign Essential HTN  3. CAD noted by calcification seen on Chest CT 3/2017. Follow up stress test 3/2017 was normal.    Today he presents unsure if she would like to move forward with AV node ablation and pacemaker.  She would like to discuss her shortness of breath with her PCP and pulmonary physician prior to this procedure.  She has chronic LUNA with walking quickly and exertion and does not use her inhalers as \"they don't work anymore\".  Her daughter also states she has less stamina than in the past.  AT this time she denies chest pain or pressure, headaches,  syncope, angina, dyspnea at rest, palpitations, orthopnea, PND, edema.  She is tolerating her Eliquis and denies bleeding, hematuria, hematochezia, epistaxis and signs/symptoms of stroke.    ASSESSMENT AND PLAN    Permanent atrial fibrillation    Rate control method.  Currently taking diltiazem 180 mg daily.      Saw Dr. Cantrell in 1/15/2019 in afib with difficult to control rates.     Saw Dr. Lindsey in 6/7/2019 and was started on digoxin but stopped due to intolerance    Presents today unsure if she wants to proceed with AV node ablation and pacemaker.  She wants to know if it will help her chronic shortness of breath.  I told her I was not sure as her LUNA has been present for some time.  We discussed that if her HRs associated with afib are fast this can contribute to her chronic LUNA.   We discussed she would likely need to remain on diltiazem or amlodipine for BP control.        We discussed the procedural risks of pacemaker implantation in detail and include such concerns as:  Internal " bleeding, collapsed lung, localized bleeding and bruising, infection (immediate and long-term) and need to re-position pacemaker lead(s). We also reviewed use of moderate sedation and local anesthetic. Patient voices understanding of the reasons for recommending pacemaker placement and denies any questions at this time.     She is going to think about proceeding with AV node ablation and ppm    In the mean time she is going to see her pulmonologist and PCP to talk about her chronic LUNA.     She will follow up with Dr. Cantrell in 10/2019.    We discussed HF symptoms and to call if she has any and we will proceed with AV node ablation and ppm      For anticoagulation for CHADS VASC 6 (age++, TIA++, female, HTN) she is taking Eliquis 2.5 mg twice daily (due to age >80 and < 60kg)    Hypertension  controlled    Patient expresses understanding and agreement with the plan.     I appreciate the chance to help with Mia Rodriguez Please let me know if you have any questions or concerns.    Madelaine Edouard, APRN, CNP    This note was completed in part using Dragon voice recognition software. Although reviewed after completion, some word and grammatical errors may occur.    No orders of the defined types were placed in this encounter.    Orders Placed This Encounter   Medications     atorvastatin (LIPITOR) 40 MG tablet     Sig: Take 0.5 tablets (20 mg) by mouth daily     Dispense:  90 tablet     Refill:  1     Pt will call for refills     Medications Discontinued During This Encounter   Medication Reason     atorvastatin (LIPITOR) 40 MG tablet          Encounter Diagnosis   Name Primary?     Hyperlipidemia LDL goal <100        CURRENT MEDICATIONS:  Current Outpatient Medications   Medication Sig Dispense Refill     apixaban ANTICOAGULANT (ELIQUIS) 2.5 MG tablet Take 1 tablet (2.5 mg) by mouth 2 times daily 180 tablet 3     atorvastatin (LIPITOR) 40 MG tablet Take 0.5 tablets (20 mg) by mouth daily 90 tablet 1     denosumab  (PROLIA) 60 MG/ML SOLN Inject 60 mg Subcutaneous. q 6 months       diltiazem ER (DILT-XR) 180 MG 24 hr capsule Take 1 capsule (180 mg) by mouth daily 90 capsule 0     levothyroxine (SYNTHROID/LEVOTHROID) 25 MCG tablet Take 25 mcg by mouth Pt takes 1 1/2 tabs daily       Nutritional Supplements (BOOST PO) Take 1 Bottle by mouth daily         ALLERGIES     Allergies   Allergen Reactions     Digoxin Other (See Comments)     Weakness, SOB     Megace [Megestrol Acetate]      Increased LFTs         PAST MEDICAL HISTORY:  Past Medical History:   Diagnosis Date     Arrhythmia     atrial fib.-on coumadin     Atrial fibrillation (H)      Breast cancer (H)      COPD (chronic obstructive pulmonary disease) (H)      COPD exacerbation (H) 9/2/2015     Coronary artery disease      Hypertension      Malignant neoplasm (H) 6/2/2008    Right Breast Cancer     Osteoporosis      Renal disease     kidney stones     Thyroid disease     Hypothyroid     Unspecified cerebral artery occlusion with cerebral infarction     TIA     Uterine fibroid        PAST SURGICAL HISTORY:  Past Surgical History:   Procedure Laterality Date     BIOPSY  6/2/2008    Left /RightBreast Biopsy     BIOPSY  6/1/2010    Right Breast Biopsy     BREAST SURGERY  6/12/2008    Right Breast Lumpectomy     CYSTOSCOPY, DILATE URETHRA, COMBINED  10/5/2012    Procedure: COMBINED CYSTOSCOPY, DILATE URETHRA;  urethral dilation;  Surgeon: Kaushal Harris MD;  Location:  OR     CYSTOSCOPY, RETROGRADES, EXTRACT STONE, COMBINED Right 4/16/2015    Procedure: COMBINED CYSTOSCOPY, RETROGRADES, EXTRACT STONE;  Surgeon: Kaushal Harris MD;  Location:  OR     EXCISE LESION LIP N/A 10/22/2014    Procedure: EXCISE LESION LIP;  Surgeon: Brent Jolley MD;  Location:  [facility]     GYN SURGERY  1981    LISA with ovaries intact-fibroid     LASER HOLMIUM LITHOTRIPSY URETER(S), INSERT STENT, COMBINED  10/5/2012    Procedure: COMBINED CYSTOSCOPY, URETEROSCOPY, LASER HOLMIUM  LITHOTRIPSY URETER(S), INSERT STENT;  RIGHT URETEROSCOPY ,right ureteral biopsy,WITH LASER HOLMIUM LITHOTRIPSY, INSERT STENT RIGHT URETER;  Surgeon: Kaushal Harris MD;  Location:  OR     LASER HOLMIUM LITHOTRIPSY URETER(S), INSERT STENT, COMBINED Right 2015    Procedure: COMBINED CYSTOSCOPY, URETEROSCOPY, LASER HOLMIUM LITHOTRIPSY URETER(S), INSERT STENT;  Surgeon: Kaushal Harris MD;  Location:  OR       FAMILY HISTORY:  Family History   Problem Relation Age of Onset     Alzheimer Disease Mother      Osteoporosis Mother      C.A.D. Father      Cardiovascular Father      Cerebrovascular Disease Father      Breast Cancer Maternal Grandmother      Unknown/Adopted Maternal Grandfather      Unknown/Adopted Paternal Grandmother         old age     Cancer Paternal Grandfather         stomach       SOCIAL HISTORY:  Social History     Socioeconomic History     Marital status: Single     Spouse name: None     Number of children: 4     Years of education: None     Highest education level: None   Occupational History     None   Social Needs     Financial resource strain: None     Food insecurity:     Worry: None     Inability: None     Transportation needs:     Medical: None     Non-medical: None   Tobacco Use     Smoking status: Former Smoker     Packs/day: 1.00     Years: 30.00     Pack years: 30.00     Types: Cigarettes     Last attempt to quit: 1989     Years since quittin.6     Smokeless tobacco: Never Used     Tobacco comment: Started:  Stopped:   Substance and Sexual Activity     Alcohol use: No     Alcohol/week: 0.0 oz     Drug use: No     Sexual activity: Not Currently     Birth control/protection: Female Surgical     Comment: LISA   Lifestyle     Physical activity:     Days per week: None     Minutes per session: None     Stress: None   Relationships     Social connections:     Talks on phone: None     Gets together: None     Attends Confucianist service: None     Active member of club  "or organization: None     Attends meetings of clubs or organizations: None     Relationship status: None     Intimate partner violence:     Fear of current or ex partner: None     Emotionally abused: None     Physically abused: None     Forced sexual activity: None   Other Topics Concern     Parent/sibling w/ CABG, MI or angioplasty before 65F 55M? No      Service Not Asked     Blood Transfusions Not Asked     Caffeine Concern Yes     Comment: 2 cups caffeine per day     Occupational Exposure Not Asked     Hobby Hazards Not Asked     Sleep Concern Yes     Stress Concern No     Weight Concern No     Special Diet No     Back Care No     Exercise No     Bike Helmet Not Asked     Seat Belt Yes     Self-Exams Not Asked   Social History Narrative     None       Review of Systems:  Skin:  Negative rash   Eyes:  Positive for glasses  ENT:  Negative hearing loss  Respiratory:  Positive for dyspnea on exertion;cough  Cardiovascular:  Negative lightheadedness;Positive for;fatigue;palpitations  Gastroenterology: Positive for poor appetite  Genitourinary:  Negative nocturia  Musculoskeletal:  Negative    Neurologic:  Negative    Psychiatric:  Positive for sleep disturbances  Heme/Lymph/Imm:  Positive for weight loss;easy bruising  Endocrine:  Positive for thyroid disorder    Physical Exam:  Vitals: /88   Pulse 88   Ht 1.499 m (4' 11\")   Wt 40.8 kg (90 lb)   LMP 12/07/1981   BMI 18.18 kg/m      Constitutional:  cooperative, alert and oriented, well developed, well nourished, in no acute distress        Skin:  warm and dry to the touch        Head:  normocephalic        Eyes:  pupils equal and round, conjunctivae and lids unremarkable, sclera white, no xanthalasma, EOMS intact, no nystagmus        ENT:  no pallor or cyanosis        Neck:  carotid pulses are full and equal bilaterally, JVP normal, no carotid bruit        Chest:  clear to auscultation   Diminished breath sounds    Cardiac:   irregularly irregular " rhythm   no presence of murmur            Abdomen:  abdomen soft        Vascular:       right radial artery;1+             left radial artery;1+                  Extremities and Back:  no deformities, clubbing, cyanosis, erythema observed;no edema        Neurological:  no gross motor deficits;affect appropriate          Recent Lab Results:  LIPID RESULTS:  Lab Results   Component Value Date    CHOL 158 01/15/2019    HDL 51 01/15/2019    LDL 88 01/15/2019    TRIG 93 01/15/2019    CHOLHDLRATIO 3.5 02/09/2014       LIVER ENZYME RESULTS:  Lab Results   Component Value Date    AST 13 10/02/2014    ALT 10 01/12/2018       CBC RESULTS:  Lab Results   Component Value Date    WBC 10.5 09/03/2017    RBC 4.72 09/03/2017    HGB 15.3 09/03/2017    HCT 44.7 09/03/2017    MCV 95 09/03/2017    MCH 32.4 09/03/2017    MCHC 34.2 09/03/2017    RDW 13.8 09/03/2017     09/03/2017       BMP RESULTS:  Lab Results   Component Value Date     01/15/2019    POTASSIUM 3.9 01/15/2019    CHLORIDE 108 (H) 01/15/2019    CO2 25 01/15/2019    ANIONGAP 10.9 01/15/2019     (H) 01/15/2019    BUN 15 01/15/2019    CR 0.92 01/15/2019    GFRESTIMATED 58 (L) 01/15/2019    GFRESTBLACK 70 01/15/2019    CYNDEE 9.7 01/15/2019        A1C RESULTS:  No results found for: A1C    INR RESULTS:  Lab Results   Component Value Date    INR 1.03 10/22/2014    INR 1.78 (H) 10/02/2014           CC  No referring provider defined for this encounter.

## 2019-07-24 ENCOUNTER — OFFICE VISIT (OUTPATIENT)
Dept: CARDIOLOGY | Facility: CLINIC | Age: 84
End: 2019-07-24
Payer: MEDICARE

## 2019-07-24 VITALS
HEIGHT: 59 IN | SYSTOLIC BLOOD PRESSURE: 137 MMHG | HEART RATE: 88 BPM | WEIGHT: 90 LBS | DIASTOLIC BLOOD PRESSURE: 88 MMHG | BODY MASS INDEX: 18.14 KG/M2

## 2019-07-24 DIAGNOSIS — E78.5 HYPERLIPIDEMIA LDL GOAL <100: ICD-10-CM

## 2019-07-24 PROCEDURE — 99214 OFFICE O/P EST MOD 30 MIN: CPT | Performed by: NURSE PRACTITIONER

## 2019-07-24 RX ORDER — ATORVASTATIN CALCIUM 40 MG/1
20 TABLET, FILM COATED ORAL DAILY
Qty: 90 TABLET | Refills: 1 | Status: SHIPPED | OUTPATIENT
Start: 2019-07-24 | End: 2020-03-12

## 2019-07-24 ASSESSMENT — MIFFLIN-ST. JEOR: SCORE: 758.87

## 2019-07-24 NOTE — LETTER
"7/24/2019    Birgit Cordero  [facility] [address]  [facility] [address]    RE: Mia Rodriguez       Dear Colleague,    I had the pleasure of seeing Mia Rodriguez in the [facility].    HPI:  Mia Rodriguez is a 85 year old female who presents for H&P for AV node ablation and permanent pacemaker placement.   She is a patient of Dr. Lindsey and Dr. Cantrell. Her medical history includes     1. Permanent AFib, with rate control/AC strategy with Eliquis for CHADSVASc 6 (HTN, CVA, age, sex). She has not had a TIA since switching warfarin to Eliquis  2. Benign Essential HTN  3. CAD noted by calcification seen on Chest CT 3/2017. Follow up stress test 3/2017 was normal.    Today he presents unsure if she would like to move forward with AV node ablation and pacemaker.  She would like to discuss her shortness of breath with her PCP and pulmonary physician prior to this procedure.  She has chronic LUNA with walking quickly and exertion and does not use her inhalers as \"they don't work anymore\".  Her daughter also states she has less stamina than in the past.  AT this time she denies chest pain or pressure, headaches,  syncope, angina, dyspnea at rest, palpitations, orthopnea, PND, edema.  She is tolerating her Eliquis and denies bleeding, hematuria, hematochezia, epistaxis and signs/symptoms of stroke.    ASSESSMENT AND PLAN    Permanent atrial fibrillation    Rate control method.  Currently taking diltiazem 180 mg daily.      Saw Dr. Cantrell in 1/15/2019 in afib with difficult to control rates.     Saw Dr. Lindsey in 6/7/2019 and was started on digoxin but stopped due to intolerance    Presents today unsure if she wants to proceed with AV node ablation and pacemaker.  She wants to know if it will help her chronic shortness of breath.  I told her I was not sure as her LUNA has been present for some time.  We discussed that if her HRs associated with afib are fast this can " contribute to her chronic LUNA.   We discussed she would likely need to remain on diltiazem or amlodipine for BP control.        We discussed the procedural risks of pacemaker implantation in detail and include such concerns as:  Internal bleeding, collapsed lung, localized bleeding and bruising, infection (immediate and long-term) and need to re-position pacemaker lead(s). We also reviewed use of moderate sedation and local anesthetic. Patient voices understanding of the reasons for recommending pacemaker placement and denies any questions at this time.     She is going to think about proceeding with AV node ablation and ppm    In the mean time she is going to see her pulmonologist and PCP to talk about her chronic LUNA.     She will follow up with Dr. Cantrell in 10/2019.    We discussed HF symptoms and to call if she has any and we will proceed with AV node ablation and ppm      For anticoagulation for CHADS VASC 6 (age++, TIA++, female, HTN) she is taking Eliquis 2.5 mg twice daily (due to age >80 and < 60kg)    Hypertension  controlled    Patient expresses understanding and agreement with the plan.     I appreciate the chance to help with Mia Rodriguez Please let me know if you have any questions or concerns.    Madelaine Edouard APRN, CNP    This note was completed in part using Dragon voice recognition software. Although reviewed after completion, some word and grammatical errors may occur.    No orders of the defined types were placed in this encounter.    Orders Placed This Encounter   Medications     atorvastatin (LIPITOR) 40 MG tablet     Sig: Take 0.5 tablets (20 mg) by mouth daily     Dispense:  90 tablet     Refill:  1     Pt will call for refills     Medications Discontinued During This Encounter   Medication Reason     atorvastatin (LIPITOR) 40 MG tablet          Encounter Diagnosis   Name Primary?     Hyperlipidemia LDL goal <100        CURRENT MEDICATIONS:  Current Outpatient Medications    Medication Sig Dispense Refill     apixaban ANTICOAGULANT (ELIQUIS) 2.5 MG tablet Take 1 tablet (2.5 mg) by mouth 2 times daily 180 tablet 3     atorvastatin (LIPITOR) 40 MG tablet Take 0.5 tablets (20 mg) by mouth daily 90 tablet 1     denosumab (PROLIA) 60 MG/ML SOLN Inject 60 mg Subcutaneous. q 6 months       diltiazem ER (DILT-XR) 180 MG 24 hr capsule Take 1 capsule (180 mg) by mouth daily 90 capsule 0     levothyroxine (SYNTHROID/LEVOTHROID) 25 MCG tablet Take 25 mcg by mouth Pt takes 1 1/2 tabs daily       Nutritional Supplements (BOOST PO) Take 1 Bottle by mouth daily         ALLERGIES     Allergies   Allergen Reactions     Digoxin Other (See Comments)     Weakness, SOB     Megace [Megestrol Acetate]      Increased LFTs         PAST MEDICAL HISTORY:  Past Medical History:   Diagnosis Date     Arrhythmia     atrial fib.-on coumadin     Atrial fibrillation (H)      Breast cancer (H)      COPD (chronic obstructive pulmonary disease) (H)      COPD exacerbation (H) 9/2/2015     Coronary artery disease      Hypertension      Malignant neoplasm (H) 6/2/2008    Right Breast Cancer     Osteoporosis      Renal disease     kidney stones     Thyroid disease     Hypothyroid     Unspecified cerebral artery occlusion with cerebral infarction     TIA     Uterine fibroid        PAST SURGICAL HISTORY:  Past Surgical History:   Procedure Laterality Date     BIOPSY  6/2/2008    Left /RightBreast Biopsy     BIOPSY  6/1/2010    Right Breast Biopsy     BREAST SURGERY  6/12/2008    Right Breast Lumpectomy     CYSTOSCOPY, DILATE URETHRA, COMBINED  10/5/2012    Procedure: COMBINED CYSTOSCOPY, DILATE URETHRA;  urethral dilation;  Surgeon: Kaushal Harris MD;  Location:  OR     CYSTOSCOPY, RETROGRADES, EXTRACT STONE, COMBINED Right 4/16/2015    Procedure: COMBINED CYSTOSCOPY, RETROGRADES, EXTRACT STONE;  Surgeon: Kaushal Harris MD;  Location: [facility]     EXCISE LESION LIP N/A 10/22/2014    Procedure: EXCISE LESION LIP;   Surgeon: Brent Jolley MD;  Location: [facility]     GYN SURGERY      LISA with ovaries intact-fibroid     LASER HOLMIUM LITHOTRIPSY URETER(S), INSERT STENT, COMBINED  10/5/2012    Procedure: COMBINED CYSTOSCOPY, URETEROSCOPY, LASER HOLMIUM LITHOTRIPSY URETER(S), INSERT STENT;  RIGHT URETEROSCOPY ,right ureteral biopsy,WITH LASER HOLMIUM LITHOTRIPSY, INSERT STENT RIGHT URETER;  Surgeon: Kaushal Harris MD;  Location:  OR     LASER HOLMIUM LITHOTRIPSY URETER(S), INSERT STENT, COMBINED Right 2015    Procedure: COMBINED CYSTOSCOPY, URETEROSCOPY, LASER HOLMIUM LITHOTRIPSY URETER(S), INSERT STENT;  Surgeon: Kaushal Harris MD;  Location:  OR       FAMILY HISTORY:  Family History   Problem Relation Age of Onset     Alzheimer Disease Mother      Osteoporosis Mother      C.A.D. Father      Cardiovascular Father      Cerebrovascular Disease Father      Breast Cancer Maternal Grandmother      Unknown/Adopted Maternal Grandfather      Unknown/Adopted Paternal Grandmother         old age     Cancer Paternal Grandfather         stomach       SOCIAL HISTORY:  Social History     Socioeconomic History     Marital status: Single     Spouse name: None     Number of children: 4     Years of education: None     Highest education level: None   Occupational History     None   Social Needs     Financial resource strain: None     Food insecurity:     Worry: None     Inability: None     Transportation needs:     Medical: None     Non-medical: None   Tobacco Use     Smoking status: Former Smoker     Packs/day: 1.00     Years: 30.00     Pack years: 30.00     Types: Cigarettes     Last attempt to quit: 1989     Years since quittin.6     Smokeless tobacco: Never Used     Tobacco comment: Started:  Stopped:   Substance and Sexual Activity     Alcohol use: No     Alcohol/week: 0.0 oz     Drug use: No     Sexual activity: Not Currently     Birth control/protection: Female Surgical     Comment: [facility]   Lifestyle  "    Physical activity:     Days per week: None     Minutes per session: None     Stress: None   Relationships     Social connections:     Talks on phone: None     Gets together: None     Attends Advent service: None     Active member of club or organization: None     Attends meetings of clubs or organizations: None     Relationship status: None     Intimate partner violence:     Fear of current or ex partner: None     Emotionally abused: None     Physically abused: None     Forced sexual activity: None   Other Topics Concern     Parent/sibling w/ CABG, MI or angioplasty before 65F 55M? No      Service Not Asked     Blood Transfusions Not Asked     Caffeine Concern Yes     Comment: 2 cups caffeine per day     Occupational Exposure Not Asked     Hobby Hazards Not Asked     Sleep Concern Yes     Stress Concern No     Weight Concern No     Special Diet No     Back Care No     Exercise No     Bike Helmet Not Asked     Seat Belt Yes     Self-Exams Not Asked   Social History Narrative     None       Review of Systems:  Skin:  Negative rash   Eyes:  Positive for glasses  ENT:  Negative hearing loss  Respiratory:  Positive for dyspnea on exertion;cough  Cardiovascular:  Negative lightheadedness;Positive for;fatigue;palpitations  Gastroenterology: Positive for poor appetite  Genitourinary:  Negative nocturia  Musculoskeletal:  Negative    Neurologic:  Negative    Psychiatric:  Positive for sleep disturbances  Heme/Lymph/Imm:  Positive for weight loss;easy bruising  Endocrine:  Positive for thyroid disorder    Physical Exam:  Vitals: /88   Pulse 88   Ht 1.499 m (4' 11\")   Wt 40.8 kg (90 lb)   LMP 12/07/1981   BMI 18.18 kg/m       Constitutional:  cooperative, alert and oriented, well developed, well nourished, in no acute distress        Skin:  warm and dry to the touch        Head:  normocephalic        Eyes:  pupils equal and round, conjunctivae and lids unremarkable, sclera white, no xanthalasma, EOMS " intact, no nystagmus        ENT:  no pallor or cyanosis        Neck:  carotid pulses are full and equal bilaterally, JVP normal, no carotid bruit        Chest:  clear to auscultation   Diminished breath sounds    Cardiac:   irregularly irregular rhythm   no presence of murmur            Abdomen:  abdomen soft        Vascular:       right radial artery;1+             left radial artery;1+                  Extremities and Back:  no deformities, clubbing, cyanosis, erythema observed;no edema        Neurological:  no gross motor deficits;affect appropriate          Recent Lab Results:  LIPID RESULTS:  Lab Results   Component Value Date    CHOL 158 01/15/2019    HDL 51 01/15/2019    LDL 88 01/15/2019    TRIG 93 01/15/2019    CHOLHDLRATIO 3.5 02/09/2014       LIVER ENZYME RESULTS:  Lab Results   Component Value Date    AST 13 10/02/2014    ALT 10 01/12/2018       CBC RESULTS:  Lab Results   Component Value Date    WBC 10.5 09/03/2017    RBC 4.72 09/03/2017    HGB 15.3 09/03/2017    HCT 44.7 09/03/2017    MCV 95 09/03/2017    MCH 32.4 09/03/2017    MCHC 34.2 09/03/2017    RDW 13.8 09/03/2017     09/03/2017       BMP RESULTS:  Lab Results   Component Value Date     01/15/2019    POTASSIUM 3.9 01/15/2019    CHLORIDE 108 (H) 01/15/2019    CO2 25 01/15/2019    ANIONGAP 10.9 01/15/2019     (H) 01/15/2019    BUN 15 01/15/2019    CR 0.92 01/15/2019    GFRESTIMATED 58 (L) 01/15/2019    GFRESTBLACK 70 01/15/2019    CYNDEE 9.7 01/15/2019        A1C RESULTS:  No results found for: A1C    INR RESULTS:  Lab Results   Component Value Date    INR 1.03 10/22/2014    INR 1.78 (H) 10/02/2014         Thank you for allowing me to participate in the care of your patient.    Sincerely,     SONAM Angeles [facility]

## 2019-07-24 NOTE — PATIENT INSTRUCTIONS
Call us if you decide to proceed with AV node ablation and permanent pacemaker.       Otherwise follow up with Dr. Cantrell in October

## 2019-07-24 NOTE — LETTER
"7/24/2019    Birgit Cordero  [facility] [address]  [facility] [address]    RE: Mia Rodriguez       Dear Colleague,    I had the pleasure of seeing Mia Rodriguez in the [facility].    HPI:  Mia Rodriguez is a 85 year old female who presents for H&P for AV node ablation and permanent pacemaker placement.   She is a patient of Dr. Lindsey and Dr. Cantrell. Her medical history includes     1. Permanent AFib, with rate control/AC strategy with Eliquis for CHADSVASc 6 (HTN, CVA, age, sex). She has not had a TIA since switching warfarin to Eliquis  2. Benign Essential HTN  3. CAD noted by calcification seen on Chest CT 3/2017. Follow up stress test 3/2017 was normal.    Today he presents unsure if she would like to move forward with AV node ablation and pacemaker.  She would like to discuss her shortness of breath with her PCP and pulmonary physician prior to this procedure.  She has chronic LUNA with walking quickly and exertion and does not use her inhalers as \"they don't work anymore\".  Her daughter also states she has less stamina than in the past.  AT this time she denies chest pain or pressure, headaches,  syncope, angina, dyspnea at rest, palpitations, orthopnea, PND, edema.  She is tolerating her Eliquis and denies bleeding, hematuria, hematochezia, epistaxis and signs/symptoms of stroke.    ASSESSMENT AND PLAN    Permanent atrial fibrillation    Rate control method.  Currently taking diltiazem 180 mg daily.      Saw Dr. Cantrell in 1/15/2019 in afib with difficult to control rates.     Saw Dr. Lindsey in 6/7/2019 and was started on digoxin but stopped due to intolerance    Presents today unsure if she wants to proceed with AV node ablation and pacemaker.  She wants to know if it will help her chronic shortness of breath.  I told her I was not sure as her LUNA has been present for some time.  We discussed that if her HRs associated with afib are fast this can " contribute to her chronic LUNA.   We discussed she would likely need to remain on diltiazem or amlodipine for BP control.        We discussed the procedural risks of pacemaker implantation in detail and include such concerns as:  Internal bleeding, collapsed lung, localized bleeding and bruising, infection (immediate and long-term) and need to re-position pacemaker lead(s). We also reviewed use of moderate sedation and local anesthetic. Patient voices understanding of the reasons for recommending pacemaker placement and denies any questions at this time.     She is going to think about proceeding with AV node ablation and ppm    In the mean time she is going to see her pulmonologist and PCP to talk about her chronic LUNA.     She will follow up with Dr. Cantrell in 10/2019.    We discussed HF symptoms and to call if she has any and we will proceed with AV node ablation and ppm      For anticoagulation for CHADS VASC 6 (age++, TIA++, female, HTN) she is taking Eliquis 2.5 mg twice daily (due to age >80 and < 60kg)    Hypertension  controlled    Patient expresses understanding and agreement with the plan.     I appreciate the chance to help with Mia Rodriguez Please let me know if you have any questions or concerns.    Madelaine Edouard APRN, CNP    This note was completed in part using Dragon voice recognition software. Although reviewed after completion, some word and grammatical errors may occur.    No orders of the defined types were placed in this encounter.    Orders Placed This Encounter   Medications     atorvastatin (LIPITOR) 40 MG tablet     Sig: Take 0.5 tablets (20 mg) by mouth daily     Dispense:  90 tablet     Refill:  1     Pt will call for refills     Medications Discontinued During This Encounter   Medication Reason     atorvastatin (LIPITOR) 40 MG tablet          Encounter Diagnosis   Name Primary?     Hyperlipidemia LDL goal <100        CURRENT MEDICATIONS:  Current Outpatient Medications    Medication Sig Dispense Refill     apixaban ANTICOAGULANT (ELIQUIS) 2.5 MG tablet Take 1 tablet (2.5 mg) by mouth 2 times daily 180 tablet 3     atorvastatin (LIPITOR) 40 MG tablet Take 0.5 tablets (20 mg) by mouth daily 90 tablet 1     denosumab (PROLIA) 60 MG/ML SOLN Inject 60 mg Subcutaneous. q 6 months       diltiazem ER (DILT-XR) 180 MG 24 hr capsule Take 1 capsule (180 mg) by mouth daily 90 capsule 0     levothyroxine (SYNTHROID/LEVOTHROID) 25 MCG tablet Take 25 mcg by mouth Pt takes 1 1/2 tabs daily       Nutritional Supplements (BOOST PO) Take 1 Bottle by mouth daily         ALLERGIES     Allergies   Allergen Reactions     Digoxin Other (See Comments)     Weakness, SOB     Megace [Megestrol Acetate]      Increased LFTs         PAST MEDICAL HISTORY:  Past Medical History:   Diagnosis Date     Arrhythmia     atrial fib.-on coumadin     Atrial fibrillation (H)      Breast cancer (H)      COPD (chronic obstructive pulmonary disease) (H)      COPD exacerbation (H) 9/2/2015     Coronary artery disease      Hypertension      Malignant neoplasm (H) 6/2/2008    Right Breast Cancer     Osteoporosis      Renal disease     kidney stones     Thyroid disease     Hypothyroid     Unspecified cerebral artery occlusion with cerebral infarction     TIA     Uterine fibroid        PAST SURGICAL HISTORY:  Past Surgical History:   Procedure Laterality Date     BIOPSY  6/2/2008    Left /RightBreast Biopsy     BIOPSY  6/1/2010    Right Breast Biopsy     BREAST SURGERY  6/12/2008    Right Breast Lumpectomy     CYSTOSCOPY, DILATE URETHRA, COMBINED  10/5/2012    Procedure: COMBINED CYSTOSCOPY, DILATE URETHRA;  urethral dilation;  Surgeon: Kaushal Harris MD;  Location:  OR     CYSTOSCOPY, RETROGRADES, EXTRACT STONE, COMBINED Right 4/16/2015    Procedure: COMBINED CYSTOSCOPY, RETROGRADES, EXTRACT STONE;  Surgeon: Kaushal Harris MD;  Location: [facility] OR     EXCISE LESION LIP N/A 10/22/2014    Procedure: EXCISE LESION LIP;   Surgeon: Brent Jolley MD;  Location: [facility]     GYN SURGERY      LISA with ovaries intact-fibroid     LASER HOLMIUM LITHOTRIPSY URETER(S), INSERT STENT, COMBINED  10/5/2012    Procedure: COMBINED CYSTOSCOPY, URETEROSCOPY, LASER HOLMIUM LITHOTRIPSY URETER(S), INSERT STENT;  RIGHT URETEROSCOPY ,right ureteral biopsy,WITH LASER HOLMIUM LITHOTRIPSY, INSERT STENT RIGHT URETER;  Surgeon: Kaushal Harris MD;  Location:  OR     LASER HOLMIUM LITHOTRIPSY URETER(S), INSERT STENT, COMBINED Right 2015    Procedure: COMBINED CYSTOSCOPY, URETEROSCOPY, LASER HOLMIUM LITHOTRIPSY URETER(S), INSERT STENT;  Surgeon: Kaushal Harris MD;  Location:  OR       FAMILY HISTORY:  Family History   Problem Relation Age of Onset     Alzheimer Disease Mother      Osteoporosis Mother      C.A.D. Father      Cardiovascular Father      Cerebrovascular Disease Father      Breast Cancer Maternal Grandmother      Unknown/Adopted Maternal Grandfather      Unknown/Adopted Paternal Grandmother         old age     Cancer Paternal Grandfather         stomach       SOCIAL HISTORY:  Social History     Socioeconomic History     Marital status: Single     Spouse name: None     Number of children: 4     Years of education: None     Highest education level: None   Occupational History     None   Social Needs     Financial resource strain: None     Food insecurity:     Worry: None     Inability: None     Transportation needs:     Medical: None     Non-medical: None   Tobacco Use     Smoking status: Former Smoker     Packs/day: 1.00     Years: 30.00     Pack years: 30.00     Types: Cigarettes     Last attempt to quit: 1989     Years since quittin.6     Smokeless tobacco: Never Used     Tobacco comment: Started:  Stopped:   Substance and Sexual Activity     Alcohol use: No     Alcohol/week: 0.0 oz     Drug use: No     Sexual activity: Not Currently     Birth control/protection: Female Surgical     Comment: [facility]   Lifestyle  "    Physical activity:     Days per week: None     Minutes per session: None     Stress: None   Relationships     Social connections:     Talks on phone: None     Gets together: None     Attends Yazidi service: None     Active member of club or organization: None     Attends meetings of clubs or organizations: None     Relationship status: None     Intimate partner violence:     Fear of current or ex partner: None     Emotionally abused: None     Physically abused: None     Forced sexual activity: None   Other Topics Concern     Parent/sibling w/ CABG, MI or angioplasty before 65F 55M? No      Service Not Asked     Blood Transfusions Not Asked     Caffeine Concern Yes     Comment: 2 cups caffeine per day     Occupational Exposure Not Asked     Hobby Hazards Not Asked     Sleep Concern Yes     Stress Concern No     Weight Concern No     Special Diet No     Back Care No     Exercise No     Bike Helmet Not Asked     Seat Belt Yes     Self-Exams Not Asked   Social History Narrative     None       Review of Systems:  Skin:  Negative rash   Eyes:  Positive for glasses  ENT:  Negative hearing loss  Respiratory:  Positive for dyspnea on exertion;cough  Cardiovascular:  Negative lightheadedness;Positive for;fatigue;palpitations  Gastroenterology: Positive for poor appetite  Genitourinary:  Negative nocturia  Musculoskeletal:  Negative    Neurologic:  Negative    Psychiatric:  Positive for sleep disturbances  Heme/Lymph/Imm:  Positive for weight loss;easy bruising  Endocrine:  Positive for thyroid disorder    Physical Exam:  Vitals: /88   Pulse 88   Ht 1.499 m (4' 11\")   Wt 40.8 kg (90 lb)   LMP 12/07/1981   BMI 18.18 kg/m       Constitutional:  cooperative, alert and oriented, well developed, well nourished, in no acute distress        Skin:  warm and dry to the touch        Head:  normocephalic        Eyes:  pupils equal and round, conjunctivae and lids unremarkable, sclera white, no xanthalasma, EOMS " intact, no nystagmus        ENT:  no pallor or cyanosis        Neck:  carotid pulses are full and equal bilaterally, JVP normal, no carotid bruit        Chest:  clear to auscultation   Diminished breath sounds    Cardiac:   irregularly irregular rhythm   no presence of murmur            Abdomen:  abdomen soft        Vascular:       right radial artery;1+             left radial artery;1+                  Extremities and Back:  no deformities, clubbing, cyanosis, erythema observed;no edema        Neurological:  no gross motor deficits;affect appropriate          Recent Lab Results:  LIPID RESULTS:  Lab Results   Component Value Date    CHOL 158 01/15/2019    HDL 51 01/15/2019    LDL 88 01/15/2019    TRIG 93 01/15/2019    CHOLHDLRATIO 3.5 02/09/2014       LIVER ENZYME RESULTS:  Lab Results   Component Value Date    AST 13 10/02/2014    ALT 10 01/12/2018       CBC RESULTS:  Lab Results   Component Value Date    WBC 10.5 09/03/2017    RBC 4.72 09/03/2017    HGB 15.3 09/03/2017    HCT 44.7 09/03/2017    MCV 95 09/03/2017    MCH 32.4 09/03/2017    MCHC 34.2 09/03/2017    RDW 13.8 09/03/2017     09/03/2017       BMP RESULTS:  Lab Results   Component Value Date     01/15/2019    POTASSIUM 3.9 01/15/2019    CHLORIDE 108 (H) 01/15/2019    CO2 25 01/15/2019    ANIONGAP 10.9 01/15/2019     (H) 01/15/2019    BUN 15 01/15/2019    CR 0.92 01/15/2019    GFRESTIMATED 58 (L) 01/15/2019    GFRESTBLACK 70 01/15/2019    CYNDEE 9.7 01/15/2019        A1C RESULTS:  No results found for: A1C    INR RESULTS:  Lab Results   Component Value Date    INR 1.03 10/22/2014    INR 1.78 (H) 10/02/2014           CC  No referring provider defined for this encounter.                  Thank you for allowing me to participate in the care of your patient.      Sincerely,     SONAM Angeles [facility]    cc:   No referring provider defined for this encounter.

## 2019-08-07 DIAGNOSIS — I48.20 CHRONIC ATRIAL FIBRILLATION (H): ICD-10-CM

## 2019-08-07 RX ORDER — DILTIAZEM HYDROCHLORIDE 180 MG/1
180 CAPSULE, EXTENDED RELEASE ORAL DAILY
Qty: 90 CAPSULE | Refills: 0 | Status: SHIPPED | OUTPATIENT
Start: 2019-08-07 | End: 2019-10-30

## 2019-10-30 DIAGNOSIS — I48.20 CHRONIC ATRIAL FIBRILLATION (H): ICD-10-CM

## 2019-10-30 RX ORDER — DILTIAZEM HYDROCHLORIDE 180 MG/1
180 CAPSULE, EXTENDED RELEASE ORAL DAILY
Qty: 90 CAPSULE | Refills: 0 | Status: SHIPPED | OUTPATIENT
Start: 2019-10-30 | End: 2020-02-20

## 2019-11-12 ENCOUNTER — CARE COORDINATION (OUTPATIENT)
Dept: CARDIOLOGY | Facility: CLINIC | Age: 84
End: 2019-11-12

## 2019-11-12 NOTE — PROGRESS NOTES
Pt called and left  stating that her insurance changed this year and that she needs an alternative Rx as her cost of Eliquis has increased and is now cost prohibitive. Pt does have a history of TIA while on Coumadin, and states her INR was erratic all the time while on Coumadin. She states she has done well on Eliquis. Will submit a Tier Exception for Eliquis. Pt has enough medication on hand now, and said that she is pre-planning for 2020. She has not hit the donout hole that she knows of, but if she is unable to obtain a refill until this is resolved, she will call us before she runs out of this medication. Pt understands she needs to continue taking it uninterrupted and to give us advance warning before she runs out of her supply. Jody Cole RN on 11/12/2019 at 2:53 PM

## 2019-11-12 NOTE — PROGRESS NOTES
Please submit a Tier Exception for Eliquis. Pt has atrial fibrillation, has history of TIA's while on Coumadin in the past, and so is no longer recommended to take Coumadin. See 10/26/18 note by Kirstin Ovalle PA-C, and also 4/7/2017 note by Xuan Correa CNP, for documentation.     Routed to PA Team to submit Tier Exception for Eliquis. Jody Cole RN on 11/12/2019 at 2:59 PM

## 2019-11-15 NOTE — TELEPHONE ENCOUNTER
Central Prior Authorization Team   Phone: [phone]      .PA Initiation-Initiated tiering exception via phone with Deborah and her supervisor Hany. I asked that decision be faxed asap.     Medication: Tier Exception-Initiated  Insurance Company: [employer] Phone [phone] Fax [phone]  Pharmacy Filling the Rx: [facility] [address] [place] [id number] [facility]  Filling Pharmacy Phone: [phone]  Filling Pharmacy Fax:    Start Date: 11/15/2019

## 2019-11-19 NOTE — TELEPHONE ENCOUNTER
PRIOR AUTHORIZATION DENIED    Medication: Tier Exception-Denied    Denial Date: 11/15/2019    Denial Rational:

## 2019-11-21 NOTE — PROGRESS NOTES
Placed call to pt to discuss affordability of Eliquis and whether she'd be able to continue to afford it. Pt said she received notification from her insurance already earlier this week. She is not interested in going back on warfarin, she had erratic INR's and ended up suffering TIA's while on warfarin d/t fluctuating INR levels. Per her insurance, the other DOACs are in the same Tier as Eliquis. Pt said she has a 90 day supply on hand, and can continue to pay for this if she needs to for now, but it is becoming costly to her. She's unsure whether she would qualify for the [employer], but is willing to apply. Pt understands she would have to submit official income documents to prove her income. Mailed [employer] application to pt. I advised we will send out once MD signs the form, and then she can choose to submit the yane directly herself, or if she is comfortable returning the documents to us then we can submit the yane.     Placed yane in MD box to sign - will mail out once done. Jody Cole RN on 11/21/2019 at 2:36 PM      Maren Singh  [facility] 2 days ago      Tiering exception was denied. Patient was notified by insurance. Thank you.   Central PA Team    Routing comment

## 2019-11-25 NOTE — PROGRESS NOTES
BMSPAF forms signed by Dr. Cantrell. Mailed forms to pt for her to complete her part of the application and attach requested financial documents. Pt can either mail to [employer] herself, or return to us to send out. Copy of provider page filed to Dr. Cantrell's box at team desk in case that is needed. Jody Cole RN on 11/25/2019 at 3:44 PM

## 2019-12-02 NOTE — PROGRESS NOTES
Pt called and said she received her eliquis pt assistance forms. She has decided it's an invasion of her privacy and she does not want to fill out the forms. Pt is going to talk to an  to find out if there is a better supplemental drug plan for her. Pt said she just picked up a 90 day supply of eliquis, and has enough to last her until her 2/2020 visit with . Pt will call us if she changes her mind about the assistance forms. Jocelynn BAEZ December 2, 2019, 2:21 PM

## 2020-01-08 ENCOUNTER — TELEPHONE (OUTPATIENT)
Dept: OBGYN | Facility: CLINIC | Age: 85
End: 2020-01-08

## 2020-01-08 DIAGNOSIS — M81.0 OSTEOPOROSIS, SENILE: Primary | ICD-10-CM

## 2020-01-08 DIAGNOSIS — Z92.29 PERSONAL HISTORY OF OTHER DRUG THERAPY: ICD-10-CM

## 2020-01-08 NOTE — TELEPHONE ENCOUNTER
Please order CAM prolia and labs. Please remember to use the 2 codes as she has hx of IV Boniva before starting on Prolia in 2013.    2nd diagnosis:   Z92.29 Personal history of other drug therapy

## 2020-01-08 NOTE — TELEPHONE ENCOUNTER
CAM prolia and Labs ordered.    Called and informed pt need for blood work prior to injection due after 20. Pt will call back to schedule labs. Not available until after the .    Also informed to remove her daughter Yamilka off of contacts as she  last month. Condolences given to patient.    Routing to [facility] as FRANK and PA pool to update on prolia process.    Lavonne Bates RN on 2020 at 3:23 PM

## 2020-01-08 NOTE — PATIENT INSTRUCTIONS
PROLIA  Risk Evaluation and Mitigation Strategy Best Practice Advisory    In May 2015, the FDA required an update to the PROLIA  (denosumab) REMS (Risk Evaluation and Mitigation Strategy) to inform both providers and patients about potential serious risks related to PROLIA .    These potential serious risks include:    Hypocalcemia    Osteonecrosis of the jaw    Atypical femoral fractures    Serious Infections    Dermatologic reactions    To ensure compliance with these requirements [facility] has developed a workflow for those patients who will receive PROLIA  at clinic.  This workflow includes insurance verification, patient scheduling, patient education, and documentation. Registered Nurses in the clinic should have completed eLearning related to the REMS and the clinic workflow.  Refer to them to initiate this process.  This workflow does not need to be executed if the patient is to receive PROLIA  injection at [facility].       The  has put together a Patient Education Toolkit.  Copies of these handouts may be available your clinic. Patients must receive the Patient Brochure and Medication Guide when receiving injection at clinic.  These will be attached to the injection ordered from [facility] [employer] to the clinic. Links to handouts:  Patient Counseling Chart for Healthcare Providers  Patient Brochure  Prescribing Information  Medication Guide    If you are having trouble accessing the above links, these documents can be found at: http://www.proliahcp.com/nlju-[email or web address]-isyulzibyo-strategy/index.[email or web address]    The role of healthcare provider includes reviewing patient education materials with the patient, advising patients to seek medical attention if they have signs or symptoms of one of the serious risks, and provide patients a copy of the Patient Brochure and Medication Guide when receiving their injection.      Due to the risk of hypocalcemia the Prescribing  Information for PROLIA  recommends that calcium and mineral levels (magnesium and phosphorus) be checked within 14 days of injection for those predisposed to hypocalcemia.

## 2020-01-29 ENCOUNTER — TRANSFERRED RECORDS (OUTPATIENT)
Dept: HEALTH INFORMATION MANAGEMENT | Facility: CLINIC | Age: 85
End: 2020-01-29

## 2020-01-29 DIAGNOSIS — Z92.29 PERSONAL HISTORY OF OTHER DRUG THERAPY: ICD-10-CM

## 2020-01-29 DIAGNOSIS — M81.0 OSTEOPOROSIS, SENILE: ICD-10-CM

## 2020-01-29 PROCEDURE — 36415 COLL VENOUS BLD VENIPUNCTURE: CPT | Performed by: OBSTETRICS & GYNECOLOGY

## 2020-01-29 PROCEDURE — 84100 ASSAY OF PHOSPHORUS: CPT | Performed by: OBSTETRICS & GYNECOLOGY

## 2020-01-29 PROCEDURE — 83735 ASSAY OF MAGNESIUM: CPT | Performed by: OBSTETRICS & GYNECOLOGY

## 2020-01-29 PROCEDURE — 82310 ASSAY OF CALCIUM: CPT | Performed by: OBSTETRICS & GYNECOLOGY

## 2020-01-30 LAB
CALCIUM SERPL-MCNC: 10.2 MG/DL (ref 8.5–10.1)
MAGNESIUM SERPL-MCNC: 1.8 MG/DL (ref 1.6–2.3)
PHOSPHATE SERPL-MCNC: 3.9 MG/DL (ref 2.5–4.5)

## 2020-02-03 ENCOUNTER — TELEPHONE (OUTPATIENT)
Dept: OBGYN | Facility: CLINIC | Age: 85
End: 2020-02-03

## 2020-02-04 NOTE — TELEPHONE ENCOUNTER
Call ed the PCP clinic to clarify that it was okay to proceed with Prolia injection.  Left message for nurse's to call the clinic to discuss.  Portia Rodriguez RN on 2/4/2020 at 10:45 AM

## 2020-02-04 NOTE — TELEPHONE ENCOUNTER
Called number left below - had to discuss with an answering . They were unable to reach person who left this message.    They will send message back to Dr Cordero's team trying to connect.    Lavonne Bates RN on 2/4/2020 at 11:31 AM

## 2020-02-04 NOTE — TELEPHONE ENCOUNTER
scheduling notified.  Will call patient to schedule.  Portia Rodriguez RN on 2/4/2020 at 2:18 PM

## 2020-02-04 NOTE — TELEPHONE ENCOUNTER
[facility] in [facility] called regarding clarification on the Prolia injection. Can be reached at [phone]

## 2020-02-04 NOTE — TELEPHONE ENCOUNTER
Spoke with Cristina BAEZ at Dr. Cordero's office.  Reran the calcium at office visit.  10.3 which at there clinic is WNL and Dr. Cordero has okayed the prolia injection.  1/29/20  Comments:   Calcium came back at 10.3, which is normal on our labs. Okay to proceed with prolia.     TSH shows dose is a little low, but I recommend no changes given risks of higher doses (can affect bone density and heart rhythm if dose is too high).     Cholesterol numbers are all at goal.     Routing to provider to advise. call and get her scheduled?    Portia Rodriguez, RN on 2/4/2020 at 11:56 AM

## 2020-02-04 NOTE — TELEPHONE ENCOUNTER
Patient had seen her primary care Doctor and discussed the calcium level of 10.2 with her.  Patient states that it is okay to proceed with the Prolia injection.     Unable to actually see from the visit note that the Dr. Cordero has okayed this.   Routing to provider to advise.   Portia Rodriguez, RN on 2/4/2020 at 9:41 AM

## 2020-02-20 ENCOUNTER — ALLIED HEALTH/NURSE VISIT (OUTPATIENT)
Dept: NURSING | Facility: CLINIC | Age: 85
End: 2020-02-20
Payer: MEDICARE

## 2020-02-20 ENCOUNTER — OFFICE VISIT (OUTPATIENT)
Dept: CARDIOLOGY | Facility: CLINIC | Age: 85
End: 2020-02-20
Payer: MEDICARE

## 2020-02-20 VITALS
WEIGHT: 88.1 LBS | HEIGHT: 59 IN | SYSTOLIC BLOOD PRESSURE: 124 MMHG | BODY MASS INDEX: 17.76 KG/M2 | DIASTOLIC BLOOD PRESSURE: 78 MMHG | HEART RATE: 68 BPM

## 2020-02-20 DIAGNOSIS — I10 BENIGN ESSENTIAL HYPERTENSION: ICD-10-CM

## 2020-02-20 DIAGNOSIS — E78.5 HYPERLIPIDEMIA LDL GOAL <100: ICD-10-CM

## 2020-02-20 DIAGNOSIS — I48.20 CHRONIC ATRIAL FIBRILLATION (H): Primary | ICD-10-CM

## 2020-02-20 DIAGNOSIS — M81.0 OSTEOPOROSIS, SENILE: Primary | ICD-10-CM

## 2020-02-20 DIAGNOSIS — I48.20 CHRONIC ATRIAL FIBRILLATION (H): ICD-10-CM

## 2020-02-20 PROCEDURE — 99214 OFFICE O/P EST MOD 30 MIN: CPT | Performed by: INTERNAL MEDICINE

## 2020-02-20 PROCEDURE — 96372 THER/PROPH/DIAG INJ SC/IM: CPT

## 2020-02-20 RX ORDER — DILTIAZEM HYDROCHLORIDE 180 MG/1
180 CAPSULE, EXTENDED RELEASE ORAL DAILY
Qty: 90 CAPSULE | Refills: 0 | Status: SHIPPED | OUTPATIENT
Start: 2020-02-20 | End: 2020-02-20

## 2020-02-20 RX ORDER — DILTIAZEM HYDROCHLORIDE 180 MG/1
180 CAPSULE, EXTENDED RELEASE ORAL DAILY
Qty: 90 CAPSULE | Refills: 3 | Status: SHIPPED | OUTPATIENT
Start: 2020-02-20 | End: 2020-03-12

## 2020-02-20 ASSESSMENT — MIFFLIN-ST. JEOR: SCORE: 750.25

## 2020-02-20 NOTE — NURSING NOTE
Clinic Administered Medication Documentation    MEDICATION LIST:   Prolia Documentation    Prior to injection, verified patient identity using patient's name and date of birth. Medication was administered. Please see MAR and medication order for additional information. Patient instructed to remain in clinic for 15 minutes and report any adverse reaction to staff immediately .    Indication: Prolia  (denosumab) is a prescription medicine used to treat osteoporosis in patients who:     Are at high risk for fracture, meaning patients who have had a fracture related to osteoporosis, or who have multiple risk factors for fracture.    Cannot use another osteoporosis medicine or other osteoporosis medicines did not work well.    The timeline for early/late injections would be 4 weeks early and any time after the 6 month yuniel. If a patient receives their injection late, then the subsequent injection would be 6 months from the date that they actually received the injection.    1.  When was the last injection?   2.  Did they check with their insurance for this calendar year?  yes  3.  Is there an order in the chart?  yes  4.  Has the patient had dental work involving the bone in the past month or will have work in the next 6 months?  no    The following steps were completed to comply with the REMS program for Prolia:    Reviewed information in the Medication Guide and Patient Counseling Chart, including the serious risks of Prolia  and the symptoms of each risk.    Advised patient to seek prompt medical attention if they have signs or symptoms of any of the serious risks.    Provided each patient a copy of the Medication Guide and Patient Brochure.    Was entire vial of medication used? Yes  Vial/Syringe: Syringe  Expiration Date:  10/2021    Pt tolerated the injection w/ mininmal discomfort  Pt declined to remain in the clinic following her injection for she had another drs appt to go to.  Pt was discharged in stable  condition.

## 2020-02-20 NOTE — LETTER
2/20/2020    Birgit Barba [facility] [address] [place] [address]    RE: Mia Rodriguez       Dear Colleague,    I had the pleasure of seeing Mia Rodriguez in the [facility].    HPI and Plan:   See dictation    Orders Placed This Encounter   Procedures     Follow-Up with Cardiac Advanced Practice Provider     Follow-Up with Cardiologist     Echocardiogram Complete       Orders Placed This Encounter   Medications     diltiazem  MG PO 24 hr capsule     Sig: Take 1 capsule (180 mg) by mouth daily     Dispense:  90 capsule     Refill:  3     apixaban ANTICOAGULANT 2.5 MG PO tablet     Sig: Take 1 tablet (2.5 mg) by mouth 2 times daily     Dispense:  180 tablet     Refill:  3       Medications Discontinued During This Encounter   Medication Reason     diltiazem  MG PO 24 hr capsule Reorder     apixaban ANTICOAGULANT (ELIQUIS) 2.5 MG tablet Reorder         Encounter Diagnoses   Name Primary?     Chronic atrial fibrillation Yes     Hyperlipidemia LDL goal <100      Benign essential hypertension        CURRENT MEDICATIONS:  Current Outpatient Medications   Medication Sig Dispense Refill     apixaban ANTICOAGULANT 2.5 MG PO tablet Take 1 tablet (2.5 mg) by mouth 2 times daily 180 tablet 3     atorvastatin (LIPITOR) 40 MG tablet Take 0.5 tablets (20 mg) by mouth daily 90 tablet 1     denosumab (PROLIA) 60 MG/ML SOLN Inject 60 mg Subcutaneous. q 6 months       diltiazem  MG PO 24 hr capsule Take 1 capsule (180 mg) by mouth daily 90 capsule 3     levothyroxine (SYNTHROID/LEVOTHROID) 25 MCG tablet Take 25 mcg by mouth Pt takes 1 1/2 tabs daily       Nutritional Supplements (BOOST PO) Take 1 Bottle by mouth daily         ALLERGIES     Allergies   Allergen Reactions     Digoxin Other (See Comments)     Weakness, SOB     Megace [Megestrol Acetate]      Increased LFTs         PAST MEDICAL HISTORY:  Past Medical History:   Diagnosis Date     Arrhythmia      atrial fib.-on coumadin     Atrial fibrillation (H)      Breast cancer (H)      COPD (chronic obstructive pulmonary disease) (H)      COPD exacerbation (H) 9/2/2015     Coronary artery disease      Hypertension      Malignant neoplasm (H) 6/2/2008    Right Breast Cancer     Osteoporosis      Renal disease     kidney stones     Thyroid disease     Hypothyroid     Unspecified cerebral artery occlusion with cerebral infarction     TIA     Uterine fibroid        PAST SURGICAL HISTORY:  Past Surgical History:   Procedure Laterality Date     BIOPSY  6/2/2008    Left /RightBreast Biopsy     BIOPSY  6/1/2010    Right Breast Biopsy     BREAST SURGERY  6/12/2008    Right Breast Lumpectomy     CYSTOSCOPY, DILATE URETHRA, COMBINED  10/5/2012    Procedure: COMBINED CYSTOSCOPY, DILATE URETHRA;  urethral dilation;  Surgeon: Kaushal Harris MD;  Location:  OR     CYSTOSCOPY, RETROGRADES, EXTRACT STONE, COMBINED Right 4/16/2015    Procedure: COMBINED CYSTOSCOPY, RETROGRADES, EXTRACT STONE;  Surgeon: Kaushal Harris MD;  Location:  OR     EXCISE LESION LIP N/A 10/22/2014    Procedure: EXCISE LESION LIP;  Surgeon: Brent Jolley MD;  Location:  SD     GYN SURGERY  1981    LISA with ovaries intact-fibroid     LASER HOLMIUM LITHOTRIPSY URETER(S), INSERT STENT, COMBINED  10/5/2012    Procedure: COMBINED CYSTOSCOPY, URETEROSCOPY, LASER HOLMIUM LITHOTRIPSY URETER(S), INSERT STENT;  RIGHT URETEROSCOPY ,right ureteral biopsy,WITH LASER HOLMIUM LITHOTRIPSY, INSERT STENT RIGHT URETER;  Surgeon: Kaushal Harris MD;  Location:  OR     LASER HOLMIUM LITHOTRIPSY URETER(S), INSERT STENT, COMBINED Right 4/16/2015    Procedure: COMBINED CYSTOSCOPY, URETEROSCOPY, LASER HOLMIUM LITHOTRIPSY URETER(S), INSERT STENT;  Surgeon: Kaushal Harris MD;  Location:  OR       FAMILY HISTORY:  Family History   Problem Relation Age of Onset     Alzheimer Disease Mother      Osteoporosis Mother      C.A.D. Father       Cardiovascular Father      Cerebrovascular Disease Father      Breast Cancer Maternal Grandmother      Unknown/Adopted Maternal Grandfather      Unknown/Adopted Paternal Grandmother         old age     Cancer Paternal Grandfather         stomach       SOCIAL HISTORY:  Social History     Socioeconomic History     Marital status: Single     Spouse name: None     Number of children: 4     Years of education: None     Highest education level: None   Occupational History     None   Social Needs     Financial resource strain: None     Food insecurity:     Worry: None     Inability: None     Transportation needs:     Medical: None     Non-medical: None   Tobacco Use     Smoking status: Former Smoker     Packs/day: 1.00     Years: 30.00     Pack years: 30.00     Types: Cigarettes     Start date:      Last attempt to quit: 1989     Years since quittin.2     Smokeless tobacco: Never Used     Tobacco comment: Started:  Stopped:   Substance and Sexual Activity     Alcohol use: No     Alcohol/week: 0.0 standard drinks     Drug use: No     Sexual activity: Not Currently     Birth control/protection: Female Surgical     Comment: LISA   Lifestyle     Physical activity:     Days per week: None     Minutes per session: None     Stress: None   Relationships     Social connections:     Talks on phone: None     Gets together: None     Attends [employer] service: None     Active member of club or organization: None     Attends meetings of clubs or organizations: None     Relationship status: None     Intimate partner violence:     Fear of current or ex partner: None     Emotionally abused: None     Physically abused: None     Forced sexual activity: None   Other Topics Concern     Parent/sibling w/ CABG, MI or angioplasty before 65F 55M? No      Service Not Asked     Blood Transfusions Not Asked     Caffeine Concern Yes     Comment: 2 cups caffeine per day     Occupational Exposure Not Asked     Hobby Hazards Not  "Asked     Sleep Concern Yes     Stress Concern No     Weight Concern No     Special Diet No     Back Care No     Exercise No     Bike Helmet Not Asked     Seat Belt Yes     Self-Exams Not Asked   Social History Narrative     None       Review of Systems:  Skin:  Negative       Eyes:  Positive for glasses cataract surgeries both eyes around 2015  ENT:  Negative hearing loss hearing aids  Respiratory:  Positive for dyspnea on exertion;cough COPD    Cardiovascular:    lightheadedness;Positive for;fatigue;palpitations Occ palpitations   Gastroenterology: Positive for poor appetite    Genitourinary:  Positive for nocturia    Musculoskeletal:  Negative      Neurologic:  Negative   TIA, 10/2/2014  Psychiatric:  Positive for sleep disturbances trouble staying asleep if does not take Benadryl before bed  Heme/Lymph/Imm:  Positive for weight loss;easy bruising    Endocrine:  Positive for thyroid disorder      Physical Exam:  Vitals: /78   Pulse 68   Ht 1.499 m (4' 11\")   Wt 40 kg (88 lb 1.6 oz)   LMP 12/07/1981   BMI 17.79 kg/m       Constitutional:  cooperative, alert and oriented, well developed, well nourished, in no acute distress        Skin:  warm and dry to the touch          Head:  normocephalic        Eyes:  pupils equal and round, conjunctivae and lids unremarkable, sclera white, no xanthalasma, EOMS intact, no nystagmus        Lymph:No Cervical lymphadenopathy present     ENT:  no pallor or cyanosis        Neck:  carotid pulses are full and equal bilaterally, JVP normal, no carotid bruit        Respiratory:  clear to auscultation    Diminished breath sounds    Cardiac:   irregularly irregular rhythm   no presence of murmur                right radial artery;1+             left radial artery;1+                    GI:  abdomen soft;BS normoactive        Extremities and Muscular Skeletal:  no deformities, clubbing, cyanosis, erythema observed;no edema              Neurological:  no gross motor " deficits;affect appropriate        Psych:  affect appropriate, oriented to time, person and place        CC  No referring provider defined for this encounter.                Thank you for allowing me to participate in the care of your patient.      Sincerely,     ROBERT BARRETO MD     [facility]    cc:   No referring provider defined for this encounter.

## 2020-02-20 NOTE — LETTER
2/20/2020      Birgit Cordero  [facility] [address]  [facility] [address]      RE: Mia Frankel Michael       Dear Colleague,    I had the pleasure of seeing Mia Rodriguez in the [facility].    Service Date: 02/20/2020      HISTORY OF PRESENT ILLNESS:  I had the opportunity to see Ms. Agustina Rodriguez in Cardiology Clinic today at [facility] in [place] for reevaluation of chronic atrial fibrillation and shortness of breath.  She has a history of hypertension, dyslipidemia, TIA, and COPD.  I have seen her for quite a few years.  She has had progressive shortness of breath which has become quite disabling.  She smoked for about 40 years but quit quite a few years ago and is discouraged that her shortness of breath continues to get worse.  I have been recommending an AV node ablation and permanent pacemaker implantation for the last couple of years, and she has even met with Electrophysiology a couple of times to discuss that but has been hesitant to go forward with that procedure.  We have had a difficult time controlling her heart rates.  Her heart rates have been fast much of the time despite diltiazem 180 mg daily.  She could not tolerate higher doses due to lightheadedness.  Digoxin made her nauseated and weak.  Beta blockers worsened her fatigue.      She has no other new issues today.  She did not take her usual diltiazem this morning because she had run out yesterday.      PHYSICAL EXAMINATION:  On examination today her blood pressure was 124/78, apical heart rate was about 110.  Weight is 88 pounds.  Her lungs are clear.  Heart rhythm is irregularly irregular and rapid.  She has no edema or jugular venous distention.      IMPRESSIONS:  Ms. Agustina Rodriguez is an 85-year-old woman with hypertension, dyslipidemia and chronic atrial fibrillation with chronic disabling shortness of breath.  This is probably due in part to COPD and possibly in part to  rapid atrial fibrillation.  Her echocardiogram still has demonstrated normal left ventricular function without significant valvular disease, but I suspect that her rapid heart rates are impacting her breathing.      I again suggested that she could consider AV node ablation and pacemaker implantation.  I told her that I cannot guarantee that it would be effective at improving her breathing significantly.  She will likely still have a significant component of shortness of breath due to COPD.  However, it might improve her breathing 20% or 60%.  I cannot tell her specifically how much more it would help.  She is hesitant to go ahead with such a procedure without more guarantee that it would be helpful.      I suggested we could try increasing her diltiazem again, but she is resistant to that as well.  I will refill her current dose for her and have her follow up with us again in 6 months to continue this discussion.      cc:   Birgit Cordero MD   [address]  [address]         ROBERT BARRETO MD, Merged with [facility]             D: 2020   T: 2020   MT: REBECCA      Name:     ROSALIND BERNABE   MRN:      [id number]        Account:      [id number]   :      1934           Service Date: 2020      Document: [id number]         Outpatient Encounter Medications as of 2020   Medication Sig Dispense Refill     apixaban ANTICOAGULANT 2.5 MG PO tablet Take 1 tablet (2.5 mg) by mouth 2 times daily 180 tablet 3     atorvastatin (LIPITOR) 40 MG tablet Take 0.5 tablets (20 mg) by mouth daily 90 tablet 1     denosumab (PROLIA) 60 MG/ML SOLN Inject 60 mg Subcutaneous. q 6 months       diltiazem  MG PO 24 hr capsule Take 1 capsule (180 mg) by mouth daily 90 capsule 3     levothyroxine (SYNTHROID/LEVOTHROID) 25 MCG tablet Take 25 mcg by mouth Pt takes 1 1/2 tabs daily       Nutritional Supplements (BOOST PO) Take 1 Bottle by mouth daily       [DISCONTINUED] apixaban  ANTICOAGULANT (ELIQUIS) 2.5 MG tablet Take 1 tablet (2.5 mg) by mouth 2 times daily 180 tablet 3     [DISCONTINUED] diltiazem  MG PO 24 hr capsule Take 1 capsule (180 mg) by mouth daily 90 capsule 0     Facility-Administered Encounter Medications as of 2/20/2020   Medication Dose Route Frequency Provider Last Rate Last Dose     denosumab (PROLIA) injection 60 mg  60 mg Subcutaneous Q6 Months Jhon Randle MD   60 mg at 02/20/20 [id number]       Again, thank you for allowing me to participate in the care of your patient.      Sincerely,    ROBERT BARRETO MD     [facility]

## 2020-02-20 NOTE — PROGRESS NOTES
Service Date: 02/20/2020      HISTORY OF PRESENT ILLNESS:  I had the opportunity to see Ms. Agustina Rodriguez in Cardiology Clinic today at [facility] in [place] for reevaluation of chronic atrial fibrillation and shortness of breath.  She has a history of hypertension, dyslipidemia, TIA, and COPD.  I have seen her for quite a few years.  She has had progressive shortness of breath which has become quite disabling.  She smoked for about 40 years but quit quite a few years ago and is discouraged that her shortness of breath continues to get worse.  I have been recommending an AV node ablation and permanent pacemaker implantation for the last couple of years, and she has even met with Electrophysiology a couple of times to discuss that but has been hesitant to go forward with that procedure.  We have had a difficult time controlling her heart rates.  Her heart rates have been fast much of the time despite diltiazem 180 mg daily.  She could not tolerate higher doses due to lightheadedness.  Digoxin made her nauseated and weak.  Beta blockers worsened her fatigue.      She has no other new issues today.  She did not take her usual diltiazem this morning because she had run out yesterday.      PHYSICAL EXAMINATION:  On examination today her blood pressure was 124/78, apical heart rate was about 110.  Weight is 88 pounds.  Her lungs are clear.  Heart rhythm is irregularly irregular and rapid.  She has no edema or jugular venous distention.      IMPRESSIONS:  Ms. Agustina Rodriguez is an 85-year-old woman with hypertension, dyslipidemia and chronic atrial fibrillation with chronic disabling shortness of breath.  This is probably due in part to COPD and possibly in part to rapid atrial fibrillation.  Her echocardiogram still has demonstrated normal left ventricular function without significant valvular disease, but I suspect that her rapid heart rates are impacting her breathing.      I again suggested that she could  consider AV node ablation and pacemaker implantation.  I told her that I cannot guarantee that it would be effective at improving her breathing significantly.  She will likely still have a significant component of shortness of breath due to COPD.  However, it might improve her breathing 20% or 60%.  I cannot tell her specifically how much more it would help.  She is hesitant to go ahead with such a procedure without more guarantee that it would be helpful.      I suggested we could try increasing her diltiazem again, but she is resistant to that as well.  I will refill her current dose for her and have her follow up with us again in 6 months to continue this discussion.      cc:   [place] [address]         ROBERT BARRETO MD, [facility]             D: 2020   T: 2020   MT: REBECCA      Name:     ROSALIND BERNABE   MRN:      [id number]        Account:      [id number]   :      1934           Service Date: 2020      Document: [id number]

## 2020-02-20 NOTE — PROGRESS NOTES
HPI and Plan:   See dictation    Orders Placed This Encounter   Procedures     Follow-Up with Cardiac Advanced Practice Provider     Follow-Up with Cardiologist     Echocardiogram Complete       Orders Placed This Encounter   Medications     diltiazem  MG PO 24 hr capsule     Sig: Take 1 capsule (180 mg) by mouth daily     Dispense:  90 capsule     Refill:  3     apixaban ANTICOAGULANT 2.5 MG PO tablet     Sig: Take 1 tablet (2.5 mg) by mouth 2 times daily     Dispense:  180 tablet     Refill:  3       Medications Discontinued During This Encounter   Medication Reason     diltiazem  MG PO 24 hr capsule Reorder     apixaban ANTICOAGULANT (ELIQUIS) 2.5 MG tablet Reorder         Encounter Diagnoses   Name Primary?     Chronic atrial fibrillation Yes     Hyperlipidemia LDL goal <100      Benign essential hypertension        CURRENT MEDICATIONS:  Current Outpatient Medications   Medication Sig Dispense Refill     apixaban ANTICOAGULANT 2.5 MG PO tablet Take 1 tablet (2.5 mg) by mouth 2 times daily 180 tablet 3     atorvastatin (LIPITOR) 40 MG tablet Take 0.5 tablets (20 mg) by mouth daily 90 tablet 1     denosumab (PROLIA) 60 MG/ML SOLN Inject 60 mg Subcutaneous. q 6 months       diltiazem  MG PO 24 hr capsule Take 1 capsule (180 mg) by mouth daily 90 capsule 3     levothyroxine (SYNTHROID/LEVOTHROID) 25 MCG tablet Take 25 mcg by mouth Pt takes 1 1/2 tabs daily       Nutritional Supplements (BOOST PO) Take 1 Bottle by mouth daily         ALLERGIES     Allergies   Allergen Reactions     Digoxin Other (See Comments)     Weakness, SOB     Megace [Megestrol Acetate]      Increased LFTs         PAST MEDICAL HISTORY:  Past Medical History:   Diagnosis Date     Arrhythmia     atrial fib.-on coumadin     Atrial fibrillation (H)      Breast cancer (H)      COPD (chronic obstructive pulmonary disease) (H)      COPD exacerbation (H) 9/2/2015     Coronary artery disease      Hypertension      Malignant neoplasm (H)  6/2/2008    Right Breast Cancer     Osteoporosis      Renal disease     kidney stones     Thyroid disease     Hypothyroid     Unspecified cerebral artery occlusion with cerebral infarction     TIA     Uterine fibroid        PAST SURGICAL HISTORY:  Past Surgical History:   Procedure Laterality Date     BIOPSY  6/2/2008    Left /RightBreast Biopsy     BIOPSY  6/1/2010    Right Breast Biopsy     BREAST SURGERY  6/12/2008    Right Breast Lumpectomy     CYSTOSCOPY, DILATE URETHRA, COMBINED  10/5/2012    Procedure: COMBINED CYSTOSCOPY, DILATE URETHRA;  urethral dilation;  Surgeon: Kaushal Harris MD;  Location:  OR     CYSTOSCOPY, RETROGRADES, EXTRACT STONE, COMBINED Right 4/16/2015    Procedure: COMBINED CYSTOSCOPY, RETROGRADES, EXTRACT STONE;  Surgeon: Kaushal Harris MD;  Location:  OR     EXCISE LESION LIP N/A 10/22/2014    Procedure: EXCISE LESION LIP;  Surgeon: Brent Jolley MD;  Location:  SD     GYN SURGERY  1981    LISA with ovaries intact-fibroid     LASER HOLMIUM LITHOTRIPSY URETER(S), INSERT STENT, COMBINED  10/5/2012    Procedure: COMBINED CYSTOSCOPY, URETEROSCOPY, LASER HOLMIUM LITHOTRIPSY URETER(S), INSERT STENT;  RIGHT URETEROSCOPY ,right ureteral biopsy,WITH LASER HOLMIUM LITHOTRIPSY, INSERT STENT RIGHT URETER;  Surgeon: Kaushal Harris MD;  Location:  OR     LASER HOLMIUM LITHOTRIPSY URETER(S), INSERT STENT, COMBINED Right 4/16/2015    Procedure: COMBINED CYSTOSCOPY, URETEROSCOPY, LASER HOLMIUM LITHOTRIPSY URETER(S), INSERT STENT;  Surgeon: Kaushal Harris MD;  Location:  OR       FAMILY HISTORY:  Family History   Problem Relation Age of Onset     Alzheimer Disease Mother      Osteoporosis Mother      C.A.D. Father      Cardiovascular Father      Cerebrovascular Disease Father      Breast Cancer Maternal Grandmother      Unknown/Adopted Maternal Grandfather      Unknown/Adopted Paternal Grandmother         old age     Cancer Paternal Grandfather         stomach        SOCIAL HISTORY:  Social History     Socioeconomic History     Marital status: Single     Spouse name: None     Number of children: 4     Years of education: None     Highest education level: None   Occupational History     None   Social Needs     Financial resource strain: None     Food insecurity:     Worry: None     Inability: None     Transportation needs:     Medical: None     Non-medical: None   Tobacco Use     Smoking status: Former Smoker     Packs/day: 1.00     Years: 30.00     Pack years: 30.00     Types: Cigarettes     Start date:      Last attempt to quit: 1989     Years since quittin.2     Smokeless tobacco: Never Used     Tobacco comment: Started:  Stopped:   Substance and Sexual Activity     Alcohol use: No     Alcohol/week: 0.0 standard drinks     Drug use: No     Sexual activity: Not Currently     Birth control/protection: Female Surgical     Comment: LISA   Lifestyle     Physical activity:     Days per week: None     Minutes per session: None     Stress: None   Relationships     Social connections:     Talks on phone: None     Gets together: None     Attends [employer] service: None     Active member of club or organization: None     Attends meetings of clubs or organizations: None     Relationship status: None     Intimate partner violence:     Fear of current or ex partner: None     Emotionally abused: None     Physically abused: None     Forced sexual activity: None   Other Topics Concern     Parent/sibling w/ CABG, MI or angioplasty before 65F 55M? No      Service Not Asked     Blood Transfusions Not Asked     Caffeine Concern Yes     Comment: 2 cups caffeine per day     Occupational Exposure Not Asked     Hobby Hazards Not Asked     Sleep Concern Yes     Stress Concern No     Weight Concern No     Special Diet No     Back Care No     Exercise No     Bike Helmet Not Asked     Seat Belt Yes     Self-Exams Not Asked   Social History Narrative     None       Review of  "Systems:  Skin:  Negative       Eyes:  Positive for glasses cataract surgeries both eyes around 2015  ENT:  Negative hearing loss hearing aids  Respiratory:  Positive for dyspnea on exertion;cough COPD    Cardiovascular:    lightheadedness;Positive for;fatigue;palpitations Occ palpitations   Gastroenterology: Positive for poor appetite    Genitourinary:  Positive for nocturia    Musculoskeletal:  Negative      Neurologic:  Negative   TIA, 10/2/2014  Psychiatric:  Positive for sleep disturbances trouble staying asleep if does not take Benadryl before bed  Heme/Lymph/Imm:  Positive for weight loss;easy bruising    Endocrine:  Positive for thyroid disorder      Physical Exam:  Vitals: /78   Pulse 68   Ht 1.499 m (4' 11\")   Wt 40 kg (88 lb 1.6 oz)   LMP 12/07/1981   BMI 17.79 kg/m      Constitutional:  cooperative, alert and oriented, well developed, well nourished, in no acute distress        Skin:  warm and dry to the touch          Head:  normocephalic        Eyes:  pupils equal and round, conjunctivae and lids unremarkable, sclera white, no xanthalasma, EOMS intact, no nystagmus        Lymph:No Cervical lymphadenopathy present     ENT:  no pallor or cyanosis        Neck:  carotid pulses are full and equal bilaterally, JVP normal, no carotid bruit        Respiratory:  clear to auscultation    Diminished breath sounds    Cardiac:   irregularly irregular rhythm   no presence of murmur                right radial artery;1+             left radial artery;1+                    GI:  abdomen soft;BS normoactive        Extremities and Muscular Skeletal:  no deformities, clubbing, cyanosis, erythema observed;no edema              Neurological:  no gross motor deficits;affect appropriate        Psych:  affect appropriate, oriented to time, person and place        CC  No referring provider defined for this encounter.              "

## 2020-03-12 DIAGNOSIS — I48.20 CHRONIC ATRIAL FIBRILLATION (H): ICD-10-CM

## 2020-03-12 DIAGNOSIS — E78.5 HYPERLIPIDEMIA LDL GOAL <100: ICD-10-CM

## 2020-03-12 DIAGNOSIS — I48.20 CHRONIC ATRIAL FIBRILLATION (H): Primary | ICD-10-CM

## 2020-03-12 RX ORDER — ATORVASTATIN CALCIUM 40 MG/1
20 TABLET, FILM COATED ORAL DAILY
Qty: 45 TABLET | Refills: 0 | Status: SHIPPED | OUTPATIENT
Start: 2020-03-12 | End: 2020-06-15

## 2020-03-12 RX ORDER — DILTIAZEM HYDROCHLORIDE 180 MG/1
180 CAPSULE, EXTENDED RELEASE ORAL DAILY
Qty: 90 CAPSULE | Refills: 0 | Status: SHIPPED | OUTPATIENT
Start: 2020-03-12 | End: 2020-06-19

## 2020-05-21 NOTE — ED NOTES
[facility]  ED Nurse Handoff Report    ED Chief complaint: Flank Pain (feels like kidney stones)      ED Diagnosis:   Final diagnoses:   Ureteral stone   Hydronephrosis, unspecified hydronephrosis type       Code Status: Full Code    Allergies:   Allergies   Allergen Reactions     Megace [Megestrol Acetate]      Increased LFTs         Activity level - Baseline/Home:  Independent    Activity Level - Current:   Independent     Needed?: No    Isolation: No  Infection: Not Applicable    Bariatric?: No    Vital Signs:   Vitals:    09/03/17 0918   BP: (!) 158/102   Pulse: 79   Resp: 16   Temp: 97.7  F (36.5  C)   TempSrc: Oral   SpO2: 95%       Cardiac Rhythm: ,        Pain level:      Is this patient confused?: No    Patient Report: Initial Complaint: pt woke up at 3am today with 6/10 right flank pain  Focused Assessment: pt is a&ox4  Tests Performed: labs and ct  Abnormal Results: ct (kidney stone)  Treatments provided: pain control    Family Comments: daughter is bedside    OBS brochure/video discussed/provided to patient: Yes    ED Medications:   Medications   morphine (PF) injection 2 mg (2 mg Intravenous Given 9/3/17 1040)   0.9% sodium chloride BOLUS (1,000 mLs Intravenous New Bag 9/3/17 0941)   ondansetron (ZOFRAN) injection 4 mg (4 mg Intravenous Given 9/3/17 0941)       Drips infusing?:  No      ED NURSE PHONE NUMBER: [phone]        [None] : No known medical problems [No Elimination Concerns] : elimination [No Feeding Concerns] : feeding [No Skin Concerns] : skin [Normal Sleep Pattern] : sleep [Family Support] : family support [Child Development and Behavior] : child development and behavior [Language Promotion/Hearing] : language promotion/hearing [Toliet Training Readiness] : toliet training readiness [Safety] : safety [No Medications] : ~He/She~ is not on any medications [Parent/Guardian] : parent/guardian [de-identified] : SMALL FOR AGE, FTT- GOOD WEIGHT GAIN SINCE HOSPITALIZATION- CONTINUE PEDIASURES, WORKING WITH NUTRITIONIST / GI  [de-identified] : DELAYED- FOLLOWS WITH EARLY INTERVENTION, ALSO HAS UPCOMING APT WITH GENETICS FOR EVAL  [FreeTextEntry3] : F/UP FROM PICU/HOSPITAL STAY; WAS ADMITTED IN DKA; NEW ONSET TYPE 1 DIABETES- MOM ADEQUATELY EDUCATED ABOUT MEDICAL MANAGEMENT- CONTINUE CLOSE F/UP WITH ENDOCRINE- UPCOMING APT WITH GENETICS, GI AND NUTRITION, EARLY INTERVENTION, F/UP 3 MONTHS  [] : The components of the vaccine(s) to be administered today are listed in the plan of care. The disease(s) for which the vaccine(s) are intended to prevent and the risks have been discussed with the caretaker.  The risks are also included in the appropriate vaccination information statements which have been provided to the patient's caregiver.  The caregiver has given consent to vaccinate.

## 2020-06-01 ENCOUNTER — CARE COORDINATION (OUTPATIENT)
Dept: CARDIOLOGY | Facility: CLINIC | Age: 85
End: 2020-06-01

## 2020-06-01 NOTE — PROGRESS NOTES
Pt called back and said her BP is 113/80, HR 84 today. She does report that her symptoms don't feel as bad today with her HR in the 80s. Pt said she still does not want to proceed with an AV dunia ablation and pacemaker placement. She would like to know if any other medication adjustment can be made to help her symptoms. I told pt  does not have any upcoming openings so she should see an TARIQ or  as her symptoms have been thought to be primarily related to her chronic afib in the past. I told pt that I will also update  to see if he thinks pt will need to wear another holter prior to her visit (if visit isn't for another week or so--last one was in 2018). Jocelynn BAEZ June 1, 2020, 12:05 PM

## 2020-06-01 NOTE — PROGRESS NOTES
Pt called and stated that she thinks her diltiazem needs to be decreased. PT said she thinks she is on too potent of a dose for her age. I asked pt why she suddenly thinks this, and she said she has been experiencing increased fatigue and dizziness in the last couple of weeks. Pt has had ongoing issues with SOB, which she still has, and  previously recommended an AV dunia ablation or increasing her diltiazem dose the help better control her afib rates. I asked pt if she has been checking her BP and HR. Pt said it was 117/83, HR 94 on 5/30. I told pt that having faster rates of afib can cause similar symptoms, and diltiazem dose would likely need to be decreased if she had low BP and HR, which does not seem to be the case. PT said she will call me back with an updated BP and HR from today.

## 2020-06-03 ENCOUNTER — VIRTUAL VISIT (OUTPATIENT)
Dept: CARDIOLOGY | Facility: CLINIC | Age: 85
End: 2020-06-03
Payer: MEDICARE

## 2020-06-03 VITALS
HEIGHT: 59 IN | DIASTOLIC BLOOD PRESSURE: 83 MMHG | BODY MASS INDEX: 16.13 KG/M2 | HEART RATE: 85 BPM | WEIGHT: 80 LBS | SYSTOLIC BLOOD PRESSURE: 114 MMHG

## 2020-06-03 DIAGNOSIS — I48.21 PERMANENT ATRIAL FIBRILLATION (H): Primary | ICD-10-CM

## 2020-06-03 PROCEDURE — 99442 ZZC PHYSICIAN TELEPHONE EVALUATION 11-20 MIN: CPT | Performed by: NURSE PRACTITIONER

## 2020-06-03 ASSESSMENT — MIFFLIN-ST. JEOR: SCORE: 708.51

## 2020-06-03 NOTE — PROGRESS NOTES
"Mia Rodriguez is a 86 year old female who is being evaluated via a billable telephone visit.      The patient has been notified of following:     \"This telephone visit will be conducted via a call between you and your physician/provider. We have found that certain health care needs can be provided without the need for a physical exam.  This service lets us provide the care you need with a short phone conversation.  If a prescription is necessary we can send it directly to your pharmacy.  If lab work is needed we can place an order for that and you can then stop by our lab to have the test done at a later time.    Telephone visits are billed at different rates depending on your insurance coverage. During this emergency period, for some insurers they may be billed the same as an in-person visit.  Please reach out to your insurance provider with any questions.    If during the course of the call the physician/provider feels a telephone visit is not appropriate, you will not be charged for this service.\"    Patient has given verbal consent for Telephone visit?  Yes    What phone number would you like to be contacted at? [phone]    How would you like to obtain your AVS? Mail a copy     Review Of Systems  Skin: Positive for hair loss, itching on skin  Eyes: Positive for reading glasses  Ears/Nose/Throat: Positive for hearing loss - wears hearing aids, postnasal drainage  Respiratory: Positive for dyspnea with exertion and at rest  Cardiovascular: Positive for chest heaviness, lightheadedness, fatigue  Gastrointestinal: Negative  Genitourinary: Negative  Musculoskeletal: Negative  Neurologic: Negative  Psychiatric: Negative  Hematologic/Lymphatic/Immunologic: Negative  Endocrine: Positive for thyroid disorder    Patient reported vitals:  BP: 114/83  Heart rate: 85  Weight: 80 lbs    Alma Delia Echevarria CMA    Mia Rodriguez is a 86 year old female who is following up after calling the clinic regarding " "dizziness.   This visit is being conducted as a virtual visit due to the emphasis on mitigation of the COVID-19 virus pandemic. The clinician has decided that the risk of an in-office visit outweighs the benefit for this patient.  She is a patient of Dr. Lindsey and Dr. Cantrell. Her medical history includes      1. Permanent AFib  2. Benign Essential HTN  3. Coronary artery disease.  noted by calcification seen on Chest CT 3/2017. Follow up stress test 3/2017 was normal  4. Shortness of breath  5. TIA   6. COPD    In 7/2019, patient was seen for H&P for av node ablation an ppm, however she decided talk with her PCP and pulmonologist regarding etiology of shortness of breath. She had chronic LUNA with walking quickly and exertion and did not use her inhalers as \"they don't work anymore\".  Her daughter also stated patient had less stamina than in the past.    In 2/2020, she saw Dr. Cantrell with elevated HRs but not wanting to pursue an AV node ablation and ppm.      On 6/1/20 she called the clinc with concerns of increase fatigue and dizziness.  She continued to have LUNA.  Her BP and HR were 117/83, HR 94 on 5/30 and 113/80, HR 84 on 6/1/2020    Today she states her /85 prior to taking her medication and afterward her BP was 98/70 mmHg with HR of 80 bpm.  She states she continues to have shortness of breath with excursion.  She is isolating herself due to COVID 19 and would prefer not to come to the hosptial.   She denies chest pain or pressure, syncope, angina, palpitations, orthopnea, PND, or edema. Her weight is down to 80# despite drinking Boost.   Reports taking medications as prescribed including Eliquis and denies bleeding, hematuria, melena, epistaxis and signs/symptoms of stroke.      ASSESSMENT AND PLAN    Atrial fibrillation    Rate control method.  Currently taking diltiazem 180 mg daily.      She has spoken with Dr. Lindsey, myself and Dr. Cantrell regarding her afib with difficult to control rates. Today we " discussed there are no other medications I can provider her as she has a history of  not tolerating higher doses due to lightheadedness and soft blood pressure.  Digoxin made her nauseated and weak.  Beta blockers worsened her fatigue.  We discussed pursing an AV node ablation and ppm.  She continues to be unsure if she wants to proceed with AV node ablation and pacemaker.  She wants to know if it will help her chronic shortness of breath.  I told her it will likely help a little (Dr. Cantrell quoted her 20-60% improvement) and her diltiazem could then been discontinued.        We discussed the procedural risks of pacemaker implantation in detail and include such concerns as:  Internal bleeding, collapsed lung, localized bleeding and bruising, infection (immediate and long-term) and need to re-position pacemaker lead(s). We also reviewed use of moderate sedation and local anesthetic. Patient voices understanding of the reasons for recommending pacemaker placement and denies any questions at this time.     She is going to think about proceeding with AV node ablation and ppm and call the clinic to schedule.       For anticoagulation for CHADS VASC 6 (age++, TIA++, female, HTN) she is taking Eliquis 2.5 mg twice daily (due to age >80 and < 60kg)    Plan:  Follow up with Dr. Cantrell in 2/2021      Phone call duration: 15 minutes    SONAM Angeles CNP

## 2020-06-03 NOTE — PATIENT INSTRUCTIONS
If you have problems or questions please call the RNs (Jeanne Loera, & Shae) at [phone]     Please call to let us know when you want to have her AV node ablation and pacemaker.  It is an overnight stay.

## 2020-06-03 NOTE — LETTER
"6/3/2020    Birgit Cordero  [facility] [address]  [facility] [address]    RE: Mia Rodriguez       Dear Colleague,    I had the pleasure of seeing Mia Rodriguez in the [facility].    Mia Rodriguez is a 86 year old female who is being evaluated via a billable telephone visit.      Mia Rodriguez is a 86 year old female who is following up after calling the clinic regarding dizziness.   This visit is being conducted as a virtual visit due to the emphasis on mitigation of the COVID-19 virus pandemic. The clinician has decided that the risk of an in-office visit outweighs the benefit for this patient.  She is a patient of Dr. Lindsey and Dr. Cantrell. Her medical history includes      1. Permanent AFib  2. Benign Essential HTN  3. Coronary artery disease.  noted by calcification seen on Chest CT 3/2017. Follow up stress test 3/2017 was normal  4. Shortness of breath  5. TIA   6. COPD    In 7/2019, patient was seen for H&P for av node ablation an ppm, however she decided talk with her PCP and pulmonologist regarding etiology of shortness of breath. She had chronic LUNA with walking quickly and exertion and did not use her inhalers as \"they don't work anymore\".  Her daughter also stated patient had less stamina than in the past.    In 2/2020, she saw Dr. Cantrell with elevated HRs but not wanting to pursue an AV node ablation and ppm.      On 6/1/20 she called the clinc with concerns of increase fatigue and dizziness.  She continued to have LUNA.  Her BP and HR were 117/83, HR 94 on 5/30 and 113/80, HR 84 on 6/1/2020    Today she states her /85 prior to taking her medication and afterward her BP was 98/70 mmHg with HR of 80 bpm.  She states she continues to have shortness of breath with excursion.  She is isolating herself due to COVID 19 and would prefer not to come to the hosptial.   She denies chest pain or pressure, syncope, angina, palpitations, " orthopnea, PND, or edema. Her weight is down to 80# despite drinking Boost.   Reports taking medications as prescribed including Eliquis and denies bleeding, hematuria, melena, epistaxis and signs/symptoms of stroke.      ASSESSMENT AND PLAN    Atrial fibrillation    Rate control method.  Currently taking diltiazem 180 mg daily.      She has spoken with Dr. Lindsey, myself and Dr. Cantrell regarding her afib with difficult to control rates. Today we discussed there are no other medications I can provider her as she has a history of  not tolerating higher doses due to lightheadedness and soft blood pressure.  Digoxin made her nauseated and weak.  Beta blockers worsened her fatigue.  We discussed pursing an AV node ablation and ppm.  She continues to be unsure if she wants to proceed with AV node ablation and pacemaker.  She wants to know if it will help her chronic shortness of breath.  I told her it will likely help a little (Dr. Cantrell quoted her 20-60% improvement) and her diltiazem could then been discontinued.        We discussed the procedural risks of pacemaker implantation in detail and include such concerns as:  Internal bleeding, collapsed lung, localized bleeding and bruising, infection (immediate and long-term) and need to re-position pacemaker lead(s). We also reviewed use of moderate sedation and local anesthetic. Patient voices understanding of the reasons for recommending pacemaker placement and denies any questions at this time.     She is going to think about proceeding with AV node ablation and ppm and call the clinic to schedule.       For anticoagulation for CHADS VASC 6 (age++, TIA++, female, HTN) she is taking Eliquis 2.5 mg twice daily (due to age >80 and < 60kg)    Plan:  Follow up with Dr. Cantrell in 2/2021      Thank you for allowing me to participate in the care of your patient.    Sincerely,     SONAM [facility]

## 2020-06-04 NOTE — TELEPHONE ENCOUNTER
It seems to me that her diltiazem is properly adjusted based on her blood pressure and heart rate.  Discontinuing or decreasing her diltiazem would surely result in unacceptably high heart rates and might make her blood pressure lower.  I would recommend that she stay on the current dose.  One advantage of doing the AV node ablation and pacemaker implantation would be that she could likely discontinue the diltiazem.  However, without it, her heart rate and symptoms would be much worse.  Parish Cantrell MD

## 2020-06-15 DIAGNOSIS — E78.5 HYPERLIPIDEMIA LDL GOAL <100: ICD-10-CM

## 2020-06-15 RX ORDER — ATORVASTATIN CALCIUM 40 MG/1
20 TABLET, FILM COATED ORAL DAILY
Qty: 45 TABLET | Refills: 3 | Status: SHIPPED | OUTPATIENT
Start: 2020-06-15 | End: 2020-10-13 | Stop reason: DRUGHIGH

## 2020-06-19 ENCOUNTER — CARE COORDINATION (OUTPATIENT)
Dept: CARDIOLOGY | Facility: CLINIC | Age: 85
End: 2020-06-19

## 2020-06-19 DIAGNOSIS — I48.20 CHRONIC ATRIAL FIBRILLATION (H): ICD-10-CM

## 2020-06-19 RX ORDER — DILTIAZEM HYDROCHLORIDE 120 MG/1
120 CAPSULE, EXTENDED RELEASE ORAL DAILY
Qty: 30 CAPSULE | Refills: 0 | Status: SHIPPED | OUTPATIENT
Start: 2020-06-19 | End: 2020-06-29

## 2020-06-19 NOTE — PROGRESS NOTES
Call from Son Dhiraj Rodriguez called [phone] with concerns over patient blood pressures and intermittent lightheaded/dizzy spells over the last 2 to 3 weeks. Dhiraj reports patient is lighter in weight approx 88 to 90 lbs as noted in Epic. Recent home blood pressures reportedly consistently in the 95/80 range; today reported as 95/79 prior to taking diltiazem and heart rate 80's and today and had a dizzy spell. Son states patient declining AV node ablation and pacemaker at this time due to COVID-19 risk of exposure, she is self quarantined. Son asks if diltiazem 180 mg daily could be adjusted.     Discussed with son balance of heart rate control with atrial fibrillation and use of diltiazem and rationale for pacemaker w/AV node ablation. Reviewed going down on dosage of drug may increase rapid heart rates which could necessitate an Emergency Room visit if heart rates become too rapid. Reviewed past recommendations. Son states patient is clear she does not want to go out during COVID-19 and will not come to a hospital for procedure until risk is past, therefore son reports patient does not see procedure as an option at this time until COVID-19 risk lowers. Son states this has been an ongoing issue and aware he may not get an answer back today but next week.    Last office visit w/TARIQ Madelaine Pedersen NP 6/3/2020:  Today (at visit 6/3) she states her /85 prior to taking her medication and afterward her BP was 98/70 mmHg with HR of 80 bpm.  ASSESSMENT AND PLAN  Atrial fibrillation  Rate control method.  Currently taking diltiazem 180 mg daily.    --She has spoken with Dr. Lindsey, myself and Dr. Cantrell regarding her afib with difficult to control rates. Today we discussed there are no other medications I can provider her as she has a history of  not tolerating higher doses due to lightheadedness and soft blood pressure.  Digoxin made her nauseated and weak.  Beta blockers worsened her fatigue.  We discussed pursing an  AV node ablation and ppm.  She continues to be unsure if she wants to proceed with AV node ablation and pacemaker.  --She wants to know if it will help her chronic shortness of breath.  I told her it will likely help a little (Dr. Cantrell quoted her 20-60% improvement) and her diltiazem could then been discontinued.      We discussed the procedural risks of pacemaker implantation in detail and include such concerns as:  Internal bleeding, collapsed lung, localized bleeding and bruising, infection (immediate and long-term) and need to re-position pacemaker lead(s). --We also reviewed use of moderate sedation and local anesthetic. Patient voices understanding of the reasons for recommending pacemaker placement and denies any questions at this time.   She is going to think about proceeding with AV node ablation and ppm and call the clinic to schedule.     --For anticoagulation for CHADS VASC 6 (age++, TIA++, female, HTN) she is taking Eliquis 2.5 mg twice daily (due to age >80 and < 60kg)    On 6/1/20 she called the [facility] with concerns of increase fatigue and dizziness.  She continued to have LUNA.  Her BP and HR were 117/83, HR 94 on 5/30 and 113/80, HR 84 on 6/1/2020.    Telephone encounter response from DR Cantrell 6/1/2020:  It seems to me that her diltiazem is properly adjusted based on her blood pressure and heart rate.  Discontinuing or decreasing her diltiazem would surely result in unacceptably high heart rates and might make her blood pressure lower.  I would recommend that she stay on the current dose.  One advantage of doing the AV node ablation and pacemaker implantation would be that she could likely discontinue the diltiazem.  However, without it, her heart rate and symptoms would be much worse.  Parish Cantrell MD    In 2/2020, she saw Dr. Cantrell with elevated HRs but not wanting to pursue an AV node ablation and ppm.      Messaged to DR Cantrell.  Esme Durant RN 06/19/20 11:21  AM    ================================    Parish Cantrell MD  [facility] 7 18 minutes ago (4:17 PM)          I agree that dizzy spells are very concerning due to risk of falls. Let's try decreasing the diltiazem dose to 120 mg daily. EE        ================================    Called son and left message. Called to patient for local pharmacy - she was reluctant to have filled locally, reviewed rationale for local refill 30 day supply and if tolerating with send off Rx to [facility] for 90 day supply. Patient agrees to this and asks be sent to [facility] Marisela Noble. Patient agrees to call or have her son call on how she is tolerating decreased dose for long term Rx to be sent to mail order.  Esme Durant RN 06/19/20 4:43 PM

## 2020-06-19 NOTE — TELEPHONE ENCOUNTER
I agree that dizzy spells are very concerning due to risk of falls. Let's try decreasing the diltiazem dose to 120 mg daily. EE

## 2020-06-29 RX ORDER — DILTIAZEM HYDROCHLORIDE 120 MG/1
120 CAPSULE, EXTENDED RELEASE ORAL DAILY
Qty: 90 CAPSULE | Refills: 3 | Status: SHIPPED | OUTPATIENT
Start: 2020-06-29 | End: 2021-01-01

## 2020-06-29 NOTE — PROGRESS NOTES
6/29/2020 Called to patient for update on how she is doing on lower dose of dilitazem.  Patient states she is feeling much better and will call if concerns or questions in the future.  Mail order Rx sent.  Esme Durant RN 06/29/20 12:47 PM

## 2020-10-06 ENCOUNTER — HOSPITAL ENCOUNTER (INPATIENT)
Facility: CLINIC | Age: 85
LOS: 6 days | Discharge: NURSING, REHAB OR LONG-TERM CARE | DRG: 872 | End: 2020-10-12
Attending: EMERGENCY MEDICINE | Admitting: INTERNAL MEDICINE
Payer: MEDICARE

## 2020-10-06 ENCOUNTER — APPOINTMENT (OUTPATIENT)
Dept: CT IMAGING | Facility: CLINIC | Age: 85
DRG: 872 | End: 2020-10-06
Attending: EMERGENCY MEDICINE
Payer: MEDICARE

## 2020-10-06 DIAGNOSIS — R06.02 SHORTNESS OF BREATH: Primary | ICD-10-CM

## 2020-10-06 DIAGNOSIS — K57.32 SIGMOID DIVERTICULITIS: ICD-10-CM

## 2020-10-06 LAB
ALBUMIN SERPL-MCNC: 2.5 G/DL (ref 3.4–5)
ALBUMIN UR-MCNC: 30 MG/DL
ALP SERPL-CCNC: 55 U/L (ref 40–150)
ALT SERPL W P-5'-P-CCNC: 52 U/L (ref 0–50)
ANION GAP SERPL CALCULATED.3IONS-SCNC: 6 MMOL/L (ref 3–14)
APPEARANCE UR: CLEAR
AST SERPL W P-5'-P-CCNC: 53 U/L (ref 0–45)
BACTERIA #/AREA URNS HPF: ABNORMAL /HPF
BASOPHILS # BLD AUTO: 0 10E9/L (ref 0–0.2)
BASOPHILS NFR BLD AUTO: 0.3 %
BILIRUB SERPL-MCNC: 0.7 MG/DL (ref 0.2–1.3)
BILIRUB UR QL STRIP: NEGATIVE
BUN SERPL-MCNC: 26 MG/DL (ref 7–30)
CALCIUM SERPL-MCNC: 9.8 MG/DL (ref 8.5–10.1)
CHLORIDE SERPL-SCNC: 108 MMOL/L (ref 94–109)
CO2 SERPL-SCNC: 28 MMOL/L (ref 20–32)
COLOR UR AUTO: YELLOW
CREAT SERPL-MCNC: 0.87 MG/DL (ref 0.52–1.04)
DIFFERENTIAL METHOD BLD: ABNORMAL
EOSINOPHIL # BLD AUTO: 0 10E9/L (ref 0–0.7)
EOSINOPHIL NFR BLD AUTO: 0.3 %
ERYTHROCYTE [DISTWIDTH] IN BLOOD BY AUTOMATED COUNT: 13.1 % (ref 10–15)
GFR SERPL CREATININE-BSD FRML MDRD: 60 ML/MIN/{1.73_M2}
GLUCOSE SERPL-MCNC: 125 MG/DL (ref 70–99)
GLUCOSE UR STRIP-MCNC: NEGATIVE MG/DL
HCT VFR BLD AUTO: 47.7 % (ref 35–47)
HGB BLD-MCNC: 15.9 G/DL (ref 11.7–15.7)
HGB UR QL STRIP: ABNORMAL
HYALINE CASTS #/AREA URNS LPF: 15 /LPF (ref 0–2)
IMM GRANULOCYTES # BLD: 0.1 10E9/L (ref 0–0.4)
IMM GRANULOCYTES NFR BLD: 0.4 %
INTERPRETATION ECG - MUSE: NORMAL
KETONES UR STRIP-MCNC: NEGATIVE MG/DL
LACTATE BLD-SCNC: 2 MMOL/L (ref 0.7–2)
LEUKOCYTE ESTERASE UR QL STRIP: NEGATIVE
LIPASE SERPL-CCNC: 125 U/L (ref 73–393)
LYMPHOCYTES # BLD AUTO: 1 10E9/L (ref 0.8–5.3)
LYMPHOCYTES NFR BLD AUTO: 6.6 %
MCH RBC QN AUTO: 33.4 PG (ref 26.5–33)
MCHC RBC AUTO-ENTMCNC: 33.3 G/DL (ref 31.5–36.5)
MCV RBC AUTO: 100 FL (ref 78–100)
MONOCYTES # BLD AUTO: 0.5 10E9/L (ref 0–1.3)
MONOCYTES NFR BLD AUTO: 3.2 %
MUCOUS THREADS #/AREA URNS LPF: PRESENT /LPF
NEUTROPHILS # BLD AUTO: 12.8 10E9/L (ref 1.6–8.3)
NEUTROPHILS NFR BLD AUTO: 89.2 %
NITRATE UR QL: NEGATIVE
NRBC # BLD AUTO: 0 10*3/UL
NRBC BLD AUTO-RTO: 0 /100
PH UR STRIP: 5.5 PH (ref 5–7)
PLATELET # BLD AUTO: 201 10E9/L (ref 150–450)
POTASSIUM SERPL-SCNC: 4 MMOL/L (ref 3.4–5.3)
PROT SERPL-MCNC: 7 G/DL (ref 6.8–8.8)
RBC # BLD AUTO: 4.76 10E12/L (ref 3.8–5.2)
RBC #/AREA URNS AUTO: 18 /HPF (ref 0–2)
SODIUM SERPL-SCNC: 142 MMOL/L (ref 133–144)
SOURCE: ABNORMAL
SP GR UR STRIP: 1.02 (ref 1–1.03)
UROBILINOGEN UR STRIP-MCNC: NORMAL MG/DL (ref 0–2)
WBC # BLD AUTO: 14.4 10E9/L (ref 4–11)
WBC #/AREA URNS AUTO: 2 /HPF (ref 0–5)

## 2020-10-06 PROCEDURE — 80053 COMPREHEN METABOLIC PANEL: CPT | Performed by: EMERGENCY MEDICINE

## 2020-10-06 PROCEDURE — C9803 HOPD COVID-19 SPEC COLLECT: HCPCS

## 2020-10-06 PROCEDURE — 250N000011 HC RX IP 250 OP 636: Performed by: INTERNAL MEDICINE

## 2020-10-06 PROCEDURE — 99285 EMERGENCY DEPT VISIT HI MDM: CPT | Mod: 25

## 2020-10-06 PROCEDURE — 83605 ASSAY OF LACTIC ACID: CPT | Performed by: INTERNAL MEDICINE

## 2020-10-06 PROCEDURE — 87086 URINE CULTURE/COLONY COUNT: CPT | Performed by: EMERGENCY MEDICINE

## 2020-10-06 PROCEDURE — U0003 INFECTIOUS AGENT DETECTION BY NUCLEIC ACID (DNA OR RNA); SEVERE ACUTE RESPIRATORY SYNDROME CORONAVIRUS 2 (SARS-COV-2) (CORONAVIRUS DISEASE [COVID-19]), AMPLIFIED PROBE TECHNIQUE, MAKING USE OF HIGH THROUGHPUT TECHNOLOGIES AS DESCRIBED BY CMS-2020-01-R: HCPCS | Performed by: EMERGENCY MEDICINE

## 2020-10-06 PROCEDURE — 250N000013 HC RX MED GY IP 250 OP 250 PS 637: Performed by: INTERNAL MEDICINE

## 2020-10-06 PROCEDURE — 250N000009 HC RX 250: Performed by: EMERGENCY MEDICINE

## 2020-10-06 PROCEDURE — 258N000003 HC RX IP 258 OP 636: Performed by: INTERNAL MEDICINE

## 2020-10-06 PROCEDURE — 250N000011 HC RX IP 250 OP 636: Performed by: EMERGENCY MEDICINE

## 2020-10-06 PROCEDURE — 81001 URINALYSIS AUTO W/SCOPE: CPT | Performed by: EMERGENCY MEDICINE

## 2020-10-06 PROCEDURE — 71260 CT THORAX DX C+: CPT

## 2020-10-06 PROCEDURE — 120N000001 HC R&B MED SURG/OB

## 2020-10-06 PROCEDURE — 99223 1ST HOSP IP/OBS HIGH 75: CPT | Mod: AI | Performed by: INTERNAL MEDICINE

## 2020-10-06 PROCEDURE — 96361 HYDRATE IV INFUSION ADD-ON: CPT

## 2020-10-06 PROCEDURE — 83605 ASSAY OF LACTIC ACID: CPT | Performed by: EMERGENCY MEDICINE

## 2020-10-06 PROCEDURE — 87040 BLOOD CULTURE FOR BACTERIA: CPT | Performed by: EMERGENCY MEDICINE

## 2020-10-06 PROCEDURE — 85025 COMPLETE CBC W/AUTO DIFF WBC: CPT | Performed by: EMERGENCY MEDICINE

## 2020-10-06 PROCEDURE — 83690 ASSAY OF LIPASE: CPT | Performed by: EMERGENCY MEDICINE

## 2020-10-06 PROCEDURE — 258N000003 HC RX IP 258 OP 636: Performed by: EMERGENCY MEDICINE

## 2020-10-06 PROCEDURE — 96365 THER/PROPH/DIAG IV INF INIT: CPT

## 2020-10-06 PROCEDURE — 93005 ELECTROCARDIOGRAM TRACING: CPT

## 2020-10-06 RX ORDER — LIDOCAINE 40 MG/G
CREAM TOPICAL
Status: DISCONTINUED | OUTPATIENT
Start: 2020-10-06 | End: 2020-10-12 | Stop reason: HOSPADM

## 2020-10-06 RX ORDER — SODIUM CHLORIDE 9 MG/ML
INJECTION, SOLUTION INTRAVENOUS CONTINUOUS
Status: DISCONTINUED | OUTPATIENT
Start: 2020-10-06 | End: 2020-10-07

## 2020-10-06 RX ORDER — NALOXONE HYDROCHLORIDE 0.4 MG/ML
.1-.4 INJECTION, SOLUTION INTRAMUSCULAR; INTRAVENOUS; SUBCUTANEOUS
Status: DISCONTINUED | OUTPATIENT
Start: 2020-10-06 | End: 2020-10-12 | Stop reason: HOSPADM

## 2020-10-06 RX ORDER — ACETAMINOPHEN 325 MG/1
650 TABLET ORAL EVERY 4 HOURS PRN
Status: DISCONTINUED | OUTPATIENT
Start: 2020-10-06 | End: 2020-10-06 | Stop reason: DRUGHIGH

## 2020-10-06 RX ORDER — IOPAMIDOL 755 MG/ML
40 INJECTION, SOLUTION INTRAVASCULAR ONCE
Status: COMPLETED | OUTPATIENT
Start: 2020-10-06 | End: 2020-10-06

## 2020-10-06 RX ORDER — PIPERACILLIN SODIUM, TAZOBACTAM SODIUM 3; .375 G/15ML; G/15ML
3.38 INJECTION, POWDER, LYOPHILIZED, FOR SOLUTION INTRAVENOUS ONCE
Status: COMPLETED | OUTPATIENT
Start: 2020-10-06 | End: 2020-10-06

## 2020-10-06 RX ORDER — ALBUTEROL SULFATE 90 UG/1
2 AEROSOL, METERED RESPIRATORY (INHALATION) EVERY 6 HOURS PRN
Status: DISCONTINUED | OUTPATIENT
Start: 2020-10-06 | End: 2020-10-12 | Stop reason: HOSPADM

## 2020-10-06 RX ORDER — METOPROLOL TARTRATE 1 MG/ML
2.5 INJECTION, SOLUTION INTRAVENOUS EVERY 4 HOURS PRN
Status: DISCONTINUED | OUTPATIENT
Start: 2020-10-06 | End: 2020-10-12 | Stop reason: HOSPADM

## 2020-10-06 RX ORDER — HYDROCODONE BITARTRATE AND ACETAMINOPHEN 5; 325 MG/1; MG/1
1 TABLET ORAL EVERY 4 HOURS PRN
Status: DISCONTINUED | OUTPATIENT
Start: 2020-10-06 | End: 2020-10-12 | Stop reason: HOSPADM

## 2020-10-06 RX ORDER — HYDROMORPHONE HYDROCHLORIDE 1 MG/ML
0.2 INJECTION, SOLUTION INTRAMUSCULAR; INTRAVENOUS; SUBCUTANEOUS
Status: DISCONTINUED | OUTPATIENT
Start: 2020-10-06 | End: 2020-10-12 | Stop reason: HOSPADM

## 2020-10-06 RX ORDER — POTASSIUM CHLORIDE 1500 MG/1
20-40 TABLET, EXTENDED RELEASE ORAL
Status: DISCONTINUED | OUTPATIENT
Start: 2020-10-06 | End: 2020-10-06

## 2020-10-06 RX ORDER — PROCHLORPERAZINE 25 MG
12.5 SUPPOSITORY, RECTAL RECTAL EVERY 12 HOURS PRN
Status: DISCONTINUED | OUTPATIENT
Start: 2020-10-06 | End: 2020-10-12 | Stop reason: HOSPADM

## 2020-10-06 RX ORDER — HYDROCODONE BITARTRATE AND ACETAMINOPHEN 5; 325 MG/1; MG/1
1-2 TABLET ORAL EVERY 4 HOURS PRN
Status: DISCONTINUED | OUTPATIENT
Start: 2020-10-06 | End: 2020-10-06

## 2020-10-06 RX ORDER — DILTIAZEM HYDROCHLORIDE 120 MG/1
120 CAPSULE, COATED, EXTENDED RELEASE ORAL DAILY
Status: DISCONTINUED | OUTPATIENT
Start: 2020-10-07 | End: 2020-10-12 | Stop reason: HOSPADM

## 2020-10-06 RX ORDER — POTASSIUM CHLORIDE 7.45 MG/ML
10 INJECTION INTRAVENOUS
Status: DISCONTINUED | OUTPATIENT
Start: 2020-10-06 | End: 2020-10-06

## 2020-10-06 RX ORDER — PIPERACILLIN SODIUM, TAZOBACTAM SODIUM 2; .25 G/10ML; G/10ML
2.25 INJECTION, POWDER, LYOPHILIZED, FOR SOLUTION INTRAVENOUS EVERY 6 HOURS
Status: DISCONTINUED | OUTPATIENT
Start: 2020-10-06 | End: 2020-10-10

## 2020-10-06 RX ORDER — POTASSIUM CHLORIDE 1.5 G/1.58G
20-40 POWDER, FOR SOLUTION ORAL
Status: DISCONTINUED | OUTPATIENT
Start: 2020-10-06 | End: 2020-10-06

## 2020-10-06 RX ORDER — ONDANSETRON 4 MG/1
4 TABLET, ORALLY DISINTEGRATING ORAL EVERY 6 HOURS PRN
Status: DISCONTINUED | OUTPATIENT
Start: 2020-10-06 | End: 2020-10-12 | Stop reason: HOSPADM

## 2020-10-06 RX ORDER — POTASSIUM CHLORIDE 29.8 MG/ML
20 INJECTION INTRAVENOUS
Status: DISCONTINUED | OUTPATIENT
Start: 2020-10-06 | End: 2020-10-06

## 2020-10-06 RX ORDER — ONDANSETRON 2 MG/ML
4 INJECTION INTRAMUSCULAR; INTRAVENOUS EVERY 6 HOURS PRN
Status: DISCONTINUED | OUTPATIENT
Start: 2020-10-06 | End: 2020-10-12 | Stop reason: HOSPADM

## 2020-10-06 RX ORDER — MAGNESIUM SULFATE HEPTAHYDRATE 40 MG/ML
4 INJECTION, SOLUTION INTRAVENOUS EVERY 4 HOURS PRN
Status: DISCONTINUED | OUTPATIENT
Start: 2020-10-06 | End: 2020-10-06

## 2020-10-06 RX ORDER — POTASSIUM CL/LIDO/0.9 % NACL 10MEQ/0.1L
10 INTRAVENOUS SOLUTION, PIGGYBACK (ML) INTRAVENOUS
Status: DISCONTINUED | OUTPATIENT
Start: 2020-10-06 | End: 2020-10-06

## 2020-10-06 RX ORDER — ACETAMINOPHEN 500 MG
500 TABLET ORAL EVERY 4 HOURS PRN
Status: DISCONTINUED | OUTPATIENT
Start: 2020-10-06 | End: 2020-10-12 | Stop reason: HOSPADM

## 2020-10-06 RX ORDER — PROCHLORPERAZINE MALEATE 5 MG
5 TABLET ORAL EVERY 6 HOURS PRN
Status: DISCONTINUED | OUTPATIENT
Start: 2020-10-06 | End: 2020-10-12 | Stop reason: HOSPADM

## 2020-10-06 RX ADMIN — APIXABAN 2.5 MG: 2.5 TABLET, FILM COATED ORAL at 21:39

## 2020-10-06 RX ADMIN — PIPERACILLIN AND TAZOBACTAM 2.25 G: 2; .25 INJECTION, POWDER, FOR SOLUTION INTRAVENOUS at 21:40

## 2020-10-06 RX ADMIN — SODIUM CHLORIDE 1000 ML: 9 INJECTION, SOLUTION INTRAVENOUS at 14:32

## 2020-10-06 RX ADMIN — SODIUM CHLORIDE 60 ML: 9 INJECTION, SOLUTION INTRAVENOUS at 15:45

## 2020-10-06 RX ADMIN — IOPAMIDOL 40 ML: 755 INJECTION, SOLUTION INTRAVENOUS at 15:44

## 2020-10-06 RX ADMIN — PIPERACILLIN SODIUM AND TAZOBACTAM SODIUM 3.38 G: 3; .375 INJECTION, POWDER, LYOPHILIZED, FOR SOLUTION INTRAVENOUS at 16:26

## 2020-10-06 RX ADMIN — SODIUM CHLORIDE: 9 INJECTION, SOLUTION INTRAVENOUS at 19:50

## 2020-10-06 RX ADMIN — ACETAMINOPHEN 500 MG: 500 TABLET, FILM COATED ORAL at 23:32

## 2020-10-06 ASSESSMENT — ENCOUNTER SYMPTOMS
ROS GI COMMENTS: NEGATIVE FOR BLACK STOOL
APPETITE CHANGE: 1
FEVER: 0
FREQUENCY: 1
BLOOD IN STOOL: 0
WEAKNESS: 1
MYALGIAS: 0
SHORTNESS OF BREATH: 0
DIARRHEA: 1
NAUSEA: 1

## 2020-10-06 ASSESSMENT — MIFFLIN-ST. JEOR: SCORE: 724.38

## 2020-10-06 ASSESSMENT — ACTIVITIES OF DAILY LIVING (ADL): ADLS_ACUITY_SCORE: 13

## 2020-10-06 NOTE — ED NOTES
[facility]  ED Nurse Handoff Report    ED Chief complaint: Abdominal Pain      ED Diagnosis:   Final diagnoses:   Sigmoid diverticulitis       Code Status: Not addressed in ED    Allergies:   Allergies   Allergen Reactions     Digoxin Other (See Comments)     Weakness, SOB     Megace [Megestrol Acetate]      Increased LFTs         Patient Story: Abd pain for one week  Focused Assessment:  Pain tolerable, pt alert and oriented times four, no CP or SOB    Treatments and/or interventions provided: Zosyn, 2000ml NS bolus infusing, CT shows Severe sigmoid diverticulitis.  Patient's response to treatments and/or interventions: VSS    To be done/followed up on inpatient unit:  None currently    Does this patient have any cognitive concerns?: none    Activity level - Baseline/Home:  Independent  Activity Level - Current:   Stand with Assist    Patient's Preferred language: English   Needed?: No    Isolation: None  Infection: Not Applicable  Bariatric?: No    Vital Signs:   Vitals:    10/06/20 1400 10/06/20 1448 10/06/20 1500 10/06/20 1600   BP: 119/88  (!) 151/105 (!) 147/76   Pulse: 107 105 120 97   Resp: 28 14 (!) 32 (!) 34   Temp:       TempSrc:       SpO2: 95% 96% 97% 96%   Weight:       Height:           Cardiac Rhythm:     Was the PSS-3 completed:   Yes  What interventions are required if any?               Family Comments: none at bedside  OBS brochure/video discussed/provided to patient/family: N/A              Name of person given brochure if not patient: na              Relationship to patient: na    For the majority of the shift this patient's behavior was Green.   Behavioral interventions performed were none.    ED NURSE PHONE NUMBER: [phone]

## 2020-10-06 NOTE — PHARMACY-ADMISSION MEDICATION HISTORY
Pharmacy Medication History  Admission medication history interview status for the 10/6/2020  admission is complete. See EPIC admission navigator for prior to admission medications       Medication history sources: Patient and Surescripts  Location of interview: {Phone,   Medication history source reliability: Good  Adherence assessment: Good    Significant changes made to the medication list:  -Deleted synthroid 25 mcg, taking 1/2 tablet daily. Pt says no longer taking it.      Additional medication history information:       Medication reconciliation completed by provider prior to medication history? No    Time spent in this activity: 12 min      Prior to Admission medications    Medication Sig Last Dose Taking? Auth Provider   apixaban ANTICOAGULANT (ELIQUIS) 2.5 MG tablet Take 1 tablet (2.5 mg) by mouth 2 times daily 10/6/2020 at am Yes Parish Cantrell MD   atorvastatin (LIPITOR) 40 MG tablet Take 0.5 tablets (20 mg) by mouth daily  Patient taking differently: Take 20 mg by mouth At Bedtime  10/5/2020 at Unknown time Yes Madelaine Edouard APRN CNP   diltiazem ER (DILT-XR) 120 MG 24 hr capsule Take 1 capsule (120 mg) by mouth daily 10/6/2020 at Unknown time Yes Parish Cantrell MD   denosumab (PROLIA) 60 MG/ML SOLN Inject 60 mg Subcutaneous. q 6 months   Reported, Patient

## 2020-10-06 NOTE — PROGRESS NOTES
RECEIVING UNIT ED HANDOFF REVIEW    ED Nurse Handoff Report was reviewed by: Ashley Dickson RN on October 6, 2020 at 5:32 PM

## 2020-10-06 NOTE — ED TRIAGE NOTES
Pt presents to the ED via EMS for evaluation of abdominal pain. Pt reports 1 week of abdominal pain. Per EMS, pt reports feeling like their is a mass in her abdomen. Pt reports diarrhea and chills but, denies vomiting and nausea. Per EMS, pt has a hx of AFIB.

## 2020-10-06 NOTE — H&P
Admitted:     10/06/2020      PRIMARY CARE PHYSICIAN:  Birgit Cordero MD      CODE STATUS:  Full code.      CHIEF COMPLAINT:  Abdominal pain.      HISTORY OF PRESENT ILLNESS:  Ms. Rodriguez is an 86-year-old female with a past medical history significant for COPD, coronary artery disease, previous stroke, hypothyroidism, who presents to the Emergency Department with the above concerns.  History is obtained through discussion with the ED physician as well as the patient.  The patient notes that she has been having abdominal pain for approximately 1 week, which has been increasing on a fairly regular basis.  The patient states that she gets pain in the bilateral lower quadrants of her abdomen which seem to be worse after eating.  She has had nausea that has been getting worse over the past day or 2 and generally has a poor appetite, but has not significantly been eating or drinking over the past day or 2.  She has not had any fevers but had a bit of chills earlier today.  She states she has been strictly social isolating in her house and has not left since March.  She has no sick contacts and no exposures to COVID.  She does have an occasional cough which is typical for her because of her COPD and postnasal drip.  Her cough is not productive.  She is not short of breath and she has not had any increasing respiratory symptoms.  The patient has been taking all of her medications diligently.  She has not noted any blood in her stool or urine.  She notes that she had some loose stool earlier today.  She has not had any blood or discharge from her vagina.  She has never before had diverticulitis.  She was a smoker for about 30 years but quit smoking in 1989.  Again, no new respiratory symptoms.      In the Emergency Department, the patient did have an abdominal and chest CT showing evidence of severe sigmoid diverticulitis and a new spiculated lung nodule in the lingula.  She does not recall being told she ever had a lung  mass in the past.  She does have a left shift and blood cultures were obtained.  She has been started on Zosyn for likely diverticulitis.      PAST MEDICAL HISTORY:   1.  Coronary artery disease.   2.  COPD.   3.  Atrial fibrillation.   4.  Previous stroke.   5.  Kidney stones.   6.  Hypothyroidism.   7.  Right breast cancer.   8.  Osteoporosis.   9.  Uterine fibroids.   10.  Hypertension.      MEDICATIONS:    Facility-Administered Medications Prior to Admission   Medication Dose Route Frequency Provider Last Rate Last Dose     [DISCONTINUED] denosumab (PROLIA) injection 60 mg  60 mg Subcutaneous Q6 Months Jhon Randle MD   60 mg at 02/20/20 1458     Medications Prior to Admission   Medication Sig Dispense Refill Last Dose     apixaban ANTICOAGULANT (ELIQUIS) 2.5 MG tablet Take 1 tablet (2.5 mg) by mouth 2 times daily 180 tablet 3 10/6/2020 at am     atorvastatin (LIPITOR) 40 MG tablet Take 0.5 tablets (20 mg) by mouth daily (Patient taking differently: Take 20 mg by mouth At Bedtime ) 45 tablet 3 10/5/2020 at Unknown time     diltiazem ER (DILT-XR) 120 MG 24 hr capsule Take 1 capsule (120 mg) by mouth daily 90 capsule 3 10/6/2020 at Unknown time     denosumab (PROLIA) 60 MG/ML SOLN Inject 60 mg Subcutaneous. q 6 months              SOCIAL HISTORY:  The patient quit smoking in 1989.  Denies any use of alcohol.  She has a 30-pack-year history of smoking.      FAMILY HISTORY:  Mother had Alzheimer disease and father had heart disease and stroke.      ALLERGIES:  DIGOXIN AND MEGACE.      REVIEW OF SYSTEMS:  The complete review of systems reviewed and negative, save for the pertinent positives recorded in HPI.      PHYSICAL EXAMINATION:   VITAL SIGNS:  Blood pressure of 147/76, heart rate 97, respirations 20, saturating 96% on room air, temperature 96.5 degrees Fahrenheit.   GENERAL:  The patient is sitting in bed and appears comfortable.   HEENT:  Pupils equal and reactive to light, extraocular muscle function  intact.  No scleral icterus.  Oropharynx is clear.   NECK:  No lymphadenopathy or thyromegaly.   CARDIOVASCULAR:  Irregular, but rate is controlled.  No appreciable murmur, rub or gallop.   PULMONARY:  Lungs are clear to auscultation bilaterally without wheeze or rhonchi.   GASTROINTESTINAL:  Positive bowel sounds, soft, nontender and nondistended.   SKIN:  No rashes or lesions.   LYMPHATICS:  No peripheral edema.   PSYCHIATRIC:  Alert and oriented x3, normal affect.   NEUROLOGIC:  Cranial nerves II-XII are grossly intact.  No focal deficits.      LABORATORIES:  BMP and LFTs are unremarkable save for mildly elevated transaminases.  WBC count is 14.4, hemoglobin 15.9, platelets of 201,000.  Blood and urine cultures are pending.      CT of the chest and abdomen shows several bilateral intrarenal stones and severe sigmoid diverticulitis.  The region of inflammation at this site contacts the posterior wall of the urinary bladder and also encroaches upon the vaginal cavity.  Fistulas are not excluded.  There is a spiculated lung nodule in the lingula at 1.2 cm and a few other lung nodules of 0.2 cm in the left upper lobe, slightly more prominent than 2017 when it was 0.1 cm.      EKG read by me personally shows atrial fibrillation.      ASSESSMENT AND PLAN:  Ms. Rodriguez is an 86-year-old female with a past medical history significant for COPD, coronary artery disease, atrial fibrillation, hypothyroidism, who presents to the Emergency Department with abdominal pain and was found to have diverticulitis.   1.  Severe, acute sigmoid diverticulitis:  The severe inflammation abuts some of the urinary bladder and the vagina and while the patient does not have any symptoms of a fistula such as bleeding or drainage, a fistula cannot be excluded.  I am going to continue her on Zosyn and have her n.p.o. for now and we will hydrate with normal saline.  Given the severe nature, although I do note that she actually feels well at this  point, I will consult Colorectal Surgery to see if they have any other recommendations.   2.  Lingular, spiculated lung nodule:  Given her smoking history and previous breast cancer, this would be concerning for malignancy.  The patient sounds like she would consider workup for it, but is not very enthusiastic about any sort of treatments.  PET CT would likely be the next step.  I could consider referral to the Oncology Clinic.   3.  Atrial fibrillation with rapid ventricular response:  Rate is now controlled after getting IV fluids and so will continue with her PTA regimen of Delta and apixaban.  I will have IV metoprolol available if rate is greater than 120.  She will be monitored on telemetry.   4.  Hypothyroidism:  We will continue with levothyroxine.   5.  Chronic kidney disease with bilateral kidney stones:  Creatinine appears to be at her baseline of roughly 0.9.  I will be hydrating given n.p.o. status as above and will monitor BMP periodically.   6.  Chronic obstructive pulmonary disease:  Respiratory status is at baseline.  P.r.n. albuterol inhaler will be available.   7.  Deep venous thrombosis prophylaxis:  She is on apixaban.   8.  Disposition:  I expect at least 2-3 nights in the hospital for IV antibiotics and to gradually advance p.o., so will be an inpatient.         TANIA REY DO             D: 10/06/2020   T: 10/06/2020   MT: EVA      Name:     ROSALIND BERNABE   MRN:      [id number]        Account:      [id number]   :      1934        Admitted:     10/06/2020                   Document: [id number]       cc: Birgit Cordero MD

## 2020-10-06 NOTE — ED PROVIDER NOTES
"History     Chief Complaint:  Abdominal Pain       The history is provided by the patient.     Mia Rodriguez is a 86 year old female currently on Eliquis for atrial fibrillation with a history of COPD, CAD, hypertension, nephrolithiasis, hypothyroidism, TIA, and breast cancer among others listed below who presents for evaluation of abdominal pain that has been ongoing for approximately one week. The patient reports that she has been having intermittent pain of her lower abdomen, sometimes on the right and sometimes on the left side. She began to develop chills this morning and notes increased cough over the past week. She notes increased frequency of urination. She reports two episodes of diarrhea this morning and felt nauseous after her second episode of diarrhea. She has also had increased frequency of urination and She has been drinking water regularly, but states she has lost her appetite so has not been eating much recently. Ultimately, she has felt \"very weak\" and so decided to present today. Here, she denies any fever, black or bloody stool, new shortness of breath, myalgias, chest pain, or previous similar abdominal pain. She has no history of diverticulitis. She denies any known exposure to COVID-19 and states she has stayed in her house since March. Her son visits her and she orders her groceries to her home.     Allergies:  Digoxin  Megestrol acetate     Medications:   Eliquis  Atorvastatin  Denosumab  Diltiazem  Levothyroxine  Levalbuterol inhaler    Medical History:   Arrhythmia  Atrial fibrillation  Breast cancer  COPD  COPD exacerbations  CAD  Hypertension  Malignant neoplasm of breast  Nephrolithiasis  Hypothyroidism  TIA  Uterine fibroid  Obstructive nephropathy  Osteoporosis     Surgical History   Breast biopsy  Right breast biopsy  Right breast lumpectomy  Cystoscopy, dilate urethra combined  Cystoscopy, retrogrades, extract stone combined  Excise lesion LIP  Hysterectomy with ovaries " intact  Laser holmium lithotripsy ureter, insert stent, combined x2    Family History:   Father -  CAD, cerebrovascular disease  Mother -  Alzheimer disease, osteoporosis     Social History:  The patient was accompanied to the ED by EMS.  Smoking Status: Former - 1989  Smokeless Tobacco: Never  Alcohol Use: No  Drug Use: No   Marital Status: Single  PCP: Birgit Cordero        Review of Systems   Constitutional: Positive for appetite change. Negative for fever.   Respiratory: Negative for shortness of breath.    Cardiovascular: Negative for chest pain.   Gastrointestinal: Positive for diarrhea and nausea. Negative for blood in stool.        Negative for black stool   Genitourinary: Positive for frequency.   Musculoskeletal: Negative for myalgias.   Neurological: Positive for weakness.   All other systems reviewed and are negative.    Physical Exam     Patient Vitals for the past 24 hrs:   BP Temp Temp src Pulse Resp SpO2 Height Weight   10/06/20 1254 (!) 119/97 96.5  F (35.8  C) Temporal 126 26 95 % 1.524 m (5') 36.3 kg (80 lb)          Physical Exam  Nursing note and vitals reviewed.  Constitutional:  Appears well-developed and well-nourished.   HENT:   Head:    Atraumatic.   Mouth/Throat:   Oropharynx is clear. No oropharyngeal exudate. Dry mucosal membranes.   Eyes:    Pupils are equal, round, and reactive to light.   Neck:    Normal range of motion. Neck supple.      No tracheal deviation present. No thyromegaly present.   Cardiovascular:  Normal rate, regular rhythm, no murmur   Pulmonary/Chest: Breath sounds are clear and equal without wheezes or crackles.  Abdominal:   Soft. Bowel sounds are normal. Exhibits no distension and      no mass. There is no tenderness.      There is no rebound and no guarding.   Musculoskeletal:  Exhibits no edema.   Lymphadenopathy:  No cervical adenopathy.   Neurological:   Alert and oriented to person, place, and time.   Skin:    Skin is warm and dry. No rash noted. No pallor.       Emergency Department Course     ECG:  ECG taken at 1258, ECG read at 1309  Atrial fibrillation with rapid ventricular response  ST & T wave abnormality, consider inferolateral ischemia  Abnormal ECG  Rate 120 bpm. MN interval * ms. QRS duration 70 ms. QT/QTc 306/432 ms. P-R-T axes * 72 268.    Imaging:  Radiology results were communicated with the patient who voiced understanding of the findings.    CT Chest/Abdomen/Pelvis w Contrast:  IMPRESSION:   1.  Severe sigmoid diverticulitis. A region of inflammation at this   site contacts the posterior wall of the urinary bladder and also   encroaches upon the vaginal cavity. Fistulas developing between these   sites is not excluded. No abscess identified.   2.  Emphysema.   3.  Coronary artery and other vascular calcifications.   4.  New or better visualized spiculated nodule at the lingula is   noted. There is additional nodularity seen in this region. Neoplasm is   a possibility and further oncologic workup is recommended. A PET/CT   could provide further assessment. Surveillance CT chest in three   months recommended.   5.  Multiple bilateral intrarenal stones, but no hydronephrosis or   ureter stone visualized.  Reading per radiology.    Laboratory:  Laboratory findings were communicated with the patient who voiced understanding of the findings.    CBC: WBC 14.4 (H), HGB 15.9 (H),   CMP: Glucose 125 (H), GFR 60 (L), Albumin 2.5 (L), ALT 52 (H), AST 53 (H), (Creatinine 0.87) o/w WNL   Lipase: 125   Lactic Acid (Resulted 1446): 2.0   Blood Culture x2: Pending    UA with Microscopic: Blood small (A), Protein Albumin 30 (A), RBC/HPF 18 (H), Bacteria few (A), Mucous present (A), Hyaline casts 15 (H), o/w WNL   Urine culture: Pending    Asymptomatic COVID-19 (Coronavirus) PCR by Nasopharyngeal Swab: Pending     Interventions:   1432 Normal Saline 1000 mL IV   1626 Zosyn 3.375 g IV    Emergency Department Course:    Nursing notes and vitals reviewed.    1258 EKG  obtained as noted above.     1313 I performed an exam of the patient as documented above.     1331 IV was inserted and blood was drawn for laboratory testing, results above.    1423 The patient provided a urine sample here in the emergency department. This was sent for laboratory testing, findings above.     1540 The patient was sent for CT while in the emergency department, results above.     The patient's nose was swabbed and this sample was sent for COVID testing, findings above.      1634  I consulted with Dr. Clancy of the hospitalist services. They are in agreement to accept the patient for admission. Findings and plan explained to the Patient who consents to admission. Discussed the patient with Dr. Clancy, who will admit the patient to a bed for further monitoring, evaluation, and treatment.    Impression & Plan       Medical Decision Making:  I found the patient to have CT evidence of severe diverticulitis without definite abscess formation.  I am concerned for possible early sepsis, therefore she was admitted to the care under the hospitalist for IV antibiotics.  I do not have suspicion for COVID-19.    Diagnosis:     ICD-10-CM    1. Sigmoid diverticulitis  K57.32         Disposition:  Admitted to Dr. Jimenez.    Scribe Disclosure:  I, Cordelia Powers, am serving as a scribe at 1:10 PM on 10/6/2020 to document services personally performed by Malka Paniagua MD based on my observations and the provider's statements to me.      Malka Paniagua MD  10/06/20 4195       Malka Paniagua MD  10/06/20 5265

## 2020-10-07 LAB
ANION GAP SERPL CALCULATED.3IONS-SCNC: 7 MMOL/L (ref 3–14)
BACTERIA SPEC CULT: NO GROWTH
BUN SERPL-MCNC: 14 MG/DL (ref 7–30)
CALCIUM SERPL-MCNC: 8.1 MG/DL (ref 8.5–10.1)
CHLORIDE SERPL-SCNC: 116 MMOL/L (ref 94–109)
CO2 SERPL-SCNC: 22 MMOL/L (ref 20–32)
CREAT SERPL-MCNC: 0.7 MG/DL (ref 0.52–1.04)
CRP SERPL-MCNC: 49.6 MG/L (ref 0–8)
ERYTHROCYTE [DISTWIDTH] IN BLOOD BY AUTOMATED COUNT: 13.3 % (ref 10–15)
GFR SERPL CREATININE-BSD FRML MDRD: 78 ML/MIN/{1.73_M2}
GLUCOSE BLDC GLUCOMTR-MCNC: 110 MG/DL (ref 70–99)
GLUCOSE SERPL-MCNC: 58 MG/DL (ref 70–99)
HCT VFR BLD AUTO: 40.2 % (ref 35–47)
HGB BLD-MCNC: 13.2 G/DL (ref 11.7–15.7)
LACTATE BLD-SCNC: 1.6 MMOL/L (ref 0.7–2)
MCH RBC QN AUTO: 32.5 PG (ref 26.5–33)
MCHC RBC AUTO-ENTMCNC: 32.8 G/DL (ref 31.5–36.5)
MCV RBC AUTO: 99 FL (ref 78–100)
PLATELET # BLD AUTO: 176 10E9/L (ref 150–450)
POTASSIUM SERPL-SCNC: 3.2 MMOL/L (ref 3.4–5.3)
POTASSIUM SERPL-SCNC: 4.2 MMOL/L (ref 3.4–5.3)
RBC # BLD AUTO: 4.06 10E12/L (ref 3.8–5.2)
SARS-COV-2 RNA SPEC QL NAA+PROBE: NOT DETECTED
SODIUM SERPL-SCNC: 145 MMOL/L (ref 133–144)
SPECIMEN SOURCE: NORMAL
SPECIMEN SOURCE: NORMAL
WBC # BLD AUTO: 13 10E9/L (ref 4–11)

## 2020-10-07 PROCEDURE — 80048 BASIC METABOLIC PNL TOTAL CA: CPT | Performed by: INTERNAL MEDICINE

## 2020-10-07 PROCEDURE — 99233 SBSQ HOSP IP/OBS HIGH 50: CPT | Performed by: HOSPITALIST

## 2020-10-07 PROCEDURE — 250N000011 HC RX IP 250 OP 636: Performed by: HOSPITALIST

## 2020-10-07 PROCEDURE — 84132 ASSAY OF SERUM POTASSIUM: CPT | Performed by: HOSPITALIST

## 2020-10-07 PROCEDURE — 120N000001 HC R&B MED SURG/OB

## 2020-10-07 PROCEDURE — 250N000011 HC RX IP 250 OP 636: Performed by: INTERNAL MEDICINE

## 2020-10-07 PROCEDURE — 86140 C-REACTIVE PROTEIN: CPT | Performed by: INTERNAL MEDICINE

## 2020-10-07 PROCEDURE — 250N000013 HC RX MED GY IP 250 OP 250 PS 637: Performed by: HOSPITALIST

## 2020-10-07 PROCEDURE — 36415 COLL VENOUS BLD VENIPUNCTURE: CPT | Performed by: HOSPITALIST

## 2020-10-07 PROCEDURE — 258N000003 HC RX IP 258 OP 636: Performed by: INTERNAL MEDICINE

## 2020-10-07 PROCEDURE — 36415 COLL VENOUS BLD VENIPUNCTURE: CPT | Performed by: INTERNAL MEDICINE

## 2020-10-07 PROCEDURE — 999N001017 HC STATISTIC GLUCOSE BY METER IP

## 2020-10-07 PROCEDURE — 85027 COMPLETE CBC AUTOMATED: CPT | Performed by: INTERNAL MEDICINE

## 2020-10-07 PROCEDURE — 250N000013 HC RX MED GY IP 250 OP 250 PS 637: Performed by: INTERNAL MEDICINE

## 2020-10-07 RX ORDER — SODIUM CHLORIDE AND POTASSIUM CHLORIDE 150; 450 MG/100ML; MG/100ML
INJECTION, SOLUTION INTRAVENOUS CONTINUOUS
Status: DISCONTINUED | OUTPATIENT
Start: 2020-10-07 | End: 2020-10-09

## 2020-10-07 RX ORDER — POTASSIUM CHLORIDE 1.5 G/1.58G
20-40 POWDER, FOR SOLUTION ORAL
Status: DISCONTINUED | OUTPATIENT
Start: 2020-10-07 | End: 2020-10-12 | Stop reason: HOSPADM

## 2020-10-07 RX ORDER — POTASSIUM CHLORIDE 29.8 MG/ML
20 INJECTION INTRAVENOUS
Status: DISCONTINUED | OUTPATIENT
Start: 2020-10-07 | End: 2020-10-07 | Stop reason: CLARIF

## 2020-10-07 RX ORDER — POTASSIUM CHLORIDE 7.45 MG/ML
10 INJECTION INTRAVENOUS
Status: DISCONTINUED | OUTPATIENT
Start: 2020-10-07 | End: 2020-10-12 | Stop reason: HOSPADM

## 2020-10-07 RX ORDER — MAGNESIUM SULFATE HEPTAHYDRATE 40 MG/ML
4 INJECTION, SOLUTION INTRAVENOUS EVERY 4 HOURS PRN
Status: DISCONTINUED | OUTPATIENT
Start: 2020-10-07 | End: 2020-10-12 | Stop reason: HOSPADM

## 2020-10-07 RX ORDER — POTASSIUM CHLORIDE 1500 MG/1
20-40 TABLET, EXTENDED RELEASE ORAL
Status: DISCONTINUED | OUTPATIENT
Start: 2020-10-07 | End: 2020-10-12 | Stop reason: HOSPADM

## 2020-10-07 RX ORDER — POTASSIUM CL/LIDO/0.9 % NACL 10MEQ/0.1L
10 INTRAVENOUS SOLUTION, PIGGYBACK (ML) INTRAVENOUS
Status: DISCONTINUED | OUTPATIENT
Start: 2020-10-07 | End: 2020-10-12 | Stop reason: HOSPADM

## 2020-10-07 RX ADMIN — APIXABAN 2.5 MG: 2.5 TABLET, FILM COATED ORAL at 08:54

## 2020-10-07 RX ADMIN — SODIUM CHLORIDE: 9 INJECTION, SOLUTION INTRAVENOUS at 07:41

## 2020-10-07 RX ADMIN — POTASSIUM CHLORIDE AND SODIUM CHLORIDE: 450; 150 INJECTION, SOLUTION INTRAVENOUS at 16:43

## 2020-10-07 RX ADMIN — APIXABAN 2.5 MG: 2.5 TABLET, FILM COATED ORAL at 21:13

## 2020-10-07 RX ADMIN — DILTIAZEM HYDROCHLORIDE 120 MG: 120 CAPSULE, COATED, EXTENDED RELEASE ORAL at 08:54

## 2020-10-07 RX ADMIN — PIPERACILLIN AND TAZOBACTAM 2.25 G: 2; .25 INJECTION, POWDER, FOR SOLUTION INTRAVENOUS at 09:43

## 2020-10-07 RX ADMIN — POTASSIUM CHLORIDE 40 MEQ: 1500 TABLET, EXTENDED RELEASE ORAL at 16:15

## 2020-10-07 RX ADMIN — PIPERACILLIN AND TAZOBACTAM 2.25 G: 2; .25 INJECTION, POWDER, FOR SOLUTION INTRAVENOUS at 21:13

## 2020-10-07 RX ADMIN — POTASSIUM CHLORIDE 20 MEQ: 1500 TABLET, EXTENDED RELEASE ORAL at 18:02

## 2020-10-07 RX ADMIN — PIPERACILLIN AND TAZOBACTAM 2.25 G: 2; .25 INJECTION, POWDER, FOR SOLUTION INTRAVENOUS at 16:13

## 2020-10-07 RX ADMIN — PIPERACILLIN AND TAZOBACTAM 2.25 G: 2; .25 INJECTION, POWDER, FOR SOLUTION INTRAVENOUS at 03:57

## 2020-10-07 ASSESSMENT — ACTIVITIES OF DAILY LIVING (ADL)
ADLS_ACUITY_SCORE: 13
ADLS_ACUITY_SCORE: 15
ADLS_ACUITY_SCORE: 13
ADLS_ACUITY_SCORE: 11
ADLS_ACUITY_SCORE: 15
ADLS_ACUITY_SCORE: 15

## 2020-10-07 NOTE — PROVIDER NOTIFICATION
Prescriber Notification Note    The pharmacist has communicated with this patient's provider regarding a concern or therapy recommendation.    Notified Person: SOMMER Tony  Date/Time of Notification: 10/6/2020 9150  Interaction: text page  Concern/Recommendation:suggested decreasing norco to 1 tablet per dose due to very low patient weight. Also asked for K and Mg replacement to be discontinued due to low patient weight.     Comments/Additional Details:all of the above done

## 2020-10-07 NOTE — CONSULTS
[facility]  Colon and Rectal Surgery Consult Note  Name: Mia Rodriguez    MRN: [id number]  YOB: 1934    Age: 86 year old  Date of admission: 10/6/2020  Primary care provider: Birgit Cordero     Requesting Physician:  Dr Jimenez  Reason for consult:  Severe diverticulitis?, vaginal fistula            History of Present Illness:   Mia Rodriguez is a 86 year old female with PMHx significant for COPD, coronary artery disease, previous stroke, hypothyroidism, seen at the request of Dr Jimenez, who presents with abdominal pain for one week. The patient states that she gets pain in the bilateral lower quadrants of her abdomen which seem to be worse after eating.  She states she generally has a poor appetite, but has not significantly been eating or drinking over the past day or 2.  She last passed gas and stool on Monday per patient report. She denies significant change in her bowel habits prior to her arrival in the hospital and was having bowel movements regularly for her. No constipation or diarrhea. She has never had diverticulitis.      In the ED, the patient did have an abdominal and chest CT showing evidence of severe sigmoid diverticulitis and a new spiculated lung nodule in the lingula.  She does not recall being told she ever had a lung mass in the past.  She does have a left shift and blood cultures were obtained.  She was started on Zosyn, IVF, and admitted for further observation.      Currently, she is feeling better with no complaints. She denies family history of colon or rectal cancer. Has never had colonoscopy herself.         Colonoscopy History:  None    Past abdominal surgery: None            Past Medical History:     Past Medical History:   Diagnosis Date     Arrhythmia     atrial fib.-on coumadin     Atrial fibrillation (H)      Breast cancer (H)      COPD (chronic obstructive pulmonary disease) (H)      COPD exacerbation (H) 9/2/2015     Coronary artery  disease      Hypertension      Malignant neoplasm (H) 2008    Right Breast Cancer     Osteoporosis      Renal disease     kidney stones     Thyroid disease     Hypothyroid     Unspecified cerebral artery occlusion with cerebral infarction     TIA     Uterine fibroid              Past Surgical History:     Past Surgical History:   Procedure Laterality Date     BIOPSY  2008    Left /RightBreast Biopsy     BIOPSY  2010    Right Breast Biopsy     BREAST SURGERY  2008    Right Breast Lumpectomy     CYSTOSCOPY, DILATE URETHRA, COMBINED  10/5/2012    Procedure: COMBINED CYSTOSCOPY, DILATE URETHRA;  urethral dilation;  Surgeon: Kaushal Harris MD;  Location:  OR     CYSTOSCOPY, RETROGRADES, EXTRACT STONE, COMBINED Right 2015    Procedure: COMBINED CYSTOSCOPY, RETROGRADES, EXTRACT STONE;  Surgeon: Kaushal Harris MD;  Location:  OR     EXCISE LESION LIP N/A 10/22/2014    Procedure: EXCISE LESION LIP;  Surgeon: Brent Jolley MD;  Location: [facility]     GYN SURGERY      LISA with ovaries intact-fibroid     LASER HOLMIUM LITHOTRIPSY URETER(S), INSERT STENT, COMBINED  10/5/2012    Procedure: COMBINED CYSTOSCOPY, URETEROSCOPY, LASER HOLMIUM LITHOTRIPSY URETER(S), INSERT STENT;  RIGHT URETEROSCOPY ,right ureteral biopsy,WITH LASER HOLMIUM LITHOTRIPSY, INSERT STENT RIGHT URETER;  Surgeon: Kaushal Harris MD;  Location:  OR     LASER HOLMIUM LITHOTRIPSY URETER(S), INSERT STENT, COMBINED Right 2015    Procedure: COMBINED CYSTOSCOPY, URETEROSCOPY, LASER HOLMIUM LITHOTRIPSY URETER(S), INSERT STENT;  Surgeon: Kaushal Harris MD;  Location:  OR               Social History:     Social History     Tobacco Use     Smoking status: Former Smoker     Packs/day: 1.00     Years: 30.00     Pack years: 30.00     Types: Cigarettes     Start date:      Quit date: 1989     Years since quittin.8     Smokeless tobacco: Never Used     Tobacco comment: Started:  Stopped:    Substance Use Topics     Alcohol use: No     Alcohol/week: 0.0 standard drinks             Family History:     Family History   Problem Relation Age of Onset     Alzheimer Disease Mother      Osteoporosis Mother      C.A.D. Father      Cardiovascular Father      Cerebrovascular Disease Father      Breast Cancer Maternal Grandmother      Unknown/Adopted Maternal Grandfather      Unknown/Adopted Paternal Grandmother         old age     Cancer Paternal Grandfather         stomach             Allergies:     Allergies   Allergen Reactions     Digoxin Other (See Comments)     Weakness, SOB     Megace [Megestrol Acetate]      Increased LFTs               Medications:       apixaban ANTICOAGULANT  2.5 mg Oral BID     diltiazem ER COATED BEADS  120 mg Oral Daily     piperacillin-tazobactam  2.25 g Intravenous Q6H     sodium chloride (PF)  3 mL Intracatheter Q8H             Review of Systems:   A comprehensive greater than 10 system review of systems was carried out.  Pertinent positives and negatives are noted above.  Otherwise negative for contributory info.            Physical Exam:     Blood pressure 125/81, pulse 101, temperature 97.7  F (36.5  C), temperature source Oral, resp. rate 16, height 1.524 m (5'), weight 36.3 kg (80 lb), last menstrual period 12/07/1981, SpO2 94 %.  No intake or output data in the 24 hours ending 10/07/20 1101  Exam:  General - Awake alert and oriented, appears stated age  Pulm - Non-labored breathing with normal respiratory effort  CVS - reg rate and rhythm, no peripheral edema  Abd - Soft, non-tender, non-distended.  No guarding, rigidity or peritoneal signs.  Neuro - CN II-XII grossly intact  Musculoskeletal - extremities with no clubbing, cyanosis or edema; able to ambulate  Psych - responsive, alert, cooperative; oriented x3; appropriate mood and affect  External/skin - inspection reveals no rashes, lesions or ulcers, normal coloring             Data Reviewed:     Recent Results (from  the past 24 hour(s))   CT Chest/Abdomen/Pelvis w Contrast    Narrative    CT CHEST/ABDOMEN/PELVIS WITH CONTRAST 10/6/2020 4:02 PM    CLINICAL HISTORY: Check for pneumonia. Diverticulitis. Appendicitis.  Ischemic colitis. Cause of sepsis. Abdominal pain for one week with  cough, shortness of breath and diarrhea.    TECHNIQUE: CT scan of the chest, abdomen, and pelvis was performed  following injection of IV contrast. Multiplanar reformats were  obtained. Dose reduction techniques were used.     CONTRAST: 40 mL Isovue-370    COMPARISON: CT abdomen and pelvis 9/3/2017, CT chest 3/26/2017.    FINDINGS:   LUNGS AND PLEURA: No effusions. Emphysema. 0.2 cm medial left upper  lobe nodule is slightly more prominent since 2017 where it was 0.1 cm  series 5 image 157. New elongated nodular opacity at the lingula that  is approximately 0.8 cm series 5 image 240. A new or better visualized  spiculated-appearing nodule is 1.2 cm at the lingula series 5 image  224. There is other nodularity in this region as well. A few calcified  granulomas noted.    MEDIASTINUM/AXILLAE: Thoracic aortic and coronary artery  calcifications. No acute thoracic aortic abnormality. No central  pulmonary embolism. The exam is nondiagnostic for assessment of small  branch vessel pulmonary emboli. No suspicious enlarging lymph nodes.    HEPATOBILIARY: No acute hepatic abnormality. Gallbladder shows no  specific abnormality. Tiny cyst at the left liver series 3 image 138.    PANCREAS: Normal.    SPLEEN: Scattered calcifications.    ADRENAL GLANDS: Normal.    KIDNEYS/BLADDER: Several bilateral intrarenal stones. An example at  the lower pole on the left is 0.8 cm series 3 image 175. There are  other bilateral examples. No hydronephrosis. No visible ureter stone.  There is some edema adjacent to the bladder. There is inflammatory  change along the posterior wall of the bladder near the  diverticulitis, see below, series 3 image 265.    BOWEL: Acute  diverticulitis at the sigmoid colon identified, for  example, series 3 image 263. Inflamed diverticulum encroaches upon the  vaginal cavity at this level. There is also wall thickening and  inflammation at other portions of the sigmoid colon; for instance,  around series 3 image 254 and adjacent images. There is no free air.  No abscess at this time. Trace pelvic fluid. No bowel obstruction.    PELVIC ORGANS: Uterus not seen. Ovaries not seen.    ADDITIONAL FINDINGS: Vascular calcifications.    MUSCULOSKELETAL: No acute osseous abnormality.      Impression    IMPRESSION:  1.  Severe sigmoid diverticulitis. A region of inflammation at this  site contacts the posterior wall of the urinary bladder and also  encroaches upon the vaginal cavity. Fistulas developing between these  sites is not excluded. No abscess identified.  2.  Emphysema.  3.  Coronary artery and other vascular calcifications.  4.  New or better visualized spiculated nodule at the lingula is  noted. There is additional nodularity seen in this region. Neoplasm is  a possibility and further oncologic workup is recommended. A PET/CT  could provide further assessment. Surveillance CT chest in three  months recommended.  5.  Multiple bilateral intrarenal stones, but no hydronephrosis or  ureter stone visualized.    TANIA VILLATORO MD       Recent Labs   Lab 10/07/20  0729 10/06/20  1331   WBC 13.0* 14.4*   HGB 13.2 15.9*   HCT 40.2 47.7*   MCV 99 100    201     Recent Labs   Lab 10/07/20  0729 10/06/20  1331   * 142   POTASSIUM 3.2* 4.0   CHLORIDE 116* 108   CO2 22 28   ANIONGAP 7 6   GLC 58* 125*   BUN 14 26   CR 0.70 0.87   GFRESTIMATED 78 60*   GFRESTBLACK >90 70   CYNDEE 8.1* 9.8     Recent Labs   Lab 10/06/20  1423   LACT 2.0     Recent Labs   Lab 10/06/20  1423   COLOR Yellow   APPEARANCE Clear   URINEGLC Negative   URINEBILI Negative   URINEKETONE Negative   SG 1.023   UBLD Small*   URINEPH 5.5   PROTEIN 30*   NITRITE Negative   LEUKEST Negative    RBCU 18*   WBCU 2         Assessment and Plan:   Mia Rodriguez is a 86 year old female with PMHx COPD, coronary artery disease, previous stroke, hypothyroidism, who presented with abdominal pain for one week and CT evidence of possible diverticulitis and lung nodule. Currently, she is feeling better with regards to pain. Alre=997 overnight but down to 97 this morning. Recommend continuing IV abx, bowel rest, and slowly advancing diet. If clinically worsens, discussed need for more emergent surgery which would likely involve ostomy.     If improves with conservative measures, may benefit from colonoscopy in 6 weeks to rule out malignancy as underlying pathology given has never had scope.     Plan:  1. Admit to hospitalist  2. Surgery: No indication for urgent surgery at this time.  3. Diet: Clear liquid diet  4. IV Fluids: Continue  5. Antibiotics:  Continue  6. Medications:  No change  7. I&O s:  strict I&O s   8. Labs:   - Reviewed: Yes  - Ordered: None   9. Imaging:   - Dr. Matthew and myself have personally viewed: CT abd/pelvis  - Ordered:  None  10. Activity:  OOB, ambulate as able  11. DVT prophylaxis: SCD s,   12. This plan has been discussed with Dr. Matthew    Patient specific identified risk factors considered as part of today s evaluation include: blood thinners, COPD     Additional history obtained from hospitalist notes.  Time spent on consultation: 30 minutes, greater than 50% spent on counseling and/or coordination of care.          Brenda Mims PA-C  [facility]  [phone]

## 2020-10-07 NOTE — PLAN OF CARE
DATE & TIME: 10/7/20 0550-6602    Cognitive Concerns/ Orientation : A&Ox4. Calm & cooperative  BEHAVIOR & AGGRESSION TOOL COLOR: Green  CIWA SCORE: NA   ABNL VS/O2: Afebrile, VSS on RA  MOBILITY: SBA/gait belt, steady  PAIN MANAGMENT: Denies pain.   DIET: Advanced from NPO to clear liquid diet, tolerating.   BOWEL/BLADDER: Continent  ABNL LAB/BG: WBC 13.0 (14.4 on admission yesterday). K 3.2, not on protocol, awaiting MD to add orders. BG 58, recheck after apple juice x2 was 110. Na 145.   DRAIN/DEVICES: PIV, IVF infusing  mL/hr.   TELEMETRY RHYTHM: Afib w/ CVR  SKIN: Bruises, blanchable redness coccyx, turn/repo independently.   TESTS/PROCEDURES: None  D/C DAY/GOALS/PLACE: Possible discharge back home in 1-2 days  OTHER IMPORTANT INFO: Per Colorectal surgery note, no indication for urgent surgery at this time. On IV Azegec0r. IVF infusing 100 mL/hr. Denies abd pain/n/v. Voiding adequately, small soft BM x1 this shift.   MD/RN ROUNDING SIGNED OFF D/E SHIFT: Yes  COMMIT TO SIT DONE AND SIGNED OFF: Yes    Update 5069-6594  MD added K protocol, K 3.2, replaced, recheck at 2230. IVF changed from  mL/hr to 1/2NS+KCl 75 mL/hr.

## 2020-10-07 NOTE — PLAN OF CARE
Cognitive Concerns/ Orientation : A&Ox4. Calm & cooperative  BEHAVIOR & AGGRESSION TOOL COLOR: Green  CIWA SCORE: NA   ABNL VS/O2: Febrile , Temp 102.2 MD notified, PRN Tylenol given recheck Temp 98.4 then 98.0, other  VSS on RA  MOBILITY: Up with assist of 1, gait belt  PAIN MANAGMENT: Denies pain  DIET: NPO ex. Ice chips, meds  BOWEL/BLADDER: Continent  ABNL LAB/BG: WBC 14.4  DRAIN/DEVICES: PIV, IVF infusing  TELEMETRY RHYTHM: Afib w/ CVR  SKIN: Bruises  TESTS/PROCEDURES:   D/C DAY/GOALS/PLACE: Pending  OTHER IMPORTANT INFO: Colorectal surgery consult. Sepsis fired; Lactate for sepsis protocol 1.6  MD/RN ROUNDING SIGNED OFF D/E SHIFT: NA  COMMIT TO SIT DONE AND SIGNED OFF: Yes

## 2020-10-07 NOTE — PROVIDER NOTIFICATION
MD Notification    Notified Person: MD    Notified Person Name: Dr. Judge    Notification Date/Time: 10/6/20 11:08pm    Notification Interaction: Phone    Purpose of Notification: Fever 102.2    Orders Received:    Comments: Trend Fever. PRN Tylenol

## 2020-10-07 NOTE — PROGRESS NOTES
Cross Cover:  Notified of fever 102.2. this is first documented fever. Blood cx collected earlier today.  Started on zosyn for severe diverticulitis.    Give tylenol. Trend Fever.    D Alfie, DO

## 2020-10-07 NOTE — PROGRESS NOTES
[facility]    Medicine Progress Note - Hospitalist Service       Date of Admission:  10/6/2020  Assessment & Plan         Severe, acute sigmoid diverticulitis  CT showed:  Severe sigmoid diverticulitis. A region of inflammation at this  site contacts the posterior wall of the urinary bladder and also  encroaches upon the vaginal cavity. Fistulas developing between these  sites is not excluded. No abscess identified.  Febrile last night but no fever today.  She has no pain or tenderness on exam.  WBC is slightly down.    Continue antibiotics     Clears     CRS following, non-operative management for now     Lingular, spiculated lung nodule    Needs outpatient PET scan     Atrial fibrillation with rapid ventricular response  Heart rate better.    Continue diltiazem and apixaban    Hypothyroidism    Continue levothyroxine    Chronic kidney disease with bilateral kidney stones  Mild hypernatremia  Hypokalemia    Creatinine stable     Change intravenous fluid to 1/2NS +KCl    Monitor BMP    Replace K    Chronic obstructive pulmonary disease    P.r.n. albuterol inhaler available     Diet: Clear Liquid Diet    DVT Prophylaxis: apixaban  Bob Catheter: not present  Code Status: Full Code         Disposition Plan   Expected discharge: 2 - 3 days, recommended to prior living arrangement once antibiotic plan established.  Entered: Cristian Cruz MD 10/07/2020, 3:00 PM     The patient's care was discussed with the Bedside Nurse and Patient.    Cristian Cruz MD  Hospitalist Service  [facility]  Contact information available via [facility] Paging/Directory    ______________________________________________________________________    Interval History   No pain at all and feeling pretty well.     Data reviewed today: I reviewed all medications, new labs and imaging results over the last 24 hours. I personally reviewed no images or EKG's today.    Physical Exam   Vital  Signs: Temp: 97.7  F (36.5  C) Temp src: Oral BP: 125/81 Pulse: 101   Resp: 16 SpO2: 94 % O2 Device: None (Room air)    Weight: 80 lbs 0 oz  Constitutional: awake, alert, cooperative, no apparent distress  Respiratory: No increased work of breathing, good air exchange, clear to auscultation bilaterally, no crackles or wheezing  Cardiovascular:  regular rate and rhythm, normal S1 and S2, no S3 or S4, and no murmur noted  GI:  normal bowel sounds, soft, non-distended, non-tender  Neuropsychiatric: General: normal, calm and normal eye contact    Data   Recent Labs   Lab 10/07/20  0729 10/06/20  1331   WBC 13.0* 14.4*   HGB 13.2 15.9*   MCV 99 100    201   * 142   POTASSIUM 3.2* 4.0   CHLORIDE 116* 108   CO2 22 28   BUN 14 26   CR 0.70 0.87   ANIONGAP 7 6   CYNDEE 8.1* 9.8   GLC 58* 125*   ALBUMIN  --  2.5*   PROTTOTAL  --  7.0   BILITOTAL  --  0.7   ALKPHOS  --  55   ALT  --  52*   AST  --  53*   LIPASE  --  125     Recent Results (from the past 24 hour(s))   CT Chest/Abdomen/Pelvis w Contrast    Narrative    CT CHEST/ABDOMEN/PELVIS WITH CONTRAST 10/6/2020 4:02 PM    CLINICAL HISTORY: Check for pneumonia. Diverticulitis. Appendicitis.  Ischemic colitis. Cause of sepsis. Abdominal pain for one week with  cough, shortness of breath and diarrhea.    TECHNIQUE: CT scan of the chest, abdomen, and pelvis was performed  following injection of IV contrast. Multiplanar reformats were  obtained. Dose reduction techniques were used.     CONTRAST: 40 mL Isovue-370    COMPARISON: CT abdomen and pelvis 9/3/2017, CT chest 3/26/2017.    FINDINGS:   LUNGS AND PLEURA: No effusions. Emphysema. 0.2 cm medial left upper  lobe nodule is slightly more prominent since 2017 where it was 0.1 cm  series 5 image 157. New elongated nodular opacity at the lingula that  is approximately 0.8 cm series 5 image 240. A new or better visualized  spiculated-appearing nodule is 1.2 cm at the lingula series 5 image  224. There is other nodularity  in this region as well. A few calcified  granulomas noted.    MEDIASTINUM/AXILLAE: Thoracic aortic and coronary artery  calcifications. No acute thoracic aortic abnormality. No central  pulmonary embolism. The exam is nondiagnostic for assessment of small  branch vessel pulmonary emboli. No suspicious enlarging lymph nodes.    HEPATOBILIARY: No acute hepatic abnormality. Gallbladder shows no  specific abnormality. Tiny cyst at the left liver series 3 image 138.    PANCREAS: Normal.    SPLEEN: Scattered calcifications.    ADRENAL GLANDS: Normal.    KIDNEYS/BLADDER: Several bilateral intrarenal stones. An example at  the lower pole on the left is 0.8 cm series 3 image 175. There are  other bilateral examples. No hydronephrosis. No visible ureter stone.  There is some edema adjacent to the bladder. There is inflammatory  change along the posterior wall of the bladder near the  diverticulitis, see below, series 3 image 265.    BOWEL: Acute diverticulitis at the sigmoid colon identified, for  example, series 3 image 263. Inflamed diverticulum encroaches upon the  vaginal cavity at this level. There is also wall thickening and  inflammation at other portions of the sigmoid colon; for instance,  around series 3 image 254 and adjacent images. There is no free air.  No abscess at this time. Trace pelvic fluid. No bowel obstruction.    PELVIC ORGANS: Uterus not seen. Ovaries not seen.    ADDITIONAL FINDINGS: Vascular calcifications.    MUSCULOSKELETAL: No acute osseous abnormality.      Impression    IMPRESSION:  1.  Severe sigmoid diverticulitis. A region of inflammation at this  site contacts the posterior wall of the urinary bladder and also  encroaches upon the vaginal cavity. Fistulas developing between these  sites is not excluded. No abscess identified.  2.  Emphysema.  3.  Coronary artery and other vascular calcifications.  4.  New or better visualized spiculated nodule at the lingula is  noted. There is additional  nodularity seen in this region. Neoplasm is  a possibility and further oncologic workup is recommended. A PET/CT  could provide further assessment. Surveillance CT chest in three  months recommended.  5.  Multiple bilateral intrarenal stones, but no hydronephrosis or  ureter stone visualized.    TANIA VILLATORO MD     Medications     - MEDICATION INSTRUCTIONS -       sodium chloride 100 mL/hr at 10/07/20 0741       apixaban ANTICOAGULANT  2.5 mg Oral BID     diltiazem ER COATED BEADS  120 mg Oral Daily     piperacillin-tazobactam  2.25 g Intravenous Q6H     sodium chloride (PF)  3 mL Intracatheter Q8H

## 2020-10-07 NOTE — PROGRESS NOTES
Admission    Patient arrives to room 607 via cart from ED.  Care plan note:   0450-9711  BP elevated 142/70, Tachy with , other VSS, O2 wnl RA. Denied chest pain/SOB/abd pain. CT on admission showed severe sigmoid diverticulitis, on IV Zosyn q6h. NPO, Colorectal surgery following. Up independently at baseline, SBA with gait belt/FR.

## 2020-10-08 LAB
ANION GAP SERPL CALCULATED.3IONS-SCNC: 3 MMOL/L (ref 3–14)
BASOPHILS # BLD AUTO: 0 10E9/L (ref 0–0.2)
BASOPHILS NFR BLD AUTO: 0.3 %
BUN SERPL-MCNC: 7 MG/DL (ref 7–30)
CALCIUM SERPL-MCNC: 8.7 MG/DL (ref 8.5–10.1)
CHLORIDE SERPL-SCNC: 115 MMOL/L (ref 94–109)
CO2 SERPL-SCNC: 24 MMOL/L (ref 20–32)
CREAT SERPL-MCNC: 0.72 MG/DL (ref 0.52–1.04)
DIFFERENTIAL METHOD BLD: ABNORMAL
EOSINOPHIL # BLD AUTO: 0.3 10E9/L (ref 0–0.7)
EOSINOPHIL NFR BLD AUTO: 3.7 %
ERYTHROCYTE [DISTWIDTH] IN BLOOD BY AUTOMATED COUNT: 13.2 % (ref 10–15)
GFR SERPL CREATININE-BSD FRML MDRD: 76 ML/MIN/{1.73_M2}
GLUCOSE SERPL-MCNC: 76 MG/DL (ref 70–99)
HCT VFR BLD AUTO: 43.3 % (ref 35–47)
HGB BLD-MCNC: 14.5 G/DL (ref 11.7–15.7)
IMM GRANULOCYTES # BLD: 0 10E9/L (ref 0–0.4)
IMM GRANULOCYTES NFR BLD: 0.1 %
LYMPHOCYTES # BLD AUTO: 0.5 10E9/L (ref 0.8–5.3)
LYMPHOCYTES NFR BLD AUTO: 7.3 %
MCH RBC QN AUTO: 33 PG (ref 26.5–33)
MCHC RBC AUTO-ENTMCNC: 33.5 G/DL (ref 31.5–36.5)
MCV RBC AUTO: 99 FL (ref 78–100)
MONOCYTES # BLD AUTO: 0.6 10E9/L (ref 0–1.3)
MONOCYTES NFR BLD AUTO: 8 %
NEUTROPHILS # BLD AUTO: 5.7 10E9/L (ref 1.6–8.3)
NEUTROPHILS NFR BLD AUTO: 80.6 %
NRBC # BLD AUTO: 0 10*3/UL
NRBC BLD AUTO-RTO: 0 /100
PLATELET # BLD AUTO: 211 10E9/L (ref 150–450)
POTASSIUM SERPL-SCNC: 4.1 MMOL/L (ref 3.4–5.3)
RBC # BLD AUTO: 4.39 10E12/L (ref 3.8–5.2)
SODIUM SERPL-SCNC: 142 MMOL/L (ref 133–144)
WBC # BLD AUTO: 7.1 10E9/L (ref 4–11)

## 2020-10-08 PROCEDURE — 250N000011 HC RX IP 250 OP 636: Performed by: INTERNAL MEDICINE

## 2020-10-08 PROCEDURE — 85025 COMPLETE CBC W/AUTO DIFF WBC: CPT | Performed by: HOSPITALIST

## 2020-10-08 PROCEDURE — 250N000013 HC RX MED GY IP 250 OP 250 PS 637: Performed by: INTERNAL MEDICINE

## 2020-10-08 PROCEDURE — 120N000001 HC R&B MED SURG/OB

## 2020-10-08 PROCEDURE — 36415 COLL VENOUS BLD VENIPUNCTURE: CPT | Performed by: HOSPITALIST

## 2020-10-08 PROCEDURE — 99232 SBSQ HOSP IP/OBS MODERATE 35: CPT | Performed by: HOSPITALIST

## 2020-10-08 PROCEDURE — 80048 BASIC METABOLIC PNL TOTAL CA: CPT | Performed by: HOSPITALIST

## 2020-10-08 PROCEDURE — 250N000011 HC RX IP 250 OP 636: Performed by: HOSPITALIST

## 2020-10-08 RX ADMIN — POTASSIUM CHLORIDE AND SODIUM CHLORIDE: 450; 150 INJECTION, SOLUTION INTRAVENOUS at 03:52

## 2020-10-08 RX ADMIN — APIXABAN 2.5 MG: 2.5 TABLET, FILM COATED ORAL at 09:25

## 2020-10-08 RX ADMIN — PIPERACILLIN AND TAZOBACTAM 2.25 G: 2; .25 INJECTION, POWDER, FOR SOLUTION INTRAVENOUS at 09:30

## 2020-10-08 RX ADMIN — POTASSIUM CHLORIDE AND SODIUM CHLORIDE: 450; 150 INJECTION, SOLUTION INTRAVENOUS at 15:51

## 2020-10-08 RX ADMIN — PIPERACILLIN AND TAZOBACTAM 2.25 G: 2; .25 INJECTION, POWDER, FOR SOLUTION INTRAVENOUS at 04:49

## 2020-10-08 RX ADMIN — APIXABAN 2.5 MG: 2.5 TABLET, FILM COATED ORAL at 21:26

## 2020-10-08 RX ADMIN — PIPERACILLIN AND TAZOBACTAM 2.25 G: 2; .25 INJECTION, POWDER, FOR SOLUTION INTRAVENOUS at 15:51

## 2020-10-08 RX ADMIN — DILTIAZEM HYDROCHLORIDE 120 MG: 120 CAPSULE, COATED, EXTENDED RELEASE ORAL at 09:26

## 2020-10-08 RX ADMIN — PIPERACILLIN AND TAZOBACTAM 2.25 G: 2; .25 INJECTION, POWDER, FOR SOLUTION INTRAVENOUS at 21:26

## 2020-10-08 ASSESSMENT — ACTIVITIES OF DAILY LIVING (ADL)
ADLS_ACUITY_SCORE: 13
ADLS_ACUITY_SCORE: 15
ADLS_ACUITY_SCORE: 19
ADLS_ACUITY_SCORE: 19
ADLS_ACUITY_SCORE: 13
ADLS_ACUITY_SCORE: 19

## 2020-10-08 NOTE — PROGRESS NOTES
[facility]    Medicine Progress Note - Hospitalist Service       Date of Admission:  10/6/2020  Assessment & Plan     Severe, acute sigmoid diverticulitis  Sepsis due to the above   CT showed:  Severe sigmoid diverticulitis. A region of inflammation at this  site contacts the posterior wall of the urinary bladder and also  encroaches upon the vaginal cavity. Fistulas developing between these  sites is not excluded. No abscess identified.  Clinically she is doing well.  No fever or pain.  Tolerated clears and now on full liquids this morning.    Continue antibiotics     Can advance to low fiber diet this afternoon per CRS if tolerating full liquids     CRS following, non-operative management for now     Lingular, spiculated lung nodule    Needs outpatient PET scan     Atrial fibrillation with rapid ventricular response  Heart rate better.    Continue diltiazem and apixaban    Hypothyroidism    Continue levothyroxine    Chronic kidney disease with bilateral kidney stones  Mild hypernatremia, resolved  Hypokalemia, resolved  Creatinine stable     Continue 1/2NS +KCl    Monitor BMP    Chronic obstructive pulmonary disease    P.r.n. albuterol inhaler available     Diet: Full Liquid Diet    DVT Prophylaxis: apixaban  Bob Catheter: not present  Code Status: Full Code         Disposition Plan   Expected discharge: 1 - 3 days, recommended to prior living arrangement once antibiotic plan established.  Entered: Cristian Cruz MD 10/08/2020, 8:11 AM     The patient's care was discussed with the Bedside Nurse and Patient.  Called daughterBennie today at 3:15pm @ [phone]- no answer    Cristian Cruz MD  Hospitalist Service  [facility]  Contact information available via [facility] Paging/Directory    ______________________________________________________________________    Interval History   Tolerating her diet, no pain, passing flatus.    Data reviewed today:  I reviewed all medications, new labs and imaging results over the last 24 hours. I personally reviewed no images or EKG's today.    Physical Exam   Vital Signs: Temp: 97.7  F (36.5  C) Temp src: Oral BP: (!) 141/95 Pulse: 102   Resp: 16 SpO2: 95 % O2 Device: None (Room air)    Weight: 80 lbs 0 oz  Constitutional: awake, alert, cooperative, no apparent distress  Respiratory: No increased work of breathing, good air exchange, clear to auscultation bilaterally, no crackles or wheezing  Cardiovascular:  regular rate and rhythm, normal S1 and S2, no S3 or S4, and no murmur noted  GI:  normal bowel sounds, soft, non-distended, non-tender  Neuropsychiatric: General: normal, calm and normal eye contact    Data   Recent Labs   Lab 10/07/20  2309 10/07/20  0729 10/06/20  1331   WBC  --  13.0* 14.4*   HGB  --  13.2 15.9*   MCV  --  99 100   PLT  --  176 201   NA  --  145* 142   POTASSIUM 4.2 3.2* 4.0   CHLORIDE  --  116* 108   CO2  --  22 28   BUN  --  14 26   CR  --  0.70 0.87   ANIONGAP  --  7 6   CYNDEE  --  8.1* 9.8   GLC  --  58* 125*   ALBUMIN  --   --  2.5*   PROTTOTAL  --   --  7.0   BILITOTAL  --   --  0.7   ALKPHOS  --   --  55   ALT  --   --  52*   AST  --   --  53*   LIPASE  --   --  125     No results found for this or any previous visit (from the past 24 hour(s)).  Medications     0.45% sodium chloride + KCl 20 mEq/L 75 mL/hr at 10/08/20 0352     - MEDICATION INSTRUCTIONS -         apixaban ANTICOAGULANT  2.5 mg Oral BID     diltiazem ER COATED BEADS  120 mg Oral Daily     piperacillin-tazobactam  2.25 g Intravenous Q6H     sodium chloride (PF)  3 mL Intracatheter Q8H

## 2020-10-08 NOTE — PROGRESS NOTES
Colon & Rectal Surgery Progress Note             Interval History:   Hospital Day #: 3    Doing well.  Denies n/v/f/c.  Multiple liquid stools.  HR improved but in low 100s.  Denies abdominal pain.                Medications:   I have reviewed this patient's current medications               Physical Exam:   Blood pressure (!) 141/95, pulse 102, temperature 97.7  F (36.5  C), temperature source Oral, resp. rate 16, height 1.524 m (5'), weight 36.3 kg (80 lb), last menstrual period 12/07/1981, SpO2 95 %.    Intake/Output Summary (Last 24 hours) at 10/8/2020 0708  Last data filed at 10/7/2020 1619  Gross per 24 hour   Intake 2031 ml   Output --   Net 2031 ml     GEN:  Alert, NAD  ABD:  Soft, NT, ND         Data:        Lab Results   Component Value Date     10/07/2020    Lab Results   Component Value Date    CHLORIDE 116 10/07/2020    Lab Results   Component Value Date    BUN 14 10/07/2020      Lab Results   Component Value Date    POTASSIUM 4.2 10/07/2020    Lab Results   Component Value Date    CO2 22 10/07/2020    Lab Results   Component Value Date    CR 0.70 10/07/2020    CR 0.87 10/06/2020        Lab Results   Component Value Date    HGB 13.2 10/07/2020    HGB 15.9 (H) 10/06/2020     Lab Results   Component Value Date     10/07/2020     10/06/2020     Lab Results   Component Value Date    WBC 13.0 (H) 10/07/2020    WBC 14.4 (H) 10/06/2020            Assessment and Plan:   87 yo female with complex PMH admitted with diverticulitis.  Clinically improving.    - await am labs  - ok to adv to full liquids, if does well can adv to LFD this afternoon.  - If ongoing diarrhea would check c.diff.       Roxanna Matthew MD  [facility]  Office Phone # [phone]  Pager: [phone]

## 2020-10-08 NOTE — PLAN OF CARE
DATE & TIME: 10/7/20 1640-0157  Cognitive Concerns/ Orientation : A&Ox4. Calm & cooperative  BEHAVIOR & AGGRESSION TOOL COLOR: Green  CIWA SCORE: NA        ABNL VS/O2: Afebrile, VSS on RA  MOBILITY: SBA/gait belt, steady  PAIN MANAGMENT: Denies pain.   DIET:clear liquid diet, tolerating.   BOWEL/BLADDER: Mostly continent, inc at times  ABNL LAB/BG: WBC 13.0 (14.4 on admission yesterday). K 3.2, replaced, recheck at 2230. Na 145.   DRAIN/DEVICES: PIV, IVF infusing 1/2NS+20Keq @75 mL/hr.   TELEMETRY RHYTHM: Afib w/ RVR, 's  SKIN: Bruises, blanchable redness coccyx, turn/repo independently.   TESTS/PROCEDURES: None  D/C DAY/GOALS/PLACE: Possible discharge back home in 1-2 days  OTHER IMPORTANT INFO: Per Colorectal surgery note, no indication for urgent surgery at this time. On IV [id number]. Denies abd pain/n/v. Voiding adequately.

## 2020-10-08 NOTE — PLAN OF CARE
DATE & TIME: 10/8/20 3782-2521                   Cognitive Concerns/ Orientation : A&Ox4. Calm & cooperative  BEHAVIOR & AGGRESSION TOOL COLOR: Green  CIWA SCORE: NA        ABNL VS/O2: Afebrile, HR low 100s in AM, 80s rest of the shift, other VSS on RA  MOBILITY: SBA/gait belt, steady  PAIN MANAGMENT: Denies pain.   DIET: Full liquid, tolerated breakfast and lunch, advanced to low fiber diet for dinner, per CRS order.   BOWEL/BLADDER: Continent  ABNL LAB/BG: INR 1.64.   DRAIN/DEVICES: PIV 1/2 NS +KCl 75 mL/hr.   TELEMETRY RHYTHM: Afib w/ RVR in AM , Afibe w/ CVR rest of the shift. Asymptomatic.   SKIN: Bruises, blanchable redness coccyx, turn/repo independently.   TESTS/PROCEDURES: None  D/C DAY/GOALS/PLACE: Possible discharge back home in 1-2 days  OTHER IMPORTANT INFO: On IV Zosyn q6h. IVF infusing 75 mL/hr. Voiding adequately, small loose stools x2 this shift. Denies abd pain/n/v.   MD/RN ROUNDING SIGNED OFF D/E SHIFT: Yes  COMMIT TO SIT DONE AND SIGNED OFF: Yes

## 2020-10-08 NOTE — PLAN OF CARE
DATE & TIME: 10/7/20 6489-0259  Cognitive Concerns/ Orientation : A&Ox4. Calm & cooperative  BEHAVIOR & AGGRESSION TOOL COLOR: Green  CIWA SCORE: NA        ABNL VS/O2: Afebrile, VSS on RA  MOBILITY: SBA/gait belt, steady  PAIN MANAGMENT: Denies pain.   DIET:clear liquid diet, tolerating.   BOWEL/BLADDER: Mostly continent, inc at times  ABNL LAB/BG: WBC 13.0 (14.4 on admission ). K 4.2. Na 145.   DRAIN/DEVICES: PIV, IVF infusing 1/2NS+20Keq @75 mL/hr.   TELEMETRY RHYTHM: Afib w/ RVR, 's  SKIN: Bruises, blanchable redness coccyx, turn/repo independently.   TESTS/PROCEDURES: None  D/C DAY/GOALS/PLACE: Possible discharge back home in 1-2 days  OTHER IMPORTANT INFO: Per Colorectal surgery note, no indication for urgent surgery at this time. On IV [id number]. IVF infusing 75 mL/hr. Denies abd pain/n/v. Voiding adequately, small soft BM x1 this shift.

## 2020-10-09 ENCOUNTER — APPOINTMENT (OUTPATIENT)
Dept: PHYSICAL THERAPY | Facility: CLINIC | Age: 85
DRG: 872 | End: 2020-10-09
Attending: HOSPITALIST
Payer: MEDICARE

## 2020-10-09 LAB
ANION GAP SERPL CALCULATED.3IONS-SCNC: 5 MMOL/L (ref 3–14)
BUN SERPL-MCNC: 9 MG/DL (ref 7–30)
CALCIUM SERPL-MCNC: 9 MG/DL (ref 8.5–10.1)
CHLORIDE SERPL-SCNC: 116 MMOL/L (ref 94–109)
CO2 SERPL-SCNC: 24 MMOL/L (ref 20–32)
CREAT SERPL-MCNC: 0.84 MG/DL (ref 0.52–1.04)
GFR SERPL CREATININE-BSD FRML MDRD: 63 ML/MIN/{1.73_M2}
GLUCOSE SERPL-MCNC: 82 MG/DL (ref 70–99)
HGB BLD-MCNC: 15.2 G/DL (ref 11.7–15.7)
POTASSIUM SERPL-SCNC: 3.8 MMOL/L (ref 3.4–5.3)
SODIUM SERPL-SCNC: 145 MMOL/L (ref 133–144)

## 2020-10-09 PROCEDURE — 120N000001 HC R&B MED SURG/OB

## 2020-10-09 PROCEDURE — 250N000011 HC RX IP 250 OP 636: Performed by: HOSPITALIST

## 2020-10-09 PROCEDURE — 250N000011 HC RX IP 250 OP 636: Performed by: INTERNAL MEDICINE

## 2020-10-09 PROCEDURE — 97110 THERAPEUTIC EXERCISES: CPT | Mod: GP

## 2020-10-09 PROCEDURE — 36415 COLL VENOUS BLD VENIPUNCTURE: CPT | Performed by: HOSPITALIST

## 2020-10-09 PROCEDURE — 99232 SBSQ HOSP IP/OBS MODERATE 35: CPT | Performed by: HOSPITALIST

## 2020-10-09 PROCEDURE — 85018 HEMOGLOBIN: CPT | Performed by: HOSPITALIST

## 2020-10-09 PROCEDURE — 97530 THERAPEUTIC ACTIVITIES: CPT | Mod: GP

## 2020-10-09 PROCEDURE — 250N000013 HC RX MED GY IP 250 OP 250 PS 637: Performed by: HOSPITALIST

## 2020-10-09 PROCEDURE — 250N000013 HC RX MED GY IP 250 OP 250 PS 637: Performed by: INTERNAL MEDICINE

## 2020-10-09 PROCEDURE — 97116 GAIT TRAINING THERAPY: CPT | Mod: GP

## 2020-10-09 PROCEDURE — 97161 PT EVAL LOW COMPLEX 20 MIN: CPT | Mod: GP

## 2020-10-09 PROCEDURE — 80048 BASIC METABOLIC PNL TOTAL CA: CPT | Performed by: HOSPITALIST

## 2020-10-09 RX ORDER — ATORVASTATIN CALCIUM 20 MG/1
20 TABLET, FILM COATED ORAL EVERY EVENING
Status: DISCONTINUED | OUTPATIENT
Start: 2020-10-09 | End: 2020-10-12 | Stop reason: HOSPADM

## 2020-10-09 RX ORDER — LABETALOL HYDROCHLORIDE 5 MG/ML
10 INJECTION, SOLUTION INTRAVENOUS
Status: DISCONTINUED | OUTPATIENT
Start: 2020-10-09 | End: 2020-10-12 | Stop reason: HOSPADM

## 2020-10-09 RX ADMIN — LABETALOL HYDROCHLORIDE 10 MG: 5 INJECTION, SOLUTION INTRAVENOUS at 17:46

## 2020-10-09 RX ADMIN — POTASSIUM CHLORIDE AND SODIUM CHLORIDE: 450; 150 INJECTION, SOLUTION INTRAVENOUS at 03:57

## 2020-10-09 RX ADMIN — APIXABAN 2.5 MG: 2.5 TABLET, FILM COATED ORAL at 22:23

## 2020-10-09 RX ADMIN — PIPERACILLIN AND TAZOBACTAM 2.25 G: 2; .25 INJECTION, POWDER, FOR SOLUTION INTRAVENOUS at 16:09

## 2020-10-09 RX ADMIN — APIXABAN 2.5 MG: 2.5 TABLET, FILM COATED ORAL at 08:03

## 2020-10-09 RX ADMIN — DILTIAZEM HYDROCHLORIDE 120 MG: 120 CAPSULE, COATED, EXTENDED RELEASE ORAL at 08:03

## 2020-10-09 RX ADMIN — PIPERACILLIN AND TAZOBACTAM 2.25 G: 2; .25 INJECTION, POWDER, FOR SOLUTION INTRAVENOUS at 22:23

## 2020-10-09 RX ADMIN — PIPERACILLIN AND TAZOBACTAM 2.25 G: 2; .25 INJECTION, POWDER, FOR SOLUTION INTRAVENOUS at 04:21

## 2020-10-09 RX ADMIN — PIPERACILLIN AND TAZOBACTAM 2.25 G: 2; .25 INJECTION, POWDER, FOR SOLUTION INTRAVENOUS at 10:31

## 2020-10-09 RX ADMIN — ATORVASTATIN CALCIUM 20 MG: 20 TABLET, FILM COATED ORAL at 22:23

## 2020-10-09 ASSESSMENT — ACTIVITIES OF DAILY LIVING (ADL)
ADLS_ACUITY_SCORE: 15

## 2020-10-09 NOTE — PROGRESS NOTES
COLON & RECTAL SURGERY  PROGRESS NOTE    October 9, 2020    SUBJECTIVE:  Doing well. Tolerating LFD. Diarrhea improving. No pain.     OBJECTIVE:  Temp:  [97.4  F (36.3  C)-97.8  F (36.6  C)] 97.8  F (36.6  C)  Pulse:  [] 100  Resp:  [16] 16  BP: (132-169)/() 169/99  SpO2:  [94 %-95 %] 95 %    Intake/Output Summary (Last 24 hours) at 10/9/2020 1020  Last data filed at 10/9/2020 0807  Gross per 24 hour   Intake 720 ml   Output --   Net 720 ml       GENERAL:  Awake, alert, no acute distress, sitting up in chair.   HEAD: Normocephalic atraumatic  SCLERA: Anicteric  EXTREMITIES: Warm and well perfused  ABDOMEN:  Soft, non- tender, non-distended. No guarding, rigidity, or peritoneal signs.     LABS:  Lab Results   Component Value Date    WBC 7.1 10/08/2020     Lab Results   Component Value Date    HGB 15.2 10/09/2020     Lab Results   Component Value Date    HCT 43.3 10/08/2020     Lab Results   Component Value Date     10/08/2020     Last Basic Metabolic Panel:  Lab Results   Component Value Date     10/09/2020      Lab Results   Component Value Date    POTASSIUM 3.8 10/09/2020     Lab Results   Component Value Date    CHLORIDE 116 10/09/2020     Lab Results   Component Value Date    CYNDEE 9.0 10/09/2020     Lab Results   Component Value Date    CO2 24 10/09/2020     Lab Results   Component Value Date    BUN 9 10/09/2020     Lab Results   Component Value Date    CR 0.84 10/09/2020     Lab Results   Component Value Date    GLC 82 10/09/2020       ASSESSMENT/PLAN: 85 yo female with complex PMH admitted with diverticulitis.  Clinically improving.    1. Low fiber diet  2.OOB, ambulate  3. Plan for discharge tomorrow. Patient lives independently and daughter will be able to assist with transition to home tomorrow.     Discussed with Dr. Puri.     For questions/paging, please contact the CRS office at [phone].    Melonie Sharp PA-C  [facility]  Phone: [phone]

## 2020-10-09 NOTE — PROGRESS NOTES
10/09/20 0955   Quick Adds   Type of Visit Initial PT Evaluation   Living Environment   People in home alone   Current Living Arrangements house   Home Accessibility stairs to enter home;stairs within home   Number of Stairs, Main Entrance 1   Stair Railings, Main Entrance none   Number of Stairs, Within Home, Primary 7   Stair Railings, Within Home, Primary railings on both sides of stairs   Transportation Anticipated family or friend will provide   Living Environment Comments Patient lives in a split level house with 1 step to enter and 7 to the main level. Patient indicates daughter & granddaughter helping with groceries & 1x/week supplemental help for household cleaning.   Self-Care   Usual Activity Tolerance good   Current Activity Tolerance moderate   Equipment Currently Used at Home none  (owns a cane)   Activity/Exercise/Self-Care Comment At baseline IND with functional mobility & ADLs.   Disability/Function   Wear Glasses or Blind yes   Fall history within last six months no   General Information   Onset of Illness/Injury or Date of Surgery 10/09/20   Referring Physician Maya Owens MD   Patient/Family Therapy Goals Statement (PT) To get stronger   Pertinent History of Current Problem (include personal factors and/or comorbidities that impact the POC) per chart: 86-year-old female with a history of COPD, coronary artery disease, CVA, hypothyroidism who was admitted with a severe diverticulitis.  She had been having lower abdominal pain for about a week which would worsen after meals.  The pain continually worsened to the point where she was seen in the emergency department yesterday.  She denies any associated nausea or vomiting.  She has had a poor appetite but no significant change in her p.o. intake.  She has been having regular bowel movements.   Existing Precautions/Restrictions fall   General Observations Greeted patient sitting up in recliner.   Cognition   Orientation Status (Cognition)  oriented x 4   Affect/Mental Status (Cognition) WFL   Follows Commands (Cognition) WFL   Pain Assessment   Patient Currently in Pain No   Integumentary/Edema   Integumentary/Edema Comments swelling in B feet   Posture    Posture Forward head position;Protracted shoulders   Range of Motion (ROM)   ROM Comment B LE WFL   Strength   Strength Comments B LE functionally weak   Bed Mobility   Comment (Bed Mobility) Did not observe   Transfers   Transfer Safety Comments sit <> stand without assistive device at CGA   Gait/Stairs (Locomotion)   Comment (Gait/Stairs) 10ft without assistive device at CGA, decreased channing, stride, unsteady, reaching for furniture   Balance   Balance Comments unsteady with ambulation without assistive device   Sensory Examination   Sensory Perception patient reports no sensory changes   Clinical Impression   Criteria for Skilled Therapeutic Intervention yes, treatment indicated   PT Diagnosis (PT) impaired functional mobility   Influenced by the following impairments LE weakness, SOB with activity, decreased activity tolerance, impaired balance   Functional limitations due to impairments transfers, ambulation, stairs   Clinical Presentation Stable/Uncomplicated   Clinical Presentation Rationale PMH, current presentation, clinical judgement   Clinical Decision Making (Complexity) low complexity   Therapy Frequency (PT) Daily   Predicted Duration of Therapy Intervention (days/wks) 3 days   Planned Therapy Interventions (PT) balance training;bed mobility training;gait training;home exercise program;patient/family education;stair training;strengthening;transfer training   Anticipated Equipment Needs at Discharge (PT) walker, rolling   Risk & Benefits of therapy have been explained evaluation/treatment results reviewed;care plan/treatment goals reviewed;patient   Clinical Impression Comments Patient presents with weakness & deconditioning with SOB during functional activity and impaired balance all  "contributing to increased fall risk and decreased safety living home alone.    PT Discharge Planning    PT Discharge Recommendation (DC Rec) home with assist;home with home care physical therapy   PT Rationale for DC Rec PT - Patient is recommended to have family spending a few days/nights with patient during transition to home from hospital (reports granddaughter may be able to). Patient demonstrates necessary level of functional mobility with use of FWW for all OOB activity and Ax1 for stair negotiation. HH-PT recommended to continuing improving strength, balance and activity tolerance to return to baseline.    PT Brief overview of current status  If family is unable to spend first few days staying with patient, patient may be unsafe to discharge to home alone and would need TCU.    [facility] [employer] \"6 Clicks\"   2016, Trustees of [facility], under license to [employer].  All rights reserved.   6 Clicks Short Forms Basic Mobility Inpatient Short Form   [facility] AM-PAC  \"6 Clicks\" V.2 Basic Mobility Inpatient Short Form   1. Turning from your back to your side while in a flat bed without using bedrails? 3 - A Little   2. Moving from lying on your back to sitting on the side of a flat bed without using bedrails? 3 - A Little   3. Moving to and from a bed to a chair (including a wheelchair)? 3 - A Little   4. Standing up from a chair using your arms (e.g., wheelchair, or bedside chair)? 3 - A Little   5. To walk in hospital room? 3 - A Little   6. Climbing 3-5 steps with a railing? 3 - A Little   Basic Mobility Raw Score (Score out of 24.Lower scores equate to lower levels of function) 18   Total Evaluation Time   Total Evaluation Time (Minutes) 8     "

## 2020-10-09 NOTE — PLAN OF CARE
DATE & TIME: 10/8/2020 0551-8769    Cognitive Concerns/ Orientation : AOx4  BEHAVIOR & AGGRESSION TOOL COLOR: Green  CIWA SCORE: N/A  ABNL VS/O2: VSS on room air  MOBILITY: SBA with gaitbelt  PAIN MANAGMENT: denies pain  DIET: tolerating low fiber diet  BOWEL/BLADDER: Voiding adequately. Did not have any BMs this evening but did have diarrhea this AM. If pt continues to have diarrhea/does not improve, rule out for c diff.  ABNL LAB/BG:  n/a  DRAIN/DEVICES: R PIV infusing @ 75 mL/hr  TELEMETRY RHYTHM: A Fib. RVR  SKIN: bruised  TESTS/PROCEDURES: n/a  D/C DAY/GOALS/PLACE: follow up with colorectal surgeon   OTHER IMPORTANT INFO: n/a

## 2020-10-09 NOTE — PLAN OF CARE
DATE & TIME: 10/9/20 0746-5748                   Cognitive Concerns/ Orientation : A&Ox4. Calm & cooperative  BEHAVIOR & AGGRESSION TOOL COLOR: Green  CIWA SCORE: NA        ABNL VS/O2: Afebrile, HR low 100s in AM, 80s rest of the shift, other VSS on RA  MOBILITY: SBA/gait belt, steady, walked the hallways x2.   PAIN MANAGMENT: Denies pain.   DIET: low fiber diet, fair appetite, tolerating.   BOWEL/BLADDER: Continent, denies urinary symptoms. No loose stools this shift.    ABNL LAB/BG: Na 145   DRAIN/DEVICES: PIV access x1 saline locked.   TELEMETRY RHYTHM: Afib w/ RVR in AM , Afibe w/ CVR rest of the shift. Asymptomatic.   SKIN: Bruises, blanchable redness coccyx, turn/repo independently.   TESTS/PROCEDURES: None  D/C DAY/GOALS/PLACE: Possible discharge back home tomorrow per CRS note.   OTHER IMPORTANT INFO: On IV Zosyn q6h. IVF discontinued. Voiding adequately. Tolerating low fiber diet, denies abd pain/n/v. Colorectal surgery following.    MD/RN ROUNDING SIGNED OFF D/E SHIFT: Yes  COMMIT TO SIT DONE AND SIGNED OFF: Yes

## 2020-10-09 NOTE — PROGRESS NOTES
[facility]  Medicine Progress Note - Hospitalist Service       Date of Admission:  10/6/2020  When I evaluated patient: 10/09/2020    Assessment & Plan     Severe, acute sigmoid diverticulitis  Sepsis due to the above   Presented with abdominal pain. CT abd pelvis in ER showed  Severe sigmoid diverticulitis. A region of inflammation at this  site contacts the posterior wall of the urinary bladder and also encroaches upon the vaginal cavity. Fistulas developing between these sites is not excluded. No abscess identified.  - admitted with IV Zosyn, CRS consulted, diet gradually advanced, tolerating. BM (+), no hematochezia or melena.   -continue Zosyn, PO at discharge, plan 10 more days of augmentin.   -CRS consult appreciated,  non-operative management for now and is doing better, no pain.     Lingular, spiculated lung nodule  - Needs outpatient PET scan or repeat CT chest as outpatient.   - daughter reported patient having altered bowel habit for about a year, defer further work up- colonoscopy given her age to her PCP, discussed with her daughter.      Atrial fibrillation with rapid ventricular response  HR overall improved, but still in 100-110 and also mildly hypertensive.  PTA is on  Diltiazem 120 mg daily and apixaban.  - noted that it was difficult to titrate her medication. She was hypotensive and dizzy with she was on 180 mg daily and AVN and PPM was recommended earlier this year which she has declined.   - will not change medication. Given her underlying illness, HR<120 and SBPs upto 160s is acceptable.     Hypothyroidism  -Continue levothyroxine    Chronic kidney disease with bilateral kidney stones  Mild hypernatremia, resolved  Hypokalemia, resolved  Creatinine stable   - discontinue IVF today, encourage PO     Chronic obstructive pulmonary disease  -P.r.n. albuterol inhaler available     Diet: Low Fiber Diet    DVT Prophylaxis: apixaban  Bob Catheter: not present  Code Status:  Full Code         Disposition Plan   Expected discharge: patient does not feel comfortable going home today, daughter helping her with necessary errands today and so she can go home tomorrow.    Entered: Maya Owens MD 10/09/2020, 3:46 PM     The patient's care was discussed with the Bedside Nurse and Patient. Discussed with her daughter over the phone at length.        Maya Owens MD  Hospitalist Service  [facility]  Contact information available via [facility] Paging/Directory    ______________________________________________________________________    Interval History   Abdominal pain has resolved, tolerating diet that was advanced to low fiber. Afebrile.   -BM (+). No nausea or vomiting.    -noted mildly hypertensive and tachycardic.     Data reviewed today: I reviewed all medications, new labs and imaging results over the last 24 hours. I personally reviewed no images or EKG's today.    Physical Exam   Vital Signs: Temp: 97.8  F (36.6  C) Temp src: Oral BP: (!) 155/88 Pulse: 98   Resp: 16 SpO2: 95 % O2 Device: None (Room air)    Weight: 80 lbs 0 oz     Constitutional: awake, alert, up in chair, very pleasant, cooperative, no apparent distress  Respiratory: No increased work of breathing, good air exchange, diminished but clear to auscultation bilaterally, no crackles or wheezing  Cardiovascular:  s1s2 irregular mildly tachycardic.   GI:  normal bowel sounds, soft, non-distended, non-tender  Neuropsychiatric: General: normal, calm and appropriate    Data   Recent Labs   Lab 10/09/20  0749 10/08/20  0835 10/07/20  2309 10/07/20  0729 10/06/20  1331   WBC  --  7.1  --  13.0* 14.4*   HGB 15.2 14.5  --  13.2 15.9*   MCV  --  99  --  99 100   PLT  --  211  --  176 201   * 142  --  145* 142   POTASSIUM 3.8 4.1 4.2 3.2* 4.0   CHLORIDE 116* 115*  --  116* 108   CO2 24 24  --  22 28   BUN 9 7  --  14 26   CR 0.84 0.72  --  0.70 0.87   ANIONGAP 5 3  --  7 6   CYNDEE 9.0 8.7  --  8.1*  9.8   GLC 82 76  --  58* 125*   ALBUMIN  --   --   --   --  2.5*   PROTTOTAL  --   --   --   --  7.0   BILITOTAL  --   --   --   --  0.7   ALKPHOS  --   --   --   --  55   ALT  --   --   --   --  52*   AST  --   --   --   --  53*   LIPASE  --   --   --   --  125     No results found for this or any previous visit (from the past 24 hour(s)).  Medications     - MEDICATION INSTRUCTIONS -         apixaban ANTICOAGULANT  2.5 mg Oral BID     atorvastatin  20 mg Oral QPM     diltiazem ER COATED BEADS  120 mg Oral Daily     piperacillin-tazobactam  2.25 g Intravenous Q6H     sodium chloride (PF)  3 mL Intracatheter Q8H

## 2020-10-09 NOTE — PLAN OF CARE
DATE & TIME: 10/8/2020 4200-6642              Cognitive Concerns/ Orientation : AOx4  BEHAVIOR & AGGRESSION TOOL COLOR: Green  CIWA SCORE: N/A  ABNL VS/O2: VSS on RA, ex BP elevated.   MOBILITY: SBA with gaitbelt  PAIN MANAGMENT: denies pain  DIET: tolerating low fiber diet  BOWEL/BLADDER: Voiding adequately. No BM per pt report this shift.   ABNL LAB/BG: N/A  DRAIN/DEVICES: R PIV infusing @ 75 mL/hr  TELEMETRY RHYTHM: Afib CVR-RVR  SKIN: bruised  TESTS/PROCEDURES: N/A  D/C DAY/GOALS/PLACE: follow up with colorectal surgeon   OTHER IMPORTANT INFO: N/A

## 2020-10-10 ENCOUNTER — APPOINTMENT (OUTPATIENT)
Dept: PHYSICAL THERAPY | Facility: CLINIC | Age: 85
DRG: 872 | End: 2020-10-10
Attending: HOSPITALIST
Payer: MEDICARE

## 2020-10-10 LAB
CREAT SERPL-MCNC: 0.75 MG/DL (ref 0.52–1.04)
GFR SERPL CREATININE-BSD FRML MDRD: 72 ML/MIN/{1.73_M2}
MAGNESIUM SERPL-MCNC: 1.6 MG/DL (ref 1.6–2.3)

## 2020-10-10 PROCEDURE — 82565 ASSAY OF CREATININE: CPT | Performed by: INTERNAL MEDICINE

## 2020-10-10 PROCEDURE — 36415 COLL VENOUS BLD VENIPUNCTURE: CPT | Performed by: INTERNAL MEDICINE

## 2020-10-10 PROCEDURE — 250N000013 HC RX MED GY IP 250 OP 250 PS 637: Performed by: INTERNAL MEDICINE

## 2020-10-10 PROCEDURE — 120N000001 HC R&B MED SURG/OB

## 2020-10-10 PROCEDURE — 250N000013 HC RX MED GY IP 250 OP 250 PS 637: Performed by: HOSPITALIST

## 2020-10-10 PROCEDURE — 97116 GAIT TRAINING THERAPY: CPT | Mod: GP | Performed by: PHYSICAL THERAPIST

## 2020-10-10 PROCEDURE — 83735 ASSAY OF MAGNESIUM: CPT | Performed by: INTERNAL MEDICINE

## 2020-10-10 PROCEDURE — 97530 THERAPEUTIC ACTIVITIES: CPT | Mod: GP | Performed by: PHYSICAL THERAPIST

## 2020-10-10 PROCEDURE — 250N000011 HC RX IP 250 OP 636: Performed by: INTERNAL MEDICINE

## 2020-10-10 PROCEDURE — 99232 SBSQ HOSP IP/OBS MODERATE 35: CPT | Performed by: INTERNAL MEDICINE

## 2020-10-10 RX ORDER — SACCHAROMYCES BOULARDII 250 MG
250 CAPSULE ORAL 2 TIMES DAILY
Status: DISCONTINUED | OUTPATIENT
Start: 2020-10-10 | End: 2020-10-12 | Stop reason: HOSPADM

## 2020-10-10 RX ADMIN — PIPERACILLIN AND TAZOBACTAM 2.25 G: 2; .25 INJECTION, POWDER, FOR SOLUTION INTRAVENOUS at 04:15

## 2020-10-10 RX ADMIN — DILTIAZEM HYDROCHLORIDE 120 MG: 120 CAPSULE, COATED, EXTENDED RELEASE ORAL at 08:11

## 2020-10-10 RX ADMIN — AMOXICILLIN AND CLAVULANATE POTASSIUM 1 TABLET: 875; 125 TABLET, FILM COATED ORAL at 21:04

## 2020-10-10 RX ADMIN — ATORVASTATIN CALCIUM 20 MG: 20 TABLET, FILM COATED ORAL at 21:03

## 2020-10-10 RX ADMIN — APIXABAN 2.5 MG: 2.5 TABLET, FILM COATED ORAL at 08:11

## 2020-10-10 RX ADMIN — Medication 250 MG: at 21:04

## 2020-10-10 RX ADMIN — APIXABAN 2.5 MG: 2.5 TABLET, FILM COATED ORAL at 21:04

## 2020-10-10 RX ADMIN — PIPERACILLIN AND TAZOBACTAM 2.25 G: 2; .25 INJECTION, POWDER, FOR SOLUTION INTRAVENOUS at 11:10

## 2020-10-10 ASSESSMENT — ACTIVITIES OF DAILY LIVING (ADL)
ADLS_ACUITY_SCORE: 16
ADLS_ACUITY_SCORE: 15
ADLS_ACUITY_SCORE: 15
ADLS_ACUITY_SCORE: 16
ADLS_ACUITY_SCORE: 15
ADLS_ACUITY_SCORE: 15

## 2020-10-10 NOTE — CONSULTS
Care Management Initial Consult    General Information  Assessment completed with:: Patient,  And then with dennis Avery present at 1500  Type of CM/SW Visit: Offer D/C Planning  Primary Care Provider verified and updated as needed?: Yes  Readmission Within the Last 30 Days: no previous admission in last 30 days        Advance Care Planning:            Communication Assessment  Patient's communication style: spoken language (English or Bilingual)  Hearing Difficulty or Deaf: yes Wear Glasses or Blind: yes    Cognitive  Cognitive/Neuro/Behavioral: WDL                      Living Environment:   People in home: alone     Current living Arrangements: house          Family/Social Support:  Care provided by: self  Provides care for: no one     Who is your support system?: Children     Description of Support System: Supportive, Involved  Description of Support System: Supportive, Involved  Lacks necessary supervision and assistance(related to family availabilty with work schedules etc. )        Description of Support System: Supportive, Involved  Support Assessment: Lacks necessary supervision and assistance(related to family availabilty with work schedules etc. )    Current Resources:   Skilled Home Care Services:    Community Resources: Housekeeping/[employer](has a person do housekeeping and laundry weekly)  Equipment currently used at home: walker, rolling  Supplies currently used at home:      Employment:  Employment Status: retired        Financial/Environmental Concerns:            Lifestyle & Psychosocial Needs:        Socioeconomic History     Marital status: Single     Spouse name: Not on file     Number of children: 4     Years of education: Not on file     Highest education level: Not on file     Tobacco Use     Smoking status: Former Smoker     Packs/day: 1.00     Years: 30.00     Pack years: 30.00     Types: Cigarettes     Start date:      Quit date: 1989     Years since quittin.8      Smokeless tobacco: Never Used     Tobacco comment: Started:  Stopped:   Substance and Sexual Activity     Alcohol use: No     Alcohol/week: 0.0 standard drinks     Drug use: No     Sexual activity: Not Currently     Birth control/protection: Female Surgical     Comment: LISA       Functional Status:  Prior to admission patient needed assistance: Meal preparation, Laundry/Housekeeping, Shopping, Transportation   Assesssment of Functional Status: Needs placement in a SNF/TCF for rehabilitation(recommend by therapy assessment)    Mental Health Status:  WDL                            Values/Beliefs:  Spiritual, Cultural Beliefs, Hoahaoism Practices, Values that affect care: no               Additional Information:  Together discussed therapy recommendations currently for home with family assistance most of time for next few days and HHC or if not able for this then TCU. Reviewed both choices, discussed coverage costs with TCU and HHC. We also discussed private pay costs for additional services outside of what Medicare would cover for HHC services.     Pt/family was provided with the Medicare Compare list for SNF and Home Care.  Discussed associated Medicare star ratings to assist with choice for referrals/discharge planning Yes    Education was given to pt/family that star ratings are updated/maintained by Medicare and can be reviewed by visiting www.medicare.gov Yes    Patient and family to discuss their plan, but are leaning towards a TCU placement.     Case Management to follow up with family on Sunday. Name and number left for return calls.     Amalia Herrera RN   [facility]   Phone [phone]

## 2020-10-10 NOTE — PLAN OF CARE
Cognitive Concerns/ Orientation : A&Ox4. Calm & cooperative  BEHAVIOR & AGGRESSION TOOL COLOR: Green  CIWA SCORE: NA        ABNL VS/O2: BP elevated 152/92 other VSS on RA  MOBILITY: A x 1 with belt and walker   PAIN MANAGMENT: Denies pain.   DIET: low fiber diet, tolerating well  BOWEL/BLADDER: Continent. Small loose BM   ABNL LAB/BG: None  DRAIN/DEVICES: PIV access x1 saline locked.   TELEMETRY RHYTHM: Afib w/ CVR.  SKIN: Bruises, blanchable redness coccyx, turn/repo independently. Bilateral feet/ankles +2 edema. BLE elevated.  TESTS/PROCEDURES: None  D/C DAY/GOALS/PLACE: Possible discharge home today  OTHER IMPORTANT INFO: On IV Zosyn q6h. Plan is to transition to PO antibiotics. Colorectal surgery signed off.  MD/RN ROUNDING SIGNED OFF D/E SHIFT: NA  COMMIT TO SIT DONE AND SIGNED OFF: Yes

## 2020-10-10 NOTE — PLAN OF CARE
A&O x4. SBA. VSS ex elev BP. Orthos pos per shift. Lungs clear. Tolerating diet. Voiding without difficulty. Plan is to have PT make final recommendation. SW consulted to ensure safe discharge plans for pt. Spoke with daughter and son today and updated plans. They expressed concerns about her going back home as unsafe due to mobility and self-care. Switching abx to augmentin. Should discharge when plan verified.

## 2020-10-10 NOTE — PLAN OF CARE
Cognitive Concerns/ Orientation : A&Ox4. Calm & cooperative  BEHAVIOR & AGGRESSION TOOL COLOR: Green  CIWA SCORE: NA        ABNL VS/O2: Afebrile, HR sometimes tachy. HBP decreased down to given parameter with PRN Labetalol. On RA  MOBILITY: A x 1 with belt and walker to use the bathroom.  PAIN MANAGMENT: Denies pain.   DIET: low fiber diet, good appetite  BOWEL/BLADDER: Continent. Had small loose stool this shift.    ABNL LAB/BG: None  DRAIN/DEVICES: PIV access x1 saline locked.   TELEMETRY RHYTHM: Afib w/ RVR.  SKIN: Bruises, blanchable redness coccyx, turn/repo independently. Bilateral feet/ankles +2 edema. BLE elevated.  TESTS/PROCEDURES: None  D/C DAY/GOALS/PLACE: Possible discharge back home tomorrow.  OTHER IMPORTANT INFO: On IV Zosyn q6h. IVF discontinued. Voiding adequately. Colorectal surgery signed off.  MD/RN ROUNDING SIGNED OFF D/E SHIFT: No  COMMIT TO SIT DONE AND SIGNED OFF: Yes

## 2020-10-10 NOTE — PROGRESS NOTES
[facility]    Medicine Progress Note - Hospitalist Service       Date of Admission:  10/6/2020  Assessment & Plan       Severe, acute sigmoid diverticulitis  Sepsis due to the above   Presented with abdominal pain. CT abd pelvis in ER showed  Severe sigmoid diverticulitis. A region of inflammation at this  site contacts the posterior wall of the urinary bladder and also encroaches upon the vaginal cavity. Fistulas developing between these sites is not excluded. No abscess identified.  - admitted with IV Zosyn, CRS consulted, diet gradually advanced, tolerating. BM (+), no hematochezia or melena. Low fiber diet as tolerated.    - Zosyn 10/6- 10/10. Will change to Augmentin for 10 to 14-day course.     Lingular, spiculated lung nodule  - Needs outpatient PET scan or repeat CT chest as outpatient.   - daughter reported patient having altered bowel habit for about a year, defer further work up- colonoscopy given her age to her PCP     Atrial fibrillation with rapid ventricular response  HR overall improved, but mildly hypertensive.  PTA is on  Diltiazem 120 mg daily and apixaban.  - noted that it was difficult to titrate her medication. She was hypotensive and dizzy with she was on 180 mg daily and AVN and PPM was recommended earlier this year which she has declined.   - will not change medication for now and monitor closely. Given her underlying illness, HR<120 and SBPs upto 160s is acceptable.      Hypothyroidism  -Continue levothyroxine     Chronic kidney disease with bilateral kidney stones  Mild hypernatremia, resolved  Hypokalemia, resolved  Creatinine stable   Creatinine   Date Value Ref Range Status   10/10/2020 0.75 0.52 - 1.04 mg/dL Final      Chronic obstructive pulmonary disease  -P.r.n. albuterol inhaler available       Diet: Low Fiber Diet    DVT Prophylaxis: apixaban  Bob Catheter: not present  Code Status: Full Code           Disposition Plan   Expected discharge: today vs  tomorrow, recommended to transitional care unit once safe disposition plan/ TCU bed available. Daughter feels patient unsafe for home   Entered: Brenton Crouch MD, MD 10/10/2020, 12:55 PM       The patient's care was discussed with the Bedside Nurse and Patient.    Brenton Crouch MD, MD  Hospitalist Service  [facility]  Contact information available via [facility] Paging/Directory    ______________________________________________________________________    Interval History   Feeling better. Denies any nausea/ vomiting  Denies Chest pain/ SOB/ cough, Denies any N&V/ bowel problems  Denies urinary problems , Denies fever/chills/rigors     Data reviewed today: I reviewed all medications, new labs and imaging results over the last 24 hours. I personally reviewed no images or EKG's today.    Physical Exam   BP (!) 142/89   Pulse 95   Temp 97.5  F (36.4  C) (Oral)   Resp 16   Ht 1.524 m (5')   Wt 36.3 kg (80 lb)   LMP 12/07/1981   SpO2 93%   BMI 15.62 kg/m    Gen- pleasant lying in bed  HEENT- NAD, IRAJ, MMM  Neck- supple, no JVD elevation, no thyromegaly  CVS- I+II+ no m/r/g. irregular  RS- CTAB  Abdo- soft, no tenderness . No g/r/r   Ext- no edema     Data   BMP  Recent Labs   Lab 10/10/20  0837 10/09/20  0749 10/08/20  0835 10/07/20  2309 10/07/20  0729 10/06/20  1331   NA  --  145* 142  --  145* 142   POTASSIUM  --  3.8 4.1 4.2 3.2* 4.0   CHLORIDE  --  116* 115*  --  116* 108   CYNDEE  --  9.0 8.7  --  8.1* 9.8   CO2  --  24 24  --  22 28   BUN  --  9 7  --  14 26   CR 0.75 0.84 0.72  --  0.70 0.87   GLC  --  82 76  --  58* 125*     CBC  Recent Labs   Lab 10/09/20  0749 10/08/20  0835 10/07/20  0729 10/06/20  1331   WBC  --  7.1 13.0* 14.4*   RBC  --  4.39 4.06 4.76   HGB 15.2 14.5 13.2 15.9*   HCT  --  43.3 40.2 47.7*   MCV  --  99 99 100   MCH  --  33.0 32.5 33.4*   MCHC  --  33.5 32.8 33.3   RDW  --  13.2 13.3 13.1   PLT  --  211 176 201     INRNo lab results found in last 7  days.  LFTs  Recent Labs   Lab 10/06/20  1331   ALKPHOS 55   AST 53*   ALT 52*   BILITOTAL 0.7   PROTTOTAL 7.0   ALBUMIN 2.5*      PANC  Recent Labs   Lab 10/06/20  1331   LIPASE 125

## 2020-10-11 ENCOUNTER — APPOINTMENT (OUTPATIENT)
Dept: PHYSICAL THERAPY | Facility: CLINIC | Age: 85
DRG: 872 | End: 2020-10-11
Payer: MEDICARE

## 2020-10-11 PROCEDURE — 250N000013 HC RX MED GY IP 250 OP 250 PS 637: Performed by: INTERNAL MEDICINE

## 2020-10-11 PROCEDURE — 99232 SBSQ HOSP IP/OBS MODERATE 35: CPT | Performed by: HOSPITALIST

## 2020-10-11 PROCEDURE — 97116 GAIT TRAINING THERAPY: CPT | Mod: GP

## 2020-10-11 PROCEDURE — 120N000001 HC R&B MED SURG/OB

## 2020-10-11 PROCEDURE — 97110 THERAPEUTIC EXERCISES: CPT | Mod: GP

## 2020-10-11 PROCEDURE — 97530 THERAPEUTIC ACTIVITIES: CPT | Mod: GP

## 2020-10-11 PROCEDURE — 250N000013 HC RX MED GY IP 250 OP 250 PS 637: Performed by: HOSPITALIST

## 2020-10-11 RX ADMIN — AMOXICILLIN AND CLAVULANATE POTASSIUM 1 TABLET: 875; 125 TABLET, FILM COATED ORAL at 20:11

## 2020-10-11 RX ADMIN — ATORVASTATIN CALCIUM 20 MG: 20 TABLET, FILM COATED ORAL at 20:11

## 2020-10-11 RX ADMIN — DILTIAZEM HYDROCHLORIDE 120 MG: 120 CAPSULE, COATED, EXTENDED RELEASE ORAL at 08:03

## 2020-10-11 RX ADMIN — APIXABAN 2.5 MG: 2.5 TABLET, FILM COATED ORAL at 20:11

## 2020-10-11 RX ADMIN — Medication 250 MG: at 20:11

## 2020-10-11 RX ADMIN — APIXABAN 2.5 MG: 2.5 TABLET, FILM COATED ORAL at 08:03

## 2020-10-11 RX ADMIN — Medication 250 MG: at 08:03

## 2020-10-11 RX ADMIN — AMOXICILLIN AND CLAVULANATE POTASSIUM 1 TABLET: 875; 125 TABLET, FILM COATED ORAL at 08:03

## 2020-10-11 ASSESSMENT — ACTIVITIES OF DAILY LIVING (ADL)
ADLS_ACUITY_SCORE: 15
ADLS_ACUITY_SCORE: 15
ADLS_ACUITY_SCORE: 16
ADLS_ACUITY_SCORE: 15
ADLS_ACUITY_SCORE: 16
ADLS_ACUITY_SCORE: 15

## 2020-10-11 NOTE — PLAN OF CARE
DATE & TIME: 10/9/20                  Cognitive Concerns/ Orientation : A&Ox4. Calm & cooperative  BEHAVIOR & AGGRESSION TOOL COLOR: Green  CIWA SCORE: NA        ABNL VS/O2: BP elevated  , not high enough for PRN Labetalol parameters other VSS on RA  MOBILITY: A x 1 with belt and walker   PAIN MANAGMENT: Denies pain.   DIET: low fiber diet, tolerating well  BOWEL/BLADDER: Continent. NO BM this shift  ABNL LAB/BG: None  DRAIN/DEVICES: PIV access x1 saline locked.   TELEMETRY RHYTHM: Afib w/ CVR.  SKIN: Bruises, blanchable redness coccyx, turn/repo independently. Bilateral feet/ankles +2 edema. BLE elevated.  TESTS/PROCEDURES: None  Plan is to have PT make final . Spoke with daughter and son today and updated plans. They expressed concerns about her going back home as unsafe due to mobility and self-care. Switching abx to augmentin. Should discharge when plan verified.

## 2020-10-11 NOTE — PLAN OF CARE
No acute changes. Mepilex applied for preventative reasons on coccyx. Pt working with OT per shift. Awaiting placement for TCU. SW following.

## 2020-10-11 NOTE — PROGRESS NOTES
[facility]  Hospitalist Progress Note        Curt Martin MD   10/11/2020        Interval History:      - no acute issues overnight; denies any abdominal pain         Assessment and Plan:        Ms Rodriguez is an 86-year-old female with a PMH of COPD, CAD, atrial fibrillation, hypothyroidism, who presented to the ED with abdominal pain and found to have diverticulitis    Severe, acute sigmoid diverticulitis  Sepsis due to the above--sepsis resolved   - CT abd pelvis in ER noted with severe sigmoid diverticulitis, no abscess  - admitted with IV Zosyn, CRS consulted, diet gradually advanced, tolerating. BM (+), no hematochezia or melena. Low fiber diet as tolerated.    - Zosyn 10/6- 10/10, switched to PO Augmentin for 10 to 14-day course  - clinically improved; CRS signed off; will need colonoscopy in 6-8 weeks      Lingular, spiculated lung nodule  - Needs outpatient PET scan or repeat CT chest as outpatient.   - daughter reported patient having altered bowel habit for about a year, defer further work up- colonoscopy given her age to her PCP     Atrial fibrillation with rapid ventricular response  - HR overall improved, but mildly hypertensive.  PTA is on  Diltiazem 120 mg daily and apixaban.  - noted that it was difficult to titrate her medication. She was hypotensive and dizzy with she was on 180 mg daily and AVN and PPM was recommended earlier this year which she has declined.   - HR<120 and SBPs upto 160s is acceptable.      Hypothyroidism  -Continue levothyroxine     Chronic kidney disease with bilateral kidney stones  Hypokalemia, resolved  Creatinine stable                          Chronic obstructive pulmonary disease  -P.r.n. albuterol inhaler available     Diet: Low Fiber Diet    DVT Prophylaxis: apixaban  Code Status: Full Code               Disposition Plan     Expected discharge: today vs tomorrow when TCU available; medically stabvle for discharge ; PT rec home with home cares but  family unable to assist and daughter feels patient unsafe for home    Care plan discussed with patient and daughter in room.                   Physical Exam:      Blood pressure (!) 153/90, pulse 92, temperature 98.8  F (37.1  C), temperature source Oral, resp. rate 18, height 1.524 m (5'), weight 36.3 kg (80 lb), last menstrual period 12/07/1981, SpO2 95 %.  Vitals:    10/06/20 1254   Weight: 36.3 kg (80 lb)     Vital Signs with Ranges  Temp:  [97.6  F (36.4  C)-98.8  F (37.1  C)] 98.8  F (37.1  C)  Pulse:  [78-92] 92  Resp:  [16-18] 18  BP: (132-157)/(76-95) 153/90  SpO2:  [93 %-96 %] 95 %  I/O's Last 24 hours  I/O last 3 completed shifts:  In: 500 [P.O.:500]  Out: -     Constitutional: Alert, awake and oriented; resting comfortably in no apparent distress   HEENT: Pupils equal and reactive to light and accomodation, neck supple    Oral cavity: Moist mucosa   Cardiovascular: Normal s1 s2, irregularly irregular rate and rhythm, no murmur   Lungs: B/l clear to auscultation, no wheezes or crepitations   Abdomen: Soft, nt, nd, no guarding, rigidity or rebound; BS +   LE : No edema   Musculoskeletal: Power 5/5 in all extremities   Neuro: No focal neurological deficits noted, CN II to XII grossly intact   Psychiatry: normal mood and affect                Medications:          amoxicillin-clavulanate  1 tablet Oral Q12H ARIK     apixaban ANTICOAGULANT  2.5 mg Oral BID     atorvastatin  20 mg Oral QPM     diltiazem ER COATED BEADS  120 mg Oral Daily     saccharomyces boulardii  250 mg Oral BID     sodium chloride (PF)  3 mL Intracatheter Q8H     PRN Meds: acetaminophen, acetaminophen, albuterol, HYDROcodone-acetaminophen, HYDROmorphone, labetalol, lidocaine 4%, lidocaine (buffered or not buffered), magnesium sulfate, melatonin, metoprolol, naloxone, ondansetron **OR** ondansetron, - MEDICATION INSTRUCTIONS -, potassium chloride, potassium chloride with lidocaine, potassium chloride, potassium chloride, prochlorperazine  **OR** prochlorperazine **OR** prochlorperazine, sodium chloride (PF)         Data:      All new lab and imaging data was reviewed.   Recent Labs   Lab Test 10/09/20  0749 10/08/20  0835 10/07/20  0729 10/06/20  1331 10/22/14  1228 10/22/14  1228 10/02/14  1136 02/10/14  0755 02/10/14  0755   WBC  --  7.1 13.0* 14.4*   < >  --  8.6   < >  --    HGB 15.2 14.5 13.2 15.9*   < >  --  14.4   < >  --    MCV  --  99 99 100   < >  --  94   < >  --    PLT  --  211 176 201   < >  --  213   < >  --    INR  --   --   --   --   --  1.03 1.78*  --  1.80*    < > = values in this interval not displayed.      Recent Labs   Lab Test 10/10/20  0837 10/09/20  0749 10/08/20  0835 10/07/20  2309 10/07/20  0729   NA  --  145* 142  --  145*   POTASSIUM  --  3.8 4.1 4.2 3.2*   CHLORIDE  --  116* 115*  --  116*   CO2  --  24 24  --  22   BUN  --  9 7  --  14   CR 0.75 0.84 0.72  --  0.70   ANIONGAP  --  5 3  --  7   CYNDEE  --  9.0 8.7  --  8.1*   GLC  --  82 76  --  58*     Recent Labs   Lab Test 03/26/17  1820 03/26/17  1605 08/17/15  1506   TROPI <0.015  The 99th percentile for upper reference range is 0.045 ug/L.  Troponin values in   the range of 0.045 - 0.120 ug/L may be associated with risks of adverse   clinical events.   <0.015  The 99th percentile for upper reference range is 0.045 ug/L.  Troponin values in   the range of 0.045 - 0.120 ug/L may be associated with risks of adverse   clinical events.   <0.015  The 99th percentile for upper reference range is 0.045 ug/L.  Troponin values in   the range of 0.045 - 0.120 ug/L may be associated with risks of adverse   clinical events.

## 2020-10-11 NOTE — PROGRESS NOTES
Care Management Follow Up Note    Length of Stay (days) 5    Patient plan of care discussed at Interdisciplinary Rounds: yes  Expected Discharge Date: 10/10/20(Home vs TCU)  Concerns to be Addressed:     Patient and family discussed discharge disposition and are interested in TCU placement for discharge    Anticipated Discharge Disposition:  TCU  Anticipated Discharge Services:    Anticipated Discharge DME:      Plan:    Follow up done today with patient and daughter Bennie (here at hospital this am). They have decided to pursue TCU placement for discharge. The TCU choices are [facility], [facility] in [place], [place], and [facility].   Referrals sent via DOD and unit SW update on disposition plan to follow along    Amalia Herrera RN   [facility]   Phone [phone]

## 2020-10-11 NOTE — PROGRESS NOTES
Pt AO4.  SBA W to BR, frequently voiding.  Full code.  Tele A fib CVR.  VSS on RA.  Denies pain.  Discharge pending.

## 2020-10-11 NOTE — PLAN OF CARE
Discharge Planner OT   Patient plan for discharge: TCU  Current status: Pt working with PT, family and care team finalized today discharge to TCU setting before heading home. Will defer OT to TCU setting, PT following.  Barriers to return to prior living situation: defer to PT  Recommendations for discharge: TCU, defer to PT  Rationale for recommendations: Deferring OT orders to TCU setting, confirmed today. PT following       Entered by: Mayra Rodriguez 10/11/2020 10:32 AM

## 2020-10-12 ENCOUNTER — APPOINTMENT (OUTPATIENT)
Dept: PHYSICAL THERAPY | Facility: CLINIC | Age: 85
DRG: 872 | End: 2020-10-12
Payer: MEDICARE

## 2020-10-12 VITALS
SYSTOLIC BLOOD PRESSURE: 156 MMHG | BODY MASS INDEX: 15.71 KG/M2 | WEIGHT: 80 LBS | OXYGEN SATURATION: 94 % | HEIGHT: 60 IN | HEART RATE: 90 BPM | RESPIRATION RATE: 16 BRPM | TEMPERATURE: 97.6 F | DIASTOLIC BLOOD PRESSURE: 94 MMHG

## 2020-10-12 LAB
BACTERIA SPEC CULT: NO GROWTH
BACTERIA SPEC CULT: NO GROWTH
SPECIMEN SOURCE: NORMAL
SPECIMEN SOURCE: NORMAL

## 2020-10-12 PROCEDURE — 99239 HOSP IP/OBS DSCHRG MGMT >30: CPT | Performed by: HOSPITALIST

## 2020-10-12 PROCEDURE — 90662 IIV NO PRSV INCREASED AG IM: CPT | Performed by: HOSPITALIST

## 2020-10-12 PROCEDURE — 250N000011 HC RX IP 250 OP 636: Performed by: HOSPITALIST

## 2020-10-12 PROCEDURE — 250N000013 HC RX MED GY IP 250 OP 250 PS 637: Performed by: INTERNAL MEDICINE

## 2020-10-12 PROCEDURE — G0008 ADMIN INFLUENZA VIRUS VAC: HCPCS | Performed by: HOSPITALIST

## 2020-10-12 PROCEDURE — 97116 GAIT TRAINING THERAPY: CPT | Mod: GP | Performed by: PHYSICAL THERAPY ASSISTANT

## 2020-10-12 RX ADMIN — Medication 250 MG: at 12:45

## 2020-10-12 RX ADMIN — APIXABAN 2.5 MG: 2.5 TABLET, FILM COATED ORAL at 09:18

## 2020-10-12 RX ADMIN — DILTIAZEM HYDROCHLORIDE 120 MG: 120 CAPSULE, COATED, EXTENDED RELEASE ORAL at 09:18

## 2020-10-12 RX ADMIN — AMOXICILLIN AND CLAVULANATE POTASSIUM 1 TABLET: 875; 125 TABLET, FILM COATED ORAL at 09:18

## 2020-10-12 RX ADMIN — INFLUENZA A VIRUS A/MICHIGAN/45/2015 X-275 (H1N1) ANTIGEN (FORMALDEHYDE INACTIVATED), INFLUENZA A VIRUS A/SINGAPORE/INFIMH-16-0019/2016 IVR-186 (H3N2) ANTIGEN (FORMALDEHYDE INACTIVATED), INFLUENZA B VIRUS B/PHUKET/3073/2013 ANTIGEN (FORMALDEHYDE INACTIVATED), AND INFLUENZA B VIRUS B/MARYLAND/15/2016 BX-69A ANTIGEN (FORMALDEHYDE INACTIVATED) 0.7 ML: 60; 60; 60; 60 INJECTION, SUSPENSION INTRAMUSCULAR at 16:24

## 2020-10-12 ASSESSMENT — ACTIVITIES OF DAILY LIVING (ADL)
ADLS_ACUITY_SCORE: 15

## 2020-10-12 NOTE — DISCHARGE SUMMARY
[facility]  Discharge Summary        Mia Rodriguez MRN# [id number]   YOB: 1934 Age: 86 year old     Date of Admission:  10/6/2020  Date of Discharge:  10/12/2020  Admitting Physician:  Zeb Jimenez DO  Discharge Physician: Curt Martin MD  Discharging Service: Hospitalist     Primary Provider: Birgit Cordero  Primary Care Physician Phone Number: [phone]         Discharge Diagnoses/Problem Oriented Hospital Course (Providers):    Mia Rodriguez was admitted on 10/6/2020 by Zeb Jimenez DO and I would refer you to their history and physical.  The following problems were addressed during her hospitalization:    Ms Rodriguez is an 86-year-old female with a PMH of COPD, CAD, atrial fibrillation, hypothyroidism, who presented to the ED with abdominal pain and found to have diverticulitis     Severe, acute sigmoid diverticulitis  Sepsis due to the above--sepsis resolved   - CT abd pelvis in ER noted with severe sigmoid diverticulitis, no abscess  - admitted with IV Zosyn, CRS consulted, diet gradually advanced, tolerating. BM (+), no hematochezia or melena. Low fiber diet as tolerated.    - Zosyn 10/6- 10/10, switched to PO Augmentin for total 10 to 14-day course  - clinically improved; CRS signed off; will need colonoscopy in 6-8 weeks      Lingular, spiculated lung nodule  - Needs outpatient PET scan or repeat CT chest as outpatient.   - daughter reported patient having altered bowel habit for about a year, defer further work up- colonoscopy given her age to her PCP     Atrial fibrillation with rapid ventricular response  - HR overall improved, but mildly hypertensive.  PTA is on  Diltiazem 120 mg daily and apixaban.  - noted that it was difficult to titrate her medication. She was hypotensive and dizzy when she was on 180 mg daily and AVN and PPM was recommended earlier this year which she has declined.   - HR<120 and SBPs upto 160s is  acceptable.      Hypothyroidism  -Continue levothyroxine     Chronic kidney disease with bilateral kidney stones  Hypokalemia, resolved  Creatinine stable      Chronic obstructive pulmonary disease  -P.r.n. albuterol inhaler available     Diet: Low Fiber Diet      Code Status: Full Code                 Pending Results:        Unresulted Labs Ordered in the Past 30 Days of this Admission     No orders found from 9/6/2020 to 10/7/2020.               Discharge Instructions and Follow-Up:      Follow-up Appointments     Follow Up and recommended labs and tests      Follow up with longterm physician.  The following labs/tests are   recommended: repeat CBC and BMP in 3-5 days.    Follow up with PCP after discharge from TCU for consideration of a   Colonoscopy in 6 -8 weeks and also to follow up on incidental lung nodule.           Follow-up and recommended labs and tests       Follow up with Dr. Matthew, [facility] as needed   for worsening or recurrent symptoms.   [facility]  Phone: [phone]                  Discharge Disposition:      Discharged to short-term care facility         Discharge Medications:        Current Discharge Medication List      START taking these medications    Details   amoxicillin-clavulanate (AUGMENTIN) 875-125 MG tablet Take 1 tablet by mouth every 12 hours for 8 days    Associated Diagnoses: Sigmoid diverticulitis         CONTINUE these medications which have NOT CHANGED    Details   apixaban ANTICOAGULANT (ELIQUIS) 2.5 MG tablet Take 1 tablet (2.5 mg) by mouth 2 times daily  Qty: 180 tablet, Refills: 3    Associated Diagnoses: Chronic atrial fibrillation (H)      atorvastatin (LIPITOR) 40 MG tablet Take 0.5 tablets (20 mg) by mouth daily  Qty: 45 tablet, Refills: 3    Associated Diagnoses: Hyperlipidemia LDL goal <100      diltiazem ER (DILT-XR) 120 MG 24 hr capsule Take 1 capsule (120 mg) by mouth daily  Qty: 90 capsule, Refills: 3     Associated Diagnoses: Chronic atrial fibrillation (H)      denosumab (PROLIA) 60 MG/ML SOLN Inject 60 mg Subcutaneous. q 6 months                  Allergies:         Allergies   Allergen Reactions     Digoxin Other (See Comments)     Weakness, SOB     Megace [Megestrol Acetate]      Increased LFTs              Consultations This Hospital Stay:      Consultation during this admission received from colorectal surgery         Condition and Physical Exam on Discharge:      Discharge condition: Stable   Discharge vitals: Blood pressure (!) 156/94, pulse 90, temperature 97.6  F (36.4  C), temperature source Oral, resp. rate 16, height 1.524 m (5'), weight 36.3 kg (80 lb), last menstrual period 12/07/1981, SpO2 94 %.     Constitutional: Alert, awake and oriented; resting comfortably in no apparent distress   HEENT: Pupils equal and reactive to light and accomodation, neck supple    Oral cavity: Moist mucosa   Cardiovascular: Normal s1 s2, irregularly irregular rate and rhythm, no murmur   Lungs: B/l clear to auscultation, no wheezes or crepitations   Abdomen: Soft, nt, nd, no guarding, rigidity or rebound; BS +   LE : No edema   Musculoskeletal: Power 5/5 in all extremities   Neuro: No focal neurological deficits noted, CN II to XII grossly intact   Psychiatry: normal mood and affect                 Discharge Orders for Skilled Facility (from Discharge Orders):        After Care Instructions     Activity - Up ad corrina      Advance Diet as Tolerated      Follow this diet upon discharge: Orders Placed This Encounter      Low Fiber Diet         General info for SNF      Length of Stay Estimate: Short Term Care: Estimated # of Days <30  Condition at Discharge: Improving  Level of care:skilled   Rehabilitation Potential: Good  Admission H&P remains valid and up-to-date: Yes  Recent Chemotherapy: N/A  Use Nursing Home Standing Orders: Yes         Mantoux instructions      Give two-step Mantoux (PPD) Per Facility Policy Yes                     Rehab orders for Skilled Facility (from Discharge Orders):      Referrals     Future Labs/Procedures    Physical Therapy Adult Consult     Comments:    Evaluate and treat as clinically indicated.    Reason:  Deconditioning             Discharge Time:      Greater than 30 minutes.        Image Results From This Hospital Stay (For Non-EPIC Providers):        Results for orders placed or performed during the hospital encounter of 10/06/20   CT Chest/Abdomen/Pelvis w Contrast    Narrative    CT CHEST/ABDOMEN/PELVIS WITH CONTRAST 10/6/2020 4:02 PM    CLINICAL HISTORY: Check for pneumonia. Diverticulitis. Appendicitis.  Ischemic colitis. Cause of sepsis. Abdominal pain for one week with  cough, shortness of breath and diarrhea.    TECHNIQUE: CT scan of the chest, abdomen, and pelvis was performed  following injection of IV contrast. Multiplanar reformats were  obtained. Dose reduction techniques were used.     CONTRAST: 40 mL Isovue-370    COMPARISON: CT abdomen and pelvis 9/3/2017, CT chest 3/26/2017.    FINDINGS:   LUNGS AND PLEURA: No effusions. Emphysema. 0.2 cm medial left upper  lobe nodule is slightly more prominent since 2017 where it was 0.1 cm  series 5 image 157. New elongated nodular opacity at the lingula that  is approximately 0.8 cm series 5 image 240. A new or better visualized  spiculated-appearing nodule is 1.2 cm at the lingula series 5 image  224. There is other nodularity in this region as well. A few calcified  granulomas noted.    MEDIASTINUM/AXILLAE: Thoracic aortic and coronary artery  calcifications. No acute thoracic aortic abnormality. No central  pulmonary embolism. The exam is nondiagnostic for assessment of small  branch vessel pulmonary emboli. No suspicious enlarging lymph nodes.    HEPATOBILIARY: No acute hepatic abnormality. Gallbladder shows no  specific abnormality. Tiny cyst at the left liver series 3 image 138.    PANCREAS: Normal.    SPLEEN: Scattered  calcifications.    ADRENAL GLANDS: Normal.    KIDNEYS/BLADDER: Several bilateral intrarenal stones. An example at  the lower pole on the left is 0.8 cm series 3 image 175. There are  other bilateral examples. No hydronephrosis. No visible ureter stone.  There is some edema adjacent to the bladder. There is inflammatory  change along the posterior wall of the bladder near the  diverticulitis, see below, series 3 image 265.    BOWEL: Acute diverticulitis at the sigmoid colon identified, for  example, series 3 image 263. Inflamed diverticulum encroaches upon the  vaginal cavity at this level. There is also wall thickening and  inflammation at other portions of the sigmoid colon; for instance,  around series 3 image 254 and adjacent images. There is no free air.  No abscess at this time. Trace pelvic fluid. No bowel obstruction.    PELVIC ORGANS: Uterus not seen. Ovaries not seen.    ADDITIONAL FINDINGS: Vascular calcifications.    MUSCULOSKELETAL: No acute osseous abnormality.      Impression    IMPRESSION:  1.  Severe sigmoid diverticulitis. A region of inflammation at this  site contacts the posterior wall of the urinary bladder and also  encroaches upon the vaginal cavity. Fistulas developing between these  sites is not excluded. No abscess identified.  2.  Emphysema.  3.  Coronary artery and other vascular calcifications.  4.  New or better visualized spiculated nodule at the lingula is  noted. There is additional nodularity seen in this region. Neoplasm is  a possibility and further oncologic workup is recommended. A PET/CT  could provide further assessment. Surveillance CT chest in three  months recommended.  5.  Multiple bilateral intrarenal stones, but no hydronephrosis or  ureter stone visualized.    TANIA VILLATORO MD           Most Recent Lab Results In EPIC (For Non-EPIC Providers):    Most Recent 3 CBC's:  Recent Labs   Lab Test 10/09/20  0749 10/08/20  0835 10/07/20  0729 10/06/20  1331   WBC  --  7.1 13.0*  14.4*   HGB 15.2 14.5 13.2 15.9*   MCV  --  99 99 100   PLT  --  211 176 201      Most Recent 3 BMP's:  Recent Labs   Lab Test 10/10/20  0837 10/09/20  0749 10/08/20  0835 10/07/20  2309 10/07/20  0729   NA  --  145* 142  --  145*   POTASSIUM  --  3.8 4.1 4.2 3.2*   CHLORIDE  --  116* 115*  --  116*   CO2  --  24 24  --  22   BUN  --  9 7  --  14   CR 0.75 0.84 0.72  --  0.70   ANIONGAP  --  5 3  --  7   CYNDEE  --  9.0 8.7  --  8.1*   GLC  --  82 76  --  58*     Most Recent 3 Troponin's:  Recent Labs   Lab Test 03/26/17  1820 03/26/17  1605 08/17/15  1506   TROPI <0.015  The 99th percentile for upper reference range is 0.045 ug/L.  Troponin values in   the range of 0.045 - 0.120 ug/L may be associated with risks of adverse   clinical events.   <0.015  The 99th percentile for upper reference range is 0.045 ug/L.  Troponin values in   the range of 0.045 - 0.120 ug/L may be associated with risks of adverse   clinical events.   <0.015  The 99th percentile for upper reference range is 0.045 ug/L.  Troponin values in   the range of 0.045 - 0.120 ug/L may be associated with risks of adverse   clinical events.       Most Recent 3 INR's:  Recent Labs   Lab Test 10/22/14  1228 10/02/14  1136 02/10/14  0755   INR 1.03 1.78* 1.80*     Most Recent 2 LFT's:  Recent Labs   Lab Test 10/06/20  1331 01/12/18  0821 10/02/14  1136 10/02/14  1136   AST 53*  --   --  13   ALT 52* 10   < > 15   ALKPHOS 55  --   --  46   BILITOTAL 0.7  --   --  0.5    < > = values in this interval not displayed.     Most Recent Cholesterol Panel:  Recent Labs   Lab Test 01/15/19  0927   CHOL 158   LDL 88   HDL 51   TRIG 93     Most Recent 6 Bacteria Isolates From Any Culture (See EPIC Reports for Culture Details):  Recent Labs   Lab Test 10/06/20  1423 10/06/20  1331 09/26/14  1927   CULT No growth  No growth No growth 10,000 to 50,000 colonies/mL Proteus mirabilis*     Most Recent TSH, T4 and HgbA1c:  Recent Labs   Lab Test 02/08/14  1735 08/07/12  1025    TSH 2.76 2.24   T4  --  1.84

## 2020-10-12 NOTE — PROGRESS NOTES
Patient seen and examined    - no acute issues overnight  - has been having some soft to loose stools (not watery) but no fever or pain abdomen; no concern for C diff at this time  - discharge held yesterday due to unavailability of placement     Discharge to [facility] today    Please see discharge summary for details

## 2020-10-12 NOTE — PLAN OF CARE
PT-  Pt discharged to TCU today due to not having adequate level of assist at home.  PT goals not met.

## 2020-10-12 NOTE — PLAN OF CARE
DATE & TIME: 10/11/20 3651-8196               Cognitive Concerns/ Orientation : A&Ox4. Calm & cooperative  BEHAVIOR & AGGRESSION TOOL COLOR: Green  CIWA SCORE: NA        ABNL VS/O2: VSS on RA  MOBILITY: SBA walker   PAIN MANAGMENT: Denies pain.   DIET: low fiber diet, tolerating well  BOWEL/BLADDER: Continent. NO BM this shift  ABNL LAB/BG: None  DRAIN/DEVICES: PIV access x1 saline locked.   TELEMETRY RHYTHM: Afib w/ CVR.  SKIN: Bruises, blanchable redness coccyx, turn/repo independently. Bilateral feet/ankles +2 edema. BLE elevated.  Discharge: tomorrow to TCU if bed available  TESTS/PROCEDURES: Switched abx to augmentin.

## 2020-10-12 NOTE — PLAN OF CARE
DATE & TIME: 10/12/2020 6700-7305            Cognitive Concerns/ Orientation : A&Ox4. Calm & cooperative  BEHAVIOR & AGGRESSION TOOL COLOR: Green  CIWA SCORE: NA        ABNL VS/O2: VSS on RA  MOBILITY: SBA walker   PAIN MANAGMENT: Denies pain.   DIET: low fiber diet  BOWEL/BLADDER: Continent  ABNL LAB/BG: None  DRAIN/DEVICES: No IV access  TELEMETRY RHYTHM: Afib w/ CVR.  SKIN: Bruises, blanchable redness coccyx, turn/repo independently. Bilateral feet/ankles +2 edema. BLE elevated.  Discharge: today to TCU if bed available  TESTS/PROCEDURES: Switched abx to augmentin.

## 2020-10-12 NOTE — PROGRESS NOTES
Discharge    Patient discharged to [facility] via  with [facility] transport    Listed belongings gathered and returned to patient. Yes  Care Plan and Patient education resolved: Yes  Prescriptions if needed, hard copies sent with patient  NA  Home and hospital acquired medications returned to patient: Yes  Medication Bin checked and emptied on discharge Yes  Follow up appointment made for patient: No

## 2020-10-12 NOTE — PLAN OF CARE
DATE & TIME: 10/12/2020 3133-8810            Cognitive Concerns/ Orientation : A&Ox4. Calm & cooperative  BEHAVIOR & AGGRESSION TOOL COLOR: Green  CIWA SCORE: NA        ABNL VS/O2: BP elevated 163/103, 156/94, HR low 100s at times, other VSS on RA  MOBILITY: SBA walker/gait belt, steady  PAIN MANAGMENT: Denies pain.   DIET: low fiber diet  BOWEL/BLADDER: Continent  ABNL LAB/BG: None  DRAIN/DEVICES: No IV access  TELEMETRY RHYTHM: Afib w/ CVR.  SKIN: Bruises, blanchable redness coccyx, turn/repo independently. Bilateral feet/ankles +2 edema. BLE elevated.  Discharge: today to [facility], ride schedule at 1630.   TESTS/PROCEDURES: on oral Augmentin q12h. Loose stools x3 this morning, denies abd pain/nausea or any other symptoms, MD aware.

## 2020-10-12 NOTE — PROGRESS NOTES
Care Management Discharge Note    Discharge Planning:  Expected Discharge Date: 10/12/20     Concerns to be Addressed: discharge planning       Anticipated Discharge Disposition: Skilled Nursing Facilty  Anticipated Discharge Services:  TCU  Anticipated Discharge DME:      Patient/family educated on Medicare website which has current facility and service quality ratings: yes  Referrals Placed by CM/SW: Post Acute Facilities  Education Provided on the Discharge Plan:    Patient/Family in Agreement with the Plan: yes     Disposition Comments:      Selected Continued Care - Admitted Since 10/6/2020     Destination Coordination complete    Service Provider Selected Services Address Phone Fax Patient Preferred Last Updated    [facility] - Referral Only (SNF)  Skilled Nursing [address] [place] [address] [phone] -- Alma Paz LSW 10/12/2020 1302                Additional Information:  Patient accepted at [facility]. Spoke to patient's daughter Bennie regarding placement. She also asked for [facility] however, writer explained that they do not have any beds available today. Bennie is in agreement to [facility]. Patient will be picked up via transport aide at 1630 today to go by [facility]. Writer updated bedside nurse and paged for discharge orders. PAS completed and faxed to facility. Orders received for discharge today and faxed.     PAS-RR    D: Per DHS regulation, ANDRZEJ completed and submitted PAS-RR to [place] [employer] via the Senior [employer] Line.  PAS-RR confirmation # is : [id number]    I: SW spoke with daughter and they are aware a PAS-RR has been submitted.  SW reviewed with daughter that they may be contacted for a follow up appointment within 10 days of hospital discharge if their SNF stay is < 30 days.  Contact information for Senior [employer] Line was also provided.    A: daughter verbalized understanding.    P: Further questions may be directed to Senior  LinkAge Line at #[phone], option #4 for [facility] [place]- staff.        LADY Person

## 2020-10-13 ENCOUNTER — TELEPHONE (OUTPATIENT)
Dept: OBGYN | Facility: CLINIC | Age: 85
End: 2020-10-13

## 2020-10-13 ENCOUNTER — NURSING HOME VISIT (OUTPATIENT)
Dept: GERIATRICS | Facility: CLINIC | Age: 85
End: 2020-10-13
Payer: MEDICARE

## 2020-10-13 VITALS
BODY MASS INDEX: 15.71 KG/M2 | HEART RATE: 96 BPM | RESPIRATION RATE: 18 BRPM | HEIGHT: 60 IN | WEIGHT: 80 LBS | OXYGEN SATURATION: 96 % | SYSTOLIC BLOOD PRESSURE: 142 MMHG | DIASTOLIC BLOOD PRESSURE: 87 MMHG | TEMPERATURE: 97.7 F

## 2020-10-13 DIAGNOSIS — M81.0 OSTEOPOROSIS, SENILE: ICD-10-CM

## 2020-10-13 DIAGNOSIS — M81.0 OSTEOPOROSIS, SENILE: Primary | ICD-10-CM

## 2020-10-13 DIAGNOSIS — I10 BENIGN ESSENTIAL HYPERTENSION: ICD-10-CM

## 2020-10-13 DIAGNOSIS — Z92.29 PERSONAL HISTORY OF OTHER DRUG THERAPY: ICD-10-CM

## 2020-10-13 DIAGNOSIS — E03.9 HYPOTHYROIDISM, UNSPECIFIED TYPE: ICD-10-CM

## 2020-10-13 DIAGNOSIS — E78.5 HYPERLIPIDEMIA WITH TARGET LDL LESS THAN 100: ICD-10-CM

## 2020-10-13 DIAGNOSIS — R91.1 LUNG NODULE: ICD-10-CM

## 2020-10-13 DIAGNOSIS — I27.20 PULMONARY HYPERTENSION (H): ICD-10-CM

## 2020-10-13 DIAGNOSIS — J44.9 CHRONIC OBSTRUCTIVE PULMONARY DISEASE, UNSPECIFIED COPD TYPE (H): ICD-10-CM

## 2020-10-13 DIAGNOSIS — R53.81 PHYSICAL DECONDITIONING: ICD-10-CM

## 2020-10-13 DIAGNOSIS — I48.91 ATRIAL FIBRILLATION, UNSPECIFIED TYPE (H): ICD-10-CM

## 2020-10-13 DIAGNOSIS — E46 PROTEIN-CALORIE MALNUTRITION, UNSPECIFIED SEVERITY (H): ICD-10-CM

## 2020-10-13 DIAGNOSIS — K57.32 SIGMOID DIVERTICULITIS: Primary | ICD-10-CM

## 2020-10-13 PROCEDURE — 99310 SBSQ NF CARE HIGH MDM 45: CPT | Performed by: NURSE PRACTITIONER

## 2020-10-13 RX ORDER — ATORVASTATIN CALCIUM 20 MG/1
20 TABLET, FILM COATED ORAL AT BEDTIME
COMMUNITY
End: 2020-10-18

## 2020-10-13 RX ORDER — ALBUTEROL SULFATE 90 UG/1
2 AEROSOL, METERED RESPIRATORY (INHALATION) EVERY 4 HOURS PRN
Status: ON HOLD | COMMUNITY
End: 2022-01-01

## 2020-10-13 ASSESSMENT — MIFFLIN-ST. JEOR: SCORE: 724.38

## 2020-10-13 NOTE — TELEPHONE ENCOUNTER
Last injection 2/20/20  Recent labs 10/9 and 10/10/20:  Mag normal  Creatinine normal  Calcium normal  1/29/20 phosphorus normal    CAM order placed 1/8/20 - referral reads closed  New CAM order placed today    Lavonne Bates RN on 10/13/2020 at 10:09 AM

## 2020-10-13 NOTE — LETTER
10/13/2020        RE: Mia Rodriguez  [address] [place] [address]        [facility]    PRIMARY CARE PROVIDER AND CLINIC:  Birgit Cordero, [address] / [place] [address]  Chief Complaint   Patient presents with     Hospital F/U     [place] Medical Record Number:  [id number]  Place of Service where encounter took place:  [facility] [[id number]]    Mia Rodriguez  is a 86 year old  (2/25/1934), admitted to the above facility from  [facility]. Hospital stay 10/6/2020 through 10/12/2020.  Admitted to this facility for  rehab, medical management and nursing care.    HPI:    HPI information obtained from: facility chart records, facility staff, patient report and [facility] chart review.     Brief Summary of Hospital Course:     PMH: COPD, CAD, atrial fibrillation, hypothyroidism    Patient admitted to [facility] 10/6-10/12 due to abdominal pain. CT abd pelvis + severe sigmoid diverticulitis. Was treated with IV zosyn 10/6-10/10, then transitioned to Augmentin. Diet gradually advanced. Colorectal surgery consulted, recommending outpatient colonoscopy in 6-8 weeks. Noted to have lingular, spiculated lung nodule on CT scan, recommending outpatient PET scan or repeat CT chest in 3 months. Also noted to have atrial fibrillation with RVR, previously declined PPM and AVN. Goal HR < 120 and SBP < 160s.     [facility]       Updates on Status Since Skilled nursing Admission:     During exam, patient seen resting in bed. Reports fatigued following therapy session today. Ambulates w/walker. PTA lives in a house alone, states goal is to discharge home. Admits to improved appetite. Sleeping well at night. Patient reports [facility], states she was supposed to take last dose of prolia approx 2 months ago. Denies abdominal pain, nausea, bloody stools. Denies  chest pain, SOB, headache, syncope.        CODE STATUS/ADVANCE DIRECTIVES DISCUSSION:   CPR/Full code   Patient's living condition: lives alone  ALLERGIES: Digoxin and Megace [megestrol acetate]  PAST MEDICAL HISTORY:  has a past medical history of Arrhythmia, Atrial fibrillation (H), Breast cancer (H), COPD (chronic obstructive pulmonary disease) (H), COPD exacerbation (H) (9/2/2015), Coronary artery disease, Hypertension, Malignant neoplasm (H) (6/2/2008), Osteoporosis, Renal disease, Thyroid disease, Unspecified cerebral artery occlusion with cerebral infarction, and Uterine fibroid. She also has no past medical history of Difficult intubation, Malignant hyperthermia, PONV (postoperative nausea and vomiting), or Spinal headache.  PAST SURGICAL HISTORY:   has a past surgical history that includes Breast surgery (6/12/2008); biopsy (6/2/2008); biopsy (6/1/2010); GYN surgery (1981); Laser holmium lithotripsy ureter(s), insert stent, combined (10/5/2012); Cystoscopy, dilate urethra, combined (10/5/2012); Excise lesion lip (N/A, 10/22/2014); Laser holmium lithotripsy ureter(s), insert stent, combined (Right, 4/16/2015); and Cystoscopy, retrogrades, extract stone, combined (Right, 4/16/2015).  FAMILY HISTORY: family history includes Alzheimer Disease in her mother; Breast Cancer in her maternal grandmother; C.A.D. in her father; Cancer in her paternal grandfather; Cardiovascular in her father; Cerebrovascular Disease in her father; Osteoporosis in her mother; Unknown/Adopted in her maternal grandfather and paternal grandmother.  SOCIAL HISTORY:   reports that she quit smoking about 30 years ago. Her smoking use included cigarettes. She started smoking about 61 years ago. She has a 30.00 pack-year smoking history. She has never used smokeless tobacco. She reports that she does not drink alcohol or use drugs.    Post Discharge Medication Reconciliation Status: discharge medications reconciled and changed, per  note/orders    Current Outpatient Medications   Medication Sig Dispense Refill     amoxicillin-clavulanate (AUGMENTIN) 875-125 MG tablet Take 1 tablet by mouth 2 times daily       apixaban ANTICOAGULANT (ELIQUIS) 2.5 MG tablet Take 1 tablet (2.5 mg) by mouth 2 times daily 180 tablet 3     atorvastatin (LIPITOR) 20 MG tablet Take 20 mg by mouth At Bedtime       diltiazem ER (DILT-XR) 120 MG 24 hr capsule Take 1 capsule (120 mg) by mouth daily 90 capsule 3     denosumab (PROLIA) 60 MG/ML SOLN Inject 60 mg Subcutaneous. q 6 months           ROS:  10 point ROS of systems including Constitutional, Eyes, Respiratory, Cardiovascular, Gastroenterology, Genitourinary, Integumentary, Musculoskeletal, Psychiatric were all negative except for pertinent positives noted in my HPI.      Vitals:  BP (!) 142/87   Pulse 96   Temp 97.7  F (36.5  C)   Resp 18   Ht 1.524 m (5')   Wt 36.3 kg (80 lb)   LMP 12/07/1981   SpO2 96%   BMI 15.62 kg/m    Exam:  Limited observational exam due to COVID19 precautions  GENERAL APPEARANCE:  Alert, in no distress, oriented, thin, cooperative  ENT:  Mouth and posterior oropharynx normal, moist mucous membranes, [place]  EYES:  EOM, conjunctivae, lids, pupils and irises normal, PERRL  NECK:  No adenopathy,masses or thyromegaly  RESP:  no respiratory distress, no coughing  CV:  BLE edema.  M/S:   Gait and station abnormal, uses walker. Digits and nails normal  SKIN:  Inspection of skin and subcutaneous tissue baseline  NEURO:   Cranial nerves 2-12 are normal tested and grossly at patient's baseline  PSYCH:  Oriented x 2, affect and mood normal    Wt Readings from Last 4 Encounters:   10/13/20 36.3 kg (80 lb)   10/06/20 36.3 kg (80 lb)   06/03/20 36.3 kg (80 lb)   02/20/20 40 kg (88 lb 1.6 oz)       Lab/Diagnostic data:  Recent labs in [facility] reviewed by me today.  and   Most Recent 3 CBC's:  Recent Labs   Lab Test 10/09/20  0749 10/08/20  0835 10/07/20  0729 10/06/20  1331   WBC  --  7.1 13.0* 14.4*    HGB 15.2 14.5 13.2 15.9*   MCV  --  99 99 100   PLT  --  211 176 201     Most Recent 3 BMP's:  Recent Labs   Lab Test 10/10/20  0837 10/09/20  0749 10/08/20  0835 10/07/20  2309 10/07/20  0729   NA  --  145* 142  --  145*   POTASSIUM  --  3.8 4.1 4.2 3.2*   CHLORIDE  --  116* 115*  --  116*   CO2  --  24 24  --  22   BUN  --  9 7  --  14   CR 0.75 0.84 0.72  --  0.70   ANIONGAP  --  5 3  --  7   CYNDEE  --  9.0 8.7  --  8.1*   GLC  --  82 76  --  58*       CT Chest/Abdomen/Pelvis w Contrast     Narrative     CT CHEST/ABDOMEN/PELVIS WITH CONTRAST 10/6/2020 4:02 PM     CLINICAL HISTORY: Check for pneumonia. Diverticulitis. Appendicitis.  Ischemic colitis. Cause of sepsis. Abdominal pain for one week with  cough, shortness of breath and diarrhea.     TECHNIQUE: CT scan of the chest, abdomen, and pelvis was performed  following injection of IV contrast. Multiplanar reformats were  obtained. Dose reduction techniques were used.      CONTRAST: 40 mL Isovue-370     COMPARISON: CT abdomen and pelvis 9/3/2017, CT chest 3/26/2017.     FINDINGS:   LUNGS AND PLEURA: No effusions. Emphysema. 0.2 cm medial left upper  lobe nodule is slightly more prominent since 2017 where it was 0.1 cm  series 5 image 157. New elongated nodular opacity at the lingula that  is approximately 0.8 cm series 5 image 240. A new or better visualized  spiculated-appearing nodule is 1.2 cm at the lingula series 5 image  224. There is other nodularity in this region as well. A few calcified  granulomas noted.     MEDIASTINUM/AXILLAE: Thoracic aortic and coronary artery  calcifications. No acute thoracic aortic abnormality. No central  pulmonary embolism. The exam is nondiagnostic for assessment of small  branch vessel pulmonary emboli. No suspicious enlarging lymph nodes.     HEPATOBILIARY: No acute hepatic abnormality. Gallbladder shows no  specific abnormality. Tiny cyst at the left liver series 3 image 138.     PANCREAS: Normal.     SPLEEN: Scattered  calcifications.     ADRENAL GLANDS: Normal.     KIDNEYS/BLADDER: Several bilateral intrarenal stones. An example at  the lower pole on the left is 0.8 cm series 3 image 175. There are  other bilateral examples. No hydronephrosis. No visible ureter stone.  There is some edema adjacent to the bladder. There is inflammatory  change along the posterior wall of the bladder near the  diverticulitis, see below, series 3 image 265.     BOWEL: Acute diverticulitis at the sigmoid colon identified, for  example, series 3 image 263. Inflamed diverticulum encroaches upon the  vaginal cavity at this level. There is also wall thickening and  inflammation at other portions of the sigmoid colon; for instance,  around series 3 image 254 and adjacent images. There is no free air.  No abscess at this time. Trace pelvic fluid. No bowel obstruction.     PELVIC ORGANS: Uterus not seen. Ovaries not seen.     ADDITIONAL FINDINGS: Vascular calcifications.     MUSCULOSKELETAL: No acute osseous abnormality.        Impression     IMPRESSION:  1.  Severe sigmoid diverticulitis. A region of inflammation at this  site contacts the posterior wall of the urinary bladder and also  encroaches upon the vaginal cavity. Fistulas developing between these  sites is not excluded. No abscess identified.  2.  Emphysema.  3.  Coronary artery and other vascular calcifications.  4.  New or better visualized spiculated nodule at the lingula is  noted. There is additional nodularity seen in this region. Neoplasm is  a possibility and further oncologic workup is recommended. A PET/CT  could provide further assessment. Surveillance CT chest in three  months recommended.  5.  Multiple bilateral intrarenal stones, but no hydronephrosis or  ureter stone visualized.     TANIA VILLATORO MD         TSH 1/29/20: 8.61        ASSESSMENT/PLAN:    (K57.32) Sigmoid diverticulitis  (primary encounter diagnosis)  Comment: Acute sigmoid diverticulitis.  Plan:   - Check CBC & BMP  10/14  - Course of Augmentin to be completed on 10/20  - Per discharge summary, recommending outpatient colonoscopy in 6-8 weeks. Reviewed recommendation with patient; declined recommendation for outpatient colonoscopy. Reports never having a colonoscopy and doesn't want one.  - Patient to follow-up with [facility] if sx worsen  - Patient verbalizes understanding and agrees with treatment plan.     (I48.91) Atrial fibrillation, unspecified type (H)  Comment: Recent A fib with RVR. HR now improved. Previously has declined PPM and AV node ablation. Goal HR < 120 and SBP < 160s.   Plan:   - Continue diltiazem, eliquis  - Monitor BP/HR  - Patient to follow-up with Cardiologist (Dr. Cantrell) as directed  - Patient verbalizes understanding and agrees with treatment plan.     (I10) Benign essential hypertension  (E78.5) Hyperlipidemia with target LDL less than 100  Comment: Controlled. Goal HR < 120 and SBP < 160s.   Plan:   - Continue diltiazem, lipitor  - Monitor BP/HR; avoid hypotension due to fall risk  - Patient verbalizes understanding and agrees with treatment plan.     (I27.20) Pulmonary hypertension (H)  Comment: Chronic pulmonary hypertension. Last Echo 6/19/19 found EF 60-65% with mild mitral regurgitation, mild-moderate tricuspid regurgitation, mild pulmonary hypertension and mild aortic regurgitation.   Plan:   - OT to eval/treat edema  - Monitor respiratory status, edema, weights  - Patient to follow-up with Cardiologist (Dr. Cantrell) as directed    (M81.0) Osteoporosis, senile  Comment: Chronic  Plan:   - Patient to follow-up with Dr. Randle for outpatient prolia injection  - Patient verbalizes understanding and agrees with treatment plan, states overdue for next injection.     (J44.9) Chronic obstructive pulmonary disease, unspecified COPD type (H)  Comment: Controlled  Plan:   - Add albuterol inhaler prn for SOB, wheezing  - Monitor O2 sats and respiratory status  - Patient verbalizes  understanding and agrees with treatment plan.     (E46) Protein-calorie malnutrition, unspecified severity (H)  Comment: Chronic. Body mass index is 15.62 kg/m .  Plan:   - Monitor intake, weights  - Dietary to follow  - Patient verbalizes understanding and agrees with treatment plan.     (E03.9) Hypothyroidism, unspecified type  Comment: Uncontrolled, last TSH 8.6 in 01/2020. Goal TSH 4-6 for older adults.  Plan:   - Per chart review, patient stopped taking levothyroxine 12.5mcg every day. Unclear when this was stopped by patient. However, per chart review dose of levothyroxine noted to be 37.5mcg QD in 03/2020.  - Recheck TSH, free T4, free T3 in 1-2 weeks    (R53.81) Physical deconditioning  Comment: Ongoing physical deconditioning due to recent hospitalization. Hx CVA. PTA lives in house alone. Ambulates w/walker. Requires assistance with ADLs.   Plan:   - Encourage active participation in therapy session to increase strength and promote independence in activities and ADLs.   - Cognitive testing to be done in therapy. ST to eval/treat cognition.  - SW following for discharge planning. Patient's goal is to discharge home.  - Patient confirms code status as full code. States she is considering changing code status to DNR/DNI, but wishes to continue full code at this time.  - Patient verbalizes understanding and agrees with treatment plan.     (R91.1) Lung nodule  Comment: Lingular, spiculated lung nodule noted on CT scan on 10/6. Hx breast cancer.   Plan:   - Reviewed treatment options, including referral to Oncologist, outpatient PET scan vs repeat CT chest in 3 months. Patient verbalizes understanding of recommendations, states she doesn't want to pursue treatment plan for lung nodule at this time.              Total time spent with patient visit at the skilled nursing facility was 42 minutes including patient visit and review of past records. Greater than 50% of total time (26 minutes) spent with counseling  patient regarding treatment plan, medication management, review recent imaging and lab results and recommendations for follow-up labs, as well as discharge planning. Patient verbalizes understanding and agrees with treatment plan.       Electronically signed by:  SONAM Yousif CNP                           Sincerely,        SONAM Yousif CNP

## 2020-10-13 NOTE — PROGRESS NOTES
[facility]    PRIMARY CARE PROVIDER AND CLINIC:  Birgit Cordero, [facility] [place] [address] / [place] [address]  Chief Complaint   Patient presents with     Hospital F/U     [facility] Record Number:  [id number]  [facility] where encounter took place[facility] [[id number]]    Mia Rodriguez  is a 86 year old  (2/25/1934), admitted to the above facility from  [facility]. Hospital stay 10/6/2020 through 10/12/2020.  Admitted to this facility for  rehab, medical management and nursing care.    HPI:    HPI information obtained from: facility chart records, facility staff, patient report and [facility] chart review.     Brief Summary of Hospital Course:     PMH: COPD, CAD, atrial fibrillation, hypothyroidism    Patient admitted to [facility] 10/6-10/12 due to abdominal pain. CT abd pelvis + severe sigmoid diverticulitis. Was treated with IV zosyn 10/6-10/10, then transitioned to Augmentin. Diet gradually advanced. Colorectal surgery consulted, recommending outpatient colonoscopy in 6-8 weeks. Noted to have lingular, spiculated lung nodule on CT scan, recommending outpatient PET scan or repeat CT chest in 3 months. Also noted to have atrial fibrillation with RVR, previously declined PPM and AVN. Goal HR < 120 and SBP < 160s.     [facility]       Updates on Status Since Skilled nursing Admission:     During exam, patient seen resting in bed. Reports fatigued following therapy session today. Ambulates w/walker. PTA lives in a house alone, states goal is to discharge home. Admits to improved appetite. Sleeping well at night. Patient reports [facility], states she was supposed to take last dose of prolia approx 2 months ago. Denies abdominal pain, nausea, bloody stools. Denies chest pain, SOB, headache, syncope.        CODE STATUS/ADVANCE DIRECTIVES DISCUSSION:   CPR/Full code    Patient's living condition: lives alone  ALLERGIES: Digoxin and Megace [megestrol acetate]  PAST MEDICAL HISTORY:  has a past medical history of Arrhythmia, Atrial fibrillation (H), Breast cancer (H), COPD (chronic obstructive pulmonary disease) (H), COPD exacerbation (H) (9/2/2015), Coronary artery disease, Hypertension, Malignant neoplasm (H) (6/2/2008), Osteoporosis, Renal disease, Thyroid disease, Unspecified cerebral artery occlusion with cerebral infarction, and Uterine fibroid. She also has no past medical history of Difficult intubation, Malignant hyperthermia, PONV (postoperative nausea and vomiting), or Spinal headache.  PAST SURGICAL HISTORY:   has a past surgical history that includes Breast surgery (6/12/2008); biopsy (6/2/2008); biopsy (6/1/2010); GYN surgery (1981); Laser holmium lithotripsy ureter(s), insert stent, combined (10/5/2012); Cystoscopy, dilate urethra, combined (10/5/2012); Excise lesion lip (N/A, 10/22/2014); Laser holmium lithotripsy ureter(s), insert stent, combined (Right, 4/16/2015); and Cystoscopy, retrogrades, extract stone, combined (Right, 4/16/2015).  FAMILY HISTORY: family history includes Alzheimer Disease in her mother; Breast Cancer in her maternal grandmother; C.A.D. in her father; Cancer in her paternal grandfather; Cardiovascular in her father; Cerebrovascular Disease in her father; Osteoporosis in her mother; Unknown/Adopted in her maternal grandfather and paternal grandmother.  SOCIAL HISTORY:   reports that she quit smoking about 30 years ago. Her smoking use included cigarettes. She started smoking about 61 years ago. She has a 30.00 pack-year smoking history. She has never used smokeless tobacco. She reports that she does not drink alcohol or use drugs.    Post Discharge Medication Reconciliation Status: discharge medications reconciled and changed, per note/orders    Current Outpatient Medications   Medication Sig Dispense Refill     amoxicillin-clavulanate  (AUGMENTIN) 875-125 MG tablet Take 1 tablet by mouth 2 times daily       apixaban ANTICOAGULANT (ELIQUIS) 2.5 MG tablet Take 1 tablet (2.5 mg) by mouth 2 times daily 180 tablet 3     atorvastatin (LIPITOR) 20 MG tablet Take 20 mg by mouth At Bedtime       diltiazem ER (DILT-XR) 120 MG 24 hr capsule Take 1 capsule (120 mg) by mouth daily 90 capsule 3     denosumab (PROLIA) 60 MG/ML SOLN Inject 60 mg Subcutaneous. q 6 months           ROS:  10 point ROS of systems including Constitutional, Eyes, Respiratory, Cardiovascular, Gastroenterology, Genitourinary, Integumentary, Musculoskeletal, Psychiatric were all negative except for pertinent positives noted in my HPI.      Vitals:  BP (!) 142/87   Pulse 96   Temp 97.7  F (36.5  C)   Resp 18   Ht 1.524 m (5')   Wt 36.3 kg (80 lb)   LMP 12/07/1981   SpO2 96%   BMI 15.62 kg/m    Exam:  Limited observational exam due to COVID19 precautions  GENERAL APPEARANCE:  Alert, in no distress, oriented, thin, cooperative  ENT:  Mouth and posterior oropharynx normal, moist mucous membranes, [place]  EYES:  EOM, conjunctivae, lids, pupils and irises normal, PERRL  NECK:  No adenopathy,masses or thyromegaly  RESP:  no respiratory distress, no coughing  CV:  BLE edema.  M/S:   Gait and station abnormal, uses walker. Digits and nails normal  SKIN:  Inspection of skin and subcutaneous tissue baseline  NEURO:   Cranial nerves 2-12 are normal tested and grossly at patient's baseline  PSYCH:  Oriented x 2, affect and mood normal    Wt Readings from Last 4 Encounters:   10/13/20 36.3 kg (80 lb)   10/06/20 36.3 kg (80 lb)   06/03/20 36.3 kg (80 lb)   02/20/20 40 kg (88 lb 1.6 oz)       Lab/Diagnostic data:  Recent labs in [facility] reviewed by me today.  and   Most Recent 3 CBC's:  Recent Labs   Lab Test 10/09/20  0749 10/08/20  0835 10/07/20  0729 10/06/20  1331   WBC  --  7.1 13.0* 14.4*   HGB 15.2 14.5 13.2 15.9*   MCV  --  99 99 100   PLT  --  211 176 201     Most Recent 3 BMP's:  Recent Labs    Lab Test 10/10/20  0837 10/09/20  0749 10/08/20  0835 10/07/20  2309 10/07/20  0729   NA  --  145* 142  --  145*   POTASSIUM  --  3.8 4.1 4.2 3.2*   CHLORIDE  --  116* 115*  --  116*   CO2  --  24 24  --  22   BUN  --  9 7  --  14   CR 0.75 0.84 0.72  --  0.70   ANIONGAP  --  5 3  --  7   CYNDEE  --  9.0 8.7  --  8.1*   GLC  --  82 76  --  58*       CT Chest/Abdomen/Pelvis w Contrast     Narrative     CT CHEST/ABDOMEN/PELVIS WITH CONTRAST 10/6/2020 4:02 PM     CLINICAL HISTORY: Check for pneumonia. Diverticulitis. Appendicitis.  Ischemic colitis. Cause of sepsis. Abdominal pain for one week with  cough, shortness of breath and diarrhea.     TECHNIQUE: CT scan of the chest, abdomen, and pelvis was performed  following injection of IV contrast. Multiplanar reformats were  obtained. Dose reduction techniques were used.      CONTRAST: 40 mL Isovue-370     COMPARISON: CT abdomen and pelvis 9/3/2017, CT chest 3/26/2017.     FINDINGS:   LUNGS AND PLEURA: No effusions. Emphysema. 0.2 cm medial left upper  lobe nodule is slightly more prominent since 2017 where it was 0.1 cm  series 5 image 157. New elongated nodular opacity at the lingula that  is approximately 0.8 cm series 5 image 240. A new or better visualized  spiculated-appearing nodule is 1.2 cm at the lingula series 5 image  224. There is other nodularity in this region as well. A few calcified  granulomas noted.     MEDIASTINUM/AXILLAE: Thoracic aortic and coronary artery  calcifications. No acute thoracic aortic abnormality. No central  pulmonary embolism. The exam is nondiagnostic for assessment of small  branch vessel pulmonary emboli. No suspicious enlarging lymph nodes.     HEPATOBILIARY: No acute hepatic abnormality. Gallbladder shows no  specific abnormality. Tiny cyst at the left liver series 3 image 138.     PANCREAS: Normal.     SPLEEN: Scattered calcifications.     ADRENAL GLANDS: Normal.     KIDNEYS/BLADDER: Several bilateral intrarenal stones. An  example at  the lower pole on the left is 0.8 cm series 3 image 175. There are  other bilateral examples. No hydronephrosis. No visible ureter stone.  There is some edema adjacent to the bladder. There is inflammatory  change along the posterior wall of the bladder near the  diverticulitis, see below, series 3 image 265.     BOWEL: Acute diverticulitis at the sigmoid colon identified, for  example, series 3 image 263. Inflamed diverticulum encroaches upon the  vaginal cavity at this level. There is also wall thickening and  inflammation at other portions of the sigmoid colon; for instance,  around series 3 image 254 and adjacent images. There is no free air.  No abscess at this time. Trace pelvic fluid. No bowel obstruction.     PELVIC ORGANS: Uterus not seen. Ovaries not seen.     ADDITIONAL FINDINGS: Vascular calcifications.     MUSCULOSKELETAL: No acute osseous abnormality.        Impression     IMPRESSION:  1.  Severe sigmoid diverticulitis. A region of inflammation at this  site contacts the posterior wall of the urinary bladder and also  encroaches upon the vaginal cavity. Fistulas developing between these  sites is not excluded. No abscess identified.  2.  Emphysema.  3.  Coronary artery and other vascular calcifications.  4.  New or better visualized spiculated nodule at the lingula is  noted. There is additional nodularity seen in this region. Neoplasm is  a possibility and further oncologic workup is recommended. A PET/CT  could provide further assessment. Surveillance CT chest in three  months recommended.  5.  Multiple bilateral intrarenal stones, but no hydronephrosis or  ureter stone visualized.     TANIA VILLATORO MD         TSH 1/29/20: 8.61        ASSESSMENT/PLAN:    (K57.32) Sigmoid diverticulitis  (primary encounter diagnosis)  Comment: Acute sigmoid diverticulitis.  Plan:   - Check CBC & BMP 10/14  - Course of Augmentin to be completed on 10/20  - Per discharge summary, recommending outpatient  colonoscopy in 6-8 weeks. Reviewed recommendation with patient; declined recommendation for outpatient colonoscopy. Reports never having a colonoscopy and doesn't want one.  - Patient to follow-up with Colorectal Surgery Clinic PRN if sx worsen  - Patient verbalizes understanding and agrees with treatment plan.     (I48.91) Atrial fibrillation, unspecified type (H)  Comment: Recent A fib with RVR. HR now improved. Previously has declined PPM and AV node ablation. Goal HR < 120 and SBP < 160s.   Plan:   - Continue diltiazem, eliquis  - Monitor BP/HR  - Patient to follow-up with Cardiologist (Dr. Cantrell) as directed  - Patient verbalizes understanding and agrees with treatment plan.     (I10) Benign essential hypertension  (E78.5) Hyperlipidemia with target LDL less than 100  Comment: Controlled. Goal HR < 120 and SBP < 160s.   Plan:   - Continue diltiazem, lipitor  - Monitor BP/HR; avoid hypotension due to fall risk  - Patient verbalizes understanding and agrees with treatment plan.     (I27.20) Pulmonary hypertension (H)  Comment: Chronic pulmonary hypertension. Last Echo 6/19/19 found EF 60-65% with mild mitral regurgitation, mild-moderate tricuspid regurgitation, mild pulmonary hypertension and mild aortic regurgitation.   Plan:   - OT to eval/treat edema  - Monitor respiratory status, edema, weights  - Patient to follow-up with Cardiologist (Dr. Cantrell) as directed    (M81.0) Osteoporosis, senile  Comment: Chronic  Plan:   - Patient to follow-up with Dr. Randle for outpatient prolia injection  - Patient verbalizes understanding and agrees with treatment plan, states overdue for next injection.     (J44.9) Chronic obstructive pulmonary disease, unspecified COPD type (H)  Comment: Controlled  Plan:   - Add albuterol inhaler prn for SOB, wheezing  - Monitor O2 sats and respiratory status  - Patient verbalizes understanding and agrees with treatment plan.     (E46) Protein-calorie malnutrition, unspecified severity  (H)  Comment: Chronic. Body mass index is 15.62 kg/m .  Plan:   - Monitor intake, weights  - Dietary to follow  - Patient verbalizes understanding and agrees with treatment plan.     (E03.9) Hypothyroidism, unspecified type  Comment: Uncontrolled, last TSH 8.6 in 01/2020. Goal TSH 4-6 for older adults.  Plan:   - Per chart review, patient stopped taking levothyroxine 12.5mcg every day. Unclear when this was stopped by patient. However, per chart review dose of levothyroxine noted to be 37.5mcg QD in 03/2020.  - Recheck TSH, free T4, free T3 in 1-2 weeks    (R53.81) Physical deconditioning  Comment: Ongoing physical deconditioning due to recent hospitalization. Hx CVA. PTA lives in house alone. Ambulates w/walker. Requires assistance with ADLs.   Plan:   - Encourage active participation in therapy session to increase strength and promote independence in activities and ADLs.   - Cognitive testing to be done in therapy. ST to eval/treat cognition.  - SW following for discharge planning. Patient's goal is to discharge home.  - Patient confirms code status as full code. States she is considering changing code status to DNR/DNI, but wishes to continue full code at this time.  - Patient verbalizes understanding and agrees with treatment plan.     (R91.1) Lung nodule  Comment: Lingular, spiculated lung nodule noted on CT scan on 10/6. Hx breast cancer.   Plan:   - Reviewed treatment options, including referral to Oncologist, outpatient PET scan vs repeat CT chest in 3 months. Patient verbalizes understanding of recommendations, states she doesn't want to pursue treatment plan for lung nodule at this time.              Total time spent with patient visit at the skilled nursing facility was 42 minutes including patient visit and review of past records. Greater than 50% of total time (26 minutes) spent with counseling patient regarding treatment plan, medication management, review recent imaging and lab results and  recommendations for follow-up labs, as well as discharge planning. Patient verbalizes understanding and agrees with treatment plan.       Electronically signed by:  SONAM Yousif CNP

## 2020-10-13 NOTE — PATIENT INSTRUCTIONS
PROLIA  Risk Evaluation and Mitigation Strategy Best Practice Advisory    In May 2015, the FDA required an update to the PROLIA  (denosumab) REMS (Risk Evaluation and Mitigation Strategy) to inform both providers and patients about potential serious risks related to PROLIA .    These potential serious risks include:    Hypocalcemia    Osteonecrosis of the jaw    Atypical femoral fractures    Serious Infections    Dermatologic reactions    To ensure compliance with these requirements [facility] has developed a workflow for those patients who will receive PROLIA  at clinic.  This workflow includes insurance verification, patient scheduling, patient education, and documentation. Registered Nurses in the clinic should have completed eLearning related to the REMS and the clinic workflow.  Refer to them to initiate this process.  This workflow does not need to be executed if the patient is to receive PROLIA  injection at [facility].       The  has put together a Patient Education Toolkit.  Copies of these handouts may be available your clinic. Patients must receive the Patient Brochure and Medication Guide when receiving injection at clinic.  These will be attached to the injection ordered from [facility] [employer] to the clinic. Links to handouts:  Patient Counseling Chart for Healthcare Providers  Patient Brochure  Prescribing Information  Medication Guide    If you are having trouble accessing the above links, these documents can be found at: http://[email or web address]    The role of healthcare provider includes reviewing patient education materials with the patient, advising patients to seek medical attention if they have signs or symptoms of one of the serious risks, and provide patients a copy of the Patient Brochure and Medication Guide when receiving their injection.      Due to the risk of hypocalcemia the Prescribing  Information for PROLIA  recommends that calcium and mineral levels (magnesium and phosphorus) be checked within 14 days of injection for those predisposed to hypocalcemia.

## 2020-10-13 NOTE — TELEPHONE ENCOUNTER
Patient overdue for see Dr. Randle and getting next Prolia which was due 8/20/20. LM for her to call back to schedule with Dr. Randle and set up next Prolia with possible labs first.    Triage can you please review for labs and place CAM order.

## 2020-10-13 NOTE — TELEPHONE ENCOUNTER
I have left a message on patient home phone. After review more details on chart, appears she was recently in the hospital and discharge to transitional care.

## 2020-10-14 ENCOUNTER — HOSPITAL LABORATORY (OUTPATIENT)
Dept: OTHER | Facility: CLINIC | Age: 85
End: 2020-10-14

## 2020-10-14 LAB
ANION GAP SERPL CALCULATED.3IONS-SCNC: 6 MMOL/L (ref 3–14)
BUN SERPL-MCNC: 13 MG/DL (ref 7–30)
CALCIUM SERPL-MCNC: 9.6 MG/DL (ref 8.5–10.1)
CHLORIDE SERPL-SCNC: 106 MMOL/L (ref 94–109)
CO2 SERPL-SCNC: 29 MMOL/L (ref 20–32)
CREAT SERPL-MCNC: 0.84 MG/DL (ref 0.52–1.04)
ERYTHROCYTE [DISTWIDTH] IN BLOOD BY AUTOMATED COUNT: 13.8 % (ref 10–15)
GFR SERPL CREATININE-BSD FRML MDRD: 62 ML/MIN/{1.73_M2}
GLUCOSE SERPL-MCNC: 91 MG/DL (ref 70–99)
HCT VFR BLD AUTO: 46.2 % (ref 35–47)
HGB BLD-MCNC: 15.2 G/DL (ref 11.7–15.7)
MCH RBC QN AUTO: 32.5 PG (ref 26.5–33)
MCHC RBC AUTO-ENTMCNC: 32.9 G/DL (ref 31.5–36.5)
MCV RBC AUTO: 99 FL (ref 78–100)
PLATELET # BLD AUTO: 269 10E9/L (ref 150–450)
POTASSIUM SERPL-SCNC: 2.8 MMOL/L (ref 3.4–5.3)
RBC # BLD AUTO: 4.68 10E12/L (ref 3.8–5.2)
SODIUM SERPL-SCNC: 141 MMOL/L (ref 133–144)
WBC # BLD AUTO: 8.7 10E9/L (ref 4–11)

## 2020-10-15 ENCOUNTER — HOSPITAL LABORATORY (OUTPATIENT)
Dept: OTHER | Facility: CLINIC | Age: 85
End: 2020-10-15

## 2020-10-15 LAB
POTASSIUM SERPL-SCNC: 3 MMOL/L (ref 3.4–5.3)
SARS-COV-2 RNA SPEC QL NAA+PROBE: NOT DETECTED
SPECIMEN SOURCE: NORMAL

## 2020-10-16 ENCOUNTER — DISCHARGE SUMMARY NURSING HOME (OUTPATIENT)
Dept: GERIATRICS | Facility: CLINIC | Age: 85
End: 2020-10-16
Payer: MEDICARE

## 2020-10-16 ENCOUNTER — HOSPITAL LABORATORY (OUTPATIENT)
Dept: OTHER | Facility: CLINIC | Age: 85
End: 2020-10-16

## 2020-10-16 VITALS
DIASTOLIC BLOOD PRESSURE: 94 MMHG | BODY MASS INDEX: 15.71 KG/M2 | HEART RATE: 103 BPM | RESPIRATION RATE: 18 BRPM | WEIGHT: 80 LBS | TEMPERATURE: 97.5 F | OXYGEN SATURATION: 96 % | HEIGHT: 60 IN | SYSTOLIC BLOOD PRESSURE: 159 MMHG

## 2020-10-16 DIAGNOSIS — J44.9 CHRONIC OBSTRUCTIVE PULMONARY DISEASE, UNSPECIFIED COPD TYPE (H): ICD-10-CM

## 2020-10-16 DIAGNOSIS — I48.91 ATRIAL FIBRILLATION, UNSPECIFIED TYPE (H): ICD-10-CM

## 2020-10-16 DIAGNOSIS — R53.81 PHYSICAL DECONDITIONING: ICD-10-CM

## 2020-10-16 DIAGNOSIS — I10 BENIGN ESSENTIAL HYPERTENSION: ICD-10-CM

## 2020-10-16 DIAGNOSIS — R91.1 LUNG NODULE: ICD-10-CM

## 2020-10-16 DIAGNOSIS — E46 PROTEIN-CALORIE MALNUTRITION, UNSPECIFIED SEVERITY (H): ICD-10-CM

## 2020-10-16 DIAGNOSIS — I27.20 PULMONARY HYPERTENSION (H): ICD-10-CM

## 2020-10-16 DIAGNOSIS — K57.32 SIGMOID DIVERTICULITIS: Primary | ICD-10-CM

## 2020-10-16 DIAGNOSIS — R41.89 COGNITIVE IMPAIRMENT: ICD-10-CM

## 2020-10-16 DIAGNOSIS — M81.0 OSTEOPOROSIS, SENILE: ICD-10-CM

## 2020-10-16 LAB
ANION GAP SERPL CALCULATED.3IONS-SCNC: 1 MMOL/L (ref 3–14)
BUN SERPL-MCNC: 11 MG/DL (ref 7–30)
CALCIUM SERPL-MCNC: 9.4 MG/DL (ref 8.5–10.1)
CHLORIDE SERPL-SCNC: 113 MMOL/L (ref 94–109)
CO2 SERPL-SCNC: 30 MMOL/L (ref 20–32)
CREAT SERPL-MCNC: 0.72 MG/DL (ref 0.52–1.04)
GFR SERPL CREATININE-BSD FRML MDRD: 76 ML/MIN/{1.73_M2}
GLUCOSE SERPL-MCNC: 77 MG/DL (ref 70–99)
POTASSIUM SERPL-SCNC: 3.6 MMOL/L (ref 3.4–5.3)
SODIUM SERPL-SCNC: 144 MMOL/L (ref 133–144)

## 2020-10-16 PROCEDURE — 99316 NF DSCHRG MGMT 30 MIN+: CPT | Performed by: NURSE PRACTITIONER

## 2020-10-16 ASSESSMENT — MIFFLIN-ST. JEOR: SCORE: 724.38

## 2020-10-16 NOTE — PROGRESS NOTES
[facility] DISCHARGE SUMMARY  PATIENT'S NAME: Mia Rodriguez  YOB: 1934  MEDICAL RECORD NUMBER:  [id number]  Place of Service where encounter took place:  [facility] [[id number]]    PRIMARY CARE PROVIDER AND CLINIC RESPONSIBLE AFTER TRANSFER:   Birgit E Loeding, [facility] [place] [address] / [place] [address]    Non-[facility] Provider     Transferring providers: SONAM Stephens CNP, Alma Pereira MD  Recent Hospitalization/ED:  [facility] Hospital stay 10/6/2020 to 10/12/2020.  Date of SNF Admission: October / 12 / 2020  Date of SNF (anticipated) Discharge: October / 19 / 2020  Discharged to: previous independent home  Cognitive Scores: SLUMS: 21/30  DME: Walker    CODE STATUS/ADVANCE DIRECTIVES DISCUSSION:  Full Code   ALLERGIES: Digoxin and Megace [megestrol acetate]    DISCHARGE DIAGNOSIS/NURSING FACILITY COURSE:   She came to this facility for short term rehab and medical management following hospitalization after presenting to the ED with abdominal pain. She was found to have sepsis and acute sigmoid diverticulitis. Treated with IV zosyn and discharged on Augmentin. She was in afib with RVR on hospital admission, which improved. Diltiazem dose was limited by hypotension.     She has worked with PHYSICAL THERAPY and OT with good progress. Ambulating 110 ft with walker. Able to do 8 stairs with handrails and supervision. High fall risk. Independent with toileting and dressing. Requires stand by assist with bathing.   ASSESSMENT / PLAN:  (K57.32) Sigmoid  diverticulitis  (primary encounter diagnosis)  Comment: infection appears resolved. Diarrhea has improved and abdominal pain has resolved. She is feeling well and eager to return home   Plan: discharge home 10/19/2020. Complete course of Augmentin 10/20/2020. Home care services and follow up as below.     (E46) Protein-calorie malnutrition, unspecified severity  (H)  Comment: oral intake is improving   Plan: continue nutritional supplements     (I48.91) Atrial fibrillation, unspecified type (H)  Comment: HR: . Diltiazem dose is limited by hypotension. She has declined AVN and PPM   Plan: continue diltiazem and apixaban. Cardiology follow up as scheduled.     (I27.20) Pulmonary hypertension (H)  Comment: ECHO 6/2019 with EF 60-65%, mild pulmonary hypertension, mild MR, mild-moderate TR, mild AR   Plan: Cardiology follow up per usual schedule     (J44.9) Chronic obstructive pulmonary disease, unspecified COPD type (H)  Comment: no acute issues   Plan: continue prn albuterol     Hypokalemia  Comment: she has received KCl 40 meq daily X 2 days for K 2.8 on 10/14/2020. K 3.6 today. Hypokalemia likely due to diarrhea  Plan: KCl 40 meq daily for one more dose, then discontinue. Follow up lab per PCP.     (I10) Benign essential hypertension  Comment: controlled with BPs: 145/82, 129/67, 114/75   Plan: continue diltiazem. Avoid hypotension due to advanced age and fall risk.     (M81.0) Osteoporosis, senile  Comment: chronic   Plan: continue Prolia injection per usual schedule     (R91.1) Lung nodule  Comment: spiculated nodule at the lingula was noted on imagine. PET or repeat CT in 3 months recommended   Plan: follow up with PCP     (R41.89) Cognitive impairment  Comment: mild deficits on cognitive testing,which appears to be baseline   Plan: family is involved. Home care referral     (R53.81) Physical deconditioning  Comment: progress in therapies as above   Plan: home therapies for ongoing gait training, strengthening and ADL safety      Past Medical History:  has a past medical history of Arrhythmia, Atrial fibrillation (H), Breast cancer (H), COPD (chronic obstructive pulmonary disease) (H), COPD exacerbation (H) (9/2/2015), Coronary artery disease, Hypertension, Malignant neoplasm (H) (6/2/2008), Osteoporosis, Renal disease, Thyroid disease, Unspecified cerebral artery  occlusion with cerebral infarction, and Uterine fibroid. She also has no past medical history of Difficult intubation, Malignant hyperthermia, PONV (postoperative nausea and vomiting), or Spinal headache.    Discharge Medications:    Current Outpatient Medications   Medication Sig Dispense Refill     albuterol (PROAIR HFA/PROVENTIL HFA/VENTOLIN HFA) 108 (90 Base) MCG/ACT inhaler Inhale 2 puffs into the lungs every 4 hours as needed for shortness of breath / dyspnea or wheezing       amoxicillin-clavulanate (AUGMENTIN) 875-125 MG tablet Take 1 tablet by mouth 2 times daily       apixaban ANTICOAGULANT (ELIQUIS) 2.5 MG tablet Take 1 tablet (2.5 mg) by mouth 2 times daily 180 tablet 3     atorvastatin (LIPITOR) 20 MG tablet Take 20 mg by mouth At Bedtime       denosumab (PROLIA) 60 MG/ML SOLN Inject 60 mg Subcutaneous. q 6 months       diltiazem ER (DILT-XR) 120 MG 24 hr capsule Take 1 capsule (120 mg) by mouth daily 90 capsule 3       Medication Changes/Rationale:     KCL 40 meq daily x 3 doses     Controlled medications sent with patient:   not applicable/none     ROS:   4 point ROS including Respiratory, CV, GI and , other than that noted in the HPI,  is negative    Physical Exam:   Vitals: BP (!) 159/94   Pulse 103   Temp 97.5  F (36.4  C)   Resp 18   Ht 1.524 m (5')   Wt 36.3 kg (80 lb)   LMP 12/07/1981   SpO2 96%   BMI 15.62 kg/m    BMI= Body mass index is 15.62 kg/m .  GENERAL APPEARANCE:  Alert, in no distress, thin  ENT:  [place]  EYES:  EOM normal, conjunctiva and lids normal  NECK:  No adenopathy,masses or thyromegaly  RESP:  respiratory effort and palpation of chest normal, lungs clear to auscultation , no respiratory distress  CV:  Palpation and auscultation of heart done , irregular rate and rhythm, no murmur, no edema, +2 pedal pulses  ABDOMEN:  soft, non-tender, no distension, no masses  M/S:   gait steady with walker. [place] with good strength  SKIN:  no rashes or open areas  PSYCH:  oriented X 3,  memory impaired , affect and mood normal     SNF labs: Labs done in SNF are in [facility] EPIC. Please refer to them using EPIC/Care Everywhere.      DISCHARGE PLAN:    Follow up labs: per PCP    Medical Follow Up:      Follow up with primary care provider within 1-2  weeks   Follow up with Cardiology as scheduled     MTM referral needed and placed by this provider: No    Discharge Services: Home Care:  Occupational Therapy, Physical Therapy, Registered Nurse, Home Health Aide and From:  [facility]        TOTAL DISCHARGE TIME:   Greater than 30 minutes  Electronically signed by:  SONAM Stephens CNP

## 2020-10-18 RX ORDER — ATORVASTATIN CALCIUM 20 MG/1
20 TABLET, FILM COATED ORAL AT BEDTIME
Qty: 30 TABLET | Refills: 0 | Status: SHIPPED | OUTPATIENT
Start: 2020-10-18 | End: 2021-01-01

## 2020-10-18 NOTE — PATIENT INSTRUCTIONS
[facility] Discharge Orders    Name: Mia Rodriguez  : 1934  Planned Discharge Date: 10/19/2020  Discharged to: previous independent home    MEDICAL FOLLOW UP  Follow up with PCP Dr Cordero within 1-2 weeks   Follow up with Specialists: Cardiology per usual schedule       ORDER CHANGES:  No medication changes were made while you were on  the transitional care unit.   Complete course of Augmentin on 10/20/2020.     DISCHARGE MEDICATIONS:  A 30 day supply of the following medication was ordered from your [facility] pharmacy: lipitor     Current Outpatient Medications   Medication Sig Dispense Refill     atorvastatin (LIPITOR) 20 MG tablet Take 1 tablet (20 mg) by mouth At Bedtime 30 tablet 0     albuterol (PROAIR HFA/PROVENTIL HFA/VENTOLIN HFA) 108 (90 Base) MCG/ACT inhaler Inhale 2 puffs into the lungs every 4 hours as needed for shortness of breath / dyspnea or wheezing       amoxicillin-clavulanate (AUGMENTIN) 875-125 MG tablet Take 1 tablet by mouth 2 times daily       apixaban ANTICOAGULANT (ELIQUIS) 2.5 MG tablet Take 1 tablet (2.5 mg) by mouth 2 times daily       denosumab (PROLIA) 60 MG/ML SOLN Inject 60 mg Subcutaneous. q 6 months       diltiazem ER (DILT-XR) 120 MG 24 hr capsule Take 1 capsule (120 mg) by mouth daily         SERVICES:  Home Care:  Occupational Therapy, Physical Therapy, Registered Nurse, Home Health Aide and From:  [facility]    ADDITIONAL INSTRUCTIONS:  A repeat CT scan in 3 months or PET scan is recommended to follow up on a lung nodule noted on CT scan while inpatient. Follow up with Dr Cordero.       SONAM Stephens CNP  This document was electronically signed on 2020

## 2020-12-15 NOTE — TELEPHONE ENCOUNTER
Spoke with patient to let her know she is overdue for prolia as well as a yearly check in. She would like to know who Dr. Randle would recommend she see for osteoporosis management as that is the only thing she comes here for and would like to continue this as she has been a long time patient. Patient aware Dr. Randle not back till end of month.    Please advise. Also being she is already quite overdue for her next prolia (was due in August), and her yearly check in with you (was due in July) can she come for injection despite not having UTD office visit, or do office visit first?

## 2020-12-21 NOTE — TELEPHONE ENCOUNTER
Patient was notified of [facility] recs and was transferred to schedule visit with Dr. Lanza and Prolia. Dexa not due until 7/2021

## 2021-01-01 ENCOUNTER — OFFICE VISIT (OUTPATIENT)
Dept: OBGYN | Facility: CLINIC | Age: 86
End: 2021-01-01
Payer: MEDICARE

## 2021-01-01 ENCOUNTER — ANCILLARY PROCEDURE (OUTPATIENT)
Dept: BONE DENSITY | Facility: CLINIC | Age: 86
End: 2021-01-01
Attending: OBSTETRICS & GYNECOLOGY
Payer: MEDICARE

## 2021-01-01 ENCOUNTER — TRANSFERRED RECORDS (OUTPATIENT)
Dept: HEALTH INFORMATION MANAGEMENT | Facility: CLINIC | Age: 86
End: 2021-01-01

## 2021-01-01 ENCOUNTER — TELEPHONE (OUTPATIENT)
Dept: OBGYN | Facility: CLINIC | Age: 86
End: 2021-01-01

## 2021-01-01 VITALS
DIASTOLIC BLOOD PRESSURE: 72 MMHG | HEIGHT: 59 IN | BODY MASS INDEX: 16.13 KG/M2 | WEIGHT: 80 LBS | SYSTOLIC BLOOD PRESSURE: 110 MMHG

## 2021-01-01 DIAGNOSIS — M81.0 OSTEOPOROSIS, SENILE: ICD-10-CM

## 2021-01-01 DIAGNOSIS — M81.0 OSTEOPOROSIS, SENILE: Primary | ICD-10-CM

## 2021-01-01 DIAGNOSIS — I48.91 ATRIAL FIBRILLATION, UNSPECIFIED TYPE (H): ICD-10-CM

## 2021-01-01 DIAGNOSIS — I48.20 CHRONIC ATRIAL FIBRILLATION (H): ICD-10-CM

## 2021-01-01 DIAGNOSIS — Z92.29 PERSONAL HISTORY OF OTHER DRUG THERAPY: ICD-10-CM

## 2021-01-01 LAB
MAGNESIUM SERPL-MCNC: 2.1 MG/DL (ref 1.6–2.3)
PHOSPHATE SERPL-MCNC: 4.1 MG/DL (ref 2.5–4.5)

## 2021-01-01 PROCEDURE — 99213 OFFICE O/P EST LOW 20 MIN: CPT | Mod: 25 | Performed by: OBSTETRICS & GYNECOLOGY

## 2021-01-01 PROCEDURE — 96372 THER/PROPH/DIAG INJ SC/IM: CPT | Performed by: OBSTETRICS & GYNECOLOGY

## 2021-01-01 PROCEDURE — 36415 COLL VENOUS BLD VENIPUNCTURE: CPT | Performed by: OBSTETRICS & GYNECOLOGY

## 2021-01-01 PROCEDURE — 84100 ASSAY OF PHOSPHORUS: CPT | Performed by: OBSTETRICS & GYNECOLOGY

## 2021-01-01 PROCEDURE — 83735 ASSAY OF MAGNESIUM: CPT | Performed by: OBSTETRICS & GYNECOLOGY

## 2021-01-01 PROCEDURE — 77080 DXA BONE DENSITY AXIAL: CPT | Performed by: OBSTETRICS & GYNECOLOGY

## 2021-01-01 RX ORDER — DILTIAZEM HYDROCHLORIDE 120 MG/1
120 CAPSULE, EXTENDED RELEASE ORAL DAILY
Qty: 90 CAPSULE | Refills: 2 | Status: SHIPPED | OUTPATIENT
Start: 2021-01-01 | End: 2022-01-01

## 2021-01-01 RX ORDER — ATORVASTATIN CALCIUM 20 MG/1
20 TABLET, FILM COATED ORAL AT BEDTIME
Qty: 90 TABLET | Refills: 1 | Status: SHIPPED | OUTPATIENT
Start: 2021-01-01 | End: 2021-01-01

## 2021-01-01 RX ORDER — ATORVASTATIN CALCIUM 20 MG/1
20 TABLET, FILM COATED ORAL AT BEDTIME
Qty: 90 TABLET | Refills: 0 | Status: SHIPPED | OUTPATIENT
Start: 2021-01-01 | End: 2021-01-01

## 2021-01-01 RX ORDER — ATORVASTATIN CALCIUM 20 MG/1
20 TABLET, FILM COATED ORAL AT BEDTIME
Qty: 90 TABLET | Refills: 0 | Status: SHIPPED | OUTPATIENT
Start: 2021-01-01 | End: 2022-01-01

## 2021-01-01 ASSESSMENT — MIFFLIN-ST. JEOR: SCORE: 703.51

## 2021-01-05 ENCOUNTER — OFFICE VISIT (OUTPATIENT)
Dept: OBGYN | Facility: CLINIC | Age: 86
End: 2021-01-05
Payer: MEDICARE

## 2021-01-05 ENCOUNTER — ALLIED HEALTH/NURSE VISIT (OUTPATIENT)
Dept: NURSING | Facility: CLINIC | Age: 86
End: 2021-01-05
Payer: MEDICARE

## 2021-01-05 VITALS
SYSTOLIC BLOOD PRESSURE: 122 MMHG | WEIGHT: 79.6 LBS | BODY MASS INDEX: 15.63 KG/M2 | DIASTOLIC BLOOD PRESSURE: 60 MMHG | HEIGHT: 60 IN

## 2021-01-05 DIAGNOSIS — M81.0 OSTEOPOROSIS, SENILE: Primary | ICD-10-CM

## 2021-01-05 PROCEDURE — 99213 OFFICE O/P EST LOW 20 MIN: CPT | Performed by: OBSTETRICS & GYNECOLOGY

## 2021-01-05 PROCEDURE — 96372 THER/PROPH/DIAG INJ SC/IM: CPT

## 2021-01-05 PROCEDURE — 99207 PR NO CHARGE NURSE ONLY: CPT

## 2021-01-05 ASSESSMENT — MIFFLIN-ST. JEOR: SCORE: 722.56

## 2021-01-05 NOTE — PROGRESS NOTES
SUBJECTIVE:                                                   Mia Rodriguez is a 86 year old female who presents to clinic today for the following health issue(s):  No chief complaint on file.    HPI:  Patient here for prolia injection  Former patient of Dr Jhon Randle  Lives alone  Recent hospitalization for infection      Patient's last menstrual period was 1981.     Patient is not sexually active, .  Using menopause for contraception.    reports that she quit smoking about 31 years ago. Her smoking use included cigarettes. She started smoking about 62 years ago. She has a 30.00 pack-year smoking history. She has never used smokeless tobacco.      Health maintenance updated:  yes    Today's PHQ-2 Score:   PHQ-2 (  [employer]) 2011   Q1: Little interest or pleasure in doing things 0   Q2: Feeling down, depressed or hopeless 0   PHQ-2 Score 0     Today's PHQ-9 Score: No flowsheet data found.  Today's TATE-7 Score: No flowsheet data found.    Problem list and histories reviewed & adjusted, as indicated.  Additional history: as documented.    Patient Active Problem List   Diagnosis     Cancer of breast (H)     Transient cerebral ischemia     Benign essential hypertension     Kidney stones     H/O: hysterectomy     Hyperlipidemia with target LDL less than 100     Shortness of breath     Atrial fibrillation (H)     Pulmonary hypertension (H)     COPD exacerbation (H)     Osteoporosis, senile     Nephropathy, obstructive     Sigmoid diverticulitis     Protein-calorie malnutrition (H)     Past Surgical History:   Procedure Laterality Date     BIOPSY  2008    Left /RightBreast Biopsy     BIOPSY  2010    Right Breast Biopsy     BREAST SURGERY  2008    Right Breast Lumpectomy     CYSTOSCOPY, DILATE URETHRA, COMBINED  10/5/2012    Procedure: COMBINED CYSTOSCOPY, DILATE URETHRA;  urethral dilation;  Surgeon: Kaushal Harris MD;  Location:  OR     CYSTOSCOPY, RETROGRADES, EXTRACT  STONE, COMBINED Right 2015    Procedure: COMBINED CYSTOSCOPY, RETROGRADES, EXTRACT STONE;  Surgeon: Kaushal Harris MD;  Location:  OR     EXCISE LESION LIP N/A 10/22/2014    Procedure: EXCISE LESION LIP;  Surgeon: Brent Jolley MD;  Location:  SD     GYN SURGERY      LISA with ovaries intact-fibroid     LASER HOLMIUM LITHOTRIPSY URETER(S), INSERT STENT, COMBINED  10/5/2012    Procedure: COMBINED CYSTOSCOPY, URETEROSCOPY, LASER HOLMIUM LITHOTRIPSY URETER(S), INSERT STENT;  RIGHT URETEROSCOPY ,right ureteral biopsy,WITH LASER HOLMIUM LITHOTRIPSY, INSERT STENT RIGHT URETER;  Surgeon: Kaushal Harris MD;  Location:  OR     LASER HOLMIUM LITHOTRIPSY URETER(S), INSERT STENT, COMBINED Right 2015    Procedure: COMBINED CYSTOSCOPY, URETEROSCOPY, LASER HOLMIUM LITHOTRIPSY URETER(S), INSERT STENT;  Surgeon: Kaushal Harris MD;  Location:  OR      Social History     Tobacco Use     Smoking status: Former Smoker     Packs/day: 1.00     Years: 30.00     Pack years: 30.00     Types: Cigarettes     Start date:      Quit date: 1989     Years since quittin.1     Smokeless tobacco: Never Used     Tobacco comment: Started:  Stopped:   Substance Use Topics     Alcohol use: No     Alcohol/week: 0.0 standard drinks      Problem (# of Occurrences) Relation (Name,Age of Onset)    Alzheimer Disease (1) Mother    Breast Cancer (1) Maternal Grandmother    C.A.D. (1) Father    Cancer (1) Paternal Grandfather: stomach    Cardiovascular (1) Father    Cerebrovascular Disease (1) Father    Osteoporosis (1) Mother    Unknown/Adopted (2) Maternal Grandfather, Paternal Grandmother: old age            Current Outpatient Medications   Medication Sig     albuterol (PROAIR HFA/PROVENTIL HFA/VENTOLIN HFA) 108 (90 Base) MCG/ACT inhaler Inhale 2 puffs into the lungs every 4 hours as needed for shortness of breath / dyspnea or wheezing     apixaban ANTICOAGULANT (ELIQUIS) 2.5 MG tablet Take 1  tablet (2.5 mg) by mouth 2 times daily     atorvastatin (LIPITOR) 20 MG tablet Take 1 tablet (20 mg) by mouth At Bedtime     denosumab (PROLIA) 60 MG/ML SOLN Inject 60 mg Subcutaneous. q 6 months     diltiazem ER (DILT-XR) 120 MG 24 hr capsule Take 1 capsule (120 mg) by mouth daily     Current Facility-Administered Medications   Medication     denosumab (PROLIA) injection 60 mg     Allergies   Allergen Reactions     Digoxin Other (See Comments)     Weakness, SOB     Megace [Megestrol Acetate]      Increased LFTs         ROS:  12 point review of systems negative other than symptoms noted below or in the HPI.  No urinary frequency or dysuria, bladder or kidney problems      OBJECTIVE:     LMP 12/07/1981   There is no height or weight on file to calculate BMI.    Exam:  Constitutional:  Appearance: Well nourished, well developed alert, in no acute distress  Psychiatric:  Mentation appears normal and affect normal/bright.     In-Clinic Test Results:  No results found for this or any previous visit (from the past 24 hour(s)).    ASSESSMENT/PLAN:                                                      No diagnosis found.    There are no Patient Instructions on file for this visit.    Discussed prolia  Give injection today  Orders placed for labs next time  Recent labs were ok   return 6 months    Sangeeta Lanza MD  [facility]

## 2021-01-05 NOTE — NURSING NOTE
Clinic Administered Medication Documentation      Prolia Documentation    Prior to injection, verified patient identity using patient's name and date of birth. Medication was administered. Please see MAR and medication order for additional information. Patient instructed to report any adverse reaction to staff immediately .    Indication: Prolia  (denosumab) is a prescription medicine used to treat osteoporosis in patients who:     Are at high risk for fracture, meaning patients who have had a fracture related to osteoporosis, or who have multiple risk factors for fracture.    Cannot use another osteoporosis medicine or other osteoporosis medicines did not work well.    The timeline for early/late injections would be 4 weeks early and any time after the 6 month yuniel. If a patient receives their injection late, then the subsequent injection would be 6 months from the date that they actually received the injection.    1.  When was the last injection?  20/20/2020  2.  Did they check with their insurance for this calendar year?  yes  3.  Is there an order in the chart?  yes  4.  Has the patient had dental work involving the bone in the past month or will have work in the next 6 months?  no    The following steps were completed to comply with the REMS program for Prolia:    Reviewed information in the Medication Guide and Patient Counseling Chart, including the serious risks of Prolia  and the symptoms of each risk.    Advised patient to seek prompt medical attention if they have signs or symptoms of any of the serious risks.    Provided each patient a copy of the Medication Guide and Patient Brochure.    Was entire vial of medication used? Yes  Vial/Syringe: Syringe  Expiration Date:  04/23  Was this medication supplied by the patient? No     Patient tolerated injection without incident and discharged home.    Jill Omalley RN on 1/5/2021 at 3:05 PM

## 2021-01-27 ENCOUNTER — CARE COORDINATION (OUTPATIENT)
Dept: CARDIOLOGY | Facility: CLINIC | Age: 86
End: 2021-01-27

## 2021-01-27 NOTE — PROGRESS NOTES
Pt called and left message stating she would like to stop her diltiazem as she thinks it is making her dizzy. I called pt back and she said she has dizziness with positional changes, and she thinks it is getting worse. Pt also reported ongoing shortness of breath with activity. Pt has chronic afib and has had rates in the low 100s for at least the past year. Pacemaker/AV dunia ablation has been recommended in the past. Pt is due for her annual echo and follow up with . Pt did have one BP, HR reading for me from 1/25/21 and it was 108/85, . I recommended pt come in to be evaluated by . PT said she does not want to come to clinic until she has the COVID vaccine. I gave pt information on how she can register for the vaccine lottery next Tuesday. Pt said her PCP office told her they may have the vaccine in the next 2-3 week. Pt is willing to do a telephone visit with . Pt has been scheduled for 2/3/21 @ 12:45. I told pt to check her BP and HR every morning one hour after taking her diltiazem until her visit, and to have those readings ready for the visit. Jocelynn BAEZ January 27, 2021, 1:46 PM

## 2021-02-03 ENCOUNTER — VIRTUAL VISIT (OUTPATIENT)
Dept: CARDIOLOGY | Facility: CLINIC | Age: 86
End: 2021-02-03
Payer: MEDICARE

## 2021-02-03 DIAGNOSIS — I10 BENIGN ESSENTIAL HYPERTENSION: ICD-10-CM

## 2021-02-03 DIAGNOSIS — E78.5 HYPERLIPIDEMIA LDL GOAL <100: ICD-10-CM

## 2021-02-03 DIAGNOSIS — R06.02 SHORTNESS OF BREATH: ICD-10-CM

## 2021-02-03 DIAGNOSIS — I48.20 CHRONIC ATRIAL FIBRILLATION (H): Primary | ICD-10-CM

## 2021-02-03 DIAGNOSIS — R42 DIZZINESS: ICD-10-CM

## 2021-02-03 PROCEDURE — 99442 PR PHYSICIAN TELEPHONE EVALUATION 11-20 MIN: CPT | Mod: 95 | Performed by: INTERNAL MEDICINE

## 2021-02-03 NOTE — LETTER
2/3/2021    Birgit Cordero  [address]  [place] [address]    RE: Mia Albarraneula       Dear Colleague,    I had the pleasure of seeing Mia Rodriguez in the [facility].    Agustina is a 86 year old who is being evaluated via a billable telephone visit.      What phone number would you like to be contacted at? [phone]   How would you like to obtain your AVS? Mail a copy    Review Of Systems  Skin: NEGATIVE  Eyes:Ears/Nose/Throat: NEGATIVE  Respiratory: NEGATIVE  Cardiovascular: States the dizziness is much more pronounced after taking her Diltiazem.  When she stands up the dizziness gets worse, HR goes up and feels more SOB. States this has been going on since the fall,   Gastrointestinal: NEGATIVE  Genitourinary:NEGATIVE  Musculoskeletal: NEGATIVE  Neurologic: NEGATIVE  Psychiatric: NEGATIVE  Hematologic/Lymphatic/Immunologic: NEGATIVE  Endocrine:  NEGATIVE    Vitals - Patient Reported  Systolic (Patient Reported): 101(in the AM, 1 hr after meds)  Diastolic (Patient Reported): 83  Weight (Patient Reported): 81.6 kg (180 lb)  Height (Patient Reported): 152.4 cm (5')  BMI (Based on Pt Reported Ht/Wt): 35.15  Pulse (Patient Reported): 110    All readings taken 1 hour after Diltiazem dose, states she takes about 8 am on an empty stomach.     1/28: 151/114, P: 104  1/29: 130/101, P: 94  1/30: 119/85, P: 87  1/31: 126/99, P: 80  2/1: 115/96, P: 121  2/2: 125/94, P: 98  2/3: 101/83, P: 110    VICENTE Paredes(AAMA) 2/3/21    Phone call duration: 16 minutes     Telephone number of patient: [phone]    HPI and Plan:   See dictation    No orders of the defined types were placed in this encounter.    No orders of the defined types were placed in this encounter.    There are no discontinued medications.      Encounter Diagnoses   Name Primary?     Chronic atrial fibrillation (H) Yes     Hyperlipidemia LDL goal <100      Benign essential hypertension      Shortness  of breath      Dizziness        CURRENT MEDICATIONS:  Current Outpatient Medications   Medication Sig Dispense Refill     albuterol (PROAIR HFA/PROVENTIL HFA/VENTOLIN HFA) 108 (90 Base) MCG/ACT inhaler Inhale 2 puffs into the lungs every 4 hours as needed for shortness of breath / dyspnea or wheezing       apixaban ANTICOAGULANT (ELIQUIS) 2.5 MG tablet Take 1 tablet (2.5 mg) by mouth 2 times daily 180 tablet 3     atorvastatin (LIPITOR) 20 MG tablet Take 1 tablet (20 mg) by mouth At Bedtime 30 tablet 0     denosumab (PROLIA) 60 MG/ML SOLN Inject 60 mg Subcutaneous. q 6 months       diltiazem ER (DILT-XR) 120 MG 24 hr capsule Take 1 capsule (120 mg) by mouth daily 90 capsule 3       ALLERGIES     Allergies   Allergen Reactions     Digoxin Other (See Comments)     Weakness, SOB     Megace [Megestrol Acetate]      Increased LFTs         PAST MEDICAL HISTORY:  Past Medical History:   Diagnosis Date     Arrhythmia     atrial fib.-on coumadin     Atrial fibrillation (H)      Breast cancer (H)      COPD (chronic obstructive pulmonary disease) (H)      COPD exacerbation (H) 9/2/2015     Coronary artery disease      Hypertension      Malignant neoplasm (H) 6/2/2008    Right Breast Cancer     Osteoporosis      Renal disease     kidney stones     Thyroid disease     Hypothyroid     Unspecified cerebral artery occlusion with cerebral infarction     TIA     Uterine fibroid        PAST SURGICAL HISTORY:  Past Surgical History:   Procedure Laterality Date     BIOPSY  6/2/2008    Left /RightBreast Biopsy     BIOPSY  6/1/2010    Right Breast Biopsy     BREAST SURGERY  6/12/2008    Right Breast Lumpectomy     CYSTOSCOPY, DILATE URETHRA, COMBINED  10/5/2012    Procedure: COMBINED CYSTOSCOPY, DILATE URETHRA;  urethral dilation;  Surgeon: Kaushal aHrris MD;  Location:  OR     CYSTOSCOPY, RETROGRADES, EXTRACT STONE, COMBINED Right 4/16/2015    Procedure: COMBINED CYSTOSCOPY, RETROGRADES, EXTRACT STONE;  Surgeon: Kaushal Harris  MD Douglas;  Location:  OR     EXCISE LESION LIP N/A 10/22/2014    Procedure: EXCISE LESION LIP;  Surgeon: Brent Jolley MD;  Location:  SD     GYN SURGERY  1981    LISA with ovaries intact-fibroid     LASER HOLMIUM LITHOTRIPSY URETER(S), INSERT STENT, COMBINED  10/5/2012    Procedure: COMBINED CYSTOSCOPY, URETEROSCOPY, LASER HOLMIUM LITHOTRIPSY URETER(S), INSERT STENT;  RIGHT URETEROSCOPY ,right ureteral biopsy,WITH LASER HOLMIUM LITHOTRIPSY, INSERT STENT RIGHT URETER;  Surgeon: Kaushal Harris MD;  Location:  OR     LASER HOLMIUM LITHOTRIPSY URETER(S), INSERT STENT, COMBINED Right 2015    Procedure: COMBINED CYSTOSCOPY, URETEROSCOPY, LASER HOLMIUM LITHOTRIPSY URETER(S), INSERT STENT;  Surgeon: Kaushal Harris MD;  Location:  OR       FAMILY HISTORY:  Family History   Problem Relation Age of Onset     Alzheimer Disease Mother      Osteoporosis Mother      C.A.D. Father      Cardiovascular Father      Cerebrovascular Disease Father      Breast Cancer Maternal Grandmother      Unknown/Adopted Maternal Grandfather      Unknown/Adopted Paternal Grandmother         old age     Cancer Paternal Grandfather         stomach       SOCIAL HISTORY:  Social History     Socioeconomic History     Marital status: Single     Spouse name: None     Number of children: 4     Years of education: None     Highest education level: None   Occupational History     None   Social Needs     Financial resource strain: None     Food insecurity     Worry: None     Inability: None     Transportation needs     Medical: None     Non-medical: None   Tobacco Use     Smoking status: Former Smoker     Packs/day: 1.00     Years: 30.00     Pack years: 30.00     Types: Cigarettes     Start date:      Quit date: 1989     Years since quittin.1     Smokeless tobacco: Never Used     Tobacco comment: Started:  Stopped:   Substance and Sexual Activity     Alcohol use: No     Alcohol/week: 0.0 standard drinks      Drug use: No     Sexual activity: Not Currently     Birth control/protection: Female Surgical     Comment: LISA   Lifestyle     Physical activity     Days per week: None     Minutes per session: None     Stress: None   Relationships     Social connections     Talks on phone: None     Gets together: None     Attends Nondenominational service: None     Active member of club or organization: None     Attends meetings of clubs or organizations: None     Relationship status: None     Intimate partner violence     Fear of current or ex partner: None     Emotionally abused: None     Physically abused: None     Forced sexual activity: None   Other Topics Concern     Parent/sibling w/ CABG, MI or angioplasty before 65F 55M? No      Service Not Asked     Blood Transfusions Not Asked     Caffeine Concern Yes     Comment: 2 cups caffeine per day     Occupational Exposure Not Asked     Hobby Hazards Not Asked     Sleep Concern Yes     Stress Concern No     Weight Concern No     Special Diet No     Back Care No     Exercise No     Bike Helmet Not Asked     Seat Belt Yes     Self-Exams Not Asked   Social History Narrative     None       Physical Exam:  Vitals: LMP 12/07/1981     Constitutional:           Skin:             Head:           Eyes:           Lymph:      ENT:           Neck:           Respiratory:            Cardiac:                                                           GI:           Extremities and Muscular Skeletal:                 Neurological:           Psych:         Recent Lab Results:  LIPID RESULTS:  Lab Results   Component Value Date    CHOL 158 01/15/2019    HDL 51 01/15/2019    LDL 88 01/15/2019    TRIG 93 01/15/2019    CHOLHDLRATIO 3.5 02/09/2014       LIVER ENZYME RESULTS:  Lab Results   Component Value Date    AST 53 (H) 10/06/2020    ALT 52 (H) 10/06/2020       CBC RESULTS:  Lab Results   Component Value Date    WBC 8.7 10/14/2020    RBC 4.68 10/14/2020    HGB 15.2 10/14/2020    HCT 46.2 10/14/2020     MCV 99 10/14/2020    MCH 32.5 10/14/2020    MCHC 32.9 10/14/2020    RDW 13.8 10/14/2020     10/14/2020       BMP RESULTS:  Lab Results   Component Value Date     10/16/2020    POTASSIUM 3.6 10/16/2020    CHLORIDE 113 (H) 10/16/2020    CO2 30 10/16/2020    ANIONGAP 1 (L) 10/16/2020    GLC 77 10/16/2020    BUN 11 10/16/2020    CR 0.72 10/16/2020    GFRESTIMATED 76 10/16/2020    GFRESTBLACK 88 10/16/2020    CYNDEE 9.4 10/16/2020        A1C RESULTS:  No results found for: A1C    INR RESULTS:  Lab Results   Component Value Date    INR 1.03 10/22/2014    INR 1.78 (H) 10/02/2014       Service Date: 02/03/2021      CARDIOLOGY OFFICE PROGRESS NOTE       HISTORY OF PRESENT ILLNESS:  I had the opportunity to see Ms. Jhon sky in Cardiology Clinic today at [facility] in [place] for reevaluation of chronic atrial fibrillation, shortness of breath and lightheadedness.  She has a history of hypertension, dyslipidemia, TIA and COPD.  We have been dealing with her atrial fibrillation for many years.  We had initially been using a strategy of rhythm control which eventually failed and she has been in persistent atrial fibrillation now for several years.  We have struggled with rate control.  Her heart rates have consistently been high and her blood pressures have been low with symptoms of lightheadedness.  These symptoms seem to be worse now.  She takes diltiazem sustained release 120 mg in the morning and about an hour later, she is very lightheaded and unable to be up on her feet to do things.  She tries to get all of her activities done in the morning before she takes her diltiazem because she knows that she cannot do anything afterward.  These dizziness symptoms last until late afternoon but do not really go away unless she is lying down or sitting.      We have tried digoxin in the past but that caused unacceptable nausea.  She was on beta blockers which caused severe fatigue.  She has had  consistent complaints of shortness of breath as well.  She smoked for many years and has some degree of COPD but I wonder whether her heart rate control may also be contributing to her shortness of breath issues.      Today, her blood pressure was 101/83 one hour after her morning medications with a heart rate of 110 beats per minute.  Other blood pressures have been similar or slightly higher over the last week or so and her heart rates are generally running in the mid 90s to 110.      IMPRESSIONS:  Ms. John Rodriguez is an 86-year-old woman with chronic atrial fibrillation, COPD, hypertension and dyslipidemia.  She generally runs low blood pressures and for that reason, we have had difficulty achieving adequate heart rate control of her atrial fibrillation.  We have recently decreased the diltiazem from 180 mg a day to 120 mg a day which has not helped her heart rate control.  In fact, her heart rates are a bit higher.  Unfortunately, she is still getting the dizziness symptoms throughout the day after taking her diltiazem, presumably caused by low blood pressures.      I once again suggested AV node ablation and permanent pacemaker implantation as I have recommended numerous times in the past.  She has always told me that she will think about it and then never decides to go ahead with the procedure.  I emphasized that the procedure is generally safe and went over the risks with her including the possibility of bleeding, infection, pneumothorax and hospitalization.  Of course, there is a small risk of meggan COVID virus when she is in the hospital but that risk is very low in the outpatient setting.      Once again, she said she would think about it.  I will try to have my nurses call her in a couple of days to check in with her about this.  In the meantime, I suggested she take her diltiazem 120 mg daily at night before bed which might help keep her from being so dizzy throughout the day when she wants to be  active.      If she is willing to go ahead with the procedure, we will go ahead and schedule that for her when she is available.      Parish Cantrell MD, FACC       cc:   [place] [address]         PARISH CANTRELL MD, FACC             D: 2021   T: 2021   MT: DW      Name:     ROSALIND BERNABE   MRN:      [id number]        Account:      [id number]   :      1934           Service Date: 2021      Document: [id number]        Thank you for allowing me to participate in the care of your patient.    Sincerely,     PARISH CANTRELL MD     [facility]

## 2021-02-03 NOTE — PROGRESS NOTES
Service Date: 02/03/2021      CARDIOLOGY OFFICE PROGRESS NOTE       HISTORY OF PRESENT ILLNESS:  I had the opportunity to see Ms. John Rodriguez combination in Cardiology Clinic today at [facility] in [place] for reevaluation of chronic atrial fibrillation, shortness of breath and lightheadedness.  She has a history of hypertension, dyslipidemia, TIA and COPD.  We have been dealing with her atrial fibrillation for many years.  We had initially been using a strategy of rhythm control which eventually failed and she has been in persistent atrial fibrillation now for several years.  We have struggled with rate control.  Her heart rates have consistently been high and her blood pressures have been low with symptoms of lightheadedness.  These symptoms seem to be worse now.  She takes diltiazem sustained release 120 mg in the morning and about an hour later, she is very lightheaded and unable to be up on her feet to do things.  She tries to get all of her activities done in the morning before she takes her diltiazem because she knows that she cannot do anything afterward.  These dizziness symptoms last until late afternoon but do not really go away unless she is lying down or sitting.      We have tried digoxin in the past but that caused unacceptable nausea.  She was on beta blockers which caused severe fatigue.  She has had consistent complaints of shortness of breath as well.  She smoked for many years and has some degree of COPD but I wonder whether her heart rate control may also be contributing to her shortness of breath issues.      Today, her blood pressure was 101/83 one hour after her morning medications with a heart rate of 110 beats per minute.  Other blood pressures have been similar or slightly higher over the last week or so and her heart rates are generally running in the mid 90s to 110.      IMPRESSIONS:  Ms. John Rodriguez is an 86-year-old woman with chronic atrial fibrillation, COPD,  hypertension and dyslipidemia.  She generally runs low blood pressures and for that reason, we have had difficulty achieving adequate heart rate control of her atrial fibrillation.  We have recently decreased the diltiazem from 180 mg a day to 120 mg a day which has not helped her heart rate control.  In fact, her heart rates are a bit higher.  Unfortunately, she is still getting the dizziness symptoms throughout the day after taking her diltiazem, presumably caused by low blood pressures.      I once again suggested AV node ablation and permanent pacemaker implantation as I have recommended numerous times in the past.  She has always told me that she will think about it and then never decides to go ahead with the procedure.  I emphasized that the procedure is generally safe and went over the risks with her including the possibility of bleeding, infection, pneumothorax and hospitalization.  Of course, there is a small risk of meggan COVID virus when she is in the hospital but that risk is very low in the outpatient setting.      Once again, she said she would think about it.  I will try to have my nurses call her in a couple of days to check in with her about this.  In the meantime, I suggested she take her diltiazem 120 mg daily at night before bed which might help keep her from being so dizzy throughout the day when she wants to be active.      If she is willing to go ahead with the procedure, we will go ahead and schedule that for her when she is available.      Robert Cantrell MD, FACC       cc:   Birgit Cordero MD    [address] [address]         ROBERT CANTRELL MD, FACC             D: 2021   T: 2021   MT: CLAIRE      Name:     ROSALIND BERNABE   MRN:      [id number]        Account:      [id number]   :      1934           Service Date: 2021      Document: [id number]

## 2021-02-03 NOTE — PROGRESS NOTES
Agustina is a 86 year old who is being evaluated via a billable telephone visit.      What phone number would you like to be contacted at? [phone]   How would you like to obtain your AVS? Mail a copy    Review Of Systems  Skin: NEGATIVE  Eyes:Ears/Nose/Throat: NEGATIVE  Respiratory: NEGATIVE  Cardiovascular: States the dizziness is much more pronounced after taking her Diltiazem.  When she stands up the dizziness gets worse, HR goes up and feels more SOB. States this has been going on since the fall,   Gastrointestinal: NEGATIVE  Genitourinary:NEGATIVE  Musculoskeletal: NEGATIVE  Neurologic: NEGATIVE  Psychiatric: NEGATIVE  Hematologic/Lymphatic/Immunologic: NEGATIVE  Endocrine:  NEGATIVE    Vitals - Patient Reported  Systolic (Patient Reported): 101(in the AM, 1 hr after meds)  Diastolic (Patient Reported): 83  Weight (Patient Reported): 81.6 kg (180 lb)  Height (Patient Reported): 152.4 cm (5')  BMI (Based on Pt Reported Ht/Wt): 35.15  Pulse (Patient Reported): 110    All readings taken 1 hour after Diltiazem dose, states she takes about 8 am on an empty stomach.     1/28: 151/114, P: 104  1/29: 130/101, P: 94  1/30: 119/85, P: 87  1/31: 126/99, P: 80  2/1: 115/96, P: 121  2/2: 125/94, P: 98  2/3: 101/83, P: 110    VICENTE Paredes([facility]) 2/3/21    Phone call duration: 16 minutes     Telephone number of patient: [phone]    HPI and Plan:   See dictation    No orders of the defined types were placed in this encounter.    No orders of the defined types were placed in this encounter.    There are no discontinued medications.      Encounter Diagnoses   Name Primary?     Chronic atrial fibrillation (H) Yes     Hyperlipidemia LDL goal <100      Benign essential hypertension      Shortness of breath      Dizziness        CURRENT MEDICATIONS:  Current Outpatient Medications   Medication Sig Dispense Refill     albuterol (PROAIR HFA/PROVENTIL HFA/VENTOLIN HFA) 108 (90 Base) MCG/ACT inhaler Inhale 2 puffs into the  lungs every 4 hours as needed for shortness of breath / dyspnea or wheezing       apixaban ANTICOAGULANT (ELIQUIS) 2.5 MG tablet Take 1 tablet (2.5 mg) by mouth 2 times daily 180 tablet 3     atorvastatin (LIPITOR) 20 MG tablet Take 1 tablet (20 mg) by mouth At Bedtime 30 tablet 0     denosumab (PROLIA) 60 MG/ML SOLN Inject 60 mg Subcutaneous. q 6 months       diltiazem ER (DILT-XR) 120 MG 24 hr capsule Take 1 capsule (120 mg) by mouth daily 90 capsule 3       ALLERGIES     Allergies   Allergen Reactions     Digoxin Other (See Comments)     Weakness, SOB     Megace [Megestrol Acetate]      Increased LFTs         PAST MEDICAL HISTORY:  Past Medical History:   Diagnosis Date     Arrhythmia     atrial fib.-on coumadin     Atrial fibrillation (H)      Breast cancer (H)      COPD (chronic obstructive pulmonary disease) (H)      COPD exacerbation (H) 9/2/2015     Coronary artery disease      Hypertension      Malignant neoplasm (H) 6/2/2008    Right Breast Cancer     Osteoporosis      Renal disease     kidney stones     Thyroid disease     Hypothyroid     Unspecified cerebral artery occlusion with cerebral infarction     TIA     Uterine fibroid        PAST SURGICAL HISTORY:  Past Surgical History:   Procedure Laterality Date     BIOPSY  6/2/2008    Left /RightBreast Biopsy     BIOPSY  6/1/2010    Right Breast Biopsy     BREAST SURGERY  6/12/2008    Right Breast Lumpectomy     CYSTOSCOPY, DILATE URETHRA, COMBINED  10/5/2012    Procedure: COMBINED CYSTOSCOPY, DILATE URETHRA;  urethral dilation;  Surgeon: Kaushal Harris MD;  Location:  OR     CYSTOSCOPY, RETROGRADES, EXTRACT STONE, COMBINED Right 4/16/2015    Procedure: COMBINED CYSTOSCOPY, RETROGRADES, EXTRACT STONE;  Surgeon: Kaushal Harris MD;  Location:  OR     EXCISE LESION LIP N/A 10/22/2014    Procedure: EXCISE LESION LIP;  Surgeon: Brent Jolley MD;  Location:  SD     GYN SURGERY  1981    LISA with ovaries intact-fibroid     LASER HOLMIUM  LITHOTRIPSY URETER(S), INSERT STENT, COMBINED  10/5/2012    Procedure: COMBINED CYSTOSCOPY, URETEROSCOPY, LASER HOLMIUM LITHOTRIPSY URETER(S), INSERT STENT;  RIGHT URETEROSCOPY ,right ureteral biopsy,WITH LASER HOLMIUM LITHOTRIPSY, INSERT STENT RIGHT URETER;  Surgeon: Kaushal Harris MD;  Location:  OR     LASER HOLMIUM LITHOTRIPSY URETER(S), INSERT STENT, COMBINED Right 2015    Procedure: COMBINED CYSTOSCOPY, URETEROSCOPY, LASER HOLMIUM LITHOTRIPSY URETER(S), INSERT STENT;  Surgeon: Kaushal Harris MD;  Location:  OR       FAMILY HISTORY:  Family History   Problem Relation Age of Onset     Alzheimer Disease Mother      Osteoporosis Mother      C.A.D. Father      Cardiovascular Father      Cerebrovascular Disease Father      Breast Cancer Maternal Grandmother      Unknown/Adopted Maternal Grandfather      Unknown/Adopted Paternal Grandmother         old age     Cancer Paternal Grandfather         stomach       SOCIAL HISTORY:  Social History     Socioeconomic History     Marital status: Single     Spouse name: None     Number of children: 4     Years of education: None     Highest education level: None   Occupational History     None   Social Needs     Financial resource strain: None     Food insecurity     Worry: None     Inability: None     Transportation needs     Medical: None     Non-medical: None   Tobacco Use     Smoking status: Former Smoker     Packs/day: 1.00     Years: 30.00     Pack years: 30.00     Types: Cigarettes     Start date:      Quit date: 1989     Years since quittin.1     Smokeless tobacco: Never Used     Tobacco comment: Started:  Stopped:   Substance and Sexual Activity     Alcohol use: No     Alcohol/week: 0.0 standard drinks     Drug use: No     Sexual activity: Not Currently     Birth control/protection: Female Surgical     Comment: LISA   Lifestyle     Physical activity     Days per week: None     Minutes per session: None     Stress: None    Relationships     Social connections     Talks on phone: None     Gets together: None     Attends Jewish service: None     Active member of club or organization: None     Attends meetings of clubs or organizations: None     Relationship status: None     Intimate partner violence     Fear of current or ex partner: None     Emotionally abused: None     Physically abused: None     Forced sexual activity: None   Other Topics Concern     Parent/sibling w/ CABG, MI or angioplasty before 65F 55M? No      Service Not Asked     Blood Transfusions Not Asked     Caffeine Concern Yes     Comment: 2 cups caffeine per day     Occupational Exposure Not Asked     Hobby Hazards Not Asked     Sleep Concern Yes     Stress Concern No     Weight Concern No     Special Diet No     Back Care No     Exercise No     Bike Helmet Not Asked     Seat Belt Yes     Self-Exams Not Asked   Social History Narrative     None       Physical Exam:  Vitals: LMP 12/07/1981     Constitutional:           Skin:             Head:           Eyes:           Lymph:      ENT:           Neck:           Respiratory:            Cardiac:                                                           GI:           Extremities and Muscular Skeletal:                 Neurological:           Psych:         Recent Lab Results:  LIPID RESULTS:  Lab Results   Component Value Date    CHOL 158 01/15/2019    HDL 51 01/15/2019    LDL 88 01/15/2019    TRIG 93 01/15/2019    CHOLHDLRATIO 3.5 02/09/2014       LIVER ENZYME RESULTS:  Lab Results   Component Value Date    AST 53 (H) 10/06/2020    ALT 52 (H) 10/06/2020       CBC RESULTS:  Lab Results   Component Value Date    WBC 8.7 10/14/2020    RBC 4.68 10/14/2020    HGB 15.2 10/14/2020    HCT 46.2 10/14/2020    MCV 99 10/14/2020    MCH 32.5 10/14/2020    MCHC 32.9 10/14/2020    RDW 13.8 10/14/2020     10/14/2020       BMP RESULTS:  Lab Results   Component Value Date     10/16/2020    POTASSIUM 3.6 10/16/2020     CHLORIDE 113 (H) 10/16/2020    CO2 30 10/16/2020    ANIONGAP 1 (L) 10/16/2020    GLC 77 10/16/2020    BUN 11 10/16/2020    CR 0.72 10/16/2020    GFRESTIMATED 76 10/16/2020    GFRESTBLACK 88 10/16/2020    CYNDEE 9.4 10/16/2020        A1C RESULTS:  No results found for: A1C    INR RESULTS:  Lab Results   Component Value Date    INR 1.03 10/22/2014    INR 1.78 (H) 10/02/2014           CC  No referring provider defined for this encounter.

## 2021-02-09 ENCOUNTER — CARE COORDINATION (OUTPATIENT)
Dept: CARDIOLOGY | Facility: CLINIC | Age: 86
End: 2021-02-09

## 2021-02-09 NOTE — PROGRESS NOTES
From: Parish Cantrell MD   Sent: 2/3/2021   1:26 PM CST   To: [facility] Heart Team 7     Please call patient on Friday 2/5 to see if she is willing to have AV node ablation and PPM implantation. Thanks. EE

## 2021-02-09 NOTE — PROGRESS NOTES
I called pt to see if she has made a decision regarding AV node ablation and PPM implantation. Pt said she is still thinking about it. Pt did start taking the diltiazem at night as instructed by , and has noticed some improvement in her dizziness. I will update . Jocelynn BAEZ February 9, 2021, 10:32 AM

## 2021-03-23 ENCOUNTER — HOSPITAL ENCOUNTER (OUTPATIENT)
Dept: CT IMAGING | Facility: CLINIC | Age: 86
Discharge: HOME | End: 2021-03-23
Admitting: INTERNAL MEDICINE
Payer: MEDICARE

## 2021-03-23 DIAGNOSIS — R91.1 LUNG NODULE: ICD-10-CM

## 2021-03-23 LAB
CREAT BLD-MCNC: 1 MG/DL (ref 0.52–1.04)
GFR SERPL CREATININE-BSD FRML MDRD: 52 ML/MIN/{1.73_M2}

## 2021-03-23 PROCEDURE — 82565 ASSAY OF CREATININE: CPT

## 2021-03-23 PROCEDURE — 71260 CT THORAX DX C+: CPT

## 2021-03-23 PROCEDURE — 250N000011 HC RX IP 250 OP 636

## 2021-03-23 PROCEDURE — 250N000009 HC RX 250

## 2021-03-23 RX ORDER — IOPAMIDOL 755 MG/ML
80 INJECTION, SOLUTION INTRAVASCULAR ONCE
Status: COMPLETED | OUTPATIENT
Start: 2021-03-23 | End: 2021-03-23

## 2021-03-23 RX ADMIN — SODIUM CHLORIDE 70 ML: 9 INJECTION, SOLUTION INTRAVENOUS at 14:06

## 2021-03-23 RX ADMIN — IOPAMIDOL 80 ML: 755 INJECTION, SOLUTION INTRAVENOUS at 14:06

## 2021-03-31 ENCOUNTER — TRANSCRIBE ORDERS (OUTPATIENT)
Dept: OTHER | Age: 86
End: 2021-03-31

## 2021-03-31 DIAGNOSIS — R91.8 PULMONARY NODULES: Primary | ICD-10-CM

## 2021-04-01 NOTE — TELEPHONE ENCOUNTER
RECORDS STATUS - ALL OTHER DIAGNOSIS      RECORDS RECEIVED FROM: Chau/Freda   DATE RECEIVED: 4/5/2021   NOTES STATUS DETAILS   OFFICE NOTE from referring provider Complete [facility]   OFFICE NOTE from medical oncologist N/A    DISCHARGE SUMMARY from hospital N/A    DISCHARGE REPORT from the ER     OPERATIVE REPORT N/A    MEDICATION LIST Complete [facility]   CLINICAL TRIAL TREATMENTS TO DATE     LABS     PATHOLOGY REPORTS N/A    ANYTHING RELATED TO DIAGNOSIS Complete Labs last updated on 3/23/2021   GENONOMIC TESTING     TYPE:     IMAGING (NEED IMAGES & REPORT)     CT SCANS Complete CT Chest 3/23/2021    CT Chest Abdomen Pelvis 10/6/2020    Xray Chest  Complete- [facility] 1/28/2016, 8/17/2015   MRI     MAMMO     ULTRASOUND     PET       Action    Action Taken 4/1/2021 2:55PM   I called [facility]a to have IMG pushed to PACS. [phone]     4/2/2021 9:09AM   I called Freda - they will push IMG soon

## 2021-04-05 ENCOUNTER — PRE VISIT (OUTPATIENT)
Dept: ONCOLOGY | Facility: CLINIC | Age: 86
End: 2021-04-05

## 2021-04-05 ENCOUNTER — VIRTUAL VISIT (OUTPATIENT)
Dept: ONCOLOGY | Facility: CLINIC | Age: 86
End: 2021-04-05
Attending: INTERNAL MEDICINE
Payer: MEDICARE

## 2021-04-05 DIAGNOSIS — R91.1 LUNG NODULE: Primary | ICD-10-CM

## 2021-04-05 PROCEDURE — 99204 OFFICE O/P NEW MOD 45 MIN: CPT | Mod: 95 | Performed by: INTERNAL MEDICINE

## 2021-04-05 PROCEDURE — 999N001193 HC VIDEO/TELEPHONE VISIT; NO CHARGE

## 2021-04-06 NOTE — PATIENT INSTRUCTIONS
Per Dr. Islas, pt welcome to follow up in 1 year, return as needed.   Cristina Holman RN on 4/6/2021 at 4:28 PM

## 2021-05-18 NOTE — ED NOTES
Assisted pt to bathroom to void. Pt ambulated with slightly unsteady gait. Denied SOB, CP or dizziness when ambulating.    Mohs Case Number: [id number]

## 2021-06-11 NOTE — TELEPHONE ENCOUNTER
Prolia is due 7/6/21  Last yearly (or OV for Prolia mgmt): 1/5/21  Last DEXA: 7/16/19    OV 1/5/21:  Orders placed for labs next time  Recent labs were ok, return 6 months    Need new DEXA?  Unable to locate recommendation for follow up in Dexa result.    Routing to provider for review/recommendations.    Jill Omalley RN on 6/11/2021 at 12:28 PM

## 2021-06-11 NOTE — TELEPHONE ENCOUNTER
Prolia is due 7/6/21    Last yearly (or OV for Prolia mgmt): 1/5/21  Last DEXA: 7/16/19    RNs please place CAM order if needed and review for any labs/DEXA needed. Please route back when completed. Thank you.

## 2021-06-14 NOTE — TELEPHONE ENCOUNTER
Pt will need updated labs - ordered by Dr Lanza and pt informed at her last OV.  Prolia CAM order placed. Need insurance referral check for 2021    Lavonne Bates RN on 6/14/2021 at 9:05 AM

## 2021-06-14 NOTE — TELEPHONE ENCOUNTER
Spoke with patient. She states she already has an upcoming appointment with Dr. Lanza on 7/6. Transferred her to scheduling to add DEXA on before that appointment and also to have labs when she is here. It is very hard to get transportation so she was wondering if she could get her Prolia the day she is here if appropriate. She will talk with Dr. Lanza re: same day Prolia.

## 2021-06-14 NOTE — PATIENT INSTRUCTIONS
PROLIA  Risk Evaluation and Mitigation Strategy Best Practice Advisory    In May 2015, the FDA required an update to the PROLIA  (denosumab) REMS (Risk Evaluation and Mitigation Strategy) to inform both providers and patients about potential serious risks related to PROLIA .    These potential serious risks include:    Hypocalcemia    Osteonecrosis of the jaw    Atypical femoral fractures    Serious Infections    Dermatologic reactions    To ensure compliance with these requirements [facility] has developed a workflow for those patients who will receive PROLIA  at clinic.  This workflow includes insurance verification, patient scheduling, patient education, and documentation. Registered Nurses in the clinic should have completed eLearning related to the REMS and the clinic workflow.  Refer to them to initiate this process.  This workflow does not need to be executed if the patient is to receive PROLIA  injection at [facility].       The  has put together a Patient Education Toolkit.  Copies of these handouts may be available your clinic. Patients must receive the Patient Brochure and Medication Guide when receiving injection at clinic.  These will be attached to the injection ordered from [facility] to the clinic. Links to handouts:  Patient Counseling Chart for Healthcare Providers  Patient Brochure  Prescribing Information  Medication Guide    If you are having trouble accessing the above links, these documents can be found at: http://[email or web address]    The role of healthcare provider includes reviewing patient education materials with the patient, advising patients to seek medical attention if they have signs or symptoms of one of the serious risks, and provide patients a copy of the Patient Brochure and Medication Guide when receiving their injection.      Due to the risk of  hypocalcemia the Prescribing Information for PROLIA  recommends that calcium and mineral levels (magnesium and phosphorus) be checked within 14 days of injection for those predisposed to hypocalcemia.

## 2021-06-14 NOTE — TELEPHONE ENCOUNTER
I put a dexa order in.   She will need another one this year.  Might want to do it this fall before the snow starts.  Easier for her to move her annual appts to better weather time of year.

## 2021-07-06 NOTE — NURSING NOTE
Clinic Administered Medication Documentation    Administrations This Visit     denosumab (PROLIA) injection 60 mg     Admin Date  07/06/2021 Action  Given Dose  60 mg Route  Subcutaneous Site   Administered By  Jill Omalley RN    Ordering Provider: Sangeeta Lanza MD    Patient Supplied?: No                  Prolia Documentation    Prior to injection, verified patient identity using patient's name and date of birth. Medication was administered. Please see MAR and medication order for additional information. Patient instructed to report any adverse reaction to staff immediately .    Indication: Prolia  (denosumab) is a prescription medicine used to treat osteoporosis in patients who:     Are at high risk for fracture, meaning patients who have had a fracture related to osteoporosis, or who have multiple risk factors for fracture.    Cannot use another osteoporosis medicine or other osteoporosis medicines did not work well.    The timeline for early/late injections would be 4 weeks early and any time after the 6 month yuniel. If a patient receives their injection late, then the subsequent injection would be 6 months from the date that they actually received the injection.    1.  When was the last injection?  01/05/2021  2.  Did they check with their insurance for this calendar year?  Yes  3.  Is there an order in the chart?  Yes  4.  Has the patient had dental work involving the bone in the past month or will have work in the next 6 months?  No    The following steps were completed to comply with the REMS program for Prolia:    Reviewed information in the Medication Guide and Patient Counseling Chart, including the serious risks of Prolia  and the symptoms of each risk.    Advised patient to seek prompt medical attention if they have signs or symptoms of any of the serious risks.    Provided each patient a copy of the Medication Guide and Patient Brochure.    Was entire vial of medication used? Yes  Vial/Syringe:  Syringe  Expiration Date:  05/2023  Was this medication supplied by the patient? No     Patient tolerated injection without incident and discharged home.    Jill Omalley RN on 7/6/2021 at 2:43 PM

## 2021-07-07 NOTE — PROGRESS NOTES
Orders placed for future labs r/t Prolia. Per Dr. Lanza  Closing encounter    Jill Omalley RN on 7/7/2021 at 2:17 PM

## 2022-01-01 ENCOUNTER — APPOINTMENT (OUTPATIENT)
Dept: SPEECH THERAPY | Facility: CLINIC | Age: 87
DRG: 023 | End: 2022-01-01
Payer: MEDICARE

## 2022-01-01 ENCOUNTER — APPOINTMENT (OUTPATIENT)
Dept: OCCUPATIONAL THERAPY | Facility: CLINIC | Age: 87
DRG: 023 | End: 2022-01-01
Payer: MEDICARE

## 2022-01-01 ENCOUNTER — ALLIED HEALTH/NURSE VISIT (OUTPATIENT)
Dept: NURSING | Facility: CLINIC | Age: 87
End: 2022-01-01
Payer: MEDICARE

## 2022-01-01 ENCOUNTER — APPOINTMENT (OUTPATIENT)
Dept: PHYSICAL THERAPY | Facility: CLINIC | Age: 87
DRG: 023 | End: 2022-01-01
Attending: INTERNAL MEDICINE
Payer: MEDICARE

## 2022-01-01 ENCOUNTER — APPOINTMENT (OUTPATIENT)
Dept: PHYSICAL THERAPY | Facility: CLINIC | Age: 87
DRG: 023 | End: 2022-01-01
Payer: MEDICARE

## 2022-01-01 ENCOUNTER — TELEPHONE (OUTPATIENT)
Dept: CARDIOLOGY | Facility: CLINIC | Age: 87
End: 2022-01-01
Payer: MEDICARE

## 2022-01-01 ENCOUNTER — APPOINTMENT (OUTPATIENT)
Dept: GENERAL RADIOLOGY | Facility: CLINIC | Age: 87
DRG: 023 | End: 2022-01-01
Attending: INTERNAL MEDICINE
Payer: MEDICARE

## 2022-01-01 ENCOUNTER — APPOINTMENT (OUTPATIENT)
Dept: CT IMAGING | Facility: CLINIC | Age: 87
DRG: 023 | End: 2022-01-01
Attending: EMERGENCY MEDICINE
Payer: MEDICARE

## 2022-01-01 ENCOUNTER — HOSPITAL ENCOUNTER (INPATIENT)
Facility: CLINIC | Age: 87
LOS: 22 days | DRG: 023 | End: 2022-04-28
Attending: EMERGENCY MEDICINE | Admitting: INTERNAL MEDICINE
Payer: MEDICARE

## 2022-01-01 ENCOUNTER — APPOINTMENT (OUTPATIENT)
Dept: CARDIOLOGY | Facility: CLINIC | Age: 87
DRG: 023 | End: 2022-01-01
Attending: INTERNAL MEDICINE
Payer: MEDICARE

## 2022-01-01 ENCOUNTER — APPOINTMENT (OUTPATIENT)
Dept: CT IMAGING | Facility: CLINIC | Age: 87
DRG: 023 | End: 2022-01-01
Attending: INTERNAL MEDICINE
Payer: MEDICARE

## 2022-01-01 ENCOUNTER — APPOINTMENT (OUTPATIENT)
Dept: GENERAL RADIOLOGY | Facility: CLINIC | Age: 87
DRG: 023 | End: 2022-01-01
Attending: STUDENT IN AN ORGANIZED HEALTH CARE EDUCATION/TRAINING PROGRAM
Payer: MEDICARE

## 2022-01-01 ENCOUNTER — APPOINTMENT (OUTPATIENT)
Dept: CT IMAGING | Facility: CLINIC | Age: 87
DRG: 023 | End: 2022-01-01
Payer: MEDICARE

## 2022-01-01 ENCOUNTER — APPOINTMENT (OUTPATIENT)
Dept: INTERVENTIONAL RADIOLOGY/VASCULAR | Facility: CLINIC | Age: 87
DRG: 023 | End: 2022-01-01
Payer: MEDICARE

## 2022-01-01 ENCOUNTER — APPOINTMENT (OUTPATIENT)
Dept: SPEECH THERAPY | Facility: CLINIC | Age: 87
DRG: 023 | End: 2022-01-01
Attending: INTERNAL MEDICINE
Payer: MEDICARE

## 2022-01-01 ENCOUNTER — APPOINTMENT (OUTPATIENT)
Dept: OCCUPATIONAL THERAPY | Facility: CLINIC | Age: 87
DRG: 023 | End: 2022-01-01
Attending: INTERNAL MEDICINE
Payer: MEDICARE

## 2022-01-01 ENCOUNTER — TELEPHONE (OUTPATIENT)
Dept: OBGYN | Facility: CLINIC | Age: 87
End: 2022-01-01
Payer: MEDICARE

## 2022-01-01 ENCOUNTER — OFFICE VISIT (OUTPATIENT)
Dept: CARDIOLOGY | Facility: CLINIC | Age: 87
End: 2022-01-01
Payer: MEDICARE

## 2022-01-01 ENCOUNTER — APPOINTMENT (OUTPATIENT)
Dept: MRI IMAGING | Facility: CLINIC | Age: 87
DRG: 023 | End: 2022-01-01
Payer: MEDICARE

## 2022-01-01 ENCOUNTER — APPOINTMENT (OUTPATIENT)
Dept: GENERAL RADIOLOGY | Facility: CLINIC | Age: 87
DRG: 023 | End: 2022-01-01
Attending: EMERGENCY MEDICINE
Payer: MEDICARE

## 2022-01-01 ENCOUNTER — APPOINTMENT (OUTPATIENT)
Dept: EDUCATION SERVICES | Facility: CLINIC | Age: 87
End: 2022-01-01
Attending: STUDENT IN AN ORGANIZED HEALTH CARE EDUCATION/TRAINING PROGRAM
Payer: MEDICARE

## 2022-01-01 VITALS
HEART RATE: 89 BPM | BODY MASS INDEX: 16.01 KG/M2 | WEIGHT: 79.4 LBS | OXYGEN SATURATION: 97 % | HEIGHT: 59 IN | SYSTOLIC BLOOD PRESSURE: 127 MMHG | DIASTOLIC BLOOD PRESSURE: 77 MMHG

## 2022-01-01 VITALS
WEIGHT: 116.4 LBS | RESPIRATION RATE: 16 BRPM | DIASTOLIC BLOOD PRESSURE: 74 MMHG | HEIGHT: 59 IN | OXYGEN SATURATION: 89 % | BODY MASS INDEX: 23.47 KG/M2 | HEART RATE: 106 BPM | TEMPERATURE: 98.9 F | SYSTOLIC BLOOD PRESSURE: 143 MMHG

## 2022-01-01 DIAGNOSIS — M81.0 OSTEOPOROSIS, SENILE: Primary | ICD-10-CM

## 2022-01-01 DIAGNOSIS — I48.91 ATRIAL FIBRILLATION, UNSPECIFIED TYPE (H): ICD-10-CM

## 2022-01-01 DIAGNOSIS — I48.20 CHRONIC ATRIAL FIBRILLATION (H): ICD-10-CM

## 2022-01-01 DIAGNOSIS — I63.9 CEREBROVASCULAR ACCIDENT (CVA), UNSPECIFIED MECHANISM (H): ICD-10-CM

## 2022-01-01 DIAGNOSIS — Z92.29 PERSONAL HISTORY OF OTHER DRUG THERAPY: ICD-10-CM

## 2022-01-01 DIAGNOSIS — I10 BENIGN ESSENTIAL HYPERTENSION: Primary | ICD-10-CM

## 2022-01-01 LAB
ALBUMIN SERPL-MCNC: 1.9 G/DL (ref 3.4–5)
ALBUMIN SERPL-MCNC: 2.1 G/DL (ref 3.4–5)
ALBUMIN UR-MCNC: 20 MG/DL
ALBUMIN UR-MCNC: NEGATIVE MG/DL
ALP SERPL-CCNC: 63 U/L (ref 40–150)
ALP SERPL-CCNC: 80 U/L (ref 40–150)
ALT SERPL W P-5'-P-CCNC: 37 U/L (ref 0–50)
ALT SERPL W P-5'-P-CCNC: 58 U/L (ref 0–50)
AMMONIA PLAS-SCNC: 33 UMOL/L (ref 10–50)
ANION GAP SERPL CALCULATED.3IONS-SCNC: 4 MMOL/L (ref 3–14)
ANION GAP SERPL CALCULATED.3IONS-SCNC: 4 MMOL/L (ref 3–14)
ANION GAP SERPL CALCULATED.3IONS-SCNC: 5 MMOL/L (ref 3–14)
ANION GAP SERPL CALCULATED.3IONS-SCNC: 5 MMOL/L (ref 3–14)
ANION GAP SERPL CALCULATED.3IONS-SCNC: 6 MMOL/L (ref 3–14)
ANION GAP SERPL CALCULATED.3IONS-SCNC: 7 MMOL/L (ref 3–14)
ANION GAP SERPL CALCULATED.3IONS-SCNC: 8 MMOL/L (ref 3–14)
APPEARANCE UR: CLEAR
APPEARANCE UR: CLEAR
APTT PPP: 28 SECONDS (ref 22–38)
APTT PPP: 30 SECONDS (ref 22–38)
AST SERPL W P-5'-P-CCNC: 32 U/L (ref 0–45)
AST SERPL W P-5'-P-CCNC: 64 U/L (ref 0–45)
ATRIAL RATE - MUSE: 72 BPM
BACTERIA UR CULT: NO GROWTH
BASE EXCESS BLDA CALC-SCNC: 0.9 MMOL/L (ref -9–1.8)
BASOPHILS # BLD AUTO: 0 10E3/UL (ref 0–0.2)
BASOPHILS # BLD AUTO: 0.1 10E3/UL (ref 0–0.2)
BASOPHILS NFR BLD AUTO: 0 %
BASOPHILS NFR BLD AUTO: 1 %
BILIRUB DIRECT SERPL-MCNC: 0.1 MG/DL (ref 0–0.2)
BILIRUB DIRECT SERPL-MCNC: 0.1 MG/DL (ref 0–0.2)
BILIRUB SERPL-MCNC: 0.5 MG/DL (ref 0.2–1.3)
BILIRUB SERPL-MCNC: 0.8 MG/DL (ref 0.2–1.3)
BILIRUB UR QL STRIP: NEGATIVE
BILIRUB UR QL STRIP: NEGATIVE
BUN SERPL-MCNC: 13 MG/DL (ref 7–30)
BUN SERPL-MCNC: 13 MG/DL (ref 7–30)
BUN SERPL-MCNC: 17 MG/DL (ref 7–30)
BUN SERPL-MCNC: 18 MG/DL (ref 7–30)
BUN SERPL-MCNC: 20 MG/DL (ref 7–30)
BUN SERPL-MCNC: 21 MG/DL (ref 7–30)
BUN SERPL-MCNC: 23 MG/DL (ref 7–30)
BUN SERPL-MCNC: 24 MG/DL (ref 7–30)
BUN SERPL-MCNC: 31 MG/DL (ref 7–30)
CALCIUM SERPL-MCNC: 7.1 MG/DL (ref 8.5–10.1)
CALCIUM SERPL-MCNC: 7.8 MG/DL (ref 8.5–10.1)
CALCIUM SERPL-MCNC: 8.1 MG/DL (ref 8.5–10.1)
CALCIUM SERPL-MCNC: 8.6 MG/DL (ref 8.5–10.1)
CALCIUM SERPL-MCNC: 8.7 MG/DL (ref 8.5–10.1)
CALCIUM SERPL-MCNC: 8.8 MG/DL (ref 8.5–10.1)
CALCIUM SERPL-MCNC: 9 MG/DL (ref 8.5–10.1)
CALCIUM SERPL-MCNC: 9.1 MG/DL (ref 8.5–10.1)
CALCIUM SERPL-MCNC: 9.3 MG/DL (ref 8.5–10.1)
CALCIUM SERPL-MCNC: 9.5 MG/DL (ref 8.5–10.1)
CHLORIDE BLD-SCNC: 106 MMOL/L (ref 94–109)
CHLORIDE BLD-SCNC: 108 MMOL/L (ref 94–109)
CHLORIDE BLD-SCNC: 108 MMOL/L (ref 94–109)
CHLORIDE BLD-SCNC: 109 MMOL/L (ref 94–109)
CHLORIDE BLD-SCNC: 109 MMOL/L (ref 94–109)
CHLORIDE BLD-SCNC: 110 MMOL/L (ref 94–109)
CHLORIDE BLD-SCNC: 111 MMOL/L (ref 94–109)
CHLORIDE BLD-SCNC: 113 MMOL/L (ref 94–109)
CHLORIDE BLD-SCNC: 116 MMOL/L (ref 94–109)
CHLORIDE BLD-SCNC: 116 MMOL/L (ref 94–109)
CHLORIDE BLD-SCNC: 117 MMOL/L (ref 94–109)
CHLORIDE BLD-SCNC: 119 MMOL/L (ref 94–109)
CHOLEST SERPL-MCNC: 148 MG/DL
CO2 SERPL-SCNC: 18 MMOL/L (ref 20–32)
CO2 SERPL-SCNC: 19 MMOL/L (ref 20–32)
CO2 SERPL-SCNC: 19 MMOL/L (ref 20–32)
CO2 SERPL-SCNC: 23 MMOL/L (ref 20–32)
CO2 SERPL-SCNC: 24 MMOL/L (ref 20–32)
CO2 SERPL-SCNC: 25 MMOL/L (ref 20–32)
CO2 SERPL-SCNC: 26 MMOL/L (ref 20–32)
CO2 SERPL-SCNC: 26 MMOL/L (ref 20–32)
CO2 SERPL-SCNC: 27 MMOL/L (ref 20–32)
CO2 SERPL-SCNC: 28 MMOL/L (ref 20–32)
CO2 SERPL-SCNC: 29 MMOL/L (ref 20–32)
CO2 SERPL-SCNC: 29 MMOL/L (ref 20–32)
COLOR UR AUTO: ABNORMAL
COLOR UR AUTO: ABNORMAL
CREAT SERPL-MCNC: 0.51 MG/DL (ref 0.52–1.04)
CREAT SERPL-MCNC: 0.58 MG/DL (ref 0.52–1.04)
CREAT SERPL-MCNC: 0.58 MG/DL (ref 0.52–1.04)
CREAT SERPL-MCNC: 0.6 MG/DL (ref 0.52–1.04)
CREAT SERPL-MCNC: 0.62 MG/DL (ref 0.52–1.04)
CREAT SERPL-MCNC: 0.63 MG/DL (ref 0.52–1.04)
CREAT SERPL-MCNC: 0.64 MG/DL (ref 0.52–1.04)
CREAT SERPL-MCNC: 0.65 MG/DL (ref 0.52–1.04)
CREAT SERPL-MCNC: 0.7 MG/DL (ref 0.52–1.04)
CREAT SERPL-MCNC: 0.7 MG/DL (ref 0.52–1.04)
CREAT SERPL-MCNC: 0.74 MG/DL (ref 0.52–1.04)
CREAT SERPL-MCNC: 0.81 MG/DL (ref 0.52–1.04)
DIASTOLIC BLOOD PRESSURE - MUSE: NORMAL MMHG
EOSINOPHIL # BLD AUTO: 0 10E3/UL (ref 0–0.7)
EOSINOPHIL # BLD AUTO: 0.3 10E3/UL (ref 0–0.7)
EOSINOPHIL NFR BLD AUTO: 0 %
EOSINOPHIL NFR BLD AUTO: 3 %
ERYTHROCYTE [DISTWIDTH] IN BLOOD BY AUTOMATED COUNT: 13.4 % (ref 10–15)
ERYTHROCYTE [DISTWIDTH] IN BLOOD BY AUTOMATED COUNT: 13.7 % (ref 10–15)
ERYTHROCYTE [DISTWIDTH] IN BLOOD BY AUTOMATED COUNT: 13.8 % (ref 10–15)
ERYTHROCYTE [DISTWIDTH] IN BLOOD BY AUTOMATED COUNT: 13.9 % (ref 10–15)
ERYTHROCYTE [DISTWIDTH] IN BLOOD BY AUTOMATED COUNT: 13.9 % (ref 10–15)
ERYTHROCYTE [DISTWIDTH] IN BLOOD BY AUTOMATED COUNT: 14.2 % (ref 10–15)
GFR SERPL CREATININE-BSD FRML MDRD: 69 ML/MIN/1.73M2
GFR SERPL CREATININE-BSD FRML MDRD: 77 ML/MIN/1.73M2
GFR SERPL CREATININE-BSD FRML MDRD: 83 ML/MIN/1.73M2
GFR SERPL CREATININE-BSD FRML MDRD: 83 ML/MIN/1.73M2
GFR SERPL CREATININE-BSD FRML MDRD: 84 ML/MIN/1.73M2
GFR SERPL CREATININE-BSD FRML MDRD: 85 ML/MIN/1.73M2
GFR SERPL CREATININE-BSD FRML MDRD: 86 ML/MIN/1.73M2
GFR SERPL CREATININE-BSD FRML MDRD: 87 ML/MIN/1.73M2
GFR SERPL CREATININE-BSD FRML MDRD: 87 ML/MIN/1.73M2
GFR SERPL CREATININE-BSD FRML MDRD: 89 ML/MIN/1.73M2
GLUCOSE BLD-MCNC: 101 MG/DL (ref 70–99)
GLUCOSE BLD-MCNC: 106 MG/DL (ref 70–99)
GLUCOSE BLD-MCNC: 113 MG/DL (ref 70–99)
GLUCOSE BLD-MCNC: 121 MG/DL (ref 70–99)
GLUCOSE BLD-MCNC: 123 MG/DL (ref 70–99)
GLUCOSE BLD-MCNC: 123 MG/DL (ref 70–99)
GLUCOSE BLD-MCNC: 126 MG/DL (ref 70–99)
GLUCOSE BLD-MCNC: 135 MG/DL (ref 70–99)
GLUCOSE BLD-MCNC: 68 MG/DL (ref 70–99)
GLUCOSE BLD-MCNC: 77 MG/DL (ref 70–99)
GLUCOSE BLD-MCNC: 89 MG/DL (ref 70–99)
GLUCOSE BLD-MCNC: 97 MG/DL (ref 70–99)
GLUCOSE BLDC GLUCOMTR-MCNC: 100 MG/DL (ref 70–99)
GLUCOSE BLDC GLUCOMTR-MCNC: 103 MG/DL (ref 70–99)
GLUCOSE BLDC GLUCOMTR-MCNC: 104 MG/DL (ref 70–99)
GLUCOSE BLDC GLUCOMTR-MCNC: 106 MG/DL (ref 70–99)
GLUCOSE BLDC GLUCOMTR-MCNC: 107 MG/DL (ref 70–99)
GLUCOSE BLDC GLUCOMTR-MCNC: 109 MG/DL (ref 70–99)
GLUCOSE BLDC GLUCOMTR-MCNC: 111 MG/DL (ref 70–99)
GLUCOSE BLDC GLUCOMTR-MCNC: 112 MG/DL (ref 70–99)
GLUCOSE BLDC GLUCOMTR-MCNC: 112 MG/DL (ref 70–99)
GLUCOSE BLDC GLUCOMTR-MCNC: 114 MG/DL (ref 70–99)
GLUCOSE BLDC GLUCOMTR-MCNC: 115 MG/DL (ref 70–99)
GLUCOSE BLDC GLUCOMTR-MCNC: 115 MG/DL (ref 70–99)
GLUCOSE BLDC GLUCOMTR-MCNC: 117 MG/DL (ref 70–99)
GLUCOSE BLDC GLUCOMTR-MCNC: 118 MG/DL (ref 70–99)
GLUCOSE BLDC GLUCOMTR-MCNC: 120 MG/DL (ref 70–99)
GLUCOSE BLDC GLUCOMTR-MCNC: 122 MG/DL (ref 70–99)
GLUCOSE BLDC GLUCOMTR-MCNC: 125 MG/DL (ref 70–99)
GLUCOSE BLDC GLUCOMTR-MCNC: 127 MG/DL (ref 70–99)
GLUCOSE BLDC GLUCOMTR-MCNC: 130 MG/DL (ref 70–99)
GLUCOSE BLDC GLUCOMTR-MCNC: 132 MG/DL (ref 70–99)
GLUCOSE BLDC GLUCOMTR-MCNC: 133 MG/DL (ref 70–99)
GLUCOSE BLDC GLUCOMTR-MCNC: 140 MG/DL (ref 70–99)
GLUCOSE BLDC GLUCOMTR-MCNC: 141 MG/DL (ref 70–99)
GLUCOSE BLDC GLUCOMTR-MCNC: 169 MG/DL (ref 70–99)
GLUCOSE BLDC GLUCOMTR-MCNC: 189 MG/DL (ref 70–99)
GLUCOSE BLDC GLUCOMTR-MCNC: 58 MG/DL (ref 70–99)
GLUCOSE BLDC GLUCOMTR-MCNC: 67 MG/DL (ref 70–99)
GLUCOSE BLDC GLUCOMTR-MCNC: 69 MG/DL (ref 70–99)
GLUCOSE BLDC GLUCOMTR-MCNC: 70 MG/DL (ref 70–99)
GLUCOSE BLDC GLUCOMTR-MCNC: 78 MG/DL (ref 70–99)
GLUCOSE BLDC GLUCOMTR-MCNC: 79 MG/DL (ref 70–99)
GLUCOSE BLDC GLUCOMTR-MCNC: 81 MG/DL (ref 70–99)
GLUCOSE BLDC GLUCOMTR-MCNC: 81 MG/DL (ref 70–99)
GLUCOSE BLDC GLUCOMTR-MCNC: 85 MG/DL (ref 70–99)
GLUCOSE BLDC GLUCOMTR-MCNC: 86 MG/DL (ref 70–99)
GLUCOSE BLDC GLUCOMTR-MCNC: 87 MG/DL (ref 70–99)
GLUCOSE BLDC GLUCOMTR-MCNC: 88 MG/DL (ref 70–99)
GLUCOSE BLDC GLUCOMTR-MCNC: 89 MG/DL (ref 70–99)
GLUCOSE BLDC GLUCOMTR-MCNC: 90 MG/DL (ref 70–99)
GLUCOSE BLDC GLUCOMTR-MCNC: 93 MG/DL (ref 70–99)
GLUCOSE BLDC GLUCOMTR-MCNC: 94 MG/DL (ref 70–99)
GLUCOSE BLDC GLUCOMTR-MCNC: 95 MG/DL (ref 70–99)
GLUCOSE BLDC GLUCOMTR-MCNC: 96 MG/DL (ref 70–99)
GLUCOSE BLDC GLUCOMTR-MCNC: 98 MG/DL (ref 70–99)
GLUCOSE BLDC GLUCOMTR-MCNC: 99 MG/DL (ref 70–99)
GLUCOSE UR STRIP-MCNC: 100 MG/DL
GLUCOSE UR STRIP-MCNC: NEGATIVE MG/DL
HBA1C MFR BLD: 5.4 % (ref 0–5.6)
HCO3 BLD-SCNC: 24 MMOL/L (ref 21–28)
HCT VFR BLD AUTO: 37.4 % (ref 35–47)
HCT VFR BLD AUTO: 37.7 % (ref 35–47)
HCT VFR BLD AUTO: 39.5 % (ref 35–47)
HCT VFR BLD AUTO: 40 % (ref 35–47)
HCT VFR BLD AUTO: 40.8 % (ref 35–47)
HCT VFR BLD AUTO: 41.7 % (ref 35–47)
HCT VFR BLD AUTO: 43.3 % (ref 35–47)
HCT VFR BLD AUTO: 43.4 % (ref 35–47)
HCT VFR BLD AUTO: 49 % (ref 35–47)
HDLC SERPL-MCNC: 53 MG/DL
HGB BLD-MCNC: 12.1 G/DL (ref 11.7–15.7)
HGB BLD-MCNC: 12.3 G/DL (ref 11.7–15.7)
HGB BLD-MCNC: 13.1 G/DL (ref 11.7–15.7)
HGB BLD-MCNC: 13.4 G/DL (ref 11.7–15.7)
HGB BLD-MCNC: 13.4 G/DL (ref 11.7–15.7)
HGB BLD-MCNC: 13.6 G/DL (ref 11.7–15.7)
HGB BLD-MCNC: 14 G/DL (ref 11.7–15.7)
HGB BLD-MCNC: 14.4 G/DL (ref 11.7–15.7)
HGB BLD-MCNC: 15.8 G/DL (ref 11.7–15.7)
HGB UR QL STRIP: ABNORMAL
HGB UR QL STRIP: NEGATIVE
IMM GRANULOCYTES # BLD: 0 10E3/UL
IMM GRANULOCYTES # BLD: 0.1 10E3/UL
IMM GRANULOCYTES NFR BLD: 0 %
IMM GRANULOCYTES NFR BLD: 1 %
INR PPP: 1.09 (ref 0.85–1.15)
INR PPP: 1.2 (ref 0.85–1.15)
INTERPRETATION ECG - MUSE: NORMAL
KETONES UR STRIP-MCNC: ABNORMAL MG/DL
KETONES UR STRIP-MCNC: NEGATIVE MG/DL
LACTATE SERPL-SCNC: 1.3 MMOL/L (ref 0.7–2)
LDLC SERPL CALC-MCNC: 74 MG/DL
LEUKOCYTE ESTERASE UR QL STRIP: ABNORMAL
LEUKOCYTE ESTERASE UR QL STRIP: NEGATIVE
LVEF ECHO: NORMAL
LYMPHOCYTES # BLD AUTO: 0.3 10E3/UL (ref 0.8–5.3)
LYMPHOCYTES # BLD AUTO: 1.5 10E3/UL (ref 0.8–5.3)
LYMPHOCYTES NFR BLD AUTO: 13 %
LYMPHOCYTES NFR BLD AUTO: 3 %
MAGNESIUM SERPL-MCNC: 1.8 MG/DL (ref 1.6–2.3)
MAGNESIUM SERPL-MCNC: 1.9 MG/DL (ref 1.6–2.3)
MAGNESIUM SERPL-MCNC: 2 MG/DL (ref 1.6–2.3)
MCH RBC QN AUTO: 32.4 PG (ref 26.5–33)
MCH RBC QN AUTO: 32.5 PG (ref 26.5–33)
MCH RBC QN AUTO: 32.7 PG (ref 26.5–33)
MCH RBC QN AUTO: 32.7 PG (ref 26.5–33)
MCH RBC QN AUTO: 32.8 PG (ref 26.5–33)
MCH RBC QN AUTO: 32.8 PG (ref 26.5–33)
MCHC RBC AUTO-ENTMCNC: 32.1 G/DL (ref 31.5–36.5)
MCHC RBC AUTO-ENTMCNC: 32.2 G/DL (ref 31.5–36.5)
MCHC RBC AUTO-ENTMCNC: 32.3 G/DL (ref 31.5–36.5)
MCHC RBC AUTO-ENTMCNC: 32.6 G/DL (ref 31.5–36.5)
MCHC RBC AUTO-ENTMCNC: 32.8 G/DL (ref 31.5–36.5)
MCHC RBC AUTO-ENTMCNC: 32.8 G/DL (ref 31.5–36.5)
MCHC RBC AUTO-ENTMCNC: 32.9 G/DL (ref 31.5–36.5)
MCHC RBC AUTO-ENTMCNC: 33.3 G/DL (ref 31.5–36.5)
MCHC RBC AUTO-ENTMCNC: 33.9 G/DL (ref 31.5–36.5)
MCV RBC AUTO: 100 FL (ref 78–100)
MCV RBC AUTO: 101 FL (ref 78–100)
MCV RBC AUTO: 97 FL (ref 78–100)
MCV RBC AUTO: 98 FL (ref 78–100)
MCV RBC AUTO: 99 FL (ref 78–100)
MONOCYTES # BLD AUTO: 0.4 10E3/UL (ref 0–1.3)
MONOCYTES # BLD AUTO: 0.8 10E3/UL (ref 0–1.3)
MONOCYTES NFR BLD AUTO: 4 %
MONOCYTES NFR BLD AUTO: 7 %
NEUTROPHILS # BLD AUTO: 8.3 10E3/UL (ref 1.6–8.3)
NEUTROPHILS # BLD AUTO: 9.1 10E3/UL (ref 1.6–8.3)
NEUTROPHILS NFR BLD AUTO: 75 %
NEUTROPHILS NFR BLD AUTO: 93 %
NITRATE UR QL: NEGATIVE
NITRATE UR QL: NEGATIVE
NONHDLC SERPL-MCNC: 95 MG/DL
NRBC # BLD AUTO: 0 10E3/UL
NRBC # BLD AUTO: 0 10E3/UL
NRBC BLD AUTO-RTO: 0 /100
NRBC BLD AUTO-RTO: 0 /100
O2/TOTAL GAS SETTING VFR VENT: 10 %
P AXIS - MUSE: NORMAL DEGREES
PCO2 BLD: 33 MM HG (ref 35–45)
PH BLD: 7.47 [PH] (ref 7.35–7.45)
PH UR STRIP: 5.5 [PH] (ref 5–7)
PH UR STRIP: 7.5 [PH] (ref 5–7)
PHOSPHATE SERPL-MCNC: 1.1 MG/DL (ref 2.5–4.5)
PHOSPHATE SERPL-MCNC: 2.3 MG/DL (ref 2.5–4.5)
PHOSPHATE SERPL-MCNC: 2.3 MG/DL (ref 2.5–4.5)
PHOSPHATE SERPL-MCNC: 3 MG/DL (ref 2.5–4.5)
PHOSPHATE SERPL-MCNC: 3.1 MG/DL (ref 2.5–4.5)
PHOSPHATE SERPL-MCNC: 3.3 MG/DL (ref 2.5–4.5)
PHOSPHATE SERPL-MCNC: 3.8 MG/DL (ref 2.5–4.5)
PLATELET # BLD AUTO: 153 10E3/UL (ref 150–450)
PLATELET # BLD AUTO: 154 10E3/UL (ref 150–450)
PLATELET # BLD AUTO: 156 10E3/UL (ref 150–450)
PLATELET # BLD AUTO: 162 10E3/UL (ref 150–450)
PLATELET # BLD AUTO: 180 10E3/UL (ref 150–450)
PLATELET # BLD AUTO: 182 10E3/UL (ref 150–450)
PLATELET # BLD AUTO: 184 10E3/UL (ref 150–450)
PLATELET # BLD AUTO: 207 10E3/UL (ref 150–450)
PLATELET # BLD AUTO: 246 10E3/UL (ref 150–450)
PO2 BLD: 72 MM HG (ref 80–105)
POTASSIUM BLD-SCNC: 2.7 MMOL/L (ref 3.4–5.3)
POTASSIUM BLD-SCNC: 3.2 MMOL/L (ref 3.4–5.3)
POTASSIUM BLD-SCNC: 3.4 MMOL/L (ref 3.4–5.3)
POTASSIUM BLD-SCNC: 3.5 MMOL/L (ref 3.4–5.3)
POTASSIUM BLD-SCNC: 3.6 MMOL/L (ref 3.4–5.3)
POTASSIUM BLD-SCNC: 3.7 MMOL/L (ref 3.4–5.3)
POTASSIUM BLD-SCNC: 3.8 MMOL/L (ref 3.4–5.3)
POTASSIUM BLD-SCNC: 3.8 MMOL/L (ref 3.4–5.3)
POTASSIUM BLD-SCNC: 3.9 MMOL/L (ref 3.4–5.3)
POTASSIUM BLD-SCNC: 4.2 MMOL/L (ref 3.4–5.3)
PR INTERVAL - MUSE: NORMAL MS
PROT SERPL-MCNC: 5.8 G/DL (ref 6.8–8.8)
PROT SERPL-MCNC: 5.9 G/DL (ref 6.8–8.8)
QRS DURATION - MUSE: 84 MS
QT - MUSE: 380 MS
QTC - MUSE: 469 MS
R AXIS - MUSE: 63 DEGREES
RBC # BLD AUTO: 3.73 10E6/UL (ref 3.8–5.2)
RBC # BLD AUTO: 3.78 10E6/UL (ref 3.8–5.2)
RBC # BLD AUTO: 4.04 10E6/UL (ref 3.8–5.2)
RBC # BLD AUTO: 4.08 10E6/UL (ref 3.8–5.2)
RBC # BLD AUTO: 4.14 10E6/UL (ref 3.8–5.2)
RBC # BLD AUTO: 4.15 10E6/UL (ref 3.8–5.2)
RBC # BLD AUTO: 4.28 10E6/UL (ref 3.8–5.2)
RBC # BLD AUTO: 4.41 10E6/UL (ref 3.8–5.2)
RBC # BLD AUTO: 4.88 10E6/UL (ref 3.8–5.2)
RBC URINE: 15 /HPF
SARS-COV-2 RNA RESP QL NAA+PROBE: NEGATIVE
SODIUM SERPL-SCNC: 139 MMOL/L (ref 133–144)
SODIUM SERPL-SCNC: 140 MMOL/L (ref 133–144)
SODIUM SERPL-SCNC: 141 MMOL/L (ref 133–144)
SODIUM SERPL-SCNC: 142 MMOL/L (ref 133–144)
SODIUM SERPL-SCNC: 143 MMOL/L (ref 133–144)
SODIUM SERPL-SCNC: 144 MMOL/L (ref 133–144)
SODIUM SERPL-SCNC: 145 MMOL/L (ref 133–144)
SODIUM SERPL-SCNC: 147 MMOL/L (ref 133–144)
SP GR UR STRIP: 1.01 (ref 1–1.03)
SP GR UR STRIP: 1.01 (ref 1–1.03)
SYSTOLIC BLOOD PRESSURE - MUSE: NORMAL MMHG
T AXIS - MUSE: -87 DEGREES
TRIGL SERPL-MCNC: 107 MG/DL
TROPONIN I SERPL HS-MCNC: 14 NG/L
UROBILINOGEN UR STRIP-MCNC: NORMAL MG/DL
UROBILINOGEN UR STRIP-MCNC: NORMAL MG/DL
VENTRICULAR RATE- MUSE: 92 BPM
WBC # BLD AUTO: 10.3 10E3/UL (ref 4–11)
WBC # BLD AUTO: 10.8 10E3/UL (ref 4–11)
WBC # BLD AUTO: 11 10E3/UL (ref 4–11)
WBC # BLD AUTO: 13.5 10E3/UL (ref 4–11)
WBC # BLD AUTO: 14 10E3/UL (ref 4–11)
WBC # BLD AUTO: 9.2 10E3/UL (ref 4–11)
WBC # BLD AUTO: 9.3 10E3/UL (ref 4–11)
WBC # BLD AUTO: 9.7 10E3/UL (ref 4–11)
WBC # BLD AUTO: 9.8 10E3/UL (ref 4–11)
WBC URINE: 39 /HPF

## 2022-01-01 PROCEDURE — 99233 SBSQ HOSP IP/OBS HIGH 50: CPT | Performed by: REGISTERED NURSE

## 2022-01-01 PROCEDURE — 250N000013 HC RX MED GY IP 250 OP 250 PS 637: Performed by: STUDENT IN AN ORGANIZED HEALTH CARE EDUCATION/TRAINING PROGRAM

## 2022-01-01 PROCEDURE — 250N000011 HC RX IP 250 OP 636: Performed by: HOSPITALIST

## 2022-01-01 PROCEDURE — 82310 ASSAY OF CALCIUM: CPT | Performed by: STUDENT IN AN ORGANIZED HEALTH CARE EDUCATION/TRAINING PROGRAM

## 2022-01-01 PROCEDURE — 85027 COMPLETE CBC AUTOMATED: CPT | Performed by: INTERNAL MEDICINE

## 2022-01-01 PROCEDURE — 99223 1ST HOSP IP/OBS HIGH 75: CPT | Mod: AI | Performed by: INTERNAL MEDICINE

## 2022-01-01 PROCEDURE — 258N000003 HC RX IP 258 OP 636: Performed by: INTERNAL MEDICINE

## 2022-01-01 PROCEDURE — 36415 COLL VENOUS BLD VENIPUNCTURE: CPT | Performed by: INTERNAL MEDICINE

## 2022-01-01 PROCEDURE — 99233 SBSQ HOSP IP/OBS HIGH 50: CPT | Performed by: STUDENT IN AN ORGANIZED HEALTH CARE EDUCATION/TRAINING PROGRAM

## 2022-01-01 PROCEDURE — 250N000013 HC RX MED GY IP 250 OP 250 PS 637: Performed by: INTERNAL MEDICINE

## 2022-01-01 PROCEDURE — 97530 THERAPEUTIC ACTIVITIES: CPT | Mod: GP

## 2022-01-01 PROCEDURE — G1004 CDSM NDSC: HCPCS

## 2022-01-01 PROCEDURE — 84100 ASSAY OF PHOSPHORUS: CPT | Performed by: INTERNAL MEDICINE

## 2022-01-01 PROCEDURE — 250N000013 HC RX MED GY IP 250 OP 250 PS 637: Performed by: HOSPITALIST

## 2022-01-01 PROCEDURE — 99233 SBSQ HOSP IP/OBS HIGH 50: CPT | Performed by: INTERNAL MEDICINE

## 2022-01-01 PROCEDURE — 258N000003 HC RX IP 258 OP 636: Performed by: STUDENT IN AN ORGANIZED HEALTH CARE EDUCATION/TRAINING PROGRAM

## 2022-01-01 PROCEDURE — 99233 SBSQ HOSP IP/OBS HIGH 50: CPT | Mod: GC | Performed by: PSYCHIATRY & NEUROLOGY

## 2022-01-01 PROCEDURE — 96372 THER/PROPH/DIAG INJ SC/IM: CPT | Performed by: OBSTETRICS & GYNECOLOGY

## 2022-01-01 PROCEDURE — 99214 OFFICE O/P EST MOD 30 MIN: CPT | Performed by: NURSE PRACTITIONER

## 2022-01-01 PROCEDURE — 99152 MOD SED SAME PHYS/QHP 5/>YRS: CPT | Mod: GC | Performed by: RADIOLOGY

## 2022-01-01 PROCEDURE — 250N000011 HC RX IP 250 OP 636: Performed by: INTERNAL MEDICINE

## 2022-01-01 PROCEDURE — 250N000013 HC RX MED GY IP 250 OP 250 PS 637: Performed by: PHYSICIAN ASSISTANT

## 2022-01-01 PROCEDURE — 83605 ASSAY OF LACTIC ACID: CPT | Performed by: STUDENT IN AN ORGANIZED HEALTH CARE EDUCATION/TRAINING PROGRAM

## 2022-01-01 PROCEDURE — 92526 ORAL FUNCTION THERAPY: CPT | Mod: GN | Performed by: REHABILITATION PRACTITIONER

## 2022-01-01 PROCEDURE — 258N000003 HC RX IP 258 OP 636: Performed by: HOSPITALIST

## 2022-01-01 PROCEDURE — 250N000009 HC RX 250: Performed by: STUDENT IN AN ORGANIZED HEALTH CARE EDUCATION/TRAINING PROGRAM

## 2022-01-01 PROCEDURE — 250N000011 HC RX IP 250 OP 636: Performed by: STUDENT IN AN ORGANIZED HEALTH CARE EDUCATION/TRAINING PROGRAM

## 2022-01-01 PROCEDURE — 85014 HEMATOCRIT: CPT | Performed by: STUDENT IN AN ORGANIZED HEALTH CARE EDUCATION/TRAINING PROGRAM

## 2022-01-01 PROCEDURE — 93005 ELECTROCARDIOGRAM TRACING: CPT

## 2022-01-01 PROCEDURE — C9803 HOPD COVID-19 SPEC COLLECT: HCPCS

## 2022-01-01 PROCEDURE — 120N000001 HC R&B MED SURG/OB

## 2022-01-01 PROCEDURE — 80048 BASIC METABOLIC PNL TOTAL CA: CPT | Performed by: INTERNAL MEDICINE

## 2022-01-01 PROCEDURE — 250N000009 HC RX 250: Performed by: INTERNAL MEDICINE

## 2022-01-01 PROCEDURE — 36415 COLL VENOUS BLD VENIPUNCTURE: CPT | Performed by: STUDENT IN AN ORGANIZED HEALTH CARE EDUCATION/TRAINING PROGRAM

## 2022-01-01 PROCEDURE — 97535 SELF CARE MNGMENT TRAINING: CPT | Mod: GO

## 2022-01-01 PROCEDURE — 71045 X-RAY EXAM CHEST 1 VIEW: CPT

## 2022-01-01 PROCEDURE — 83036 HEMOGLOBIN GLYCOSYLATED A1C: CPT | Performed by: INTERNAL MEDICINE

## 2022-01-01 PROCEDURE — 74230 X-RAY XM SWLNG FUNCJ C+: CPT

## 2022-01-01 PROCEDURE — 87086 URINE CULTURE/COLONY COUNT: CPT | Performed by: EMERGENCY MEDICINE

## 2022-01-01 PROCEDURE — 99231 SBSQ HOSP IP/OBS SF/LOW 25: CPT | Performed by: REGISTERED NURSE

## 2022-01-01 PROCEDURE — 258N000001 HC RX 258: Performed by: STUDENT IN AN ORGANIZED HEALTH CARE EDUCATION/TRAINING PROGRAM

## 2022-01-01 PROCEDURE — 97110 THERAPEUTIC EXERCISES: CPT | Mod: GP

## 2022-01-01 PROCEDURE — 250N000011 HC RX IP 250 OP 636: Performed by: EMERGENCY MEDICINE

## 2022-01-01 PROCEDURE — 84132 ASSAY OF SERUM POTASSIUM: CPT | Performed by: INTERNAL MEDICINE

## 2022-01-01 PROCEDURE — 71275 CT ANGIOGRAPHY CHEST: CPT

## 2022-01-01 PROCEDURE — 120N000013 HC R&B IMCU

## 2022-01-01 PROCEDURE — 99232 SBSQ HOSP IP/OBS MODERATE 35: CPT | Performed by: HOSPITALIST

## 2022-01-01 PROCEDURE — 80048 BASIC METABOLIC PNL TOTAL CA: CPT | Performed by: STUDENT IN AN ORGANIZED HEALTH CARE EDUCATION/TRAINING PROGRAM

## 2022-01-01 PROCEDURE — 99358 PROLONG SERVICE W/O CONTACT: CPT | Performed by: REGISTERED NURSE

## 2022-01-01 PROCEDURE — 84100 ASSAY OF PHOSPHORUS: CPT | Performed by: STUDENT IN AN ORGANIZED HEALTH CARE EDUCATION/TRAINING PROGRAM

## 2022-01-01 PROCEDURE — 81001 URINALYSIS AUTO W/SCOPE: CPT | Performed by: EMERGENCY MEDICINE

## 2022-01-01 PROCEDURE — 85730 THROMBOPLASTIN TIME PARTIAL: CPT | Performed by: EMERGENCY MEDICINE

## 2022-01-01 PROCEDURE — 99232 SBSQ HOSP IP/OBS MODERATE 35: CPT | Performed by: INTERNAL MEDICINE

## 2022-01-01 PROCEDURE — 258N000002 HC RX IP 258 OP 250: Performed by: STUDENT IN AN ORGANIZED HEALTH CARE EDUCATION/TRAINING PROGRAM

## 2022-01-01 PROCEDURE — 74018 RADEX ABDOMEN 1 VIEW: CPT

## 2022-01-01 PROCEDURE — 92610 EVALUATE SWALLOWING FUNCTION: CPT | Mod: GN | Performed by: SPEECH-LANGUAGE PATHOLOGIST

## 2022-01-01 PROCEDURE — 85027 COMPLETE CBC AUTOMATED: CPT | Performed by: STUDENT IN AN ORGANIZED HEALTH CARE EDUCATION/TRAINING PROGRAM

## 2022-01-01 PROCEDURE — 97530 THERAPEUTIC ACTIVITIES: CPT | Mod: GO | Performed by: OCCUPATIONAL THERAPIST

## 2022-01-01 PROCEDURE — 255N000002 HC RX 255 OP 636: Performed by: RADIOLOGY

## 2022-01-01 PROCEDURE — 200N000001 HC R&B ICU

## 2022-01-01 PROCEDURE — 82803 BLOOD GASES ANY COMBINATION: CPT | Performed by: INTERNAL MEDICINE

## 2022-01-01 PROCEDURE — 97530 THERAPEUTIC ACTIVITIES: CPT | Mod: GP | Performed by: PHYSICAL THERAPIST

## 2022-01-01 PROCEDURE — 258N000003 HC RX IP 258 OP 636

## 2022-01-01 PROCEDURE — 71046 X-RAY EXAM CHEST 2 VIEWS: CPT

## 2022-01-01 PROCEDURE — 250N000009 HC RX 250: Performed by: PSYCHIATRY & NEUROLOGY

## 2022-01-01 PROCEDURE — 80061 LIPID PANEL: CPT | Performed by: INTERNAL MEDICINE

## 2022-01-01 PROCEDURE — 0042T CT HEAD PERFUSION WITH CONTRAST: CPT

## 2022-01-01 PROCEDURE — 999N000157 HC STATISTIC RCP TIME EA 10 MIN

## 2022-01-01 PROCEDURE — 92526 ORAL FUNCTION THERAPY: CPT | Mod: GN | Performed by: SPEECH-LANGUAGE PATHOLOGIST

## 2022-01-01 PROCEDURE — 92611 MOTION FLUOROSCOPY/SWALLOW: CPT | Mod: GN | Performed by: SPEECH-LANGUAGE PATHOLOGIST

## 2022-01-01 PROCEDURE — 80053 COMPREHEN METABOLIC PANEL: CPT | Performed by: STUDENT IN AN ORGANIZED HEALTH CARE EDUCATION/TRAINING PROGRAM

## 2022-01-01 PROCEDURE — 85025 COMPLETE CBC W/AUTO DIFF WBC: CPT | Performed by: EMERGENCY MEDICINE

## 2022-01-01 PROCEDURE — 83735 ASSAY OF MAGNESIUM: CPT | Performed by: STUDENT IN AN ORGANIZED HEALTH CARE EDUCATION/TRAINING PROGRAM

## 2022-01-01 PROCEDURE — U0003 INFECTIOUS AGENT DETECTION BY NUCLEIC ACID (DNA OR RNA); SEVERE ACUTE RESPIRATORY SYNDROME CORONAVIRUS 2 (SARS-COV-2) (CORONAVIRUS DISEASE [COVID-19]), AMPLIFIED PROBE TECHNIQUE, MAKING USE OF HIGH THROUGHPUT TECHNOLOGIES AS DESCRIBED BY CMS-2020-01-R: HCPCS | Performed by: INTERNAL MEDICINE

## 2022-01-01 PROCEDURE — 80053 COMPREHEN METABOLIC PANEL: CPT | Performed by: INTERNAL MEDICINE

## 2022-01-01 PROCEDURE — 81003 URINALYSIS AUTO W/O SCOPE: CPT | Performed by: INTERNAL MEDICINE

## 2022-01-01 PROCEDURE — 97166 OT EVAL MOD COMPLEX 45 MIN: CPT | Mod: GO | Performed by: OCCUPATIONAL THERAPIST

## 2022-01-01 PROCEDURE — 70553 MRI BRAIN STEM W/O & W/DYE: CPT | Mod: MG

## 2022-01-01 PROCEDURE — 83735 ASSAY OF MAGNESIUM: CPT | Performed by: HOSPITALIST

## 2022-01-01 PROCEDURE — 85027 COMPLETE CBC AUTOMATED: CPT | Performed by: HOSPITALIST

## 2022-01-01 PROCEDURE — 99291 CRITICAL CARE FIRST HOUR: CPT | Mod: GC | Performed by: PSYCHIATRY & NEUROLOGY

## 2022-01-01 PROCEDURE — 80048 BASIC METABOLIC PNL TOTAL CA: CPT | Performed by: EMERGENCY MEDICINE

## 2022-01-01 PROCEDURE — 84132 ASSAY OF SERUM POTASSIUM: CPT | Performed by: HOSPITALIST

## 2022-01-01 PROCEDURE — 99291 CRITICAL CARE FIRST HOUR: CPT | Mod: 25

## 2022-01-01 PROCEDURE — 92522 EVALUATE SPEECH PRODUCTION: CPT | Mod: GN | Performed by: SPEECH-LANGUAGE PATHOLOGIST

## 2022-01-01 PROCEDURE — 93306 TTE W/DOPPLER COMPLETE: CPT

## 2022-01-01 PROCEDURE — A9585 GADOBUTROL INJECTION: HCPCS | Performed by: STUDENT IN AN ORGANIZED HEALTH CARE EDUCATION/TRAINING PROGRAM

## 2022-01-01 PROCEDURE — 93306 TTE W/DOPPLER COMPLETE: CPT | Mod: 26 | Performed by: INTERNAL MEDICINE

## 2022-01-01 PROCEDURE — 36600 WITHDRAWAL OF ARTERIAL BLOOD: CPT

## 2022-01-01 PROCEDURE — 85610 PROTHROMBIN TIME: CPT | Performed by: EMERGENCY MEDICINE

## 2022-01-01 PROCEDURE — 84132 ASSAY OF SERUM POTASSIUM: CPT | Performed by: STUDENT IN AN ORGANIZED HEALTH CARE EDUCATION/TRAINING PROGRAM

## 2022-01-01 PROCEDURE — 250N000009 HC RX 250: Performed by: EMERGENCY MEDICINE

## 2022-01-01 PROCEDURE — 92526 ORAL FUNCTION THERAPY: CPT | Mod: GN

## 2022-01-01 PROCEDURE — 250N000011 HC RX IP 250 OP 636: Performed by: PSYCHIATRY & NEUROLOGY

## 2022-01-01 PROCEDURE — 99207 PR NO CHARGE NURSE ONLY: CPT

## 2022-01-01 PROCEDURE — 84100 ASSAY OF PHOSPHORUS: CPT | Performed by: HOSPITALIST

## 2022-01-01 PROCEDURE — 36415 COLL VENOUS BLD VENIPUNCTURE: CPT | Performed by: EMERGENCY MEDICINE

## 2022-01-01 PROCEDURE — 92507 TX SP LANG VOICE COMM INDIV: CPT | Mod: GN | Performed by: SPEECH-LANGUAGE PATHOLOGIST

## 2022-01-01 PROCEDURE — G0463 HOSPITAL OUTPT CLINIC VISIT: HCPCS

## 2022-01-01 PROCEDURE — 83735 ASSAY OF MAGNESIUM: CPT | Performed by: INTERNAL MEDICINE

## 2022-01-01 PROCEDURE — 03CG3Z7 EXTIRPATION OF MATTER FROM INTRACRANIAL ARTERY USING STENT RETRIEVER, PERCUTANEOUS APPROACH: ICD-10-PCS | Performed by: RADIOLOGY

## 2022-01-01 PROCEDURE — 99207 PR NO CHARGE LOS: CPT | Performed by: HOSPITALIST

## 2022-01-01 PROCEDURE — 82140 ASSAY OF AMMONIA: CPT | Performed by: INTERNAL MEDICINE

## 2022-01-01 PROCEDURE — 36415 COLL VENOUS BLD VENIPUNCTURE: CPT | Performed by: HOSPITALIST

## 2022-01-01 PROCEDURE — 92507 TX SP LANG VOICE COMM INDIV: CPT | Mod: GN

## 2022-01-01 PROCEDURE — 97535 SELF CARE MNGMENT TRAINING: CPT | Mod: GO | Performed by: OCCUPATIONAL THERAPIST

## 2022-01-01 PROCEDURE — 84484 ASSAY OF TROPONIN QUANT: CPT | Performed by: EMERGENCY MEDICINE

## 2022-01-01 PROCEDURE — 82248 BILIRUBIN DIRECT: CPT | Performed by: STUDENT IN AN ORGANIZED HEALTH CARE EDUCATION/TRAINING PROGRAM

## 2022-01-01 PROCEDURE — 82248 BILIRUBIN DIRECT: CPT | Performed by: INTERNAL MEDICINE

## 2022-01-01 PROCEDURE — 72170 X-RAY EXAM OF PELVIS: CPT

## 2022-01-01 PROCEDURE — 99152 MOD SED SAME PHYS/QHP 5/>YRS: CPT

## 2022-01-01 PROCEDURE — 85610 PROTHROMBIN TIME: CPT | Performed by: INTERNAL MEDICINE

## 2022-01-01 PROCEDURE — 97530 THERAPEUTIC ACTIVITIES: CPT | Mod: GO

## 2022-01-01 PROCEDURE — 85004 AUTOMATED DIFF WBC COUNT: CPT | Performed by: INTERNAL MEDICINE

## 2022-01-01 PROCEDURE — 82310 ASSAY OF CALCIUM: CPT | Performed by: INTERNAL MEDICINE

## 2022-01-01 PROCEDURE — 85018 HEMOGLOBIN: CPT | Performed by: STUDENT IN AN ORGANIZED HEALTH CARE EDUCATION/TRAINING PROGRAM

## 2022-01-01 PROCEDURE — 99222 1ST HOSP IP/OBS MODERATE 55: CPT | Performed by: REGISTERED NURSE

## 2022-01-01 PROCEDURE — 85730 THROMBOPLASTIN TIME PARTIAL: CPT | Performed by: INTERNAL MEDICINE

## 2022-01-01 PROCEDURE — U0003 INFECTIOUS AGENT DETECTION BY NUCLEIC ACID (DNA OR RNA); SEVERE ACUTE RESPIRATORY SYNDROME CORONAVIRUS 2 (SARS-COV-2) (CORONAVIRUS DISEASE [COVID-19]), AMPLIFIED PROBE TECHNIQUE, MAKING USE OF HIGH THROUGHPUT TECHNOLOGIES AS DESCRIBED BY CMS-2020-01-R: HCPCS | Performed by: EMERGENCY MEDICINE

## 2022-01-01 PROCEDURE — 255N000002 HC RX 255 OP 636: Performed by: STUDENT IN AN ORGANIZED HEALTH CARE EDUCATION/TRAINING PROGRAM

## 2022-01-01 PROCEDURE — 70496 CT ANGIOGRAPHY HEAD: CPT | Mod: ME

## 2022-01-01 PROCEDURE — 61645 PERQ ART M-THROMBECT &/NFS: CPT | Mod: GC | Performed by: RADIOLOGY

## 2022-01-01 PROCEDURE — 97161 PT EVAL LOW COMPLEX 20 MIN: CPT | Mod: GP

## 2022-01-01 RX ORDER — FUROSEMIDE 10 MG/ML
20 INJECTION INTRAMUSCULAR; INTRAVENOUS ONCE
Status: COMPLETED | OUTPATIENT
Start: 2022-01-01 | End: 2022-01-01

## 2022-01-01 RX ORDER — LACTOBACILLUS RHAMNOSUS GG 10B CELL
1 CAPSULE ORAL 2 TIMES DAILY
Status: DISCONTINUED | OUTPATIENT
Start: 2022-01-01 | End: 2022-01-01

## 2022-01-01 RX ORDER — ONDANSETRON 2 MG/ML
4 INJECTION INTRAMUSCULAR; INTRAVENOUS
Status: COMPLETED | OUTPATIENT
Start: 2022-01-01 | End: 2022-01-01

## 2022-01-01 RX ORDER — MORPHINE SULFATE 20 MG/ML
5-10 SOLUTION ORAL
Status: DISCONTINUED | OUTPATIENT
Start: 2022-01-01 | End: 2022-01-01

## 2022-01-01 RX ORDER — NALOXONE HYDROCHLORIDE 0.4 MG/ML
0.2 INJECTION, SOLUTION INTRAMUSCULAR; INTRAVENOUS; SUBCUTANEOUS
Status: DISCONTINUED | OUTPATIENT
Start: 2022-01-01 | End: 2022-01-01 | Stop reason: HOSPADM

## 2022-01-01 RX ORDER — POTASSIUM CHLORIDE 20MEQ/15ML
20 LIQUID (ML) ORAL ONCE
Status: COMPLETED | OUTPATIENT
Start: 2022-01-01 | End: 2022-01-01

## 2022-01-01 RX ORDER — SODIUM CHLORIDE 9 MG/ML
INJECTION, SOLUTION INTRAVENOUS CONTINUOUS
Status: DISCONTINUED | OUTPATIENT
Start: 2022-01-01 | End: 2022-01-01

## 2022-01-01 RX ORDER — ACETAMINOPHEN 650 MG/1
650 SUPPOSITORY RECTAL EVERY 4 HOURS PRN
Status: DISCONTINUED | OUTPATIENT
Start: 2022-01-01 | End: 2022-01-01 | Stop reason: HOSPADM

## 2022-01-01 RX ORDER — FUROSEMIDE 10 MG/ML
10 INJECTION INTRAMUSCULAR; INTRAVENOUS ONCE
Status: COMPLETED | OUTPATIENT
Start: 2022-01-01 | End: 2022-01-01

## 2022-01-01 RX ORDER — LEVALBUTEROL TARTRATE 45 UG/1
1-2 AEROSOL, METERED ORAL EVERY 4 HOURS PRN
COMMUNITY

## 2022-01-01 RX ORDER — ASPIRIN 81 MG/1
81 TABLET, CHEWABLE ORAL DAILY
Status: DISCONTINUED | OUTPATIENT
Start: 2022-01-01 | End: 2022-01-01

## 2022-01-01 RX ORDER — ATORVASTATIN CALCIUM 20 MG/1
20 TABLET, FILM COATED ORAL AT BEDTIME
Qty: 90 TABLET | Refills: 0 | Status: SHIPPED | OUTPATIENT
Start: 2022-01-01

## 2022-01-01 RX ORDER — PIPERACILLIN SODIUM, TAZOBACTAM SODIUM 3; .375 G/15ML; G/15ML
3.38 INJECTION, POWDER, LYOPHILIZED, FOR SOLUTION INTRAVENOUS EVERY 6 HOURS
Status: DISCONTINUED | OUTPATIENT
Start: 2022-01-01 | End: 2022-01-01

## 2022-01-01 RX ORDER — POTASSIUM CHLORIDE 7.45 MG/ML
10 INJECTION INTRAVENOUS
Status: COMPLETED | OUTPATIENT
Start: 2022-01-01 | End: 2022-01-01

## 2022-01-01 RX ORDER — NICOTINE POLACRILEX 4 MG
15-30 LOZENGE BUCCAL
Status: DISCONTINUED | OUTPATIENT
Start: 2022-01-01 | End: 2022-01-01 | Stop reason: HOSPADM

## 2022-01-01 RX ORDER — IOPAMIDOL 755 MG/ML
44 INJECTION, SOLUTION INTRAVASCULAR ONCE
Status: COMPLETED | OUTPATIENT
Start: 2022-01-01 | End: 2022-01-01

## 2022-01-01 RX ORDER — HEPARIN SODIUM 200 [USP'U]/100ML
1 INJECTION, SOLUTION INTRAVENOUS CONTINUOUS PRN
Status: DISCONTINUED | OUTPATIENT
Start: 2022-01-01 | End: 2022-01-01 | Stop reason: HOSPADM

## 2022-01-01 RX ORDER — ASPIRIN 300 MG/1
300 SUPPOSITORY RECTAL DAILY
Status: DISCONTINUED | OUTPATIENT
Start: 2022-01-01 | End: 2022-01-01

## 2022-01-01 RX ORDER — BARIUM SULFATE 400 MG/ML
SUSPENSION ORAL ONCE
Status: COMPLETED | OUTPATIENT
Start: 2022-01-01 | End: 2022-01-01

## 2022-01-01 RX ORDER — LIDOCAINE 40 MG/G
CREAM TOPICAL
Status: DISCONTINUED | OUTPATIENT
Start: 2022-01-01 | End: 2022-01-01

## 2022-01-01 RX ORDER — CARBOXYMETHYLCELLULOSE SODIUM 5 MG/ML
1 SOLUTION/ DROPS OPHTHALMIC
Status: DISCONTINUED | OUTPATIENT
Start: 2022-01-01 | End: 2022-01-01 | Stop reason: HOSPADM

## 2022-01-01 RX ORDER — ACETAMINOPHEN 500 MG
500-1000 TABLET ORAL EVERY 6 HOURS PRN
COMMUNITY

## 2022-01-01 RX ORDER — SODIUM CHLORIDE 450 MG/100ML
INJECTION, SOLUTION INTRAVENOUS CONTINUOUS
Status: DISCONTINUED | OUTPATIENT
Start: 2022-01-01 | End: 2022-01-01

## 2022-01-01 RX ORDER — ACETAMINOPHEN 325 MG/10.15ML
650 LIQUID ORAL EVERY 4 HOURS PRN
Status: DISCONTINUED | OUTPATIENT
Start: 2022-01-01 | End: 2022-01-01 | Stop reason: HOSPADM

## 2022-01-01 RX ORDER — OLANZAPINE 5 MG/1
5 TABLET, ORALLY DISINTEGRATING ORAL EVERY 8 HOURS PRN
Status: DISCONTINUED | OUTPATIENT
Start: 2022-01-01 | End: 2022-01-01 | Stop reason: HOSPADM

## 2022-01-01 RX ORDER — IOPAMIDOL 612 MG/ML
100 INJECTION, SOLUTION INTRAVASCULAR ONCE
Status: COMPLETED | OUTPATIENT
Start: 2022-01-01 | End: 2022-01-01

## 2022-01-01 RX ORDER — ONDANSETRON 4 MG/1
4 TABLET, ORALLY DISINTEGRATING ORAL EVERY 6 HOURS PRN
Status: DISCONTINUED | OUTPATIENT
Start: 2022-01-01 | End: 2022-01-01 | Stop reason: HOSPADM

## 2022-01-01 RX ORDER — ACETAMINOPHEN 650 MG/1
650 SUPPOSITORY RECTAL EVERY 4 HOURS PRN
Status: DISCONTINUED | OUTPATIENT
Start: 2022-01-01 | End: 2022-01-01

## 2022-01-01 RX ORDER — DILTIAZEM HYDROCHLORIDE 30 MG/1
30 TABLET, FILM COATED ORAL EVERY 6 HOURS SCHEDULED
Status: DISCONTINUED | OUTPATIENT
Start: 2022-01-01 | End: 2022-01-01

## 2022-01-01 RX ORDER — CEFTRIAXONE 1 G/1
1 INJECTION, POWDER, FOR SOLUTION INTRAMUSCULAR; INTRAVENOUS EVERY 24 HOURS
Status: DISCONTINUED | OUTPATIENT
Start: 2022-01-01 | End: 2022-01-01

## 2022-01-01 RX ORDER — DILTIAZEM HYDROCHLORIDE 120 MG/1
120 CAPSULE, EXTENDED RELEASE ORAL DAILY
Qty: 90 CAPSULE | Refills: 3 | Status: SHIPPED | OUTPATIENT
Start: 2022-01-01

## 2022-01-01 RX ORDER — NALOXONE HYDROCHLORIDE 0.4 MG/ML
0.1 INJECTION, SOLUTION INTRAMUSCULAR; INTRAVENOUS; SUBCUTANEOUS
Status: DISCONTINUED | OUTPATIENT
Start: 2022-01-01 | End: 2022-01-01 | Stop reason: HOSPADM

## 2022-01-01 RX ORDER — FUROSEMIDE 10 MG/ML
40 INJECTION INTRAMUSCULAR; INTRAVENOUS ONCE
Status: COMPLETED | OUTPATIENT
Start: 2022-01-01 | End: 2022-01-01

## 2022-01-01 RX ORDER — LIDOCAINE 40 MG/G
CREAM TOPICAL
Status: CANCELLED | OUTPATIENT
Start: 2022-01-01

## 2022-01-01 RX ORDER — FLUMAZENIL 0.1 MG/ML
0.2 INJECTION, SOLUTION INTRAVENOUS
Status: DISCONTINUED | OUTPATIENT
Start: 2022-01-01 | End: 2022-01-01 | Stop reason: HOSPADM

## 2022-01-01 RX ORDER — FENTANYL CITRATE 50 UG/ML
25-50 INJECTION, SOLUTION INTRAMUSCULAR; INTRAVENOUS EVERY 5 MIN PRN
Status: DISCONTINUED | OUTPATIENT
Start: 2022-01-01 | End: 2022-01-01 | Stop reason: HOSPADM

## 2022-01-01 RX ORDER — PROCHLORPERAZINE MALEATE 5 MG
5 TABLET ORAL EVERY 6 HOURS PRN
Status: DISCONTINUED | OUTPATIENT
Start: 2022-01-01 | End: 2022-01-01 | Stop reason: HOSPADM

## 2022-01-01 RX ORDER — DILTIAZEM HYDROCHLORIDE 30 MG/1
90 TABLET, FILM COATED ORAL EVERY 6 HOURS SCHEDULED
Status: DISCONTINUED | OUTPATIENT
Start: 2022-01-01 | End: 2022-01-01

## 2022-01-01 RX ORDER — TRAMADOL HYDROCHLORIDE 50 MG/1
50 TABLET ORAL EVERY 6 HOURS PRN
Status: DISCONTINUED | OUTPATIENT
Start: 2022-01-01 | End: 2022-01-01

## 2022-01-01 RX ORDER — DEXTROSE MONOHYDRATE 25 G/50ML
25-50 INJECTION, SOLUTION INTRAVENOUS
Status: DISCONTINUED | OUTPATIENT
Start: 2022-01-01 | End: 2022-01-01 | Stop reason: HOSPADM

## 2022-01-01 RX ORDER — DIPHENHYDRAMINE HCL 25 MG
25 CAPSULE ORAL EVERY 6 HOURS PRN
COMMUNITY

## 2022-01-01 RX ORDER — LIDOCAINE HYDROCHLORIDE 20 MG/ML
5 SOLUTION OROPHARYNGEAL ONCE
Status: COMPLETED | OUTPATIENT
Start: 2022-01-01 | End: 2022-01-01

## 2022-01-01 RX ORDER — NALOXONE HYDROCHLORIDE 0.4 MG/ML
0.4 INJECTION, SOLUTION INTRAMUSCULAR; INTRAVENOUS; SUBCUTANEOUS
Status: DISCONTINUED | OUTPATIENT
Start: 2022-01-01 | End: 2022-01-01 | Stop reason: HOSPADM

## 2022-01-01 RX ORDER — ATROPINE SULFATE 10 MG/ML
2 SOLUTION/ DROPS OPHTHALMIC EVERY 4 HOURS PRN
Status: DISCONTINUED | OUTPATIENT
Start: 2022-01-01 | End: 2022-01-01 | Stop reason: HOSPADM

## 2022-01-01 RX ORDER — OLANZAPINE 10 MG/2ML
2.5 INJECTION, POWDER, FOR SOLUTION INTRAMUSCULAR ONCE
Status: COMPLETED | OUTPATIENT
Start: 2022-01-01 | End: 2022-01-01

## 2022-01-01 RX ORDER — LOPERAMIDE HCL 2 MG
2 CAPSULE ORAL 4 TIMES DAILY PRN
Status: DISCONTINUED | OUTPATIENT
Start: 2022-01-01 | End: 2022-01-01 | Stop reason: HOSPADM

## 2022-01-01 RX ORDER — MORPHINE SULFATE 20 MG/ML
5-10 SOLUTION ORAL
Status: DISCONTINUED | OUTPATIENT
Start: 2022-01-01 | End: 2022-01-01 | Stop reason: HOSPADM

## 2022-01-01 RX ORDER — LORAZEPAM 2 MG/ML
0.5 CONCENTRATE ORAL EVERY 6 HOURS PRN
Status: DISCONTINUED | OUTPATIENT
Start: 2022-01-01 | End: 2022-01-01 | Stop reason: HOSPADM

## 2022-01-01 RX ORDER — ALBUTEROL SULFATE 0.83 MG/ML
2.5 SOLUTION RESPIRATORY (INHALATION)
Status: DISCONTINUED | OUTPATIENT
Start: 2022-01-01 | End: 2022-01-01

## 2022-01-01 RX ORDER — DEXTROSE MONOHYDRATE 100 MG/ML
INJECTION, SOLUTION INTRAVENOUS CONTINUOUS PRN
Status: DISCONTINUED | OUTPATIENT
Start: 2022-01-01 | End: 2022-01-01 | Stop reason: HOSPADM

## 2022-01-01 RX ORDER — GADOBUTROL 604.72 MG/ML
4 INJECTION INTRAVENOUS ONCE
Status: COMPLETED | OUTPATIENT
Start: 2022-01-01 | End: 2022-01-01

## 2022-01-01 RX ORDER — HYDRALAZINE HYDROCHLORIDE 20 MG/ML
10-20 INJECTION INTRAMUSCULAR; INTRAVENOUS EVERY 30 MIN PRN
Status: DISCONTINUED | OUTPATIENT
Start: 2022-01-01 | End: 2022-01-01

## 2022-01-01 RX ORDER — FUROSEMIDE 10 MG/ML
40 INJECTION INTRAMUSCULAR; INTRAVENOUS ONCE
Status: DISCONTINUED | OUTPATIENT
Start: 2022-01-01 | End: 2022-01-01

## 2022-01-01 RX ORDER — METOCLOPRAMIDE HYDROCHLORIDE 5 MG/ML
5 INJECTION INTRAMUSCULAR; INTRAVENOUS
Status: DISCONTINUED | OUTPATIENT
Start: 2022-01-01 | End: 2022-01-01

## 2022-01-01 RX ORDER — SODIUM CHLORIDE 9 MG/ML
INJECTION, SOLUTION INTRAVENOUS CONTINUOUS
Status: CANCELLED | OUTPATIENT
Start: 2022-01-01

## 2022-01-01 RX ORDER — DILTIAZEM HYDROCHLORIDE 30 MG/1
60 TABLET, FILM COATED ORAL EVERY 6 HOURS SCHEDULED
Status: DISCONTINUED | OUTPATIENT
Start: 2022-01-01 | End: 2022-01-01

## 2022-01-01 RX ORDER — SALIVA STIMULANT COMB. NO.3
2 SPRAY, NON-AEROSOL (ML) MUCOUS MEMBRANE 4 TIMES DAILY
Status: DISCONTINUED | OUTPATIENT
Start: 2022-01-01 | End: 2022-01-01 | Stop reason: HOSPADM

## 2022-01-01 RX ORDER — ONDANSETRON 2 MG/ML
4 INJECTION INTRAMUSCULAR; INTRAVENOUS EVERY 6 HOURS PRN
Status: DISCONTINUED | OUTPATIENT
Start: 2022-01-01 | End: 2022-01-01 | Stop reason: HOSPADM

## 2022-01-01 RX ORDER — MORPHINE SULFATE 20 MG/ML
10 SOLUTION ORAL EVERY 4 HOURS
Status: DISCONTINUED | OUTPATIENT
Start: 2022-01-01 | End: 2022-01-01 | Stop reason: HOSPADM

## 2022-01-01 RX ORDER — ACETAMINOPHEN 325 MG/1
650 TABLET ORAL EVERY 6 HOURS PRN
Status: DISCONTINUED | OUTPATIENT
Start: 2022-01-01 | End: 2022-01-01 | Stop reason: HOSPADM

## 2022-01-01 RX ORDER — POTASSIUM CHLORIDE 7.45 MG/ML
10 INJECTION INTRAVENOUS
Status: CANCELLED | OUTPATIENT
Start: 2022-01-01 | End: 2022-01-01

## 2022-01-01 RX ORDER — AMOXICILLIN 250 MG
1 CAPSULE ORAL EVERY 12 HOURS PRN
Status: DISCONTINUED | OUTPATIENT
Start: 2022-01-01 | End: 2022-01-01 | Stop reason: HOSPADM

## 2022-01-01 RX ORDER — IOPAMIDOL 755 MG/ML
70 INJECTION, SOLUTION INTRAVASCULAR ONCE
Status: COMPLETED | OUTPATIENT
Start: 2022-01-01 | End: 2022-01-01

## 2022-01-01 RX ORDER — NITROGLYCERIN 0.4 MG/1
0.4 TABLET SUBLINGUAL EVERY 5 MIN PRN
Status: DISCONTINUED | OUTPATIENT
Start: 2022-01-01 | End: 2022-01-01

## 2022-01-01 RX ORDER — LABETALOL HYDROCHLORIDE 5 MG/ML
10-20 INJECTION, SOLUTION INTRAVENOUS EVERY 10 MIN PRN
Status: DISCONTINUED | OUTPATIENT
Start: 2022-01-01 | End: 2022-01-01 | Stop reason: HOSPADM

## 2022-01-01 RX ORDER — ACETAMINOPHEN 325 MG/1
650 TABLET ORAL EVERY 4 HOURS PRN
Status: DISCONTINUED | OUTPATIENT
Start: 2022-01-01 | End: 2022-01-01

## 2022-01-01 RX ORDER — LORAZEPAM 2 MG/ML
.5-1 INJECTION INTRAMUSCULAR EVERY 4 HOURS PRN
Status: DISCONTINUED | OUTPATIENT
Start: 2022-01-01 | End: 2022-01-01 | Stop reason: HOSPADM

## 2022-01-01 RX ORDER — DEXTROSE MONOHYDRATE, SODIUM CHLORIDE, AND POTASSIUM CHLORIDE 50; 2.98; 4.5 G/1000ML; G/1000ML; G/1000ML
INJECTION, SOLUTION INTRAVENOUS CONTINUOUS
Status: DISCONTINUED | OUTPATIENT
Start: 2022-01-01 | End: 2022-01-01

## 2022-01-01 RX ORDER — METOPROLOL TARTRATE 1 MG/ML
2.5 INJECTION, SOLUTION INTRAVENOUS EVERY 4 HOURS PRN
Status: DISCONTINUED | OUTPATIENT
Start: 2022-01-01 | End: 2022-01-01

## 2022-01-01 RX ORDER — METOPROLOL TARTRATE 1 MG/ML
5 INJECTION, SOLUTION INTRAVENOUS ONCE
Status: COMPLETED | OUTPATIENT
Start: 2022-01-01 | End: 2022-01-01

## 2022-01-01 RX ORDER — POTASSIUM CHLORIDE 1.5 G/1.58G
20 POWDER, FOR SOLUTION ORAL 2 TIMES DAILY
Status: COMPLETED | OUTPATIENT
Start: 2022-01-01 | End: 2022-01-01

## 2022-01-01 RX ORDER — PROCHLORPERAZINE 25 MG
12.5 SUPPOSITORY, RECTAL RECTAL EVERY 12 HOURS PRN
Status: DISCONTINUED | OUTPATIENT
Start: 2022-01-01 | End: 2022-01-01 | Stop reason: HOSPADM

## 2022-01-01 RX ADMIN — PROCHLORPERAZINE EDISYLATE 5 MG: 5 INJECTION INTRAMUSCULAR; INTRAVENOUS at 10:18

## 2022-01-01 RX ADMIN — POTASSIUM & SODIUM PHOSPHATES POWDER PACK 280-160-250 MG 1 PACKET: 280-160-250 PACK at 21:43

## 2022-01-01 RX ADMIN — Medication 2 SPRAY: at 18:24

## 2022-01-01 RX ADMIN — MORPHINE SULFATE 10 MG: 10 SOLUTION ORAL at 13:52

## 2022-01-01 RX ADMIN — LOPERAMIDE HYDROCHLORIDE 2 MG: 2 CAPSULE ORAL at 05:35

## 2022-01-01 RX ADMIN — Medication 1 DROP: at 11:14

## 2022-01-01 RX ADMIN — DILTIAZEM HYDROCHLORIDE 60 MG: 30 TABLET, FILM COATED ORAL at 06:09

## 2022-01-01 RX ADMIN — Medication 2 SPRAY: at 09:13

## 2022-01-01 RX ADMIN — ONDANSETRON 4 MG: 2 INJECTION INTRAMUSCULAR; INTRAVENOUS at 09:25

## 2022-01-01 RX ADMIN — Medication 2 SPRAY: at 09:02

## 2022-01-01 RX ADMIN — POTASSIUM CHLORIDE 20 MEQ: 1.5 POWDER, FOR SOLUTION ORAL at 20:58

## 2022-01-01 RX ADMIN — SODIUM CHLORIDE 74 ML: 900 INJECTION INTRAVENOUS at 11:18

## 2022-01-01 RX ADMIN — DILTIAZEM HYDROCHLORIDE 90 MG: 30 TABLET, FILM COATED ORAL at 06:15

## 2022-01-01 RX ADMIN — DILTIAZEM HYDROCHLORIDE 90 MG: 30 TABLET, FILM COATED ORAL at 05:35

## 2022-01-01 RX ADMIN — LORAZEPAM 0.5 MG: 2 LIQUID ORAL at 12:10

## 2022-01-01 RX ADMIN — FAMOTIDINE 20 MG: 10 INJECTION, SOLUTION INTRAVENOUS at 20:26

## 2022-01-01 RX ADMIN — DEXTROSE AND SODIUM CHLORIDE: 5; 450 INJECTION, SOLUTION INTRAVENOUS at 04:10

## 2022-01-01 RX ADMIN — Medication 2 MG: at 20:58

## 2022-01-01 RX ADMIN — DILTIAZEM HYDROCHLORIDE 90 MG: 30 TABLET, FILM COATED ORAL at 00:29

## 2022-01-01 RX ADMIN — Medication 2 SPRAY: at 18:14

## 2022-01-01 RX ADMIN — FAMOTIDINE 20 MG: 10 INJECTION, SOLUTION INTRAVENOUS at 20:47

## 2022-01-01 RX ADMIN — Medication 2 SPRAY: at 22:10

## 2022-01-01 RX ADMIN — DILTIAZEM HYDROCHLORIDE 90 MG: 30 TABLET, FILM COATED ORAL at 06:30

## 2022-01-01 RX ADMIN — Medication 2 SPRAY: at 08:00

## 2022-01-01 RX ADMIN — DILTIAZEM HYDROCHLORIDE 45 MG: 30 TABLET, FILM COATED ORAL at 19:04

## 2022-01-01 RX ADMIN — DILTIAZEM HYDROCHLORIDE 90 MG: 30 TABLET, FILM COATED ORAL at 18:13

## 2022-01-01 RX ADMIN — FAMOTIDINE 20 MG: 10 INJECTION, SOLUTION INTRAVENOUS at 20:56

## 2022-01-01 RX ADMIN — MORPHINE SULFATE 10 MG: 10 SOLUTION ORAL at 03:07

## 2022-01-01 RX ADMIN — ACETAMINOPHEN 650 MG: 325 SUSPENSION ORAL at 16:26

## 2022-01-01 RX ADMIN — Medication 2 MG: at 20:56

## 2022-01-01 RX ADMIN — Medication 1 MG: at 23:48

## 2022-01-01 RX ADMIN — DILTIAZEM HYDROCHLORIDE 90 MG: 30 TABLET, FILM COATED ORAL at 01:18

## 2022-01-01 RX ADMIN — MORPHINE SULFATE 10 MG: 10 SOLUTION ORAL at 06:27

## 2022-01-01 RX ADMIN — Medication 2 MG: at 22:29

## 2022-01-01 RX ADMIN — Medication 2 MG: at 20:36

## 2022-01-01 RX ADMIN — POTASSIUM CHLORIDE 10 MEQ: 7.46 INJECTION, SOLUTION INTRAVENOUS at 14:20

## 2022-01-01 RX ADMIN — Medication 1 CAPSULE: at 09:09

## 2022-01-01 RX ADMIN — DILTIAZEM HYDROCHLORIDE 90 MG: 30 TABLET, FILM COATED ORAL at 11:52

## 2022-01-01 RX ADMIN — Medication 2 SPRAY: at 18:44

## 2022-01-01 RX ADMIN — IOPAMIDOL 120 ML: 755 INJECTION, SOLUTION INTRAVENOUS at 08:30

## 2022-01-01 RX ADMIN — Medication 2 SPRAY: at 12:20

## 2022-01-01 RX ADMIN — Medication 2 SPRAY: at 17:44

## 2022-01-01 RX ADMIN — DILTIAZEM HYDROCHLORIDE 90 MG: 30 TABLET, FILM COATED ORAL at 12:44

## 2022-01-01 RX ADMIN — Medication 2 SPRAY: at 21:21

## 2022-01-01 RX ADMIN — ASPIRIN 81 MG CHEWABLE TABLET 81 MG: 81 TABLET CHEWABLE at 08:39

## 2022-01-01 RX ADMIN — Medication 2 SPRAY: at 12:38

## 2022-01-01 RX ADMIN — Medication 2 SPRAY: at 12:12

## 2022-01-01 RX ADMIN — Medication 1 CAPSULE: at 08:35

## 2022-01-01 RX ADMIN — Medication 1 CAPSULE: at 08:39

## 2022-01-01 RX ADMIN — DILTIAZEM HYDROCHLORIDE 90 MG: 30 TABLET, FILM COATED ORAL at 17:20

## 2022-01-01 RX ADMIN — Medication 2 SPRAY: at 21:43

## 2022-01-01 RX ADMIN — FUROSEMIDE 10 MG: 10 INJECTION, SOLUTION INTRAVENOUS at 15:16

## 2022-01-01 RX ADMIN — IOPAMIDOL 63 ML: 612 INJECTION, SOLUTION INTRAVENOUS at 10:51

## 2022-01-01 RX ADMIN — Medication 2 SPRAY: at 07:51

## 2022-01-01 RX ADMIN — POTASSIUM CHLORIDE 20 MEQ: 20 SOLUTION ORAL at 12:24

## 2022-01-01 RX ADMIN — Medication 2 SPRAY: at 18:15

## 2022-01-01 RX ADMIN — Medication 2 SPRAY: at 17:58

## 2022-01-01 RX ADMIN — MORPHINE SULFATE 10 MG: 10 SOLUTION ORAL at 10:52

## 2022-01-01 RX ADMIN — LIDOCAINE HYDROCHLORIDE 1 ML: 10 INJECTION, SOLUTION INFILTRATION; PERINEURAL at 09:30

## 2022-01-01 RX ADMIN — Medication 2 SPRAY: at 12:51

## 2022-01-01 RX ADMIN — MIDAZOLAM HYDROCHLORIDE 0.5 MG: 1 INJECTION, SOLUTION INTRAMUSCULAR; INTRAVENOUS at 09:29

## 2022-01-01 RX ADMIN — DILTIAZEM HYDROCHLORIDE 45 MG: 30 TABLET, FILM COATED ORAL at 11:27

## 2022-01-01 RX ADMIN — FAMOTIDINE 20 MG: 10 INJECTION, SOLUTION INTRAVENOUS at 21:43

## 2022-01-01 RX ADMIN — ACETAMINOPHEN 650 MG: 325 TABLET ORAL at 15:56

## 2022-01-01 RX ADMIN — MORPHINE SULFATE 10 MG: 10 SOLUTION ORAL at 18:43

## 2022-01-01 RX ADMIN — DILTIAZEM HYDROCHLORIDE 30 MG: 30 TABLET, FILM COATED ORAL at 16:26

## 2022-01-01 RX ADMIN — FUROSEMIDE 20 MG: 10 INJECTION, SOLUTION INTRAVENOUS at 14:40

## 2022-01-01 RX ADMIN — Medication 2 SPRAY: at 08:26

## 2022-01-01 RX ADMIN — MORPHINE SULFATE 10 MG: 10 SOLUTION ORAL at 22:43

## 2022-01-01 RX ADMIN — ASPIRIN 81 MG CHEWABLE TABLET 81 MG: 81 TABLET CHEWABLE at 08:14

## 2022-01-01 RX ADMIN — Medication 2 SPRAY: at 18:01

## 2022-01-01 RX ADMIN — Medication 2 SPRAY: at 12:45

## 2022-01-01 RX ADMIN — ACETAMINOPHEN 650 MG: 325 SUSPENSION ORAL at 08:16

## 2022-01-01 RX ADMIN — ASPIRIN 81 MG CHEWABLE TABLET 81 MG: 81 TABLET CHEWABLE at 08:54

## 2022-01-01 RX ADMIN — DILTIAZEM HYDROCHLORIDE 60 MG: 30 TABLET, FILM COATED ORAL at 19:00

## 2022-01-01 RX ADMIN — DILTIAZEM HYDROCHLORIDE 90 MG: 30 TABLET, FILM COATED ORAL at 12:45

## 2022-01-01 RX ADMIN — DILTIAZEM HYDROCHLORIDE 10 MG/HR: 5 INJECTION INTRAVENOUS at 02:12

## 2022-01-01 RX ADMIN — SODIUM CHLORIDE: 9 INJECTION, SOLUTION INTRAVENOUS at 12:16

## 2022-01-01 RX ADMIN — GADOBUTROL 4 ML: 604.72 INJECTION INTRAVENOUS at 19:58

## 2022-01-01 RX ADMIN — DILTIAZEM HYDROCHLORIDE 90 MG: 30 TABLET, FILM COATED ORAL at 00:23

## 2022-01-01 RX ADMIN — SODIUM CHLORIDE 250 ML: 9 INJECTION, SOLUTION INTRAVENOUS at 12:01

## 2022-01-01 RX ADMIN — Medication 2 MG: at 21:57

## 2022-01-01 RX ADMIN — ONDANSETRON 4 MG: 2 INJECTION INTRAMUSCULAR; INTRAVENOUS at 08:24

## 2022-01-01 RX ADMIN — MIDAZOLAM HYDROCHLORIDE 0.5 MG: 1 INJECTION, SOLUTION INTRAMUSCULAR; INTRAVENOUS at 09:47

## 2022-01-01 RX ADMIN — DILTIAZEM HYDROCHLORIDE 5 MG/HR: 5 INJECTION INTRAVENOUS at 09:02

## 2022-01-01 RX ADMIN — DILTIAZEM HYDROCHLORIDE 90 MG: 30 TABLET, FILM COATED ORAL at 05:55

## 2022-01-01 RX ADMIN — MORPHINE SULFATE 5 MG: 10 SOLUTION ORAL at 19:34

## 2022-01-01 RX ADMIN — Medication 1 CAPSULE: at 09:21

## 2022-01-01 RX ADMIN — Medication 2 SPRAY: at 12:24

## 2022-01-01 RX ADMIN — MORPHINE SULFATE 10 MG: 10 SOLUTION ORAL at 06:47

## 2022-01-01 RX ADMIN — METOPROLOL TARTRATE 2.5 MG: 5 INJECTION INTRAVENOUS at 22:09

## 2022-01-01 RX ADMIN — DILTIAZEM HYDROCHLORIDE 90 MG: 30 TABLET, FILM COATED ORAL at 12:05

## 2022-01-01 RX ADMIN — Medication 2 SPRAY: at 22:05

## 2022-01-01 RX ADMIN — ACETAMINOPHEN 650 MG: 325 SUSPENSION ORAL at 18:49

## 2022-01-01 RX ADMIN — Medication 2 SPRAY: at 22:34

## 2022-01-01 RX ADMIN — POTASSIUM CHLORIDE: 2 INJECTION, SOLUTION, CONCENTRATE INTRAVENOUS at 10:08

## 2022-01-01 RX ADMIN — DILTIAZEM HYDROCHLORIDE 90 MG: 30 TABLET, FILM COATED ORAL at 05:54

## 2022-01-01 RX ADMIN — Medication 2 SPRAY: at 08:25

## 2022-01-01 RX ADMIN — Medication 2 SPRAY: at 21:39

## 2022-01-01 RX ADMIN — DILTIAZEM HYDROCHLORIDE 90 MG: 30 TABLET, FILM COATED ORAL at 05:51

## 2022-01-01 RX ADMIN — MORPHINE SULFATE 10 MG: 10 SOLUTION ORAL at 03:04

## 2022-01-01 RX ADMIN — ACETAMINOPHEN 650 MG: 325 TABLET ORAL at 07:55

## 2022-01-01 RX ADMIN — ASPIRIN 81 MG CHEWABLE TABLET 81 MG: 81 TABLET CHEWABLE at 08:35

## 2022-01-01 RX ADMIN — ACETAMINOPHEN 650 MG: 325 SUSPENSION ORAL at 13:55

## 2022-01-01 RX ADMIN — DILTIAZEM HYDROCHLORIDE 45 MG: 30 TABLET, FILM COATED ORAL at 06:05

## 2022-01-01 RX ADMIN — ACETAMINOPHEN 650 MG: 325 SUSPENSION ORAL at 10:21

## 2022-01-01 RX ADMIN — Medication 1 CAPSULE: at 21:57

## 2022-01-01 RX ADMIN — Medication 2 SPRAY: at 21:10

## 2022-01-01 RX ADMIN — DILTIAZEM HYDROCHLORIDE 90 MG: 30 TABLET, FILM COATED ORAL at 12:12

## 2022-01-01 RX ADMIN — DILTIAZEM HYDROCHLORIDE 90 MG: 30 TABLET, FILM COATED ORAL at 23:26

## 2022-01-01 RX ADMIN — ASPIRIN 300 MG: 300 SUPPOSITORY RECTAL at 13:52

## 2022-01-01 RX ADMIN — DILTIAZEM HYDROCHLORIDE 5 MG/HR: 5 INJECTION INTRAVENOUS at 10:32

## 2022-01-01 RX ADMIN — DILTIAZEM HYDROCHLORIDE 90 MG: 30 TABLET, FILM COATED ORAL at 23:30

## 2022-01-01 RX ADMIN — DILTIAZEM HYDROCHLORIDE 30 MG: 30 TABLET, FILM COATED ORAL at 00:01

## 2022-01-01 RX ADMIN — FUROSEMIDE 10 MG: 10 INJECTION, SOLUTION INTRAVENOUS at 15:55

## 2022-01-01 RX ADMIN — ACETAMINOPHEN 650 MG: 325 SUSPENSION ORAL at 12:16

## 2022-01-01 RX ADMIN — FAMOTIDINE 20 MG: 10 INJECTION, SOLUTION INTRAVENOUS at 20:04

## 2022-01-01 RX ADMIN — POTASSIUM & SODIUM PHOSPHATES POWDER PACK 280-160-250 MG 1 PACKET: 280-160-250 PACK at 08:30

## 2022-01-01 RX ADMIN — Medication 2 SPRAY: at 12:52

## 2022-01-01 RX ADMIN — MIDAZOLAM HYDROCHLORIDE 0.5 MG: 1 INJECTION, SOLUTION INTRAMUSCULAR; INTRAVENOUS at 10:38

## 2022-01-01 RX ADMIN — DILTIAZEM HYDROCHLORIDE 90 MG: 30 TABLET, FILM COATED ORAL at 17:48

## 2022-01-01 RX ADMIN — ACETAMINOPHEN 650 MG: 325 SUSPENSION ORAL at 08:39

## 2022-01-01 RX ADMIN — POTASSIUM CHLORIDE 20 MEQ: 1.5 POWDER, FOR SOLUTION ORAL at 14:40

## 2022-01-01 RX ADMIN — ACETAMINOPHEN 650 MG: 325 SUSPENSION ORAL at 21:02

## 2022-01-01 RX ADMIN — DILTIAZEM HYDROCHLORIDE 90 MG: 30 TABLET, FILM COATED ORAL at 12:13

## 2022-01-01 RX ADMIN — Medication 2 MG: at 21:51

## 2022-01-01 RX ADMIN — DILTIAZEM HYDROCHLORIDE 90 MG: 30 TABLET, FILM COATED ORAL at 18:24

## 2022-01-01 RX ADMIN — Medication 1 CAPSULE: at 09:13

## 2022-01-01 RX ADMIN — BARIUM SULFATE 15 ML: 400 SUSPENSION ORAL at 09:44

## 2022-01-01 RX ADMIN — DILTIAZEM HYDROCHLORIDE 90 MG: 30 TABLET, FILM COATED ORAL at 11:38

## 2022-01-01 RX ADMIN — DILTIAZEM HYDROCHLORIDE 90 MG: 30 TABLET, FILM COATED ORAL at 17:50

## 2022-01-01 RX ADMIN — FENTANYL CITRATE 25 MCG: 50 INJECTION, SOLUTION INTRAMUSCULAR; INTRAVENOUS at 10:01

## 2022-01-01 RX ADMIN — POTASSIUM & SODIUM PHOSPHATES POWDER PACK 280-160-250 MG 1 PACKET: 280-160-250 PACK at 06:09

## 2022-01-01 RX ADMIN — DILTIAZEM HYDROCHLORIDE 60 MG: 30 TABLET, FILM COATED ORAL at 23:39

## 2022-01-01 RX ADMIN — MORPHINE SULFATE 5 MG: 10 SOLUTION ORAL at 20:58

## 2022-01-01 RX ADMIN — Medication 2 SPRAY: at 19:01

## 2022-01-01 RX ADMIN — ASPIRIN 81 MG CHEWABLE TABLET 81 MG: 81 TABLET CHEWABLE at 09:22

## 2022-01-01 RX ADMIN — POTASSIUM & SODIUM PHOSPHATES POWDER PACK 280-160-250 MG 1 PACKET: 280-160-250 PACK at 05:54

## 2022-01-01 RX ADMIN — Medication 2 MG: at 21:10

## 2022-01-01 RX ADMIN — DILTIAZEM HYDROCHLORIDE 90 MG: 30 TABLET, FILM COATED ORAL at 23:53

## 2022-01-01 RX ADMIN — DILTIAZEM HYDROCHLORIDE 90 MG: 30 TABLET, FILM COATED ORAL at 13:00

## 2022-01-01 RX ADMIN — FAMOTIDINE 20 MG: 10 INJECTION, SOLUTION INTRAVENOUS at 21:08

## 2022-01-01 RX ADMIN — SODIUM CHLORIDE: 4.5 INJECTION, SOLUTION INTRAVENOUS at 13:54

## 2022-01-01 RX ADMIN — ASPIRIN 81 MG CHEWABLE TABLET 81 MG: 81 TABLET CHEWABLE at 09:21

## 2022-01-01 RX ADMIN — DILTIAZEM HYDROCHLORIDE 60 MG: 30 TABLET, FILM COATED ORAL at 12:38

## 2022-01-01 RX ADMIN — DEXTROSE AND SODIUM CHLORIDE: 5; 450 INJECTION, SOLUTION INTRAVENOUS at 18:16

## 2022-01-01 RX ADMIN — BARIUM SULFATE 20 ML: 400 SUSPENSION ORAL at 09:44

## 2022-01-01 RX ADMIN — ASPIRIN 81 MG CHEWABLE TABLET 81 MG: 81 TABLET CHEWABLE at 09:12

## 2022-01-01 RX ADMIN — DILTIAZEM HYDROCHLORIDE 90 MG: 30 TABLET, FILM COATED ORAL at 18:14

## 2022-01-01 RX ADMIN — Medication 2 MG: at 21:00

## 2022-01-01 RX ADMIN — PROCHLORPERAZINE MALEATE 5 MG: 5 TABLET ORAL at 17:50

## 2022-01-01 RX ADMIN — Medication 2 SPRAY: at 17:15

## 2022-01-01 RX ADMIN — Medication 2 MG: at 20:38

## 2022-01-01 RX ADMIN — DILTIAZEM HYDROCHLORIDE 90 MG: 30 TABLET, FILM COATED ORAL at 06:39

## 2022-01-01 RX ADMIN — DILTIAZEM HYDROCHLORIDE 60 MG: 30 TABLET, FILM COATED ORAL at 18:14

## 2022-01-01 RX ADMIN — DILTIAZEM HYDROCHLORIDE 60 MG: 30 TABLET, FILM COATED ORAL at 06:14

## 2022-01-01 RX ADMIN — Medication 2 SPRAY: at 19:04

## 2022-01-01 RX ADMIN — Medication 2 SPRAY: at 08:57

## 2022-01-01 RX ADMIN — DILTIAZEM HYDROCHLORIDE 90 MG: 30 TABLET, FILM COATED ORAL at 17:12

## 2022-01-01 RX ADMIN — POTASSIUM & SODIUM PHOSPHATES POWDER PACK 280-160-250 MG 1 PACKET: 280-160-250 PACK at 15:37

## 2022-01-01 RX ADMIN — DILTIAZEM HYDROCHLORIDE 90 MG: 30 TABLET, FILM COATED ORAL at 18:43

## 2022-01-01 RX ADMIN — FUROSEMIDE 20 MG: 10 INJECTION, SOLUTION INTRAVENOUS at 17:47

## 2022-01-01 RX ADMIN — MORPHINE SULFATE 10 MG: 10 SOLUTION ORAL at 13:31

## 2022-01-01 RX ADMIN — DILTIAZEM HYDROCHLORIDE 90 MG: 30 TABLET, FILM COATED ORAL at 00:53

## 2022-01-01 RX ADMIN — Medication 2 MG: at 20:50

## 2022-01-01 RX ADMIN — ACETAMINOPHEN 650 MG: 325 SUSPENSION ORAL at 08:23

## 2022-01-01 RX ADMIN — CEFTRIAXONE SODIUM 1 G: 1 INJECTION, POWDER, FOR SOLUTION INTRAMUSCULAR; INTRAVENOUS at 15:15

## 2022-01-01 RX ADMIN — POTASSIUM & SODIUM PHOSPHATES POWDER PACK 280-160-250 MG 1 PACKET: 280-160-250 PACK at 20:13

## 2022-01-01 RX ADMIN — DILTIAZEM HYDROCHLORIDE 90 MG: 30 TABLET, FILM COATED ORAL at 19:25

## 2022-01-01 RX ADMIN — DILTIAZEM HYDROCHLORIDE 90 MG: 30 TABLET, FILM COATED ORAL at 21:21

## 2022-01-01 RX ADMIN — DILTIAZEM HYDROCHLORIDE 90 MG: 30 TABLET, FILM COATED ORAL at 00:48

## 2022-01-01 RX ADMIN — PROCHLORPERAZINE MALEATE 5 MG: 5 TABLET ORAL at 08:39

## 2022-01-01 RX ADMIN — Medication 2 SPRAY: at 09:34

## 2022-01-01 RX ADMIN — ACETAMINOPHEN 650 MG: 325 TABLET ORAL at 14:13

## 2022-01-01 RX ADMIN — POTASSIUM CHLORIDE 10 MEQ: 7.46 INJECTION, SOLUTION INTRAVENOUS at 13:12

## 2022-01-01 RX ADMIN — DILTIAZEM HYDROCHLORIDE 90 MG: 30 TABLET, FILM COATED ORAL at 01:21

## 2022-01-01 RX ADMIN — DILTIAZEM HYDROCHLORIDE 90 MG: 30 TABLET, FILM COATED ORAL at 17:34

## 2022-01-01 RX ADMIN — DILTIAZEM HYDROCHLORIDE 90 MG: 30 TABLET, FILM COATED ORAL at 12:52

## 2022-01-01 RX ADMIN — Medication 2 SPRAY: at 18:28

## 2022-01-01 RX ADMIN — OLANZAPINE 2.5 MG: 10 INJECTION, POWDER, FOR SOLUTION INTRAMUSCULAR at 00:46

## 2022-01-01 RX ADMIN — DILTIAZEM HYDROCHLORIDE 90 MG: 30 TABLET, FILM COATED ORAL at 18:44

## 2022-01-01 RX ADMIN — Medication 1 CAPSULE: at 22:31

## 2022-01-01 RX ADMIN — LORAZEPAM 0.5 MG: 2 LIQUID ORAL at 21:07

## 2022-01-01 RX ADMIN — HEPARIN SODIUM 2 BAG: 200 INJECTION, SOLUTION INTRAVENOUS at 09:32

## 2022-01-01 RX ADMIN — Medication 2 MG: at 21:27

## 2022-01-01 RX ADMIN — ASPIRIN 81 MG CHEWABLE TABLET 81 MG: 81 TABLET CHEWABLE at 09:09

## 2022-01-01 RX ADMIN — DILTIAZEM HYDROCHLORIDE 90 MG: 30 TABLET, FILM COATED ORAL at 12:17

## 2022-01-01 RX ADMIN — DILTIAZEM HYDROCHLORIDE 90 MG: 30 TABLET, FILM COATED ORAL at 12:08

## 2022-01-01 RX ADMIN — LORAZEPAM 0.5 MG: 2 LIQUID ORAL at 17:24

## 2022-01-01 RX ADMIN — ACETAMINOPHEN 650 MG: 325 SUSPENSION ORAL at 05:56

## 2022-01-01 RX ADMIN — Medication 2 SPRAY: at 09:12

## 2022-01-01 RX ADMIN — Medication 2 SPRAY: at 09:22

## 2022-01-01 RX ADMIN — ASPIRIN 81 MG CHEWABLE TABLET 81 MG: 81 TABLET CHEWABLE at 09:33

## 2022-01-01 RX ADMIN — DILTIAZEM HYDROCHLORIDE 90 MG: 30 TABLET, FILM COATED ORAL at 05:49

## 2022-01-01 RX ADMIN — LORAZEPAM 0.5 MG: 2 LIQUID ORAL at 00:41

## 2022-01-01 RX ADMIN — ASPIRIN 81 MG CHEWABLE TABLET 81 MG: 81 TABLET CHEWABLE at 09:13

## 2022-01-01 RX ADMIN — Medication 1 CAPSULE: at 20:36

## 2022-01-01 RX ADMIN — Medication 2 MG: at 22:34

## 2022-01-01 RX ADMIN — SODIUM CHLORIDE: 9 INJECTION, SOLUTION INTRAVENOUS at 13:30

## 2022-01-01 RX ADMIN — DILTIAZEM HYDROCHLORIDE 90 MG: 30 TABLET, FILM COATED ORAL at 01:47

## 2022-01-01 RX ADMIN — DILTIAZEM HYDROCHLORIDE 5 MG/HR: 5 INJECTION INTRAVENOUS at 08:30

## 2022-01-01 RX ADMIN — Medication 1 CAPSULE: at 20:51

## 2022-01-01 RX ADMIN — ACETAMINOPHEN 650 MG: 325 SUSPENSION ORAL at 17:34

## 2022-01-01 RX ADMIN — ONDANSETRON 4 MG: 2 INJECTION INTRAMUSCULAR; INTRAVENOUS at 12:16

## 2022-01-01 RX ADMIN — MORPHINE SULFATE 10 MG: 10 SOLUTION ORAL at 10:23

## 2022-01-01 RX ADMIN — DILTIAZEM HYDROCHLORIDE 90 MG: 30 TABLET, FILM COATED ORAL at 18:53

## 2022-01-01 RX ADMIN — Medication 1 CAPSULE: at 21:51

## 2022-01-01 RX ADMIN — DILTIAZEM HYDROCHLORIDE 10 MG/HR: 5 INJECTION INTRAVENOUS at 15:51

## 2022-01-01 RX ADMIN — Medication 2 SPRAY: at 08:16

## 2022-01-01 RX ADMIN — DILTIAZEM HYDROCHLORIDE 90 MG: 30 TABLET, FILM COATED ORAL at 12:55

## 2022-01-01 RX ADMIN — ACETAMINOPHEN 650 MG: 325 SUSPENSION ORAL at 09:12

## 2022-01-01 RX ADMIN — ASPIRIN 300 MG: 300 SUPPOSITORY RECTAL at 09:54

## 2022-01-01 RX ADMIN — Medication 1 CAPSULE: at 20:35

## 2022-01-01 RX ADMIN — Medication 2 SPRAY: at 12:04

## 2022-01-01 RX ADMIN — DILTIAZEM HYDROCHLORIDE 10 MG/HR: 5 INJECTION INTRAVENOUS at 23:51

## 2022-01-01 RX ADMIN — FUROSEMIDE 40 MG: 10 INJECTION, SOLUTION INTRAVENOUS at 11:08

## 2022-01-01 RX ADMIN — CEFTRIAXONE SODIUM 1 G: 1 INJECTION, POWDER, FOR SOLUTION INTRAMUSCULAR; INTRAVENOUS at 14:57

## 2022-01-01 RX ADMIN — Medication 2 SPRAY: at 13:27

## 2022-01-01 RX ADMIN — DILTIAZEM HYDROCHLORIDE 90 MG: 30 TABLET, FILM COATED ORAL at 11:25

## 2022-01-01 RX ADMIN — ACETAMINOPHEN 650 MG: 325 SUSPENSION ORAL at 14:47

## 2022-01-01 RX ADMIN — ACETAMINOPHEN 650 MG: 325 SUSPENSION ORAL at 01:49

## 2022-01-01 RX ADMIN — FAMOTIDINE 20 MG: 10 INJECTION, SOLUTION INTRAVENOUS at 20:11

## 2022-01-01 RX ADMIN — Medication 1 CAPSULE: at 08:54

## 2022-01-01 RX ADMIN — ACETAMINOPHEN 650 MG: 325 SUSPENSION ORAL at 17:12

## 2022-01-01 RX ADMIN — ACETAMINOPHEN 650 MG: 325 TABLET ORAL at 12:52

## 2022-01-01 RX ADMIN — DILTIAZEM HYDROCHLORIDE 45 MG: 30 TABLET, FILM COATED ORAL at 00:06

## 2022-01-01 RX ADMIN — TRAMADOL HYDROCHLORIDE 50 MG: 50 TABLET ORAL at 00:24

## 2022-01-01 RX ADMIN — Medication 2 SPRAY: at 13:10

## 2022-01-01 RX ADMIN — SODIUM CHLORIDE: 9 INJECTION, SOLUTION INTRAVENOUS at 03:50

## 2022-01-01 RX ADMIN — SODIUM CHLORIDE: 9 INJECTION, SOLUTION INTRAVENOUS at 19:23

## 2022-01-01 RX ADMIN — DILTIAZEM HYDROCHLORIDE 90 MG: 30 TABLET, FILM COATED ORAL at 05:56

## 2022-01-01 RX ADMIN — MORPHINE SULFATE 5 MG: 10 SOLUTION ORAL at 17:20

## 2022-01-01 RX ADMIN — LIDOCAINE HYDROCHLORIDE 5 ML: 20 SOLUTION ORAL; TOPICAL at 12:20

## 2022-01-01 RX ADMIN — Medication 2 SPRAY: at 23:53

## 2022-01-01 RX ADMIN — ACETAMINOPHEN 650 MG: 325 SUSPENSION ORAL at 21:52

## 2022-01-01 RX ADMIN — Medication 2 SPRAY: at 19:00

## 2022-01-01 RX ADMIN — DILTIAZEM HYDROCHLORIDE 90 MG: 30 TABLET, FILM COATED ORAL at 23:50

## 2022-01-01 RX ADMIN — DILTIAZEM HYDROCHLORIDE 30 MG: 30 TABLET, FILM COATED ORAL at 06:01

## 2022-01-01 RX ADMIN — ACETAMINOPHEN 650 MG: 325 TABLET ORAL at 00:11

## 2022-01-01 RX ADMIN — Medication 2 SPRAY: at 21:41

## 2022-01-01 RX ADMIN — LOPERAMIDE HYDROCHLORIDE 2 MG: 2 CAPSULE ORAL at 21:02

## 2022-01-01 RX ADMIN — SODIUM CHLORIDE 100 ML: 900 INJECTION INTRAVENOUS at 08:30

## 2022-01-01 RX ADMIN — ASPIRIN 81 MG CHEWABLE TABLET 81 MG: 81 TABLET CHEWABLE at 08:30

## 2022-01-01 RX ADMIN — POTASSIUM CHLORIDE 10 MEQ: 7.46 INJECTION, SOLUTION INTRAVENOUS at 15:39

## 2022-01-01 RX ADMIN — DILTIAZEM HYDROCHLORIDE 90 MG: 30 TABLET, FILM COATED ORAL at 06:28

## 2022-01-01 RX ADMIN — DILTIAZEM HYDROCHLORIDE 90 MG: 30 TABLET, FILM COATED ORAL at 12:18

## 2022-01-01 RX ADMIN — CEFTRIAXONE SODIUM 1 G: 1 INJECTION, POWDER, FOR SOLUTION INTRAMUSCULAR; INTRAVENOUS at 14:40

## 2022-01-01 RX ADMIN — ASPIRIN 81 MG CHEWABLE TABLET 81 MG: 81 TABLET CHEWABLE at 08:25

## 2022-01-01 RX ADMIN — POTASSIUM CHLORIDE 20 MEQ: 20 SOLUTION ORAL at 17:48

## 2022-01-01 RX ADMIN — POTASSIUM CHLORIDE 10 MEQ: 7.46 INJECTION, SOLUTION INTRAVENOUS at 11:59

## 2022-01-01 RX ADMIN — Medication 2 SPRAY: at 21:13

## 2022-01-01 RX ADMIN — Medication 2 SPRAY: at 08:14

## 2022-01-01 RX ADMIN — Medication 2 SPRAY: at 22:11

## 2022-01-01 RX ADMIN — Medication 2 SPRAY: at 21:02

## 2022-01-01 RX ADMIN — DILTIAZEM HYDROCHLORIDE 90 MG: 30 TABLET, FILM COATED ORAL at 17:13

## 2022-01-01 RX ADMIN — DILTIAZEM HYDROCHLORIDE 10 MG/HR: 5 INJECTION INTRAVENOUS at 00:03

## 2022-01-01 RX ADMIN — Medication 2 MG: at 21:02

## 2022-01-01 RX ADMIN — Medication 2 SPRAY: at 08:46

## 2022-01-01 RX ADMIN — DILTIAZEM HYDROCHLORIDE 90 MG: 30 TABLET, FILM COATED ORAL at 06:32

## 2022-01-01 RX ADMIN — Medication 2 MG: at 20:35

## 2022-01-01 RX ADMIN — MORPHINE SULFATE 10 MG: 10 SOLUTION ORAL at 22:34

## 2022-01-01 RX ADMIN — FENTANYL CITRATE 25 MCG: 50 INJECTION, SOLUTION INTRAMUSCULAR; INTRAVENOUS at 10:35

## 2022-01-01 RX ADMIN — DILTIAZEM HYDROCHLORIDE 90 MG: 30 TABLET, FILM COATED ORAL at 00:41

## 2022-01-01 RX ADMIN — METOPROLOL TARTRATE 5 MG: 5 INJECTION INTRAVENOUS at 10:04

## 2022-01-01 RX ADMIN — ACETAMINOPHEN 650 MG: 325 SUSPENSION ORAL at 16:47

## 2022-01-01 RX ADMIN — DILTIAZEM HYDROCHLORIDE 10 MG/HR: 5 INJECTION INTRAVENOUS at 14:02

## 2022-01-01 RX ADMIN — ASPIRIN 81 MG CHEWABLE TABLET 81 MG: 81 TABLET CHEWABLE at 08:00

## 2022-01-01 RX ADMIN — Medication 2 SPRAY: at 09:54

## 2022-01-01 RX ADMIN — LOPERAMIDE HYDROCHLORIDE 2 MG: 2 CAPSULE ORAL at 01:49

## 2022-01-01 RX ADMIN — CEFTRIAXONE SODIUM 1 G: 1 INJECTION, POWDER, FOR SOLUTION INTRAMUSCULAR; INTRAVENOUS at 14:54

## 2022-01-01 RX ADMIN — Medication 1 CAPSULE: at 20:38

## 2022-01-01 RX ADMIN — DEXTROSE MONOHYDRATE 25 ML: 25 INJECTION, SOLUTION INTRAVENOUS at 15:39

## 2022-01-01 RX ADMIN — DILTIAZEM HYDROCHLORIDE 90 MG: 30 TABLET, FILM COATED ORAL at 23:32

## 2022-01-01 RX ADMIN — IOPAMIDOL 44 ML: 755 INJECTION, SOLUTION INTRAVENOUS at 11:17

## 2022-01-01 RX ADMIN — ASPIRIN 81 MG CHEWABLE TABLET 81 MG: 81 TABLET CHEWABLE at 08:24

## 2022-01-01 RX ADMIN — DILTIAZEM HYDROCHLORIDE 90 MG: 30 TABLET, FILM COATED ORAL at 00:06

## 2022-01-01 RX ADMIN — FAMOTIDINE 20 MG: 10 INJECTION, SOLUTION INTRAVENOUS at 20:02

## 2022-01-01 RX ADMIN — FUROSEMIDE 20 MG: 10 INJECTION, SOLUTION INTRAVENOUS at 12:07

## 2022-01-01 RX ADMIN — MORPHINE SULFATE 5 MG: 10 SOLUTION ORAL at 20:38

## 2022-01-01 RX ADMIN — Medication 2 SPRAY: at 12:25

## 2022-01-01 RX ADMIN — ASPIRIN 81 MG CHEWABLE TABLET 81 MG: 81 TABLET CHEWABLE at 08:57

## 2022-01-01 RX ADMIN — MORPHINE SULFATE 10 MG: 10 SOLUTION ORAL at 17:35

## 2022-01-01 RX ADMIN — Medication 1 CAPSULE: at 08:24

## 2022-01-01 RX ADMIN — DILTIAZEM HYDROCHLORIDE 90 MG: 30 TABLET, FILM COATED ORAL at 17:43

## 2022-01-01 RX ADMIN — DILTIAZEM HYDROCHLORIDE 90 MG: 30 TABLET, FILM COATED ORAL at 12:36

## 2022-01-01 RX ADMIN — Medication 2 MG: at 21:13

## 2022-01-01 RX ADMIN — Medication 1 CAPSULE: at 08:30

## 2022-01-01 RX ADMIN — Medication 1 CAPSULE: at 21:27

## 2022-01-01 RX ADMIN — Medication 2 SPRAY: at 12:55

## 2022-01-01 RX ADMIN — DILTIAZEM HYDROCHLORIDE 60 MG: 30 TABLET, FILM COATED ORAL at 23:48

## 2022-01-01 RX ADMIN — CEFTRIAXONE SODIUM 1 G: 1 INJECTION, POWDER, FOR SOLUTION INTRAMUSCULAR; INTRAVENOUS at 15:37

## 2022-01-01 RX ADMIN — ACETAMINOPHEN 650 MG: 325 TABLET ORAL at 21:01

## 2022-01-01 RX ADMIN — DILTIAZEM HYDROCHLORIDE 90 MG: 30 TABLET, FILM COATED ORAL at 05:42

## 2022-01-01 RX ADMIN — DILTIAZEM HYDROCHLORIDE 10 MG/HR: 5 INJECTION INTRAVENOUS at 13:32

## 2022-01-01 RX ADMIN — FENTANYL CITRATE 25 MCG: 50 INJECTION, SOLUTION INTRAMUSCULAR; INTRAVENOUS at 10:13

## 2022-01-01 RX ADMIN — Medication 2 SPRAY: at 17:36

## 2022-01-01 RX ADMIN — Medication 2 SPRAY: at 21:08

## 2022-01-01 RX ADMIN — MORPHINE SULFATE 10 MG: 10 SOLUTION ORAL at 02:41

## 2022-01-01 RX ADMIN — MORPHINE SULFATE 10 MG: 10 SOLUTION ORAL at 09:33

## 2022-01-01 RX ADMIN — Medication 2 SPRAY: at 12:16

## 2022-01-01 RX ADMIN — DILTIAZEM HYDROCHLORIDE 60 MG: 30 TABLET, FILM COATED ORAL at 12:29

## 2022-01-01 RX ADMIN — MORPHINE SULFATE 10 MG: 10 SOLUTION ORAL at 06:32

## 2022-01-01 RX ADMIN — ACETAMINOPHEN 650 MG: 325 SUSPENSION ORAL at 17:49

## 2022-01-01 RX ADMIN — ASPIRIN 81 MG CHEWABLE TABLET 81 MG: 81 TABLET CHEWABLE at 08:16

## 2022-01-01 ASSESSMENT — ACTIVITIES OF DAILY LIVING (ADL)
ADLS_ACUITY_SCORE: 25
ADLS_ACUITY_SCORE: 25
ADLS_ACUITY_SCORE: 17
ADLS_ACUITY_SCORE: 21
ADLS_ACUITY_SCORE: 21
ADLS_ACUITY_SCORE: 17
ADLS_ACUITY_SCORE: 23
ADLS_ACUITY_SCORE: 25
ADLS_ACUITY_SCORE: 19
ADLS_ACUITY_SCORE: 21
ADLS_ACUITY_SCORE: 23
ADLS_ACUITY_SCORE: 25
ADLS_ACUITY_SCORE: 23
ADLS_ACUITY_SCORE: 21
ADLS_ACUITY_SCORE: 17
ADLS_ACUITY_SCORE: 27
ADLS_ACUITY_SCORE: 25
ADLS_ACUITY_SCORE: 23
ADLS_ACUITY_SCORE: 21
ADLS_ACUITY_SCORE: 27
ADLS_ACUITY_SCORE: 17
ADLS_ACUITY_SCORE: 21
ADLS_ACUITY_SCORE: 17
ADLS_ACUITY_SCORE: 17
ADLS_ACUITY_SCORE: 23
ADLS_ACUITY_SCORE: 21
ADLS_ACUITY_SCORE: 19
ADLS_ACUITY_SCORE: 23
ADLS_ACUITY_SCORE: 21
ADLS_ACUITY_SCORE: 17
ADLS_ACUITY_SCORE: 17
ADLS_ACUITY_SCORE: 23
ADLS_ACUITY_SCORE: 25
ADLS_ACUITY_SCORE: 23
ADLS_ACUITY_SCORE: 21
ADLS_ACUITY_SCORE: 23
ADLS_ACUITY_SCORE: 25
ADLS_ACUITY_SCORE: 21
ADLS_ACUITY_SCORE: 19
ADLS_ACUITY_SCORE: 21
ADLS_ACUITY_SCORE: 25
ADLS_ACUITY_SCORE: 27
ADLS_ACUITY_SCORE: 21
ADLS_ACUITY_SCORE: 23
ADLS_ACUITY_SCORE: 19
ADLS_ACUITY_SCORE: 25
ADLS_ACUITY_SCORE: 21
ADLS_ACUITY_SCORE: 25
ADLS_ACUITY_SCORE: 21
ADLS_ACUITY_SCORE: 23
ADLS_ACUITY_SCORE: 19
ADLS_ACUITY_SCORE: 17
ADLS_ACUITY_SCORE: 19
ADLS_ACUITY_SCORE: 23
ADLS_ACUITY_SCORE: 27
ADLS_ACUITY_SCORE: 25
ADLS_ACUITY_SCORE: 17
ADLS_ACUITY_SCORE: 23
ADLS_ACUITY_SCORE: 23
ADLS_ACUITY_SCORE: 12
ADLS_ACUITY_SCORE: 23
ADLS_ACUITY_SCORE: 19
ADLS_ACUITY_SCORE: 21
ADLS_ACUITY_SCORE: 17
ADLS_ACUITY_SCORE: 25
ADLS_ACUITY_SCORE: 19
ADLS_ACUITY_SCORE: 21
ADLS_ACUITY_SCORE: 19
ADLS_ACUITY_SCORE: 23
ADLS_ACUITY_SCORE: 21
ADLS_ACUITY_SCORE: 21
ADLS_ACUITY_SCORE: 23
ADLS_ACUITY_SCORE: 21
ADLS_ACUITY_SCORE: 25
ADLS_ACUITY_SCORE: 23
ADLS_ACUITY_SCORE: 23
ADLS_ACUITY_SCORE: 17
ADLS_ACUITY_SCORE: 21
ADLS_ACUITY_SCORE: 21
ADLS_ACUITY_SCORE: 23
ADLS_ACUITY_SCORE: 21
ADLS_ACUITY_SCORE: 21
ADLS_ACUITY_SCORE: 25
ADLS_ACUITY_SCORE: 21
ADLS_ACUITY_SCORE: 25
ADLS_ACUITY_SCORE: 21
ADLS_ACUITY_SCORE: 21
ADLS_ACUITY_SCORE: 25
ADLS_ACUITY_SCORE: 17
ADLS_ACUITY_SCORE: 21
ADLS_ACUITY_SCORE: 23
ADLS_ACUITY_SCORE: 17
ADLS_ACUITY_SCORE: 21
ADLS_ACUITY_SCORE: 19
ADLS_ACUITY_SCORE: 25
ADLS_ACUITY_SCORE: 17
ADLS_ACUITY_SCORE: 21
ADLS_ACUITY_SCORE: 27
ADLS_ACUITY_SCORE: 23
ADLS_ACUITY_SCORE: 25
ADLS_ACUITY_SCORE: 21
ADLS_ACUITY_SCORE: 25
ADLS_ACUITY_SCORE: 21
ADLS_ACUITY_SCORE: 17
ADLS_ACUITY_SCORE: 19
ADLS_ACUITY_SCORE: 25
ADLS_ACUITY_SCORE: 21
ADLS_ACUITY_SCORE: 23
ADLS_ACUITY_SCORE: 21
ADLS_ACUITY_SCORE: 21
ADLS_ACUITY_SCORE: 12
ADLS_ACUITY_SCORE: 12
ADLS_ACUITY_SCORE: 23
ADLS_ACUITY_SCORE: 23
ADLS_ACUITY_SCORE: 25
ADLS_ACUITY_SCORE: 21
ADLS_ACUITY_SCORE: 17
ADLS_ACUITY_SCORE: 21
ADLS_ACUITY_SCORE: 19
ADLS_ACUITY_SCORE: 19
ADLS_ACUITY_SCORE: 17
ADLS_ACUITY_SCORE: 19
ADLS_ACUITY_SCORE: 23
ADLS_ACUITY_SCORE: 25
ADLS_ACUITY_SCORE: 21
ADLS_ACUITY_SCORE: 25
ADLS_ACUITY_SCORE: 23
ADLS_ACUITY_SCORE: 17
ADLS_ACUITY_SCORE: 27
ADLS_ACUITY_SCORE: 21
ADLS_ACUITY_SCORE: 19
ADLS_ACUITY_SCORE: 21
ADLS_ACUITY_SCORE: 25
ADLS_ACUITY_SCORE: 21
ADLS_ACUITY_SCORE: 17
ADLS_ACUITY_SCORE: 21
ADLS_ACUITY_SCORE: 17
ADLS_ACUITY_SCORE: 27
ADLS_ACUITY_SCORE: 21
ADLS_ACUITY_SCORE: 21
ADLS_ACUITY_SCORE: 23
ADLS_ACUITY_SCORE: 23
ADLS_ACUITY_SCORE: 21
ADLS_ACUITY_SCORE: 25
ADLS_ACUITY_SCORE: 23
ADLS_ACUITY_SCORE: 21
ADLS_ACUITY_SCORE: 25
ADLS_ACUITY_SCORE: 19
ADLS_ACUITY_SCORE: 23
ADLS_ACUITY_SCORE: 25
ADLS_ACUITY_SCORE: 25
ADLS_ACUITY_SCORE: 23
ADLS_ACUITY_SCORE: 21
ADLS_ACUITY_SCORE: 19
ADLS_ACUITY_SCORE: 25
ADLS_ACUITY_SCORE: 25
ADLS_ACUITY_SCORE: 23
ADLS_ACUITY_SCORE: 23
ADLS_ACUITY_SCORE: 17
ADLS_ACUITY_SCORE: 21
ADLS_ACUITY_SCORE: 23
ADLS_ACUITY_SCORE: 21
ADLS_ACUITY_SCORE: 19
ADLS_ACUITY_SCORE: 19
ADLS_ACUITY_SCORE: 21
ADLS_ACUITY_SCORE: 19
ADLS_ACUITY_SCORE: 23
ADLS_ACUITY_SCORE: 21
ADLS_ACUITY_SCORE: 17
ADLS_ACUITY_SCORE: 23
ADLS_ACUITY_SCORE: 21
ADLS_ACUITY_SCORE: 21
ADLS_ACUITY_SCORE: 25
ADLS_ACUITY_SCORE: 27
ADLS_ACUITY_SCORE: 25
ADLS_ACUITY_SCORE: 19
ADLS_ACUITY_SCORE: 23
ADLS_ACUITY_SCORE: 17
ADLS_ACUITY_SCORE: 23
ADLS_ACUITY_SCORE: 25
ADLS_ACUITY_SCORE: 19
ADLS_ACUITY_SCORE: 23
ADLS_ACUITY_SCORE: 19
ADLS_ACUITY_SCORE: 21
ADLS_ACUITY_SCORE: 23
ADLS_ACUITY_SCORE: 23
ADLS_ACUITY_SCORE: 21
ADLS_ACUITY_SCORE: 19
ADLS_ACUITY_SCORE: 25
ADLS_ACUITY_SCORE: 21
ADLS_ACUITY_SCORE: 23
ADLS_ACUITY_SCORE: 25
ADLS_ACUITY_SCORE: 21
ADLS_ACUITY_SCORE: 27
ADLS_ACUITY_SCORE: 25
ADLS_ACUITY_SCORE: 19
ADLS_ACUITY_SCORE: 17
ADLS_ACUITY_SCORE: 19
ADLS_ACUITY_SCORE: 21
ADLS_ACUITY_SCORE: 23
ADLS_ACUITY_SCORE: 19
ADLS_ACUITY_SCORE: 23
ADLS_ACUITY_SCORE: 19
ADLS_ACUITY_SCORE: 25
ADLS_ACUITY_SCORE: 17
ADLS_ACUITY_SCORE: 23
ADLS_ACUITY_SCORE: 25
ADLS_ACUITY_SCORE: 21
ADLS_ACUITY_SCORE: 19
ADLS_ACUITY_SCORE: 23
ADLS_ACUITY_SCORE: 21
ADLS_ACUITY_SCORE: 21
ADLS_ACUITY_SCORE: 17
ADLS_ACUITY_SCORE: 21
ADLS_ACUITY_SCORE: 25
ADLS_ACUITY_SCORE: 21
ADLS_ACUITY_SCORE: 23
ADLS_ACUITY_SCORE: 25
ADLS_ACUITY_SCORE: 19
ADLS_ACUITY_SCORE: 19
ADLS_ACUITY_SCORE: 23
ADLS_ACUITY_SCORE: 21
ADLS_ACUITY_SCORE: 21
ADLS_ACUITY_SCORE: 23
ADLS_ACUITY_SCORE: 21
ADLS_ACUITY_SCORE: 21
ADLS_ACUITY_SCORE: 19
ADLS_ACUITY_SCORE: 25
ADLS_ACUITY_SCORE: 23
ADLS_ACUITY_SCORE: 23
ADLS_ACUITY_SCORE: 21
ADLS_ACUITY_SCORE: 25
ADLS_ACUITY_SCORE: 21
ADLS_ACUITY_SCORE: 17
ADLS_ACUITY_SCORE: 25
ADLS_ACUITY_SCORE: 17
ADLS_ACUITY_SCORE: 25
ADLS_ACUITY_SCORE: 23
ADLS_ACUITY_SCORE: 19
ADLS_ACUITY_SCORE: 19
ADLS_ACUITY_SCORE: 21
ADLS_ACUITY_SCORE: 19
ADLS_ACUITY_SCORE: 21
ADLS_ACUITY_SCORE: 17
ADLS_ACUITY_SCORE: 23
ADLS_ACUITY_SCORE: 21
ADLS_ACUITY_SCORE: 21
ADLS_ACUITY_SCORE: 25
ADLS_ACUITY_SCORE: 19
ADLS_ACUITY_SCORE: 21
ADLS_ACUITY_SCORE: 19
ADLS_ACUITY_SCORE: 21
ADLS_ACUITY_SCORE: 25
ADLS_ACUITY_SCORE: 21
ADLS_ACUITY_SCORE: 21
ADLS_ACUITY_SCORE: 17
ADLS_ACUITY_SCORE: 27
ADLS_ACUITY_SCORE: 25
ADLS_ACUITY_SCORE: 21
ADLS_ACUITY_SCORE: 23
ADLS_ACUITY_SCORE: 21
ADLS_ACUITY_SCORE: 21
ADLS_ACUITY_SCORE: 19
ADLS_ACUITY_SCORE: 21
ADLS_ACUITY_SCORE: 17
ADLS_ACUITY_SCORE: 23
ADLS_ACUITY_SCORE: 25
ADLS_ACUITY_SCORE: 17
ADLS_ACUITY_SCORE: 21
ADLS_ACUITY_SCORE: 25
ADLS_ACUITY_SCORE: 23
ADLS_ACUITY_SCORE: 21
ADLS_ACUITY_SCORE: 23
ADLS_ACUITY_SCORE: 25
ADLS_ACUITY_SCORE: 21
ADLS_ACUITY_SCORE: 25
ADLS_ACUITY_SCORE: 19
ADLS_ACUITY_SCORE: 19
ADLS_ACUITY_SCORE: 21
ADLS_ACUITY_SCORE: 19
ADLS_ACUITY_SCORE: 19
ADLS_ACUITY_SCORE: 27
ADLS_ACUITY_SCORE: 23
ADLS_ACUITY_SCORE: 17
ADLS_ACUITY_SCORE: 17
ADLS_ACUITY_SCORE: 27
ADLS_ACUITY_SCORE: 19
ADLS_ACUITY_SCORE: 21
ADLS_ACUITY_SCORE: 15
ADLS_ACUITY_SCORE: 27
ADLS_ACUITY_SCORE: 15
ADLS_ACUITY_SCORE: 21
ADLS_ACUITY_SCORE: 21
ADLS_ACUITY_SCORE: 17
ADLS_ACUITY_SCORE: 25
ADLS_ACUITY_SCORE: 25
ADLS_ACUITY_SCORE: 23
ADLS_ACUITY_SCORE: 25
ADLS_ACUITY_SCORE: 19
ADLS_ACUITY_SCORE: 21
ADLS_ACUITY_SCORE: 23
ADLS_ACUITY_SCORE: 21
ADLS_ACUITY_SCORE: 23
ADLS_ACUITY_SCORE: 19
ADLS_ACUITY_SCORE: 17
ADLS_ACUITY_SCORE: 21
ADLS_ACUITY_SCORE: 17
ADLS_ACUITY_SCORE: 21
ADLS_ACUITY_SCORE: 25
ADLS_ACUITY_SCORE: 25
ADLS_ACUITY_SCORE: 23
ADLS_ACUITY_SCORE: 21
ADLS_ACUITY_SCORE: 25
ADLS_ACUITY_SCORE: 21
ADLS_ACUITY_SCORE: 17
ADLS_ACUITY_SCORE: 23
ADLS_ACUITY_SCORE: 19
ADLS_ACUITY_SCORE: 17
ADLS_ACUITY_SCORE: 23
ADLS_ACUITY_SCORE: 25
ADLS_ACUITY_SCORE: 23
ADLS_ACUITY_SCORE: 19
ADLS_ACUITY_SCORE: 27
ADLS_ACUITY_SCORE: 17
ADLS_ACUITY_SCORE: 21
ADLS_ACUITY_SCORE: 17
ADLS_ACUITY_SCORE: 17
ADLS_ACUITY_SCORE: 21
ADLS_ACUITY_SCORE: 27
ADLS_ACUITY_SCORE: 21
ADLS_ACUITY_SCORE: 21
ADLS_ACUITY_SCORE: 23
ADLS_ACUITY_SCORE: 21
ADLS_ACUITY_SCORE: 21
ADLS_ACUITY_SCORE: 15
ADLS_ACUITY_SCORE: 25
ADLS_ACUITY_SCORE: 25
ADLS_ACUITY_SCORE: 21
ADLS_ACUITY_SCORE: 23
ADLS_ACUITY_SCORE: 25
ADLS_ACUITY_SCORE: 19
ADLS_ACUITY_SCORE: 23
ADLS_ACUITY_SCORE: 23
ADLS_ACUITY_SCORE: 21
ADLS_ACUITY_SCORE: 27
ADLS_ACUITY_SCORE: 25
ADLS_ACUITY_SCORE: 21
ADLS_ACUITY_SCORE: 23
ADLS_ACUITY_SCORE: 21
ADLS_ACUITY_SCORE: 19
ADLS_ACUITY_SCORE: 12
ADLS_ACUITY_SCORE: 17
ADLS_ACUITY_SCORE: 23
ADLS_ACUITY_SCORE: 23
ADLS_ACUITY_SCORE: 21
ADLS_ACUITY_SCORE: 25
ADLS_ACUITY_SCORE: 21
ADLS_ACUITY_SCORE: 17
ADLS_ACUITY_SCORE: 21
ADLS_ACUITY_SCORE: 17
ADLS_ACUITY_SCORE: 19
ADLS_ACUITY_SCORE: 25
ADLS_ACUITY_SCORE: 25
ADLS_ACUITY_SCORE: 23
ADLS_ACUITY_SCORE: 21
ADLS_ACUITY_SCORE: 23
ADLS_ACUITY_SCORE: 21
ADLS_ACUITY_SCORE: 19
ADLS_ACUITY_SCORE: 25
ADLS_ACUITY_SCORE: 19
ADLS_ACUITY_SCORE: 21
ADLS_ACUITY_SCORE: 22
ADLS_ACUITY_SCORE: 12
ADLS_ACUITY_SCORE: 23
ADLS_ACUITY_SCORE: 23
ADLS_ACUITY_SCORE: 21
ADLS_ACUITY_SCORE: 23
ADLS_ACUITY_SCORE: 21
ADLS_ACUITY_SCORE: 17
ADLS_ACUITY_SCORE: 23
ADLS_ACUITY_SCORE: 21
ADLS_ACUITY_SCORE: 19
ADLS_ACUITY_SCORE: 25
ADLS_ACUITY_SCORE: 25
ADLS_ACUITY_SCORE: 19
ADLS_ACUITY_SCORE: 27
ADLS_ACUITY_SCORE: 23
ADLS_ACUITY_SCORE: 21
ADLS_ACUITY_SCORE: 17
ADLS_ACUITY_SCORE: 17
ADLS_ACUITY_SCORE: 19
ADLS_ACUITY_SCORE: 23
ADLS_ACUITY_SCORE: 21
ADLS_ACUITY_SCORE: 19
ADLS_ACUITY_SCORE: 23
ADLS_ACUITY_SCORE: 23
ADLS_ACUITY_SCORE: 17
ADLS_ACUITY_SCORE: 21
ADLS_ACUITY_SCORE: 23
ADLS_ACUITY_SCORE: 21
ADLS_ACUITY_SCORE: 21
ADLS_ACUITY_SCORE: 23
ADLS_ACUITY_SCORE: 17
ADLS_ACUITY_SCORE: 21
ADLS_ACUITY_SCORE: 17
ADLS_ACUITY_SCORE: 27
ADLS_ACUITY_SCORE: 19
ADLS_ACUITY_SCORE: 23
ADLS_ACUITY_SCORE: 21
ADLS_ACUITY_SCORE: 25
ADLS_ACUITY_SCORE: 17
ADLS_ACUITY_SCORE: 21
ADLS_ACUITY_SCORE: 17
ADLS_ACUITY_SCORE: 17
ADLS_ACUITY_SCORE: 21
ADLS_ACUITY_SCORE: 17
ADLS_ACUITY_SCORE: 21
ADLS_ACUITY_SCORE: 23
ADLS_ACUITY_SCORE: 21
ADLS_ACUITY_SCORE: 17
ADLS_ACUITY_SCORE: 23
ADLS_ACUITY_SCORE: 19
ADLS_ACUITY_SCORE: 19
ADLS_ACUITY_SCORE: 23
ADLS_ACUITY_SCORE: 21
ADLS_ACUITY_SCORE: 23
ADLS_ACUITY_SCORE: 21
ADLS_ACUITY_SCORE: 23
ADLS_ACUITY_SCORE: 23
ADLS_ACUITY_SCORE: 25
ADLS_ACUITY_SCORE: 21
ADLS_ACUITY_SCORE: 25
ADLS_ACUITY_SCORE: 17
ADLS_ACUITY_SCORE: 23
ADLS_ACUITY_SCORE: 21
ADLS_ACUITY_SCORE: 21
ADLS_ACUITY_SCORE: 23
ADLS_ACUITY_SCORE: 25
ADLS_ACUITY_SCORE: 17
ADLS_ACUITY_SCORE: 21
ADLS_ACUITY_SCORE: 21
ADLS_ACUITY_SCORE: 25
ADLS_ACUITY_SCORE: 19
ADLS_ACUITY_SCORE: 25
ADLS_ACUITY_SCORE: 21
ADLS_ACUITY_SCORE: 19
ADLS_ACUITY_SCORE: 21
ADLS_ACUITY_SCORE: 17
ADLS_ACUITY_SCORE: 23
ADLS_ACUITY_SCORE: 17
ADLS_ACUITY_SCORE: 17
ADLS_ACUITY_SCORE: 23
ADLS_ACUITY_SCORE: 21
ADLS_ACUITY_SCORE: 19
ADLS_ACUITY_SCORE: 21
ADLS_ACUITY_SCORE: 21
ADLS_ACUITY_SCORE: 17
ADLS_ACUITY_SCORE: 21
ADLS_ACUITY_SCORE: 23
ADLS_ACUITY_SCORE: 21
ADLS_ACUITY_SCORE: 25
ADLS_ACUITY_SCORE: 25
ADLS_ACUITY_SCORE: 17
ADLS_ACUITY_SCORE: 17
ADLS_ACUITY_SCORE: 25
ADLS_ACUITY_SCORE: 25
ADLS_ACUITY_SCORE: 19
ADLS_ACUITY_SCORE: 21
ADLS_ACUITY_SCORE: 25
ADLS_ACUITY_SCORE: 17
ADLS_ACUITY_SCORE: 19
ADLS_ACUITY_SCORE: 21
ADLS_ACUITY_SCORE: 23
ADLS_ACUITY_SCORE: 21
ADLS_ACUITY_SCORE: 23
ADLS_ACUITY_SCORE: 21
ADLS_ACUITY_SCORE: 17
ADLS_ACUITY_SCORE: 23
ADLS_ACUITY_SCORE: 25
ADLS_ACUITY_SCORE: 21
ADLS_ACUITY_SCORE: 17
ADLS_ACUITY_SCORE: 25
ADLS_ACUITY_SCORE: 21
ADLS_ACUITY_SCORE: 25
ADLS_ACUITY_SCORE: 23
ADLS_ACUITY_SCORE: 19
ADLS_ACUITY_SCORE: 25
ADLS_ACUITY_SCORE: 21
ADLS_ACUITY_SCORE: 21
ADLS_ACUITY_SCORE: 25
ADLS_ACUITY_SCORE: 23
ADLS_ACUITY_SCORE: 21
ADLS_ACUITY_SCORE: 25
ADLS_ACUITY_SCORE: 19
ADLS_ACUITY_SCORE: 19

## 2022-01-01 ASSESSMENT — ENCOUNTER SYMPTOMS
NECK PAIN: 0
HEADACHES: 0
VOMITING: 0
WEAKNESS: 1
NAUSEA: 1
SPEECH DIFFICULTY: 1
FACIAL ASYMMETRY: 1

## 2022-01-01 ASSESSMENT — MIFFLIN-ST. JEOR: SCORE: 700.79

## 2022-01-13 NOTE — TELEPHONE ENCOUNTER
Due for prolia injection: 1/6/2022    Date of last Annual Exam: 7/6/2021  Date of last required lab work: 7/6/2021 and WNL  Date of last Dexa: 7/6/2021 and recommendations:    Date of last Prolia CAM order: 7/6/2021  and needs new orders for 2022 year  Date of last Prolia injection: 7/6/2021    Orders placed, pending PA authorization    Called and left detailed vm reminding pt due for prolia injection this month.    Lavonne Bates RN on 1/13/2022 at 2:36 PM

## 2022-01-17 NOTE — TELEPHONE ENCOUNTER
Prolia authorized and can schedule.  Attempted to call pt phone and pt would answer and call would end before nurse could talk.  Called pt's daughter Bennie on file and informed pt is due for Prolia injection due and can schedule.  She will call back to schedule and get pt in.    Verbalized understanding, in agreement with plan, and voiced no further questions.  Lavonne Bates RN on 1/17/2022 at 4:10 PM

## 2022-01-27 NOTE — TELEPHONE ENCOUNTER
Please have the scheduling team call patient back for appointment with Dr. Cantrell in Cardiology.     The family is upset that they were given the wrong clinic and wanted us to send a message to the team .       The phone number is [phone] for an appointment .    [place] Triage team   [facility]

## 2022-01-28 NOTE — TELEPHONE ENCOUNTER
Called scheduling to  her an appointment.  She has one scheduled for 1/31/2022.     Gely Murillo RN  -[facility]

## 2022-01-31 NOTE — PROGRESS NOTES
Cardiology Clinic Progress Note  Mia Rodriguez MRN# [id number]   YOB: 1934 Age: 87 year old     Primary cardiologist: Dr. Cantrell    Reason for visit: Medication refills/annual visit    History of presenting illness:    Mia Rodriguez, a pleasant 87 year old patient who has a past medical history for chronic atrial fibrillation, hypertension, hyperlipidemia, TIA and COPD.     Previously, rhythm control was pursued in regards to her atrial fibrillation but eventually failed.  She now in persistent atrial fibrillation and now on rate control strategy.  She previously did not tolerate 100 mg daily of diltiazem or taking it during the daytime due to dizziness and lightheadedness.  In the past it has been recommended that she undergo an AV node ablation and permanent pacemaker, but she has continued to defer.    Currently, she is on diltiazem 120 mg in the evening and tolerating it well with minimal lightheadedness and dizziness.  Her daughter accompanies her to the visit today.  Overall, she is doing well and currently remains in her own home.  Since moving diltiazem to the evening her symptoms have greatly improved and denies any chest pain, lower extremity edema, PND, orthopnea and has stable shortness of breath on exertion.         Assessment and Plan:     ASSESSMENT:    1. Perisitent atrial fibrillation    Failed rhythm control strategy.     Currently rate controlled with diltiazem and anticoagulated with Eliquis 2.5 mg BID (dose adjusted due to age and weight)    Previously tried digoxin that caused nausea and beta blockers caused severe fatigue.     Previously recommended AVNA and PPM, but has wanted more time for consideration.     2. Hypertension    Controlled    3. Hyperlipidemia    Atorvastatin 20 mg daily    4. COPD     Prn albuterol    PLAN:     1. Continue present medical therapy  2. Follow up with Dr. Cantrell in 1 year       Orders this Visit:  Orders Placed This Encounter  "  Procedures     Follow-Up with Cardiology     Orders Placed This Encounter   Medications     atorvastatin (LIPITOR) 20 MG tablet     Sig: Take 1 tablet (20 mg) by mouth At Bedtime     Dispense:  90 tablet     Refill:  0     apixaban ANTICOAGULANT (ELIQUIS) 2.5 MG tablet     Sig: Take 1 tablet (2.5 mg) by mouth 2 times daily     Dispense:  180 tablet     Refill:  3     diltiazem ER (DILT-XR) 120 MG 24 hr capsule     Sig: Take 1 capsule (120 mg) by mouth daily     Dispense:  90 capsule     Refill:  3     Medications Discontinued During This Encounter   Medication Reason     apixaban ANTICOAGULANT (ELIQUIS) 2.5 MG tablet Reorder     diltiazem ER (DILT-XR) 120 MG 24 hr capsule Reorder     atorvastatin (LIPITOR) 20 MG tablet Reorder       Today's clinic visit entailed:  Assessment requiring an independent historian(s) - family - Daughter  Prescription drug management  30 minutes spent on the date of the encounter doing chart review, history and exam, documentation and further activities per the note  Provider  Link to [employer] Help Grid     The level of medical decision making during this visit was of moderate complexity.           Review of Systems:     Review of Systems:  Skin:  Positive for itching   Eyes:  Positive for glasses  ENT:  Negative hearing loss  Respiratory:  Positive for dyspnea on exertion  Cardiovascular:  Negative for;chest pain;fatigue;edema Positive for;palpitations;dizziness  Gastroenterology: not assessed poor appetite  Genitourinary:  Negative nocturia  Musculoskeletal:  Negative back pain  Neurologic:  not assessed    Psychiatric:  not assessed sleep disturbances  Heme/Lymph/Imm:  Positive for weight loss;easy bruising  Endocrine:  not assessed thyroid disorder            Physical Exam:   Vitals: /77   Pulse 89   Ht 1.499 m (4' 11\")   Wt 36 kg (79 lb 6.4 oz)   LMP 12/07/1981   SpO2 97%   BMI 16.04 kg/m    Constitutional:    frail;cachectic      Skin:  warm and dry to the touch    "     Head:  normocephalic        Eyes:  sclera white;conjunctivae and lids unremarkable;pupils equal and round        ENT:  no pallor or cyanosis        Neck:  not assessed this visit        Chest:  clear to auscultation   Diminished breath sounds    Cardiac: normal S1 and S2 irregularly irregular rhythm   no presence of murmur            Abdomen:  not assessed this visit        Vascular: not assessed this visit                                      Extremities and Back:  no edema        Neurological:  no gross motor deficits;affect appropriate             Medications:     Current Outpatient Medications   Medication Sig Dispense Refill     albuterol (PROAIR HFA/PROVENTIL HFA/VENTOLIN HFA) 108 (90 Base) MCG/ACT inhaler Inhale 2 puffs into the lungs every 4 hours as needed for shortness of breath / dyspnea or wheezing       apixaban ANTICOAGULANT (ELIQUIS) 2.5 MG tablet Take 1 tablet (2.5 mg) by mouth 2 times daily 180 tablet 3     atorvastatin (LIPITOR) 20 MG tablet Take 1 tablet (20 mg) by mouth At Bedtime 90 tablet 0     denosumab (PROLIA) 60 MG/ML SOLN Inject 60 mg Subcutaneous. q 6 months       diltiazem ER (DILT-XR) 120 MG 24 hr capsule Take 1 capsule (120 mg) by mouth daily 90 capsule 3       Family History   Problem Relation Age of Onset     Alzheimer Disease Mother      Osteoporosis Mother      C.A.D. Father      Cardiovascular Father      Cerebrovascular Disease Father      Breast Cancer Maternal Grandmother      Unknown/Adopted Maternal Grandfather      Unknown/Adopted Paternal Grandmother         old age     Cancer Paternal Grandfather         stomach       Social History     Socioeconomic History     Marital status: Single     Spouse name: Not on file     Number of children: 4     Years of education: Not on file     Highest education level: Not on file   Occupational History     Not on file   Tobacco Use     Smoking status: Former Smoker     Packs/day: 1.00     Years: 30.00     Pack years: 30.00     Types:  Cigarettes     Start date:      Quit date: 1989     Years since quittin.1     Smokeless tobacco: Never Used     Tobacco comment: Started:  Stopped:   Substance and Sexual Activity     Alcohol use: No     Alcohol/week: 0.0 standard drinks     Drug use: No     Sexual activity: Not Currently     Birth control/protection: Female Surgical     Comment: LISA   Other Topics Concern     Parent/sibling w/ CABG, MI or angioplasty before 65F 55M? No      Service Not Asked     Blood Transfusions Not Asked     Caffeine Concern Yes     Comment: 2 cups caffeine per day     Occupational Exposure Not Asked     Hobby Hazards Not Asked     Sleep Concern Yes     Stress Concern No     Weight Concern No     Special Diet No     Back Care No     Exercise No     Bike Helmet Not Asked     Seat Belt Yes     Self-Exams Not Asked   Social History Narrative     Not on file     Social Determinants of Health     Financial Resource Strain: Not on file   Food Insecurity: Not on file   Transportation Needs: Not on file   Physical Activity: Not on file   Stress: Not on file   Social Connections: Not on file   Intimate Partner Violence: Not on file   Housing Stability: Not on file            Past Medical History:     Past Medical History:   Diagnosis Date     Arrhythmia     atrial fib.-on coumadin     Atrial fibrillation (H)      Breast cancer (H)      COPD (chronic obstructive pulmonary disease) (H)      COPD exacerbation (H) 2015     Coronary artery disease      Hypertension      Malignant neoplasm (H) 2008    Right Breast Cancer     Osteoporosis      Renal disease     kidney stones     Thyroid disease     Hypothyroid     Unspecified cerebral artery occlusion with cerebral infarction     TIA     Uterine fibroid               Past Surgical History:     Past Surgical History:   Procedure Laterality Date     BIOPSY  2008    Left /RightBreast Biopsy     BIOPSY  2010    Right Breast Biopsy     BREAST SURGERY  2008     Right Breast Lumpectomy     CYSTOSCOPY, DILATE URETHRA, COMBINED  10/5/2012    Procedure: COMBINED CYSTOSCOPY, DILATE URETHRA;  urethral dilation;  Surgeon: Kaushal Harris MD;  Location:  OR     CYSTOSCOPY, RETROGRADES, EXTRACT STONE, COMBINED Right 4/16/2015    Procedure: COMBINED CYSTOSCOPY, RETROGRADES, EXTRACT STONE;  Surgeon: Kaushal Harris MD;  Location:  OR     EXCISE LESION LIP N/A 10/22/2014    Procedure: EXCISE LESION LIP;  Surgeon: Brent Jolley MD;  Location:  [facility]     GYN SURGERY  1981    LISA with ovaries intact-fibroid     LASER HOLMIUM LITHOTRIPSY URETER(S), INSERT STENT, COMBINED  10/5/2012    Procedure: COMBINED CYSTOSCOPY, URETEROSCOPY, LASER HOLMIUM LITHOTRIPSY URETER(S), INSERT STENT;  RIGHT URETEROSCOPY ,right ureteral biopsy,WITH LASER HOLMIUM LITHOTRIPSY, INSERT STENT RIGHT URETER;  Surgeon: Kaushal Harris MD;  Location:  OR     LASER HOLMIUM LITHOTRIPSY URETER(S), INSERT STENT, COMBINED Right 4/16/2015    Procedure: COMBINED CYSTOSCOPY, URETEROSCOPY, LASER HOLMIUM LITHOTRIPSY URETER(S), INSERT STENT;  Surgeon: Kaushal Harris MD;  Location:  OR              Allergies:   Digoxin and Megace [megestrol acetate]       Data:   All laboratory data reviewed:    Recent Labs   Lab Test 01/15/19  0927 01/12/18  0821 05/06/16  1554 02/09/14  0750 02/08/14  1735   LDL 88 97 64   < > 192*   HDL 51 65 71   < > 68   NHDL 107 123 78  --   --    CHOL 158 188 149   < > 286*   TRIG 93 128 69   < > 133   TSH  --   --   --   --  2.76    < > = values in this interval not displayed.       Lab Results   Component Value Date    WBC 8.7 10/14/2020    RBC 4.68 10/14/2020    HGB 15.2 10/14/2020    HCT 46.2 10/14/2020    MCV 99 10/14/2020    MCH 32.5 10/14/2020    MCHC 32.9 10/14/2020    RDW 13.8 10/14/2020     10/14/2020       Lab Results   Component Value Date     10/16/2020    POTASSIUM 3.6 10/16/2020    CHLORIDE 113 (H) 10/16/2020    CO2 30 10/16/2020     ANIONGAP 1 (L) 10/16/2020    GLC 77 10/16/2020    BUN 11 10/16/2020    CR 0.72 10/16/2020    GFRESTIMATED 52 (L) 03/23/2021    GFRESTBLACK 63 03/23/2021    CYNDEE 9.4 10/16/2020      Lab Results   Component Value Date    AST 53 (H) 10/06/2020    ALT 52 (H) 10/06/2020       No results found for: A1C    Lab Results   Component Value Date    INR 1.03 10/22/2014    INR 1.78 (H) 10/02/2014         SONAM CEBALLOS [facility]  Pager: [phone]  RN phone: [phone]

## 2022-01-31 NOTE — PATIENT INSTRUCTIONS
Today's Recommendations    1. Continue all medications without changes.  2. Please follow up with Dr. Cantrell in 1 year.    Please send a [employer] message or call [phone] to the RN team with questions or concerns.     Scheduling number [phone]

## 2022-01-31 NOTE — LETTER
1/31/2022    Birgit Cordero  [facility] [address]  [facility] [address]    RE: Mia Rodriguez       Dear Colleague,     I had the pleasure of seeing Mia Rodriguez in the [facility].    Cardiology Clinic Progress Note  Mia Rodriguez MRN# [id number]   YOB: 1934 Age: 87 year old     Primary cardiologist: Dr. Cantrell    Reason for visit: Medication refills/annual visit    History of presenting illness:    Mia Rodriguez, a pleasant 87 year old patient who has a past medical history for chronic atrial fibrillation, hypertension, hyperlipidemia, TIA and COPD.     Previously, rhythm control was pursued in regards to her atrial fibrillation but eventually failed.  She now in persistent atrial fibrillation and now on rate control strategy.  She previously did not tolerate 100 mg daily of diltiazem or taking it during the daytime due to dizziness and lightheadedness.  In the past it has been recommended that she undergo an AV node ablation and permanent pacemaker, but she has continued to defer.    Currently, she is on diltiazem 120 mg in the evening and tolerating it well with minimal lightheadedness and dizziness.  Her daughter accompanies her to the visit today.  Overall, she is doing well and currently remains in her own home.  Since moving diltiazem to the evening her symptoms have greatly improved and denies any chest pain, lower extremity edema, PND, orthopnea and has stable shortness of breath on exertion.         Assessment and Plan:     ASSESSMENT:    1. Perisitent atrial fibrillation    Failed rhythm control strategy.     Currently rate controlled with diltiazem and anticoagulated with Eliquis 2.5 mg BID (dose adjusted due to age and weight)    Previously tried digoxin that caused nausea and beta blockers caused severe fatigue.     Previously recommended AVNA and PPM, but has wanted more time for consideration.      2. Hypertension    Controlled    3. Hyperlipidemia    Atorvastatin 20 mg daily    4. COPD     Prn albuterol    PLAN:     1. Continue present medical therapy  2. Follow up with Dr. Cantrell in 1 year       Orders this Visit:  Orders Placed This Encounter   Procedures     Follow-Up with Cardiology     Orders Placed This Encounter   Medications     atorvastatin (LIPITOR) 20 MG tablet     Sig: Take 1 tablet (20 mg) by mouth At Bedtime     Dispense:  90 tablet     Refill:  0     apixaban ANTICOAGULANT (ELIQUIS) 2.5 MG tablet     Sig: Take 1 tablet (2.5 mg) by mouth 2 times daily     Dispense:  180 tablet     Refill:  3     diltiazem ER (DILT-XR) 120 MG 24 hr capsule     Sig: Take 1 capsule (120 mg) by mouth daily     Dispense:  90 capsule     Refill:  3     Medications Discontinued During This Encounter   Medication Reason     apixaban ANTICOAGULANT (ELIQUIS) 2.5 MG tablet Reorder     diltiazem ER (DILT-XR) 120 MG 24 hr capsule Reorder     atorvastatin (LIPITOR) 20 MG tablet Reorder       Today's clinic visit entailed:  Assessment requiring an independent historian(s) - family - Daughter  Prescription drug management  30 minutes spent on the date of the encounter doing chart review, history and exam, documentation and further activities per the note  Provider  Link to [facility] Help Grid     The level of medical decision making during this visit was of moderate complexity.           Review of Systems:     Review of Systems:  Skin:  Positive for itching   Eyes:  Positive for glasses  ENT:  Negative hearing loss  Respiratory:  Positive for dyspnea on exertion  Cardiovascular:  Negative for;chest pain;fatigue;edema Positive for;palpitations;dizziness  Gastroenterology: not assessed poor appetite  Genitourinary:  Negative nocturia  Musculoskeletal:  Negative back pain  Neurologic:  not assessed    Psychiatric:  not assessed sleep disturbances  Heme/Lymph/Imm:  Positive for weight loss;easy bruising  Endocrine:  not assessed  "thyroid disorder            Physical Exam:   Vitals: /77   Pulse 89   Ht 1.499 m (4' 11\")   Wt 36 kg (79 lb 6.4 oz)   LMP 12/07/1981   SpO2 97%   BMI 16.04 kg/m    Constitutional:    frail;cachectic      Skin:  warm and dry to the touch        Head:  normocephalic        Eyes:  sclera white;conjunctivae and lids unremarkable;pupils equal and round        ENT:  no pallor or cyanosis        Neck:  not assessed this visit        Chest:  clear to auscultation   Diminished breath sounds    Cardiac: normal S1 and S2 irregularly irregular rhythm   no presence of murmur            Abdomen:  not assessed this visit        Vascular: not assessed this visit                                      Extremities and Back:  no edema        Neurological:  no gross motor deficits;affect appropriate             Medications:     Current Outpatient Medications   Medication Sig Dispense Refill     albuterol (PROAIR HFA/PROVENTIL HFA/VENTOLIN HFA) 108 (90 Base) MCG/ACT inhaler Inhale 2 puffs into the lungs every 4 hours as needed for shortness of breath / dyspnea or wheezing       apixaban ANTICOAGULANT (ELIQUIS) 2.5 MG tablet Take 1 tablet (2.5 mg) by mouth 2 times daily 180 tablet 3     atorvastatin (LIPITOR) 20 MG tablet Take 1 tablet (20 mg) by mouth At Bedtime 90 tablet 0     denosumab (PROLIA) 60 MG/ML SOLN Inject 60 mg Subcutaneous. q 6 months       diltiazem ER (DILT-XR) 120 MG 24 hr capsule Take 1 capsule (120 mg) by mouth daily 90 capsule 3       Family History   Problem Relation Age of Onset     Alzheimer Disease Mother      Osteoporosis Mother      C.A.D. Father      Cardiovascular Father      Cerebrovascular Disease Father      Breast Cancer Maternal Grandmother      Unknown/Adopted Maternal Grandfather      Unknown/Adopted Paternal Grandmother         old age     Cancer Paternal Grandfather         stomach       Social History     Socioeconomic History     Marital status: Single     Spouse name: Not on file     " Number of children: 4     Years of education: Not on file     Highest education level: Not on file   Occupational History     Not on file   Tobacco Use     Smoking status: Former Smoker     Packs/day: 1.00     Years: 30.00     Pack years: 30.00     Types: Cigarettes     Start date:      Quit date: 1989     Years since quittin.1     Smokeless tobacco: Never Used     Tobacco comment: Started:  Stopped:   Substance and Sexual Activity     Alcohol use: No     Alcohol/week: 0.0 standard drinks     Drug use: No     Sexual activity: Not Currently     Birth control/protection: Female Surgical     Comment: LISA   Other Topics Concern     Parent/sibling w/ CABG, MI or angioplasty before 65F 55M? No      Service Not Asked     Blood Transfusions Not Asked     Caffeine Concern Yes     Comment: 2 cups caffeine per day     Occupational Exposure Not Asked     Hobby Hazards Not Asked     Sleep Concern Yes     Stress Concern No     Weight Concern No     Special Diet No     Back Care No     Exercise No     Bike Helmet Not Asked     Seat Belt Yes     Self-Exams Not Asked   Social History Narrative     Not on file     Social Determinants of Health     Financial Resource Strain: Not on file   Food Insecurity: Not on file   Transportation Needs: Not on file   Physical Activity: Not on file   Stress: Not on file   Social Connections: Not on file   Intimate Partner Violence: Not on file   Housing Stability: Not on file            Past Medical History:     Past Medical History:   Diagnosis Date     Arrhythmia     atrial fib.-on coumadin     Atrial fibrillation (H)      Breast cancer (H)      COPD (chronic obstructive pulmonary disease) (H)      COPD exacerbation (H) 2015     Coronary artery disease      Hypertension      Malignant neoplasm (H) 2008    Right Breast Cancer     Osteoporosis      Renal disease     kidney stones     Thyroid disease     Hypothyroid     Unspecified cerebral artery occlusion with  cerebral infarction     TIA     Uterine fibroid               Past Surgical History:     Past Surgical History:   Procedure Laterality Date     BIOPSY  6/2/2008    Left /RightBreast Biopsy     BIOPSY  6/1/2010    Right Breast Biopsy     BREAST SURGERY  6/12/2008    Right Breast Lumpectomy     CYSTOSCOPY, DILATE URETHRA, COMBINED  10/5/2012    Procedure: COMBINED CYSTOSCOPY, DILATE URETHRA;  urethral dilation;  Surgeon: Kaushal Harris MD;  Location:  OR     CYSTOSCOPY, RETROGRADES, EXTRACT STONE, COMBINED Right 4/16/2015    Procedure: COMBINED CYSTOSCOPY, RETROGRADES, EXTRACT STONE;  Surgeon: Kaushal Harris MD;  Location:  OR     EXCISE LESION LIP N/A 10/22/2014    Procedure: EXCISE LESION LIP;  Surgeon: Brent Jolley MD;  Location:  [facility]     GYN SURGERY  1981    LISA with ovaries intact-fibroid     LASER HOLMIUM LITHOTRIPSY URETER(S), INSERT STENT, COMBINED  10/5/2012    Procedure: COMBINED CYSTOSCOPY, URETEROSCOPY, LASER HOLMIUM LITHOTRIPSY URETER(S), INSERT STENT;  RIGHT URETEROSCOPY ,right ureteral biopsy,WITH LASER HOLMIUM LITHOTRIPSY, INSERT STENT RIGHT URETER;  Surgeon: Kaushal Harris MD;  Location:  OR     LASER HOLMIUM LITHOTRIPSY URETER(S), INSERT STENT, COMBINED Right 4/16/2015    Procedure: COMBINED CYSTOSCOPY, URETEROSCOPY, LASER HOLMIUM LITHOTRIPSY URETER(S), INSERT STENT;  Surgeon: Kaushal Harris MD;  Location:  OR              Allergies:   Digoxin and Megace [megestrol acetate]       Data:   All laboratory data reviewed:    Recent Labs   Lab Test 01/15/19  0927 01/12/18  0821 05/06/16  1554 02/09/14  0750 02/08/14  1735   LDL 88 97 64   < > 192*   HDL 51 65 71   < > 68   NHDL 107 123 78  --   --    CHOL 158 188 149   < > 286*   TRIG 93 128 69   < > 133   TSH  --   --   --   --  2.76    < > = values in this interval not displayed.       Lab Results   Component Value Date    WBC 8.7 10/14/2020    RBC 4.68 10/14/2020    HGB 15.2 10/14/2020    HCT 46.2  10/14/2020    MCV 99 10/14/2020    MCH 32.5 10/14/2020    MCHC 32.9 10/14/2020    RDW 13.8 10/14/2020     10/14/2020       Lab Results   Component Value Date     10/16/2020    POTASSIUM 3.6 10/16/2020    CHLORIDE 113 (H) 10/16/2020    CO2 30 10/16/2020    ANIONGAP 1 (L) 10/16/2020    GLC 77 10/16/2020    BUN 11 10/16/2020    CR 0.72 10/16/2020    GFRESTIMATED 52 (L) 03/23/2021    GFRESTBLACK 63 03/23/2021    CYNDEE 9.4 10/16/2020      Lab Results   Component Value Date    AST 53 (H) 10/06/2020    ALT 52 (H) 10/06/2020       No results found for: A1C    Lab Results   Component Value Date    INR 1.03 10/22/2014    INR 1.78 (H) 10/02/2014         SONAM CEBALLOS CNP  [facility]  Pager: [phone]  RN phone: [phone]    Thank you for allowing me to participate in the care of your patient.      Sincerely,     SONAM CEBALLOS CNP     [facility]  cc: No referring provider defined for this encounter.

## 2022-02-17 NOTE — NURSING NOTE
Clinic Administered Medication Documentation    Administrations This Visit     denosumab (PROLIA) injection 60 mg     Admin Date  02/17/2022 Action  Given Dose  60 mg Route  Subcutaneous Site  Left Arm Administered By  Lavonne Bates RN    Ordering Provider: Sangeeta Lanza MD    NDC: 96631-765-01    Lot#: [id number]    : AMGEN    Patient Supplied?: No                  Prolia Documentation    Prior to injection, verified patient identity using patient's name and date of birth. Medication was administered. Please see MAR and medication order for additional information. Patient instructed to report any adverse reaction to staff immediately .    Indication: Prolia  (denosumab) is a prescription medicine used to treat osteoporosis in patients who:     Are at high risk for fracture, meaning patients who have had a fracture related to osteoporosis, or who have multiple risk factors for fracture.    Cannot use another osteoporosis medicine or other osteoporosis medicines did not work well.    The timeline for early/late injections would be 4 weeks early and any time after the 6 month yuniel. If a patient receives their injection late, then the subsequent injection would be 6 months from the date that they actually received the injection.    When was the last injection?  7/6/2021  Was the last injection at least 6 months ago? Yes  Has the prior authorization been completed?  Yes  Is there an active order (within the past 365 days) in the chart?  Yes  Patient denied having dental work involving the bone in the past 6 months?  Yes  Patient denies a plan to dental work involving the bone in the next 6 months? Yes    The following steps were completed to comply with the REMS program for Prolia:    Reviewed information in the Medication Guide and Patient Counseling Chart, including the serious risks of Prolia  and the symptoms of each risk.    Advised patient to seek prompt medical attention if they have signs or  symptoms of any of the serious risks.    Provided each patient a copy of the Medication Guide and Patient Brochure.      Was entire vial of medication used? Yes  Vial/Syringe: Syringe  Expiration Date:  04/2024  Was the medication not being billed by clinic? No     Patient tolerated injection without incident and discharged home.  Lavonne Bates RN on 2/17/2022 at 1:17 PM

## 2022-04-06 PROBLEM — I63.9 CEREBROVASCULAR ACCIDENT (CVA), UNSPECIFIED MECHANISM (H): Status: ACTIVE | Noted: 2022-01-01

## 2022-04-06 NOTE — PLAN OF CARE
Neuro:  A&Ox4, forgetful. Following commands. Neuros slightly improving. R-gaze deviation resolving, L-sided vision improving. L-tounge deviation and L-facial droop. Speech slurred but improving. L-sided awareness improving. Possible occasional localized movements of LLE, withdrawal in LUE.   Fever/Pain: Afebrile. Denies pain.   CV:  A-fib, 80-120s, titrating dilt gtt PRN. SPBs<160 without intervention. Sheath groin site WDL.  Pulm: Lung sounds diminished/clear. SpO2=low-mid 90s on RA.  : Bob for strict I&Os, adequate urine output.   GI: NPO pending swallow eval. One small, loose BM this shift.   Skin: Pre-existing pressure injury on coccyx. Bruise forming on L-cheek. Lower L-lip swelling.  Plan: Continue to monitor neuros closely. Follow up MRI tomorrow (Thurs). Family at bedside updated on POC.       Goal Outcome Evaluation:    Plan of Care Reviewed With: patient, son     Overall Patient Progress: improving

## 2022-04-06 NOTE — CONSULTS
"      [facility]    Stroke Consult Note    Reason for Consult: Stroke Code     Chief Complaint: One-sided Weakness    HPI  Mia Rodriguez is a 88 year old female hx TIA, CKD, afib on Eliquis 2.5mg bid, HTN, CAD, lives at home with helpers coming in mRS 2, woke up 0530, LKN 0700 fell in kitched, pressed life alert, found down with R gaze, L facial droop, L weakness, dysarthric speech.    CTH no bleed or stroke ASPECT 10, CTA H+N shows R distal M1 occlusion, possible soft plaque vs tyhrombus R ICA, significant aortic and R carotid disease. CTP shows 9ml core, 182ml penumbra in RMCA territory, mismatch ratio 20.     Patient in afib.    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 Not TNK candidate 2/2 Eliquis therapy, CTH shows no infarct, CTP shows small core with alrge penumbra, mismatch ratio 20. Dx and treatment discussed with patient and family, taken to IR.    Recommendations  - Use orderset: \"Ischemic Stroke Routine Admission\" or \"Ischemic Stroke No Thrombolytics/No Thrombectomy ICU Admission\"  - Neurochecks and Vital Signs every q2h   - Permissive HTN; Goal per IR  - Daily aspirin 81 mg for secondary stroke prevention - time tos tart per IR  - Statin: Atorvastatin 40mg qhs  - MRI Brain with and without contrast  - TTE (with Bubble Study if age 60 yrs or less)  - Telemetry, EKG  - Bedside Glucose Monitoring  - A1c, Lipid Panel, " "Troponin x 3  - PT/OT/SLP  - Stroke Education  - Euthermia, Euglycemia    Patient Follow-up    - final recommendation pending work-up    Thank you for this consult. We will continue to follow.     The Stroke Staff is Dr. Mustafa.    Jesu Cates MD  Vascular Neurology Fellow  To page me or covering stroke neurology team member, click here: [email or web address]   Choose \"On Call\" tab at top, then search dropdown box for \"Neurology Adult\", select location, press Enter, then look for stroke/neuro ICU/telestroke.    ______________________________________________________    Clinically Significant Risk Factors Present on Admission              # Coagulation Defect: home medication list includes an anticoagulant medication             Past Medical History   Past Medical History:   Diagnosis Date     Arrhythmia     atrial fib.-on coumadin     Atrial fibrillation (H)      Breast cancer (H)      COPD (chronic obstructive pulmonary disease) (H)      COPD exacerbation (H) 9/2/2015     Coronary artery disease      Hypertension      Malignant neoplasm (H) 6/2/2008    Right Breast Cancer     Osteoporosis      Renal disease     kidney stones     Thyroid disease     Hypothyroid     Unspecified cerebral artery occlusion with cerebral infarction     TIA     Uterine fibroid      Past Surgical History   Past Surgical History:   Procedure Laterality Date     BIOPSY  6/2/2008    Left /RightBreast Biopsy     BIOPSY  6/1/2010    Right Breast Biopsy     BREAST SURGERY  6/12/2008    Right Breast Lumpectomy     CYSTOSCOPY, DILATE URETHRA, COMBINED  10/5/2012    Procedure: COMBINED CYSTOSCOPY, DILATE URETHRA;  urethral dilation;  Surgeon: Kaushal Harris MD;  Location:  OR     CYSTOSCOPY, RETROGRADES, EXTRACT STONE, COMBINED Right 4/16/2015    Procedure: COMBINED CYSTOSCOPY, RETROGRADES, EXTRACT STONE;  Surgeon: Kaushal Harris MD;  Location:  OR     EXCISE LESION LIP N/A 10/22/2014    Procedure: EXCISE LESION LIP;  Surgeon: Samreen, " Brent GREGORY MD;  Location: [facility]     GYN SURGERY  1981    LISA with ovaries intact-fibroid     LASER HOLMIUM LITHOTRIPSY URETER(S), INSERT STENT, COMBINED  10/5/2012    Procedure: COMBINED CYSTOSCOPY, URETEROSCOPY, LASER HOLMIUM LITHOTRIPSY URETER(S), INSERT STENT;  RIGHT URETEROSCOPY ,right ureteral biopsy,WITH LASER HOLMIUM LITHOTRIPSY, INSERT STENT RIGHT URETER;  Surgeon: Kaushal Harris MD;  Location:  OR     LASER HOLMIUM LITHOTRIPSY URETER(S), INSERT STENT, COMBINED Right 4/16/2015    Procedure: COMBINED CYSTOSCOPY, URETEROSCOPY, LASER HOLMIUM LITHOTRIPSY URETER(S), INSERT STENT;  Surgeon: Kaushal Harris MD;  Location:  OR     Medications   Home Meds  Prior to Admission medications    Medication Sig Start Date End Date Taking? Authorizing Provider   albuterol (PROAIR HFA/PROVENTIL HFA/VENTOLIN HFA) 108 (90 Base) MCG/ACT inhaler Inhale 2 puffs into the lungs every 4 hours as needed for shortness of breath / dyspnea or wheezing    Reported, Patient   apixaban ANTICOAGULANT (ELIQUIS) 2.5 MG tablet Take 1 tablet (2.5 mg) by mouth 2 times daily 1/31/22   Xuan Correa APRN CNP   atorvastatin (LIPITOR) 20 MG tablet Take 1 tablet (20 mg) by mouth At Bedtime 1/31/22   Xuan Correa APRN CNP   denosumab (PROLIA) 60 MG/ML SOLN Inject 60 mg Subcutaneous. q 6 months    Reported, Patient   diltiazem ER (DILT-XR) 120 MG 24 hr capsule Take 1 capsule (120 mg) by mouth daily 1/31/22   Xuan Correa APRN CNP       Scheduled Meds    denosumab  60 mg Subcutaneous Q6 Months       Infusion Meds    heparin 2 units/mL in 0.9% NaCl TABLE SOLN       heparin 2 units/mL in 0.9% NaCl TABLE SOLN         PRN Meds  fentaNYL, flumazenil, heparin 2 units/mL in 0.9% NaCl TABLE SOLN, heparin 2 units/mL in 0.9% NaCl TABLE SOLN, lidocaine (buffered or not buffered), midazolam, naloxone **OR** naloxone **OR** naloxone **OR** naloxone    Allergies   Allergies   Allergen Reactions     Digoxin Other (See Comments)      Weakness, SOB     Megace [Megestrol Acetate]      Increased LFTs       Family History   Family History   Problem Relation Age of Onset     Alzheimer Disease Mother      Osteoporosis Mother      C.A.D. Father      Cardiovascular Father      Cerebrovascular Disease Father      Breast Cancer Maternal Grandmother      Unknown/Adopted Maternal Grandfather      Unknown/Adopted Paternal Grandmother         old age     Cancer Paternal Grandfather         stomach     Social History   Social History     Tobacco Use     Smoking status: Former Smoker     Packs/day: 1.00     Years: 30.00     Pack years: 30.00     Types: Cigarettes     Start date:      Quit date: 1989     Years since quittin.3     Smokeless tobacco: Never Used     Tobacco comment: Started:  Stopped:   Substance Use Topics     Alcohol use: No     Alcohol/week: 0.0 standard drinks     Drug use: No       Review of Systems   The 10 point Review of Systems is negative other than noted in the HPI or here.        PHYSICAL EXAMINATION  Pulse:  [93-97] 97  Resp:  [16-27] 27  BP: (180-186)/(104-117) 180/104  SpO2:  [89 %-94 %] 89 %     Neurologic  Mental Status:  alert, oriented x 3, follows commands, dysarthric speech, naming and repetition normal  Cranial Nerves:  L field cut, PERRL, R gaze deviation, facial sensation intact and symmetric, L droop, hearing not formally tested but intact to conversation, palate elevation symmetric and uvula midline  Motor:  RUE RLE 5/5 LUE LLE 3/5  Reflexes:  toes down-going  Sensory:  Loss sensation LUE and LE to LT  Coordination:  normal finger-to-nose and heel-to-shin bilaterally without dysmetria, rapid alternating movements symmetric  Station/Gait:  deferred   L neglect    Dysphagia Screen  Per Nursing    Stroke Scales    NIHSS  1a. Level of Consciousness 0-->Alert, keenly responsive   1b. LOC Questions 0-->Answers both questions correctly   1c. LOC Commands 0-->Performs both tasks correctly   2.   Best Gaze 2-->Forced  deviation, or total gaze paresis not overcome by the oculocephalic maneuver   3.   Visual 2-->Complete hemianopia   4.   Facial Palsy 2-->Partial paralysis (total or near-total paralysis of lower face)   5a. Motor Arm, Left 2-->Some effort against gravity, limb cannot get to or maintain (if cued) 90 (or 45) degrees, drifts down to bed, but has some effort against gravity   5b. Motor Arm, Right 0-->No drift, limb holds 90 (or 45) degrees for full 10 secs   6a. Motor Leg, Left 2-->Some effort against gravity, leg falls to bed by 5 secs, but has some effort against gravity   6b. Motor Leg, right 0-->No drift, leg holds 30 degree position for full 5 secs   7.   Limb Ataxia 0-->Absent   8.   Sensory 1-->Mild-to-moderate sensory loss, patient feels pinprick is less sharp or is dull on the affected side, or there is a loss of superficial pain with pinprick, but patient is aware of being touched   9.   Best Language 0-->No aphasia, normal   10. Dysarthria 2-->Severe dysarthria, patients speech is so slurred as to be unintelligible in the absence of or out of proportion to any dysphasia, or is mute/anarthric   11. Extinction and Inattention  1-->Visual, tactile, auditory, spatial, or personal inattention or extinction to bilateral simultaneous stimulation in one of the sensory modalities   Total 14 (04/06/22 0905)       Modified DuPage Score (Pre-morbid)  2 - Slight disability.  Able to look after own affairs without assistance, but unable to carry out all previous activities.    Imaging  I personally reviewed all imaging; relevant findings per HPI.     Lab Results Data   CBC  Recent Labs   Lab 04/06/22  0827   WBC 11.0   RBC 4.88   HGB 15.8*   HCT 49.0*        Basic Metabolic Panel    Recent Labs   Lab 04/06/22  0827      POTASSIUM 3.6   CHLORIDE 113*   CO2 24   BUN 23   CR 0.81   *   CYNDEE 9.1     Liver Panel  No results for input(s): PROTTOTAL, ALBUMIN, BILITOTAL, ALKPHOS, AST, ALT, BILIDIRECT in the  last 168 hours.  INR    Recent Labs   Lab Test 10/22/14  1228 10/02/14  1136 02/10/14  0755   INR 1.03 1.78* 1.80*      Lipid Profile    Recent Labs   Lab Test 01/15/19  0927 01/12/18  0821 05/06/16  1554 02/09/14  0750 02/08/14  1735   CHOL 158 188 149 219* 286*   HDL 51 65 71 63 68   LDL 88 97 64 137* 192*   TRIG 93 128 69 99 133   CHOLHDLRATIO  --   --   --  3.5 4.2     A1C  No lab results found.  Troponin I    Recent Labs   Lab 04/06/22  0827   TROPONINIS 14          Stroke Code Data Data   Stroke Code Data  (for stroke code without tele)  Stroke code activated 04/06/22   0824   First stroke provider response 04/06/22   0829   Last known normal 04/06/22   0700   Time of discovery   (or onset of symptoms) 04/06/22   0700   Head CT read by Stroke Neuro Dr/Provider 04/06/22   0836   Was stroke code de-escalated? No

## 2022-04-06 NOTE — H&P
[facility]  Hospitalist History and Physical    Name: Mia Rodriguez    MRN: [id number]  YOB: 1934    Age: 88 year old  Date of Admission:  4/6/2022  Physician:  Thiago Cortez DO, FHM  Securely message with the Vocera Web Console (learn more here)  Text page via [facility] Paging/Directory         Assessment & Plan   Mia Rodriguez is a 88 year old female who presented to the [facility] ER with acute neuro changes and associated Fall.  She was felt to have an acute CVA and urgently taken to radiology for embolectomy.    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  Discussed case with neurology this afternoon.  They recommended systolic goal -160 range.  No additional anticoag today, presented on Eliquis.  Further anticoag decisions will need to be determined tomorrow.  Patient on bedrest for at least 6 hours as had some bleeding at the procedure site.  -   NPO initially, PT/OT/Speech consults.  Continue gentle non-dextrose IVF for now.  -  Continue close neuro monitoring, still appears to have significant left sided deficits/altered gaze.  -  Will determine follow-up brain imaging timing to neurology/neuroradiology    Abnormal UA:  - UA slightly abnormal, doubt UTI.  Culture pending, hold on abx.      Fall:  -  No overt majory injury.  Monitor.  Pelvic x-ray/cxr obtained.     Atrial fibrillation with RVR during procedure:  -  Continue cardizem drip started with procedure.  Titrate to HR  range while maintaining BP in target range.  -  Hold Eliquis    Hypothyroidism:  -  Hold levothyroxine initially, if cannot advance diet/take pills tomorrow consider IV    CKD, history of renal stones:  -  Monitor renal function, avoid nephrotoxic agents    COPD, smoking history:  -  No overt exacerbation.  Albuterol PRN.  Monitor.    Reflux:  -   Pepcid    Osteoporosis:  -  Monitor, baseline on prolia      Updated patients son at bedside, all questions answered.      COVID Status/Screening:  COVID-19 PCR Results    COVID-19 PCR Results 10/6/20 10/14/20 4/6/22   COVID-19 Virus PCR to [facility] - Result Not Detected Not Detected    COVID-19 Virus PCR to [facility] - Source Nasopharyngeal Nasopharyngeal    SARS CoV2 PCR   Negative      Comments are available for some flowsheets but are not being displayed.         COVID-19 Antibody Results, Testing for Immunity    COVID-19 Antibody Results, Testing for Immunity   No data to display.            DVT Prophylaxis: Pneumatic Compression Devices  Code Status: Full Code, confirmed with son.  Bob Catheter: PRESENT, indication:    Central Lines: None  Cardiac Monitoring: ACTIVE order. Indication: ICU    Disposition: Expect 2+ night hospital stay.    Primary Care Physician   Birgit Cordero, [phone]    Chief Complaint   Altered     History is obtained from records/family/ER proider, patient only able to minimally communicate.  I also spoke with the ER provider about the history.     History of Present Illness   Mia Rodriguez is a 88 year old female who presented after a fall with neuro changes.  Presents via EMS from home with stroke symptoms. Per EMS report, the patient woke up at about 0530 this morning and went to her kitchen for breakfast at about 0630. At about 0700 while eating some yogurt, her pendant registered a fall. Her neighbors found her on the floor in her kitchen unable to get back up, so they called EMS. EMS notes left-sided weakness, left facial droop, right deviated gaze, and slurred speech. She complained to them of nausea without vomiting.   After rapid stroke eval in ER she was sent for embolectomy.    Post procedure, she denies pain/SOB through nodding.  She cannot answer any detailed questions.    Past Medical History    Past Medical History:   Diagnosis Date     Arrhythmia     atrial  fib.-on coumadin     Atrial fibrillation (H)      Breast cancer (H)      COPD (chronic obstructive pulmonary disease) (H)      COPD exacerbation (H) 9/2/2015     Coronary artery disease      Hypertension      Malignant neoplasm (H) 6/2/2008    Right Breast Cancer     Osteoporosis      Renal disease     kidney stones     Thyroid disease     Hypothyroid     Unspecified cerebral artery occlusion with cerebral infarction     TIA     Uterine fibroid          Past Surgical History   Past Surgical History:   Procedure Laterality Date     BIOPSY  6/2/2008    Left /RightBreast Biopsy     BIOPSY  6/1/2010    Right Breast Biopsy     BREAST SURGERY  6/12/2008    Right Breast Lumpectomy     CYSTOSCOPY, DILATE URETHRA, COMBINED  10/5/2012    Procedure: COMBINED CYSTOSCOPY, DILATE URETHRA;  urethral dilation;  Surgeon: Kaushal Harris MD;  Location:  OR     CYSTOSCOPY, RETROGRADES, EXTRACT STONE, COMBINED Right 4/16/2015    Procedure: COMBINED CYSTOSCOPY, RETROGRADES, EXTRACT STONE;  Surgeon: Kaushal Harris MD;  Location:  OR     EXCISE LESION LIP N/A 10/22/2014    Procedure: EXCISE LESION LIP;  Surgeon: Brent Jolley MD;  Location: [facility]     GYN SURGERY  1981    LISA with ovaries intact-fibroid     LASER HOLMIUM LITHOTRIPSY URETER(S), INSERT STENT, COMBINED  10/5/2012    Procedure: COMBINED CYSTOSCOPY, URETEROSCOPY, LASER HOLMIUM LITHOTRIPSY URETER(S), INSERT STENT;  RIGHT URETEROSCOPY ,right ureteral biopsy,WITH LASER HOLMIUM LITHOTRIPSY, INSERT STENT RIGHT URETER;  Surgeon: Kaushal Harris MD;  Location:  OR     LASER HOLMIUM LITHOTRIPSY URETER(S), INSERT STENT, COMBINED Right 4/16/2015    Procedure: COMBINED CYSTOSCOPY, URETEROSCOPY, LASER HOLMIUM LITHOTRIPSY URETER(S), INSERT STENT;  Surgeon: Kaushal Harris MD;  Location:  OR        Prior to Admission Medications   Prior to Admission Medications   Prescriptions Last Dose Informant Patient Reported? Taking?   albuterol (PROAIR  HFA/PROVENTIL HFA/VENTOLIN HFA) 108 (90 Base) MCG/ACT inhaler   Yes No   Sig: Inhale 2 puffs into the lungs every 4 hours as needed for shortness of breath / dyspnea or wheezing   apixaban ANTICOAGULANT (ELIQUIS) 2.5 MG tablet   No No   Sig: Take 1 tablet (2.5 mg) by mouth 2 times daily   atorvastatin (LIPITOR) 20 MG tablet   No No   Sig: Take 1 tablet (20 mg) by mouth At Bedtime   denosumab (PROLIA) 60 MG/ML SOLN  Self Yes No   Sig: Inject 60 mg Subcutaneous. q 6 months   diltiazem ER (DILT-XR) 120 MG 24 hr capsule   No No   Sig: Take 1 capsule (120 mg) by mouth daily      Facility-Administered Medications Last Administration Doses Remaining   denosumab (PROLIA) injection 60 mg 2022  1:15 PM 1        Allergies   Allergies   Allergen Reactions     Digoxin Other (See Comments)     Weakness, SOB     Megace [Megestrol Acetate]      Increased LFTs         Social History   Social History     Tobacco Use     Smoking status: Former Smoker     Packs/day: 1.00     Years: 30.00     Pack years: 30.00     Types: Cigarettes     Start date:      Quit date: 1989     Years since quittin.3     Smokeless tobacco: Never Used     Tobacco comment: Started:  Stopped:   Substance Use Topics     Alcohol use: No     Alcohol/week: 0.0 standard drinks     Family History   Reviewed, non-contributory.    Review of Systems   A Comprehensive greater than 10 system review of systems attempted, unable to obtain with patient limited communication.    Physical Exam       BP: (!) 147/88 Pulse: 81   Resp: 19 SpO2: 96 % O2 Device: Nasal cannula Oxygen Delivery: 2 LPM  Vital Signs with Ranges  Pulse:  [] 81  Resp:  [14-30] 19  BP: (141-208)/() 147/88  SpO2:  [87 %-100 %] 96 %  82 lbs 10.76 oz    GEN:  Alert, orientation hard to determine, appears ill but comfortable.  HEENT:  Normocephalic/atraumatic, no scleral icterus, no nasal discharge, mouth moist.  CV:  Irreg Irreg with rate around 80.  No loud murmur/rub.  LUNGS:   Clear to auscultation bilaterally without rales/rhonchi/wheezing/retractions.  Mildly decreased breath sounds bases.  Symmetric chest rise on inhalation noted.  ABD:  Active bowel sounds, soft, non-tender/non-distended.  No guarding/rigidity.  Groin procedure site with some swelling but no overt ongoing bleeding noted.  EXT:  No edema.  No cyanosis.  No acute joint synovitis noted.  SKIN:  Dry to touch, no exanthems noted in the visualized areas.  NEURO:  Moves right side well on request and has good strength.  She was not able to move left side and also appeared to have neglect.  Continued to also have right gaze.    Data   Data reviewed today:  I personally reviewed the EKG tracing showing afib.    Recent Labs   Lab 04/06/22  0827   WBC 11.0   HGB 15.8*   HCT 49.0*           Recent Labs   Lab 04/06/22  1610 04/06/22  0827   NA  --  144   POTASSIUM  --  3.6   CHLORIDE  --  113*   CO2  --  24   ANIONGAP  --  7   * 121*   BUN  --  23   CR  --  0.81   GFRESTIMATED  --  69   CYNDEE  --  9.1   MAG  --  1.9     Recent Labs   Lab 04/06/22  0827   TROPONINIS 14     Recent Labs   Lab 04/06/22  1502   COLOR Light Yellow   APPEARANCE Clear   URINEGLC Negative   URINEBILI Negative   URINEKETONE Trace*   SG 1.010   UBLD Small*   URINEPH 5.5   PROTEIN 20 *   NITRITE Negative   LEUKEST Moderate*   RBCU 15*   WBCU 39*       Recent Results (from the past 24 hour(s))   CT Head w/o Contrast    Narrative    CT OF THE HEAD WITHOUT CONTRAST 4/6/2022 8:36 AM     COMPARISON: Brain MR 2/9/2014.    HISTORY: Neuro deficit, acute, stroke suspected. Code Stroke.    TECHNIQUE: 5 mm thick axial CT images of the head were acquired  without IV contrast material.    FINDINGS: There is moderate diffuse cerebral volume loss. There are  extensive confluent areas of decreased density in the cerebral white  matter bilaterally that are consistent with sequela of chronic small  vessel ischemic disease.    The ventricles and basal  cisterns are within normal limits in  configuration given the degree of cerebral volume loss.  There is no  midline shift. There are no extra-axial fluid collections.    No intracranial hemorrhage, mass or recent infarct.    The visualized paranasal sinuses are well-aerated. There is no  mastoiditis. There are no fractures of the visualized bones.      Impression    IMPRESSION: Diffuse cerebral volume loss and cerebral white matter  changes consistent with chronic small vessel ischemic disease. No  evidence for acute intracranial pathology.    Radiation dose for this scan was reduced using automated exposure  control, adjustment of the mA and/or kV according to patient size, or  iterative reconstruction technique.     CONRAD BURGOS MD         SYSTEM ID:  [id number]   CTA Head Neck with Contrast    Narrative    CT ANGIOGRAM OF THE HEAD AND NECK WITHOUT AND WITH CONTRAST  4/6/2022  8:39 AM     COMPARISON: None.    HISTORY: Neuro deficit, acute, stroke suspected. Code Stroke. Left  facial droop. Left-sided weakness.    TECHNIQUE:  Precontrast localizing scans were followed by CT  angiography with an injection of 70mL Isovue-370 nonionic intravenous  contrast material with scans through the head and neck.  Images were  transferred to a separate 3-D workstation where multiplanar  reformations and 3-D images were created.  Estimates of carotid  stenoses are made relative to the distal internal carotid artery  diameters except as noted.      FINDINGS: Incidental note is made of severe emphysematous change in  the upper lungs bilaterally as well as small bilateral pleural  effusions.    Neck CTA: The common carotid and internal carotid arteries bilaterally  are patent without stenosis. There is heterogeneous density in the  carotid bulbs bilaterally that likely simply represent swirling of  contrast opacified blood with noncontrast opacified blood. There is  delayed filling of the distal right internal carotid artery likely  due  to outflow obstruction related to the right middle cerebral artery  occlusion noted on the head CTA. The congenitally dominant right  vertebral and congenitally nondominant left vertebral arteries are  patent without stenosis.    Head CTA: There is abrupt occlusion of flow at the right MCA  trifurcation consistent with thromboembolic occlusion that results in  delayed filling of the intracranial distal right internal carotid  artery. The basilar, intracranial distal left internal carotid,  bilateral anterior cerebral, bilateral middle cerebral and bilateral  posterior cerebral arteries are patent and unremarkable.      Impression    IMPRESSION:  1. Probable thromboembolic occlusion at the right MCA trifurcation.  This results in delayed filling of the distal right internal carotid  artery likely due to outflow obstruction.  2. Otherwise, normal neck and head CTA.    This imaging study was discussed with the ordering provider, Dr. JEREMY ALDANA, by Dr. Contreras on 4/6/2022 8:49 AM.    Radiation dose for this scan was reduced using automated exposure  control, adjustment of the mA and/or kV according to patient size, or  iterative reconstruction technique.     CONRAD CONTRERAS MD         SYSTEM ID:  [id number]   Cervical spine CT w/o contrast    Narrative    CT OF THE CERVICAL SPINE WITHOUT CONTRAST   4/6/2022 8:50 AM     COMPARISON: None    HISTORY: Fall     TECHNIQUE: Axial images of the cervical spine were acquired without  intravenous contrast. Multiplanar reformations were created.        Impression    IMPRESSION: Minimal degenerative retrolisthesis of C2 upon C3 and C4  upon C5. Minimal degenerative anterolisthesis of C7 upon T1. Alignment  of the cervical vertebrae is otherwise normal. Vertebral body heights  of the cervical spine are normal. Craniocervical alignment is normal.  There are no fractures of the cervical spine. Inferior endplate  deformity of T2 is again noted and does not appear appreciably  changed  from a chest CT from 3/23/2021 and likely represents a degenerative  Schmorl's node deformity. No spinal canal stenosis. No prevertebral  soft tissue swelling.      Radiation dose for this scan was reduced using automated exposure  control, adjustment of the mA and/or kV according to patient size, or  iterative reconstruction technique    CONRAD BURGOS MD         SYSTEM ID:  [id number]   CT Head Perfusion w Contrast    Narrative    CT BRAIN PERFUSION 4/6/2022 8:53 AM    COMPARISON: Head and neck CT angiogram on same day.    HISTORY: Evaluate mismatch between penumbra and core infarct; Code  stroke.    TECHNIQUE: Time sequential axial CT images of the head were acquired  during the administration of intravenous contrast (50 mL Isovue-370).  CTA images of the [place] as well as color perfusion maps of  the brain were created from this time sequential axial source data.      Impression    IMPRESSION: There is delayed arrival of the contrast bolus throughout  the majority of the right middle cerebral and right anterior cerebral  artery territories consistent with the right middle cerebral artery  occlusion resulting in delayed filling of the right internal carotid  circulation noted on the accompanying CT angiogram. There is decreased  cerebral blood flow throughout the same distribution. There is  decreased cerebral blood volume in the cortex and white matter of the  mid right frontal lobe as well as at the lateral aspect of the right  temporal lobe worrisome for infarct. No other perfusion defects.    Radiation dose for this scan was reduced using automated exposure  control, adjustment of the mA and/or kV according to patient size, or  iterative reconstruction technique    CONRAD BURGOS MD         SYSTEM ID:  [id number]   IR Carotid Cerebral Angiogram Bilateral    Narrative    [id number]  MIA BERNABE  2/25/1934  88 years    History:  Mia Bernabe is a 88 year old female?hx TIA, CKD,  afib on  Eliquis 2.5mg bid, HTN, CAD, lives at home with helpers coming in mRS  2, woke up 0530, LKN 0700 fell in kitchen, pressed life alert, found  down with R gaze, L facial droop, L weakness, dysarthric speech.?NIHSS  14.?CT?Head with?ASPECT 10, CTA Head reveals??R distal M1 occlusion,  possible soft plaque vs thrombus R ICA. CTP shows?significant mismatch  with small core volume of core infarct. Consented for Mechanical  thrombectomy.    : Giovanny Peterson MD  [place]: Daxa Schulz MD; Storm ROJAS    Anesthesia: Local anesthesia and conscious sedation were provided  Contrast used: 63cc of VISI 320  Fluoro time: 29.2minutes  Radiation dose: = 1544mGy  Sedation time: 75minutes  Sedatives: Versed 1.5 milligram and fentanyl 75 micrograms  Other medications: None  IV zofran 4 mg ; IV metoprolol 5 mg; Diltiazem drip started at 5 mg/hr    Procedure:   1. Diagnostic cerebral angiography and interpretation of the images.  2. Ultrasound guided right common femoral arteriotomy with permanent  image of the anatomy stored in the electronic medical record.  3. Diagnostic cerebral angiography of the right internal carotid  artery and right middle cerebral artery.  4. Mechanical thrombectomy of the right middle cerebral artery.  5. Diagnostic angiography of the right common femoral artery.     Time course:  Stroke Code time: 8 AM  IR notification time: 845 AM  IR Team activation time:845 AM  Infusion IV tpa time:NA    0930: Needle stick, 1ml 1% lidocaine  0932: Right femoral access 8f sheath  0958:?1st pass?with Penumbra, off at 1002  1016: 2nd pass, off at 1024  1032: 3rd pass  1037: Reperfusion obtained, TICI 2B  1048: 8f sheath out      Procedure note and findings: The risks and benefits of the procedure  were discussed with the patient prior to the procedure. There is which  were specifically discussed included risk of worsening stroke,  arterial dissection, intracranial hemorrhage, renal failure and  contrast  allergy. Subsequently, both written and verbal informed  consent were obtained from the patient. The patient was transported to  the angiography suite and placed supine on the angiography table. The  skin over the groins was prepped and draped in the usual sterile  fashion. Under ultrasound guidance, the right common femoral artery  was noted to be patent. Thereafter, under continuous ultrasound  guidance, the right common femoral artery was punctured using a  micropuncture needle. The micropuncture needle was exchanged over the  micropuncture wire for micropuncture sheath. The micropuncture sheath  was exchanged over a 1 80 cm Bentson wire for a 8 [place] short  arterial sheath which was connected to a pressurized infusion of  heparinized saline.    An 8 [place] Isidro balloon guide catheter was prepped outside the  patient. This was advanced over a 5 [place] 1 25 cm Sim 2 catheter and  an angled guidewire through the groin sheath into the descending  thoracic aorta. Using the Hoff 2 catheter and angled Glidewire, the  right common carotid artery and right internal carotid artery were  selected. The body balloon guide catheter was then advanced over the  Hoff 2 catheter into the right internal carotid artery.    A diagnostic angiogram was performed which revealed right middle  cerebral artery distal M1 occlusion,   The body balloon guide was then connected to a  pressurized infusion of heparinized saline through a rotating  hemostatic valve.  ?  Thereafter, a 6 [place]an 131 cm Rose intermediate catheter was  advanced through the body balloon guide catheter. We then advanced the  headway 21 microcatheter over a Synchro standard microwire into the  Rose intermediate catheter. Under roadmap guidance, the headway 21  microcatheter was then advanced into the occluded M1 segment of the  right middle cerebral artery. A continuous pressurized infusion of  heparinized saline was maintained in the catheter microcatheter  system  using a series of rotating hemostatic valves.    Headway 21 microcatheter catheter and Rose distal access catheter  were advanced over Synchro 2 wire to the tip of shuttle. The  microcatheter was further advanced intracranially over the microwire  through the M1 and then M2 segment of MCA passing the clot with the  wire first followed by the microcatheter. The Solitaire stent  retriever 4 X 40 mm was advanced through the microcatheter and was  deployed at the site of occlusion by gradually removing the  microcatheter. We tried to advance the 6F mauricio catheter to abut the  occlusion, however we were having difficulty advancing it. It finally  came up as we unsheathed the stent retriever, and rose was parked at  the level of proximal site of occlusion and was connected to penumbra  aspiration device. After about 2-3 minutes, the stent retriever was  taken out under negative suction along with the Mauricio and headway 21  catheter. A small clot was seen within the strands of stent retriever.  The stent retriever was re-sheathed, and the second and third pass was  performed in a similar fashion using the same system mentioned above  except with a 5F 125 cm rose so that the rose catheter could abut  the clot more securely. After 3 passes the filling defect resolved and  there was recanalization of the M1 segment of right middle cerebral  artery with partial recanalization of the right angular artery. there  remained a perfusion defect in the right parietal lobe.  This was  suggestive of successful modified TICI 2B recanalization.   ?  Thereafter, the Mauricio intermediate catheter was removed the augustin  guide catheter was removed. Manual contrast injection was performed  through the 8 [place] arterial sheath evaluate the right common femoral  arteriotomy. Hemostasis was obtained with manual compression of the  femoral artery  Patient was then transferred to neuro-critical care  unit in stable  condition.    FINDINGS: ?    Right internal carotid artery injection: Cranial view   Biplane angiography was performed over the cranium. Intracranial view  of the right internal carotid artery injection in the AP and lateral   projections demonstrates normal petrous, laceral, cavernous,  clinoidal, ophthalmic, and communicating segments of the right  internal carotid artery. The right internal carotid artery bifurcates  into the right anterior cerebral artery and right middle cerebral  artery. Right anterior cerebral artery has normal origin and there is  no contralateral anterior cerebral artery filling through anterior  communicating artery. There is a contrast stagnation with abrupt cut  off in right middle cerebral artery M1 segment due to occlusive  thrombus suggestive of large vessel occlusion.   ??  Series #1 and Series #2: Right internal carotid artery injection with  early bifurcation of right middle cerebral artery with occlusive  thrombus in superior division in M1 segment.   Series #7: Microinjection through the microcatheter placed in the  inferior division M2 branch confirming the position of the catheter  distal to the clot just prior to deployment of Solitaire stent  retriever.  Series #9: Right internal carotid artery injection post first pass of  Solitaire stent retriever that reveals persistent M1 occlusion with  some recanalization of mid M1 clot.   Series # 13: Right internal carotid artery injection post second pass  of Solitaire stent retriever that reveals persistent M1 occlusion with  some additional recanalization of mid M1 clot.   Series 16-18:   Right internal carotid artery injection post third pass of Solitaire  stent retriever that reveals recanalization of of the right M1  occlusion, and now TICI 2 B recanalization, there is partial  recanalization of the angular branch of the right MCA.   There is perfusion deficit in the right parietal lobe region as seen  on lateral view.    Right  common carotid artery injection: Cervical view  Under fluoroscopic guidance, the Isidro  was brought down carefully  into the right common carotid artery. Biplane angiography was  performed over the neck. Cervical view of the right common carotid  artery in the AP and lateral projections (Series #19) demonstrates a  normal right common carotid, calcified right internal carotid artery  bifurcation without significant stenosis and normal right external  carotid artery and its branches. The common carotid artery bifurcation  is at the C5 level. There was no evidence of dissection,  extravasation, or filling defect.    Right common femoral artery injection: Pelvic view   Angiography was performed over the right pelvis by injection of the  sheath. Pelvic view of the right common femoral artery in the PANDYA  projection demonstrates a severely atherosclerotic right common  femoral artery, atherosclerotic superficial femoral artery, and  atherosclerotic deep femoral artery. The insertion point of the sheath  is located above the bifurcation. No dissection or pseudoaneurysm is  visualized.     Dr. Giovanny Peterson MD was present for the entire procedure.   ?    Impression    Impression:   Successful mechanical thrombectomy of the right M1 MCA with TICI 2b  recanalization after 3 passes of Solitaire accompanied with penumbra  aspiration technique. There is a partial recanalization of the right  angular artery in the M3 segment with a right parietal lobe perfusion  deficit.     Patient has severely atherosclerotic right common femoral artery and  severely calcified and atherosclerotic right superficial and deep  femoral arteries. Guillermo pressure was held to achieve hemostasis.    Daxa Schulz MD  Endovascular Surgical Neuroradiology Fellow  Pager: [phone]   CT Head w/o Contrast    Narrative    CT OF THE HEAD WITHOUT CONTRAST 4/6/2022 11:35 AM     COMPARISON: Head CT same day.    HISTORY: Stroke, follow up; 88yr post  mechanical thrombectomy with  TICI 2b revascularization.    TECHNIQUE: 5 mm thick axial CT images of the head were acquired  without IV contrast material.    FINDINGS: There is contrast staining of the right basal ganglia  endovascular procedure. There is a questionable tiny focus of  parenchymal hemorrhage at the high posteromedial right frontal lobe  (series 701, image 26). Continued surveillance recommended. No other  evidence for intracranial hemorrhage. There is moderate diffuse  cerebral volume loss. There are subtle patchy areas of decreased  density in the cerebral white matter bilaterally that are consistent  with sequela of chronic small vessel ischemic disease. The ventricles  and basal cisterns are within normal limits in configuration given the  degree of cerebral volume loss. There is no midline shift. There are  no extra-axial fluid collections.    No intracranial mass or recent infarct.    The visualized paranasal sinuses are well-aerated. There is no  mastoiditis. There are no fractures of the visualized bones.      Impression    IMPRESSION:   1. Focus of hyperdensity at the high posteromedial right frontal lobe  could represent a tiny focus of intracranial hemorrhage. Continued  surveillance recommended.  2. Diffuse cerebral volume loss and cerebral white matter changes  consistent with chronic small vessel ischemic disease.      Radiation dose for this scan was reduced using automated exposure  control, adjustment of the mA and/or kV according to patient size, or  iterative reconstruction technique    CONRAD BURGOS MD         SYSTEM ID:  [id number]

## 2022-04-06 NOTE — ED PROVIDER NOTES
History   Chief Complaint:  Stroke Symptoms     The history is provided by the EMS personnel and the patient. History limited by: dysarthria.      Mia Rodriguez is a 88 year old female with history of CKD, TIA, hypertension, CAD, and atrial fibrillation, currently anticoagulated on Eliquis, who presents via EMS from home with stroke symptoms. Per EMS report, the patient woke up at about 0530 this morning and went to her kitchen for breakfast at about 0630. At about 0700 while eating some yogurt, her pendant registered a fall. Her neighbors found her on the floor in her kitchen unable to get back up, so they called EMS. EMS notes left-sided weakness, left facial droop, right deviated gaze, and slurred speech. She complained to them of nausea without vomiting. They note a blood glucose of 101 and a blood pressure of 188/106. Here, the patient denies any pain including headache, neck pain, or chest pain.    Review of Systems   Unable to perform ROS: Other (dysarthria)   Cardiovascular: Negative for chest pain.   Gastrointestinal: Positive for nausea. Negative for vomiting.   Musculoskeletal: Negative for neck pain.   Neurological: Positive for facial asymmetry, speech difficulty and weakness (left side). Negative for headaches.     Allergies:  Digoxin  Megestrol Acetate    Medications:  Lipitor  Ellipta inhaler  Levalbuterol inhaler  Remeron  Albuterol inhaler  Eliquis  Prolia  Diltiazem     Past Medical History:     CKD  Diverticulitis  Breast cancer  Hypothyroidism  TIA  Hyperlipidemia  Atrial fibrillation  COPD  CAD  Hypertension  Osteoporosis  Thyroid disease  Uterine fibroid  Kidney stones  Osteoporosis       Past Surgical History:    Bilateral breast biopsy  Right breast lumpectomy  Cystoscopy and urethra dilation  Cystoscopy and stone extraction  Lip lesion excision  LISA  Lithotripsy with right ureteral stent placement    Family History:    Alzheimer's disease, mother  CAD, father  Stroke,  father    Social History:  Presents to ED alone.  Lives independently in private residence.     Physical Exam     Patient Vitals for the past 24 hrs:   BP Pulse Resp SpO2 Weight   04/06/22 0905 -- 109 30 93 % --   04/06/22 0900 -- 86 26 90 % --   04/06/22 0855 -- 97 27 (!) 89 % --   04/06/22 0852 (!) 180/104 93 -- 93 % --   04/06/22 0826 (!) 186/117 -- 16 94 % 37.5 kg (82 lb 10.8 oz)       Physical Exam  VS: Reviewed per above  HENT: Mucous membranes moist  EYES: sclera anicteric, pupil equal right gaze deviation  CV: Rate as noted, regular rhythm.   RESP: Effort normal. Breath sounds are normal bilaterally.  GI: no tenderness/rebound/guarding, not distended.  NEURO: GCS 14, presence or absence of facial weakness is limited due to cooperation and exam, left-sided weakness, right gaze deviation, dysarthria  MSK: No deformity of the extremities  SKIN: Warm and dry      Emergency Department Course     Imaging:  CT Head Perfusion w Contrast   Preliminary Result   IMPRESSION: There is delayed arrival of the contrast bolus throughout   the majority of the right middle cerebral and right anterior cerebral   artery territories consistent with the right middle cerebral artery   occlusion resulting in delayed filling of the right internal carotid   circulation noted on the accompanying CT angiogram. There is decreased   cerebral blood flow throughout the same distribution. There is   decreased cerebral blood volume in the cortex and white matter of the   mid right frontal lobe as well as at the lateral aspect of the right   temporal lobe worrisome for infarct. No other perfusion defects.      Radiation dose for this scan was reduced using automated exposure   control, adjustment of the mA and/or kV according to patient size, or   iterative reconstruction technique      Cervical spine CT w/o contrast   Preliminary Result   IMPRESSION: Minimal degenerative retrolisthesis of C2 upon C3 and C4   upon C5. Minimal degenerative  anterolisthesis of C7 upon T1. Alignment   of the cervical vertebrae is otherwise normal. Vertebral body heights   of the cervical spine are normal. Craniocervical alignment is normal.   There are no fractures of the cervical spine. Inferior endplate   deformity of T2 is again noted and does not appear appreciably changed   from a chest CT from 3/23/2021 and likely represents a degenerative   Schmorl's node deformity. No spinal canal stenosis. No prevertebral   soft tissue swelling.         Radiation dose for this scan was reduced using automated exposure   control, adjustment of the mA and/or kV according to patient size, or   iterative reconstruction technique      CTA Head Neck with Contrast   Preliminary Result   IMPRESSION:   1. Probable thromboembolic occlusion at the right MCA trifurcation.   This results in delayed filling of the distal right internal carotid   artery likely due to outflow obstruction.   2. Otherwise, normal neck and head CTA.      This imaging study was discussed with the ordering provider, Dr. JEREMY ALDANA, by Dr. Contreras on 4/6/2022 8:49 AM.      Radiation dose for this scan was reduced using automated exposure   control, adjustment of the mA and/or kV according to patient size, or   iterative reconstruction technique.       CT Head w/o Contrast   Final Result   IMPRESSION: Diffuse cerebral volume loss and cerebral white matter   changes consistent with chronic small vessel ischemic disease. No   evidence for acute intracranial pathology.      Radiation dose for this scan was reduced using automated exposure   control, adjustment of the mA and/or kV according to patient size, or   iterative reconstruction technique.       CONRAD CONTRERAS MD            SYSTEM ID:  [id number]      XR Chest Port 1 View    (Results Pending)   XR Pelvis 1/2 Views    (Results Pending)   IR Carotid Cerebral Angiogram Bilateral    (Results Pending)     Report per radiology    Laboratory:  Labs Ordered and  Resulted from Time of ED Arrival to Time of ED Departure   BASIC METABOLIC PANEL - Abnormal       Result Value    Sodium 144      Potassium 3.6      Chloride 113 (*)     Carbon Dioxide (CO2) 24      Anion Gap 7      Urea Nitrogen 23      Creatinine 0.81      Calcium 9.1      Glucose 121 (*)     GFR Estimate 69     CBC WITH PLATELETS AND DIFFERENTIAL - Abnormal    WBC Count 11.0      RBC Count 4.88      Hemoglobin 15.8 (*)     Hematocrit 49.0 (*)           MCH 32.4      MCHC 32.2      RDW 13.4      Platelet Count 180      % Neutrophils 75      % Lymphocytes 13      % Monocytes 7      % Eosinophils 3      % Basophils 1      % Immature Granulocytes 1      NRBCs per 100 WBC 0      Absolute Neutrophils 8.3      Absolute Lymphocytes 1.5      Absolute Monocytes 0.8      Absolute Eosinophils 0.3      Absolute Basophils 0.1      Absolute Immature Granulocytes 0.1      Absolute NRBCs 0.0     TROPONIN I - Normal    Troponin I High Sensitivity 14     GLUCOSE MONITOR NURSING POCT   ROUTINE UA WITH MICROSCOPIC REFLEX TO CULTURE   INR   PARTIAL THROMBOPLASTIN TIME   COVID-19 VIRUS (CORONAVIRUS) BY PCR      Emergency Department Course:     Reviewed:  I reviewed nursing notes, vitals, past medical history and Care Everywhere.    Assessments:  0812 I arrived to Stabilization 2.   0819 Patient arrived via EMS. I obtained history and examined the patient as noted above.   0822 Tier I stroke code activated.   0913 I rechecked the patient and explained findings.     Consults:  0837 I spoke with the stroke neurology team regarding the patient's presentation.   0848 I spoke with Dr. Contreras of radiology regarding the patient's imaging results.   0906 I spoke with Dr. Cortez of the hospitalist service, who accepts the patient for admission.     Disposition:  The patient was admitted to the hospital under the care of Dr. Cortez.     Impression & Plan     CMS Diagnoses: The patient has stroke symptoms:         ED Stroke specific  documentation           NIHSS PDF     Patient last known well time: 0530  ED Provider first to bedside at: 0819  CT Results received at: 0904    Thrombolytics:   Not given due to:   - DOAC dose within 48 hours or INR > 1.7    If treating with thrombolytics: Ensure SBP<180 and DBP<105 prior to treatment with thrombolytics.  Administering thrombolytics after treatment with IV labetalol, hydralazine, or nicardipine is reasonable once BP control is established.    Endovascular Retrieval:  Endovascular treatment initiated for R MCA occlusion    National Institutes of Health Stroke Scale (Baseline)  Time Performed: upon arrival    NIHSS 14    Medical Decision Making:  Patient presents to the ER for evaluation of dysarthria, right gaze deviation, left-sided weakness.  On arrival she is hypertensive to 180/104.  History is limited due to dysarthria.  She does have left-sided deficits.  Stroke code was called.  No external signs of trauma.  Initial imaging is concerning for right MCA occlusion.  Stroke neurology came to bedside and facilitated IR intervention.  Due to anticoagulation, patient is not a lytic candidate.  I was notified that pelvis and chest x-ray were still pending after patient had been transferred to IR.  Apparently EKG was performed and reviewed by my colleague but not by myself as well prior to transfer to IR.  Clinically I do not suspect pneumothorax or rib fracture or pelvic fracture.  These will be performed post intervention.  Labs available to me prior to transfer to IR are without marked derangement.    Critical care time: 20 minutes    Diagnosis:    ICD-10-CM    1. Cerebrovascular accident (CVA), unspecified mechanism (H)  I63.9      Scribe Disclosure:  IBeth, am serving as a scribe at 9:13 AM on 4/6/2022 to document services personally performed by Chaim Ellis MD based on my observations and the provider's statements to me.      Chaim Ellis MD  04/06/22 2881       Rhonda  MD Chaim  04/06/22 0937

## 2022-04-06 NOTE — ED TRIAGE NOTES
Pt lives indep. In her own home - alert button went off - neighbors checked on pt at approx at 0630 found pt on kitchen floor 911 called - paramedics found pt with left sided weakness left facial droop and slurred speech with gaze to the right

## 2022-04-06 NOTE — ED NOTES
Bed: ST02  Expected date:   Expected time:   Means of arrival:   Comments:  [facility] [place] - 433 - 88 F stroke alert eta [id number]

## 2022-04-06 NOTE — PROCEDURES
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 Giovanny Peterson  Secondary Surgeon:  Not applicable  Secondary Surgeon Review:  None  Fellow:  Dr Schulz, Dr Inman  Additional Assistants:     Prior to the start of the procedure and with procedural staff participation, I verbally confirmed: the patient s identity using two indicators, relevant allergies, that the procedure was appropriate and matched the consent or emergent situation, and that the correct equipment/implants were available. Immediately prior to starting the procedure I conducted the Time Out with the procedural staff and re-confirmed the patient s name, procedure, and site/side. (The Joint Commission universal protocol was followed.)  Yes    PRU value: Not applicable    Anesthesia:  Conscious Sedation  Medications:  Fentanyl 75mcg, Zofran 4mg, Metoprolol 5mg, Diltizem 5mg/hr, 1% Palin Lidocaine 1ml  Puncture site:  Right Femoral Artery    Fluoroscopy time (minutes):  29.2  Radiation dose (mGy):  1544.84    Contrast amount (mL):  63     Estimated blood loss (mL):  50    Closure:  Manual    Disposition:  Will be followed in hospital by the Neuro Critical Care/Stroke team.        Sedation Post-Procedure Summary    Sedatives: Midazolam (Versed) 1.5 mg    Vital signs and pulse oximetry were monitored and remained stable throughout the procedure, and sedation was maintained  until the procedure was complete.  The patient was monitored by staff until sedation discharge criteria were met.    Patient tolerance:  Patient tolerated the procedure well with no immediate complications.    Time of sedation in minutes:  75 minutes from beginning to end of physician one to one monitoring.    DEBBI Pringle  Neuro IR Fellow  Pager: [phone]

## 2022-04-06 NOTE — PROGRESS NOTES
[facility]     Endovascular Surgical Neuroradiology Pre-Procedure Note      HPI:  Mia Rodriguez is a 88 year old female hx TIA, CKD, afib on Eliquis 2.5mg bid, HTN, CAD, lives at home with helpers coming in mRS 2, woke up 0530, LKN 0700 fell in kitched, pressed life alert, found down with R gaze, L facial droop, L weakness, dysarthric speech. NIHSS 14. CT Head with ASPECT 10, CTA Head reveals  R distal M1 occlusion, possible soft plaque vs tyhrombus R ICA. CTP shows significant mismatch with small core volume of core infarct. Consented for Mechanical thrombectomy.    Medical History:  Past Medical History:   Diagnosis Date     Arrhythmia     atrial fib.-on coumadin     Atrial fibrillation (H)      Breast cancer (H)      COPD (chronic obstructive pulmonary disease) (H)      COPD exacerbation (H) 9/2/2015     Coronary artery disease      Hypertension      Malignant neoplasm (H) 6/2/2008    Right Breast Cancer     Osteoporosis      Renal disease     kidney stones     Thyroid disease     Hypothyroid     Unspecified cerebral artery occlusion with cerebral infarction     TIA     Uterine fibroid        Surgical History:  Past Surgical History:   Procedure Laterality Date     BIOPSY  6/2/2008    Left /RightBreast Biopsy     BIOPSY  6/1/2010    Right Breast Biopsy     BREAST SURGERY  6/12/2008    Right Breast Lumpectomy     CYSTOSCOPY, DILATE URETHRA, COMBINED  10/5/2012    Procedure: COMBINED CYSTOSCOPY, DILATE URETHRA;  urethral dilation;  Surgeon: Kaushal Harris MD;  Location:  OR     CYSTOSCOPY, RETROGRADES, EXTRACT STONE, COMBINED Right 4/16/2015    Procedure: COMBINED CYSTOSCOPY, RETROGRADES, EXTRACT STONE;  Surgeon: Kaushal Harris MD;  Location:  OR     EXCISE LESION LIP N/A 10/22/2014    Procedure: EXCISE LESION LIP;  Surgeon: Brent Jolley MD;  Location:  SD     GYN SURGERY  1981    LISA with ovaries intact-fibroid     LASER HOLMIUM LITHOTRIPSY  URETER(S), INSERT STENT, COMBINED  10/5/2012    Procedure: COMBINED CYSTOSCOPY, URETEROSCOPY, LASER HOLMIUM LITHOTRIPSY URETER(S), INSERT STENT;  RIGHT URETEROSCOPY ,right ureteral biopsy,WITH LASER HOLMIUM LITHOTRIPSY, INSERT STENT RIGHT URETER;  Surgeon: Kaushal Harris MD;  Location:  OR     LASER HOLMIUM LITHOTRIPSY URETER(S), INSERT STENT, COMBINED Right 2015    Procedure: COMBINED CYSTOSCOPY, URETEROSCOPY, LASER HOLMIUM LITHOTRIPSY URETER(S), INSERT STENT;  Surgeon: Kaushal Harris MD;  Location:  OR       Family History:  Family History   Problem Relation Age of Onset     Alzheimer Disease Mother      Osteoporosis Mother      C.A.D. Father      Cardiovascular Father      Cerebrovascular Disease Father      Breast Cancer Maternal Grandmother      Unknown/Adopted Maternal Grandfather      Unknown/Adopted Paternal Grandmother         old age     Cancer Paternal Grandfather         stomach       Social History:  Social History     Socioeconomic History     Marital status: Single     Spouse name: Not on file     Number of children: 4     Years of education: Not on file     Highest education level: Not on file   Occupational History     Not on file   Tobacco Use     Smoking status: Former Smoker     Packs/day: 1.00     Years: 30.00     Pack years: 30.00     Types: Cigarettes     Start date:      Quit date: 1989     Years since quittin.3     Smokeless tobacco: Never Used     Tobacco comment: Started:  Stopped:   Substance and Sexual Activity     Alcohol use: No     Alcohol/week: 0.0 standard drinks     Drug use: No     Sexual activity: Not Currently     Birth control/protection: Female Surgical     Comment: LISA   Other Topics Concern     Parent/sibling w/ CABG, MI or angioplasty before 65F 55M? No      Service Not Asked     Blood Transfusions Not Asked     Caffeine Concern Yes     Comment: 2 cups caffeine per day     Occupational Exposure Not Asked     Hobby Hazards Not  Asked     Sleep Concern Yes     Stress Concern No     Weight Concern No     Special Diet No     Back Care No     Exercise No     Bike Helmet Not Asked     Seat Belt Yes     Self-Exams Not Asked   Social History Narrative     Not on file     Social Determinants of Health     Financial Resource Strain: Not on file   Food Insecurity: Not on file   Transportation Needs: Not on file   Physical Activity: Not on file   Stress: Not on file   Social Connections: Not on file   Intimate Partner Violence: Not on file   Housing Stability: Not on file       Allergies:  Allergies   Allergen Reactions     Digoxin Other (See Comments)     Weakness, SOB     Megace [Megestrol Acetate]      Increased LFTs         Is there a contrast allergy?  No    Medications:  (Not in a hospital admission)  .    ROS:  The 10 point Review of Systems is negative other than noted in the HPI or here.     PHYSICAL EXAMINATION  Vital Signs:  B/P: 180/104,  T: Data Unavailable,  P: 97,  R: 27    Cardio:  RRR  Pulmonary:  no respiratory distress  Abdomen:  soft    Neurologic  Mental Status:  fully alert  Cranial Nerves:  Left Facial Plasy  Motor:  Left Hemiparesis  Sensory:  Not tested  Coordination:  Not tested    Pre-procedure National Institutes of Health Stroke Scale:      Score    Level of consciousness: (0)   Alert, keenly responsive    LOC questions: (2)   Answers neither question correctly    LOC commands: (2)   Performs neither task correctly    Best gaze: (2)   Forced deviation    Visual: (0)   No visual loss    Facial palsy: (2)   Partial paralysis (total/near total of lower face)    Motor arm (left): (4)   No movement    Motor arm (right): (0)   No drift    Motor leg (left): (2)   Some effort against gravity    Motor leg (right): (0)   No drift    Limb ataxia: (0)   Absent    Sensory: (0)   Normal- no sensory loss    Best language: (1)   Mild to moderate aphasia    Dysarthria: (1)   Mild to moderate dysarthria    Extinction and inattention: (0)    No abnormality        Total Score:  16       LABS  (most recent Cr, BUN, GFR, PLT, INR, PTT within the past 7 days):  Recent Labs   Lab 04/06/22  0827   CR 0.81   BUN 23   GFRESTIMATED 69           Platelet Function P2Y12 (PRU):  Not applicable      ASSESSMENT:    PLAN:        PRE-PROCEDURE SEDATION ASSESSMENT     Pre-Procedure Sedation Assessment done at 0900.    Expected Level:  Moderate Sedation    Indication:  Sedation is required to allow for neurointerventional procedure.    Consent obtained from relative Son after discussing the risks, benefits and alternatives.     PO Intake:  Appropriately NPO for procedure and Not NPO but emergent condition outweighs risk.    ASA Class:  Class 3 - SEVERE SYSTEMIC DISEASE, DEFINITE FUNCTIONAL LIMITATIONS.    Mallampati:  Grade 2:  Soft palate, base of uvula, tonsillar pillars, and portion of posterior pharyngeal wall visible    History and physical reviewed and no updates needed. I have reviewed the lab findings, diagnostic data, medications, and the plan for sedation. I have determined this patient to be an appropriate candidate for the planned sedation and procedure and have reassessed the patient IMMEDIATELY PRIOR to sedation and procedure.    Patient was discussed with the Attending, Dr. Peterson, who agrees with the plan.    DEBBI Pringle  Neuro IR Fellow  Pager: [phone]

## 2022-04-06 NOTE — PLAN OF CARE
SLP - Unable to see pt for a SLP swallow evaluation this pm due to head of bed restrictions.  Will reschedule.

## 2022-04-06 NOTE — IR NOTE
Interventional Radiology Intra-procedural Nursing Note     Patient Name:  Mia Rodriguez    Medical Record Number: [id number]  Today's Date:  April 6, 2022  Procedure: cerebral angiogram, mechanical thrombectomy  Start Time: 0930  End of procedure time: 1045  Report given to: NESTOR Burr, ICU  Time pt departs:  1115    Notes:      0930: Needle stick, 1ml 1% lidocaine  0932: Right femoral access 8f sheath  0958: 1st pass with Penumbra, off at 1002  1016: 2nd pass, off at 1024  1032: 3rd pass  1037: Reperfusion obtained  1048: 8f sheath out      Patient brought into IR room # 3 at 0920.      Informed consent obtained by MD Storm.      Brief neuro assessment on arrival shows patient with right gaze preference, left side weakness, answering questions and appropriately follow commands. Speech is dysarthrtic.     Peripheral pulses checked and documented in Epic Flowsheet.     Patient prepped with 2% Chlorhexidine and draped in supine position.     Hypertensive with 's.  Afib, rate 's      Debrief was completed by MD Nicholas.       Versed 1.5 mg IV  Fentanyl 75 mcg IV  Zofran 4 mg IV  Metoprolol 5 mg IV  Diltiazem 5 mg/hr IV    A 8Fr right femoral sheath was removed at 1048 and pressure was held by RT Bess for 20 minutes.  The site is C/D/I at procedure end without hematoma.    Soft tissue swelling and erythema noted to right thigh at this time.    Patient tolerated procedure well. Afib with rates 100-130's during procedure, with -170's    Patient alert, respirations regular and unlabored, no c/o pain at this time.     Neuro exam limited due to sedation, pupils equal and reactive, opens eyes spontaneously, no verbalization at this time. Continues with right gaze preference.      Carey Ramos RN on 4/6/2022 at 11:21 AM        Allergies   Allergen Reactions     Digoxin Other (See Comments)     Weakness, SOB     Megace [Megestrol Acetate]      Increased LFTs         Labs reviewed:  Results  for orders placed or performed during the hospital encounter of 04/06/22 (from the past 24 hour(s))   CBC with Platelets & Differential    Narrative    The following orders were created for panel order CBC with Platelets & Differential.  Procedure                               Abnormality         Status                     ---------                               -----------         ------                     CBC with platelets and d...[[id number]]  Abnormal            Final result                 Please view results for these tests on the individual orders.   Basic metabolic panel   Result Value Ref Range    Sodium 144 133 - 144 mmol/L    Potassium 3.6 3.4 - 5.3 mmol/L    Chloride 113 (H) 94 - 109 mmol/L    Carbon Dioxide (CO2) 24 20 - 32 mmol/L    Anion Gap 7 3 - 14 mmol/L    Urea Nitrogen 23 7 - 30 mg/dL    Creatinine 0.81 0.52 - 1.04 mg/dL    Calcium 9.1 8.5 - 10.1 mg/dL    Glucose 121 (H) 70 - 99 mg/dL    GFR Estimate 69 >60 mL/min/1.73m2   INR   Result Value Ref Range    INR 1.09 0.85 - 1.15   Partial thromboplastin time   Result Value Ref Range    aPTT 28 22 - 38 Seconds   Troponin I   Result Value Ref Range    Troponin I High Sensitivity 14 <54 ng/L   CBC with platelets and differential   Result Value Ref Range    WBC Count 11.0 4.0 - 11.0 10e3/uL    RBC Count 4.88 3.80 - 5.20 10e6/uL    Hemoglobin 15.8 (H) 11.7 - 15.7 g/dL    Hematocrit 49.0 (H) 35.0 - 47.0 %     78 - 100 fL    MCH 32.4 26.5 - 33.0 pg    MCHC 32.2 31.5 - 36.5 g/dL    RDW 13.4 10.0 - 15.0 %    Platelet Count 180 150 - 450 10e3/uL    % Neutrophils 75 %    % Lymphocytes 13 %    % Monocytes 7 %    % Eosinophils 3 %    % Basophils 1 %    % Immature Granulocytes 1 %    NRBCs per 100 WBC 0 <1 /100    Absolute Neutrophils 8.3 1.6 - 8.3 10e3/uL    Absolute Lymphocytes 1.5 0.8 - 5.3 10e3/uL    Absolute Monocytes 0.8 0.0 - 1.3 10e3/uL    Absolute Eosinophils 0.3 0.0 - 0.7 10e3/uL    Absolute Basophils 0.1 0.0 - 0.2 10e3/uL    Absolute Immature  Granulocytes 0.1 <=0.4 10e3/uL    Absolute NRBCs 0.0 10e3/uL   CT Head w/o Contrast    Narrative    CT OF THE HEAD WITHOUT CONTRAST 4/6/2022 8:36 AM     COMPARISON: Brain MR 2/9/2014.    HISTORY: Neuro deficit, acute, stroke suspected. Code Stroke.    TECHNIQUE: 5 mm thick axial CT images of the head were acquired  without IV contrast material.    FINDINGS: There is moderate diffuse cerebral volume loss. There are  extensive confluent areas of decreased density in the cerebral white  matter bilaterally that are consistent with sequela of chronic small  vessel ischemic disease.    The ventricles and basal cisterns are within normal limits in  configuration given the degree of cerebral volume loss.  There is no  midline shift. There are no extra-axial fluid collections.    No intracranial hemorrhage, mass or recent infarct.    The visualized paranasal sinuses are well-aerated. There is no  mastoiditis. There are no fractures of the visualized bones.      Impression    IMPRESSION: Diffuse cerebral volume loss and cerebral white matter  changes consistent with chronic small vessel ischemic disease. No  evidence for acute intracranial pathology.    Radiation dose for this scan was reduced using automated exposure  control, adjustment of the mA and/or kV according to patient size, or  iterative reconstruction technique.     CONRAD BURGOS MD         SYSTEM ID:  [id number]   CTA Head Neck with Contrast    Narrative    CT ANGIOGRAM OF THE HEAD AND NECK WITHOUT AND WITH CONTRAST  4/6/2022  8:39 AM     COMPARISON: None.    HISTORY: Neuro deficit, acute, stroke suspected. Code Stroke. Left  facial droop. Left-sided weakness.    TECHNIQUE:  Precontrast localizing scans were followed by CT  angiography with an injection of 70mL Isovue-370 nonionic intravenous  contrast material with scans through the head and neck.  Images were  transferred to a separate 3-D workstation where multiplanar  reformations and 3-D images were created.   Estimates of carotid  stenoses are made relative to the distal internal carotid artery  diameters except as noted.      FINDINGS: Incidental note is made of severe emphysematous change in  the upper lungs bilaterally as well as small bilateral pleural  effusions.    Neck CTA: The common carotid and internal carotid arteries bilaterally  are patent without stenosis. There is heterogeneous density in the  carotid bulbs bilaterally that likely simply represent swirling of  contrast opacified blood with noncontrast opacified blood. There is  delayed filling of the distal right internal carotid artery likely due  to outflow obstruction related to the right middle cerebral artery  occlusion noted on the head CTA. The congenitally dominant right  vertebral and congenitally nondominant left vertebral arteries are  patent without stenosis.    Head CTA: There is abrupt occlusion of flow at the right MCA  trifurcation consistent with thromboembolic occlusion that results in  delayed filling of the intracranial distal right internal carotid  artery. The basilar, intracranial distal left internal carotid,  bilateral anterior cerebral, bilateral middle cerebral and bilateral  posterior cerebral arteries are patent and unremarkable.      Impression    IMPRESSION:  1. Probable thromboembolic occlusion at the right MCA trifurcation.  This results in delayed filling of the distal right internal carotid  artery likely due to outflow obstruction.  2. Otherwise, normal neck and head CTA.    This imaging study was discussed with the ordering provider, Dr. JEREMY ALDANA, by Dr. Contreras on 4/6/2022 8:49 AM.    Radiation dose for this scan was reduced using automated exposure  control, adjustment of the mA and/or kV according to patient size, or  iterative reconstruction technique.    Cervical spine CT w/o contrast    Narrative    CT OF THE CERVICAL SPINE WITHOUT CONTRAST   4/6/2022 8:50 AM     COMPARISON: None    HISTORY: Fall      TECHNIQUE: Axial images of the cervical spine were acquired without  intravenous contrast. Multiplanar reformations were created.        Impression    IMPRESSION: Minimal degenerative retrolisthesis of C2 upon C3 and C4  upon C5. Minimal degenerative anterolisthesis of C7 upon T1. Alignment  of the cervical vertebrae is otherwise normal. Vertebral body heights  of the cervical spine are normal. Craniocervical alignment is normal.  There are no fractures of the cervical spine. Inferior endplate  deformity of T2 is again noted and does not appear appreciably changed  from a chest CT from 3/23/2021 and likely represents a degenerative  Schmorl's node deformity. No spinal canal stenosis. No prevertebral  soft tissue swelling.      Radiation dose for this scan was reduced using automated exposure  control, adjustment of the mA and/or kV according to patient size, or  iterative reconstruction technique   CT Head Perfusion w Contrast    Narrative    CT BRAIN PERFUSION 4/6/2022 8:53 AM    COMPARISON: Head and neck CT angiogram on same day.    HISTORY: Evaluate mismatch between penumbra and core infarct; Code  stroke.    TECHNIQUE: Time sequential axial CT images of the head were acquired  during the administration of intravenous contrast (50 mL Isovue-370).  CTA images of the [place] as well as color perfusion maps of  the brain were created from this time sequential axial source data.      Impression    IMPRESSION: There is delayed arrival of the contrast bolus throughout  the majority of the right middle cerebral and right anterior cerebral  artery territories consistent with the right middle cerebral artery  occlusion resulting in delayed filling of the right internal carotid  circulation noted on the accompanying CT angiogram. There is decreased  cerebral blood flow throughout the same distribution. There is  decreased cerebral blood volume in the cortex and white matter of the  mid right frontal lobe as well  as at the lateral aspect of the right  temporal lobe worrisome for infarct. No other perfusion defects.    Radiation dose for this scan was reduced using automated exposure  control, adjustment of the mA and/or kV according to patient size, or  iterative reconstruction technique

## 2022-04-07 NOTE — CONSULTS
[facility]    Stroke Consult Note    Reason for Consult: Stroke Code     Chief Complaint: One-sided Weakness    HPI  Mia Rodriguez is a 88 year old female hx TIA, CKD, afib on Eliquis 2.5mg bid, HTN, CAD, lives at home with helpers coming in mRS 2, woke up 0530, LKN 0700 fell in kitched, pressed life alert, found down with R gaze, L facial droop, L weakness, dysarthric speech.    CTH no bleed or stroke ASPECT 10, CTA H+N shows R distal M1 occlusion, possible soft plaque vs tyhrombus R ICA, significant aortic and R carotid disease. CTP shows 9ml core, 182ml penumbra in RMCA territory, mismatch ratio 20.     Patient in afib.    Imaging Findings    CTH no bleed or infarct, ASPECT 10  CTA H+N shows R distal M1 occlusion, possible soft plaque vs tyhrombus R ICA, significant aortic and R carotid disease  CTP shows 9ml core, 182ml penumbra in RMCA territory, mismatch ratio 20    CTH 4/7/22 - Post IR  - Focus of hyperdensity at the high posteromedial right frontal lobe  could represent a tiny focus of intracranial hemorrhage. Continued  surveillance recommended.    CT 4/7/12 - Follow-up imaging      1.  Stable small volume acute subarachnoid hemorrhage within the right cingulate sulcus. No new intracranial hemorrhage.  2.  Developing regions of acute infarction within the right anterior cerebral artery territory and right middle cerebral artery posterior division territory.  3.  Stable chronic infarction within the right posterior cerebral artery territory.  4.  Stable mild to moderate chronic small vessel ischemic disease and generalized brain parenchymal volume loss.    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 CT CAP recently did not show malignancy to suggest hypercoag. Not TNK candidate 2/2 Eliquis therapy, CTH showed no infarct, CTP shows small core with large penumbra, mismatch ratio 20. Dx and treatment discussed with patient and family, taken to IR tici 2b after 3 passes.     Mild improvement this am, awaiting MRI. Small SAH stable on follow-up CTH post intervention.    Recommendations    - Neurochecks and Vital Signs every q4h   - Permissive HTN <160  - Hold Aspirin for now  - Continue home statin  - MRI Brain with and without contrast  - Telemetry  - Bedside Glucose Monitoring  - PT/OT/SLP  - Stroke Education  - Euthermia, Euglycemia    Patient Follow-up    - final recommendation pending work-up    Thank you for this consult. We will continue to follow.     The Stroke Staff is Dr. Mustafa.    Jesu Cates MD  Vascular Neurology Fellow  To page me or covering stroke neurology team member, click here: [email or web address]   Choose \"On Call\" tab at top, then search dropdown box for \"Neurology Adult\", select location, press Enter, then look for stroke/neuro ICU/telestroke.    ______________________________________________________    Clinically Significant Risk Factors Present on Admission                      Past Medical History   Past Medical History:   Diagnosis Date     Arrhythmia     atrial fib.-on coumadin     Atrial fibrillation (H)      Breast cancer (H)      COPD (chronic obstructive pulmonary disease) (H)      COPD exacerbation (H) 9/2/2015     Coronary artery disease      Hypertension      Malignant neoplasm (H) 6/2/2008    Right Breast Cancer     Osteoporosis      Renal disease     kidney stones     Thyroid disease     Hypothyroid     Unspecified cerebral artery occlusion with cerebral infarction     TIA     Uterine fibroid      Past Surgical History   Past Surgical History:   Procedure " Laterality Date     BIOPSY  6/2/2008    Left /RightBreast Biopsy     BIOPSY  6/1/2010    Right Breast Biopsy     BREAST SURGERY  6/12/2008    Right Breast Lumpectomy     CYSTOSCOPY, DILATE URETHRA, COMBINED  10/5/2012    Procedure: COMBINED CYSTOSCOPY, DILATE URETHRA;  urethral dilation;  Surgeon: Kaushal Harris MD;  Location:  OR     CYSTOSCOPY, RETROGRADES, EXTRACT STONE, COMBINED Right 4/16/2015    Procedure: COMBINED CYSTOSCOPY, RETROGRADES, EXTRACT STONE;  Surgeon: Kaushal Harris MD;  Location:  OR     EXCISE LESION LIP N/A 10/22/2014    Procedure: EXCISE LESION LIP;  Surgeon: Brent Jollye MD;  Location:  [facility]     GYN SURGERY  1981    LISA with ovaries intact-fibroid     LASER HOLMIUM LITHOTRIPSY URETER(S), INSERT STENT, COMBINED  10/5/2012    Procedure: COMBINED CYSTOSCOPY, URETEROSCOPY, LASER HOLMIUM LITHOTRIPSY URETER(S), INSERT STENT;  RIGHT URETEROSCOPY ,right ureteral biopsy,WITH LASER HOLMIUM LITHOTRIPSY, INSERT STENT RIGHT URETER;  Surgeon: Kaushal Harris MD;  Location:  OR     LASER HOLMIUM LITHOTRIPSY URETER(S), INSERT STENT, COMBINED Right 4/16/2015    Procedure: COMBINED CYSTOSCOPY, URETEROSCOPY, LASER HOLMIUM LITHOTRIPSY URETER(S), INSERT STENT;  Surgeon: Kaushal Harris MD;  Location:  OR     Medications   Home Meds  Prior to Admission medications    Medication Sig Start Date End Date Taking? Authorizing Provider   albuterol (PROAIR HFA/PROVENTIL HFA/VENTOLIN HFA) 108 (90 Base) MCG/ACT inhaler Inhale 2 puffs into the lungs every 4 hours as needed for shortness of breath / dyspnea or wheezing    Reported, Patient   apixaban ANTICOAGULANT (ELIQUIS) 2.5 MG tablet Take 1 tablet (2.5 mg) by mouth 2 times daily 1/31/22   Xuan Correa APRN CNP   atorvastatin (LIPITOR) 20 MG tablet Take 1 tablet (20 mg) by mouth At Bedtime 1/31/22   Xuan Correa APRN CNP   denosumab (PROLIA) 60 MG/ML SOLN Inject 60 mg Subcutaneous. q 6 months    Reported, Patient    diltiazem ER (DILT-XR) 120 MG 24 hr capsule Take 1 capsule (120 mg) by mouth daily 22   Xuan Correa, APRN CNP       Scheduled Meds    artificial saliva  2 spray Swish & Spit 4x Daily     famotidine  20 mg Intravenous Q48H     sodium chloride (PF)  3 mL Intracatheter Q8H       Infusion Meds    dilTIAZem HCl-Sodium Chloride 5 mg/hr (22 0902)     - MEDICATION INSTRUCTIONS -       sodium chloride 75 mL/hr at 22 0350       PRN Meds  albuterol, hydrALAZINE, lidocaine 4%, lidocaine (buffered or not buffered), - MEDICATION INSTRUCTIONS -, ondansetron **OR** ondansetron, prochlorperazine **OR** prochlorperazine **OR** prochlorperazine, sodium chloride (PF)    Allergies   Allergies   Allergen Reactions     Digoxin Other (See Comments)     Weakness, SOB     Megace [Megestrol Acetate]      Increased LFTs       Family History   Family History   Problem Relation Age of Onset     Alzheimer Disease Mother      Osteoporosis Mother      C.A.D. Father      Cardiovascular Father      Cerebrovascular Disease Father      Breast Cancer Maternal Grandmother      Unknown/Adopted Maternal Grandfather      Unknown/Adopted Paternal Grandmother         old age     Cancer Paternal Grandfather         stomach     Social History   Social History     Tobacco Use     Smoking status: Former Smoker     Packs/day: 1.00     Years: 30.00     Pack years: 30.00     Types: Cigarettes     Start date:      Quit date: 1989     Years since quittin.3     Smokeless tobacco: Never Used     Tobacco comment: Started:  Stopped:   Substance Use Topics     Alcohol use: No     Alcohol/week: 0.0 standard drinks     Drug use: No       Review of Systems   The 10 point Review of Systems is negative other than noted in the HPI or here.        PHYSICAL EXAMINATION  Temp:  [96.5  F (35.8  C)-97  F (36.1  C)] 97  F (36.1  C)  Pulse:  [] 92  Resp:  [9-28] 17  BP: ()/() 154/81  SpO2:  [92 %-99 %] 96 %     Neurologic  Mental  Status:  alert, oriented x 3, follows commands, dysarthric speech, naming and repetition normal  Cranial Nerves:  L field cut, PERRL, R gaze deviation, facial sensation intact and symmetric, L droop, hearing not formally tested but intact to conversation, palate elevation symmetric and uvula midline  Motor:  RUE RLE 5/5 LUE 1/5 LLE 2/5  Reflexes:  toes down-going  Sensory:  Loss sensation LUE and LE to LT  Coordination:  normal finger-to-nose and heel-to-shin bilaterally without dysmetria, rapid alternating movements symmetric  Station/Gait:  deferred   L neglect    Dysphagia Screen  Per Nursing    Stroke Scales    NIHSS  1a. Level of Consciousness 0-->Alert, keenly responsive   1b. LOC Questions 0-->Answers both questions correctly   1c. LOC Commands 0-->Performs both tasks correctly   2.   Best Gaze 1-->Partial gaze palsy, gaze is abnormal in one or both eyes, but forced deviation or total gaze paresis is not present   3.   Visual 2-->Complete hemianopia   4.   Facial Palsy 1-->Minor paralysis (flattened nasolabial fold, asymmetry on smiling)   5a. Motor Arm, Left 3-->No effort against gravity, limb falls   5b. Motor Arm, Right 0-->No drift, limb holds 90 (or 45) degrees for full 10 secs   6a. Motor Leg, Left 2-->Some effort against gravity, leg falls to bed by 5 secs, but has some effort against gravity   6b. Motor Leg, right 0-->No drift, leg holds 30 degree position for full 5 secs   7.   Limb Ataxia 2-->Present in two limbs   8.   Sensory 1-->Mild-to-moderate sensory loss, patient feels pinprick is less sharp or is dull on the affected side, or there is a loss of superficial pain with pinprick, but patient is aware of being touched   9.   Best Language 0-->No aphasia, normal   10. Dysarthria 1-->Mild-to-moderate dysarthria, patient slurs at least some words and, at worst, can be understood with some difficulty   11. Extinction and Inattention  1-->Visual, tactile, auditory, spatial, or personal inattention or  extinction to bilateral simultaneous stimulation in one of the sensory modalities   Total 14 (04/07/22 1045)       Modified Huntington Score (Pre-morbid)  2 - Slight disability.  Able to look after own affairs without assistance, but unable to carry out all previous activities.    Imaging  I personally reviewed all imaging; relevant findings per HPI.     Lab Results Data   CBC  Recent Labs   Lab 04/07/22  0559 04/06/22  0827   WBC 13.5* 11.0   RBC 3.73* 4.88   HGB 12.1 15.8*   HCT 37.7 49.0*    180     Basic Metabolic Panel    Recent Labs   Lab 04/07/22  0559 04/06/22  1610 04/06/22  0827   *  --  144   POTASSIUM 3.6  --  3.6   CHLORIDE 117*  --  113*   CO2 23  --  24   BUN 20  --  23   CR 0.70  --  0.81   GLC 89 127* 121*   CYNDEE 8.1*  --  9.1     Liver Panel  No results for input(s): PROTTOTAL, ALBUMIN, BILITOTAL, ALKPHOS, AST, ALT, BILIDIRECT in the last 168 hours.  INR    Recent Labs   Lab Test 04/07/22  0559 04/06/22  0827 10/22/14  1228   INR 1.20* 1.09 1.03      Lipid Profile    Recent Labs   Lab Test 04/06/22  0827 01/15/19  0927 01/12/18  0821 05/06/16  1554 02/09/14  0750 02/08/14  1735   CHOL 148 158 188   < > 219* 286*   HDL 53 51 65   < > 63 68   LDL 74 88 97   < > 137* 192*   TRIG 107 93 128   < > 99 133   CHOLHDLRATIO  --   --   --   --  3.5 4.2    < > = values in this interval not displayed.     A1C    Recent Labs   Lab Test 04/06/22  0827   A1C 5.4     Troponin I    Recent Labs   Lab 04/06/22  0827   TROPONINIS 14          Stroke Code Data Data   Stroke Code Data  (for stroke code without tele)  Stroke code activated 04/06/22   0824   First stroke provider response 04/06/22   0829   Last known normal 04/06/22   0700   Time of discovery   (or onset of symptoms) 04/06/22   0700   Head CT read by Stroke Neuro Dr/Provider 04/06/22   0836   Was stroke code de-escalated? No

## 2022-04-07 NOTE — PLAN OF CARE
Shift Note    Drowsy and oriented x4.  L field cut  L hemiparesis and absence of sensation  Neuros now Q4hrs, okay by neuro that she can transfer out of ICU but is still on diltiazem drip and increased rate late afternoon to keep HR <100 and SBP <160mmHg.  LS clear, dim, on RA, upper 90s SpO2.  BS hypo.  Strict NPO per SLP; frequent oral cares with biotene and minty moisturizer.  Bob, quantity adequate for pt wt.  Denies pain.  Left and right positioning only, has coccyx concern (see WOCN note).  Family updated at bedside.

## 2022-04-07 NOTE — PROGRESS NOTES
04/07/22 0853   General Information   Onset of Illness/Injury or Date of Surgery 04/06/22   Referring Physician Dr. Cortez   Patient/Family Therapy Goal Statement (SLP) Patient did not state.    Pertinent History of Current Problem Per MD note: Mia Rodriguez is a 88 year old female who presented after a fall with neuro changes.  Presents via EMS from home with stroke symptoms. Per EMS report, the patient woke up at about 0530 this morning and went to her kitchen for breakfast at about 0630. At about 0700 while eating some yogurt, her pendant registered a fall. Her neighbors found her on the floor in her kitchen unable to get back up, so they called EMS. EMS notes left-sided weakness, left facial droop, right deviated gaze, and slurred speech. She complained to them of nausea without vomiting.   After rapid stroke eval in ER she was sent for embolectomy.   General Observations Alert, appropriate with moderate dysarthria. Left neglect/field cut.   Past History of Dysphagia No documented hiatory.    Type of Evaluation   Type of Evaluation Swallow Evaluation   Oral Motor   Oral Musculature anomalies present   Structural Abnormalities none present   Mucosal Quality dry   Dentition (Oral Motor)   Dentition (Oral Motor) natural dentition;adequate dentition   Facial Symmetry (Oral Motor)   Facial Symmetry (Oral Motor) left side impairment   Left Side Facial Asymmetry moderate impairment   Lip Function (Oral Motor)   Lip Range of Motion (Oral Motor) protrusion impairment;retraction impairment   Protrusion, Lip Range of Motion bilateral;moderate impairment   Retraction, Lip Range of Motion left side;severe impairment   Lip Strength (Oral Motor) left side;moderate impairment   Lip Coordination (Oral Motor) bilateral;severe impairment   Tongue Function (Oral Motor)   Tongue ROM (Oral Motor) depression is impaired;elevation is impaired;protrusion is impaired;retraction is impaired;lateralization is impaired    Depression, Tongue ROM Impairment (Oral Motor) moderate impairment;left side   Elevation, Tongue ROM Impairment (Oral Motor) bilateral;severe impairment   Protrusion, Tongue ROM Impairment (Oral Motor) bilateral;moderate impairment   Retraction, Tongue ROM Impairment (Oral Motor) left side;moderate impairment   Lateralization, Tongue ROM Impairment (Oral Motor) left side;moderate impairment   Tongue Strength (Oral Motor) strength decreased;moderate impairment   Tongue Coordination/Speed (Oral Motor) reduced rate   Jaw Function (Oral Motor)   Jaw Function (Oral Motor) strength impairment (bite/clench)   Comment, Jaw Function (Oral Motor) Munching cew with an ice chip.   Cough/Swallow/Gag Reflex (Oral Motor)   Volitional Throat Clear/Cough (Oral Motor) impaired;reduced strength   Volitional Swallow (Oral Motor) significantly delayed;weak   Vocal Quality/Secretion Management (Oral Motor)   Vocal Quality (Oral Motor) breathy   General Swallowing Observations   Current Diet/Method of Nutritional Intake (General Swallowing Observations, NIS) NPO   Respiratory Support (General Swallowing Observations) supplemental oxygen;nasal cannula   Swallowing Evaluation Clinical swallow evaluation   Clinical Swallow Evaluation   Feeding Assistance dependent   Clinical Swallow Evaluation Textures Trialed thin liquids;mildly thick liquids;moderately thick liquids/liquidized   Clinical Swallow Eval: Thin Liquid Texture Trial   Mode of Presentation, Thin Liquids spoon;fed by clinician   Volume of Liquid or Food Presented Ice chip and teaspoon amount of water   Oral Phase of Swallow premature pharyngeal entry   Pharyngeal Phase of Swallow impaired;coughing/choking;reduction in laryngeal movement   Diagnostic Statement Delayed swallow response with overt Sx of aspiration.    Clinical Swallow Eval: Mildly Thick Liquids   Mode of Presentation spoon;fed by clinician   Volume Presented 1 teaspoon of water   Oral Phase premature pharyngeal  entry   Pharyngeal Phase impaired;coughing/choking;reduction in laryngeal movement   Diagnostic Statement Immediate coughing due to delayed weak swallow response.    Clinical Swallow Eval: Moderately Thick Liquids   Mode of Presentation spoon;fed by clinician   Volume Presented 1 teaspoon of water   Oral Phase premature pharyngeal entry   Pharyngeal Phase impaired;coughing/choking;reduction in laryngeal movement;wet vocal quality after swallow   Diagnostic Statement Immediate coughing due to swallow delay and weak swallow response.    Swallowing Recommendations   Diet Consistency Recommendations NPO   Supervision Level for Intake 1:1 supervision needed   Medication Administration Recommendations, Swallowing (SLP) Non oral means.   Instrumental Assessment Recommendations VFSS (videofluoroscopic swallowing study)   Comment, Swallowing Recommendations Anticipate need for a video swallow study when appropriate.    General Therapy Interventions   Planned Therapy Interventions Dysphagia Treatment   Dysphagia treatment Oropharyngeal exercise training   Clinical Impression   Criteria for Skilled Therapeutic Interventions Met (SLP Eval) Yes, treatment indicated   SLP Diagnosis Moderate oral and severe pharyngeal dysphagia   Risks & Benefits of therapy have been explained evaluation/treatment results reviewed;care plan/treatment goals reviewed;risks/benefits reviewed;current/potential barriers reviewed;participants voiced agreement with care plan;participants included;patient   Clinical Impression Comments Patient presents with moderate oral and severe pharyngeal dysphagia at bedside secondary to a right CVA. Deficits include; moderate to severe left sided weakness with ROM/strength and moderate dysarthria. She demonstrated a munching pattern with single ice chip with no swallow response. She was then given a teaspoon amount of water with immediate overt Sx of aspiration with a cough response. She continued to demonstrate  immediate coughing with mild and moderately thick liquids. Patient is considered a high risk for aspiration and may need to consider short term alternate means of nutrition if swallow function does not improve in the next 1-2 days. Recommend: 1. NPO with frequent oral cares due to dry mouth.    SLP Discharge Planning   SLP Discharge Recommendation Transitional Care Facility;Acute Rehab Center-Motivated patient will benefit from intensive, interdisciplinary therapy.  Anticipate will be able to tolerate 3 hours of therapy per day   SLP Rationale for DC Rec Patient is significantly below her baseline swallow and speech/language.    SLP Brief overview of current status  Patient with moderate oral and severe pharyngeal dysphagia recommend NPO.     Total Evaluation Time   Total Evaluation Time (Minutes) 16   SLP Goals   Therapy Frequency (SLP Eval) daily   SLP Predicated Duration/Target Date for Goal Attainment 04/21/22   SLP Goals Swallow   SLP: Safely tolerate diet without signs/symptoms of aspiration Soft & bite sized diet;Mildly thick liquids

## 2022-04-07 NOTE — PROGRESS NOTES
[facility] Focused Note    S: Patient identified to have a potential CAPI or at risk of PI to coccyx during [facility]'s ICU rounding on 4/7    B:   Mia Rodriguez is a 88 year old female who presented to the [facility] ER with acute neuro changes and associated Fall.  She was felt to have an acute CVA and urgently taken to radiology for embolectomy.    A: [facility] looked at coccyx with bedside RN. No open skin identified. Scar tissue/Calloused epidermis over significant bony prominence to coccyx measuring approximately 2cm x 1cm. Blanchable pink epidermis measures 5cm x 5cm.   Garrison Risk Assessment    Sensory Perception: 4-->no impairment    Moisture: 3-->occasionally moist   Activity: 1-->bedfast     Mobility: 2-->very limited   Nutrition: 2-->probably inadequate   Friction and Shear: 2-->potential problem  Garrison Score: 14       R:   1. Clean with soap and water, pat dry  2. Press a Mepilex Sacral to the area, making sure to conform nicely to skin curvatures.   3. Time and date dressing change  4. Follow PIP plan    Pressure Injury Prevention (PIP) Plan:  -If patient is declining pressure injury prevention interventions: Explore reason why and address patient's concerns  -Mattress: low air loss  -HOB: Maintain at or below 30 degrees, unless contraindicated  -Repositioning in bed: Every 1-2 hours , Left/right positioning; avoid supine, Raise foot of bed prior to raising head of bed, to reduce patient sliding down (shear) and Frequent microturns using TAPS wedges, as patient tolerates  -Heels: Keep elevated off mattress and Pillows under calves  -Protective Dressing: Sacral Mepilex for prevention (#130201),  especially for the agitated patient   -Positioning Equipment: Pillows or if needed TAPS wedges (#433945) to help maintain 30 degree side lying position as needed  -Chair positioning: Chair cushion (#039466)  and Assist patient to reposition hourly   -Moisture Management: Perineal cleansing /protection: Follow Incontinence  Protocol, Avoid brief in bed and Clean and dry skin folds with bathing   -Under Devices: Inspect skin under all medical devices during skin inspection , Ensure tubes are stabilized without tension and Ensure patient is not lying on medical devices or equipment when repositioned      Cristina Curry RN CWOCN

## 2022-04-07 NOTE — PROGRESS NOTES
04/07/22 1130   Quick Adds   Type of Visit Initial Occupational Therapy Evaluation   Living Environment   People in Home alone   Current Living Arrangements house   Number of Stairs, Main Entrance 7   Living Environment Comments Pt has 7 stairs to bedroom level. Uses a walk in shower with shower chair. Reports son will be installing grab bars soon.   Self-Care   Usual Activity Tolerance good   Current Activity Tolerance fair   Regular Exercise Yes   Activity/Exercise Type strength training   Fall history within last six months yes   Number of times patient has fallen within last six months 1   Activity/Exercise/Self-Care Comment Pt typically is (I) with all ADL. Has help for yardwork, meals, cleaning/laundry, gets groceries delivered.   General Information   Onset of Illness/Injury or Date of Surgery 04/06/22   Referring Physician Thiago Cortez, DO   Patient/Family Therapy Goal Statement (OT) Get stronger   Additional Occupational Profile Info/Pertinent History of Current Problem Pt is 88 year old female adm on 4/6/22 via EMS with stroke like symptoms, fell in kitchen and neighbors found her on floor, were unable to get her off floor so called EMS. Pt with L sided weakness, facial droop, R gaze preference, slurred speech. Pt emergently to IR for cerebral angiogram and mechanical thrombectomy for right distal M1 occlusion. Repeat head CT showed stable small volume acute SAH within right cingulate sulcus, developing regions of acute infarction R anterior cerebral artery territory and right MCA posterior division territory, stable chronic infacrt within R PCA territory.   Existing Precautions/Restrictions fall;swallowing   Limitations/Impairments sensory;swallowing   General Observations and Info In ICU, 2 sons present   Cognitive Status Examination   Orientation Status person;place   Affect/Mental Status (Cognitive) flat/blunted affect   Follows Commands follows one-step commands;50-74% accuracy   Cognitive  Status Comments Due to significant L neglect, pt has difficulty following commands on L side.  Cognition requires further assessment   Visual Perception   Impact of Vision Impairment on Function (Vision) Impaired attention to L, impaired L visual field, impaired tracking to L   Sensory   Sensory Comments Impaired sensation entire L side of body   Pain Assessment   Patient Currently in Pain No   Posture   Posture forward head position   Range of Motion Comprehensive   Comment, General Range of Motion RUE AROM WFL but slowed. Unable to do any AROM on L side. PROM seems intact   Strength Comprehensive (MMT)   Comment, General Manual Muscle Testing (MMT) Assessment 3-/5 RUE, 1/5 LUE Trace muscle activation on L noted   Muscle Tone Assessment   Muscle Tone Comments mildly increased tone on LUE; holds L elbow, wrist and hand flexed   Coordination   Coordination Comments unable on L; slowed on R   Bed Mobility   Comment (Bed Mobility) Max-total A. Able to assist by holding bedrail with RUE during turns   Transfers   Transfer Comments Dep with lift/sling   Balance   Balance Comments unable to sit unsupported. Leans heavily to R    Activities of Daily Living   BADL Assessment/Intervention lower body dressing;toileting   Lower Body Dressing Assessment/Training   [place] Level (Lower Body Dressing) dependent (less than 25% patient effort)   Toileting   [place] Level (Toileting) dependent (less than 25% patient effort)   Clinical Impression   Criteria for Skilled Therapeutic Interventions Met (OT) Yes, treatment indicated   OT Diagnosis Impaired ADL and mobility   OT Problem List-Impairments impacting ADL problems related to;activity tolerance impaired;balance;cognition;communication;coordination;eating/swallowing;mobility;motor control;muscle tone;range of motion (ROM);sensation;strength;postural control   Assessment of Occupational Performance 5 or more Performance Deficits   Identified Performance Deficits all ADL,  IADL and mobility   Planned Therapy Interventions (OT) ADL retraining;IADL retraining;balance training;bed mobility training;cognition;motor coordination training;neuromuscular re-education;ROM;strengthening;transfer training;visual perception;progressive activity/exercise;risk factor education   Clinical Decision Making Complexity (OT) moderate complexity   Anticipated Equipment Needs Upon Discharge (OT) bathing equipment;toileting equipment;wheelchair;walker, rolling   Risk & Benefits of therapy have been explained evaluation/treatment results reviewed;care plan/treatment goals reviewed;risks/benefits reviewed;current/potential barriers reviewed;participants voiced agreement with care plan;participants included;patient;son   OT Discharge Planning   OT Discharge Recommendation (DC Rec) Acute Rehab Center-Motivated patient will benefit from intensive, interdisciplinary therapy.  Anticipate will be able to tolerate 3 hours of therapy per day   OT Rationale for DC Rec Pt is typically (I) with basic ADL and mobility and does some light IADL (I)ly as well. She has good rehab potential, has good support from her sons, and is motivated for rehab. Pt is far below her baseline and will greatly benefit from daily intensive PT/OT/SLP   OT Brief overview of current status Max A bed mobility, Dep with lift/sling for transfer to combilizer chair, needs assist for positioning. Able to answer questions and attempts to follow directions, significant L neglect.   Total Evaluation Time (Minutes)   Total Evaluation Time (Minutes) 15   OT Goals   Therapy Frequency (OT) Daily   OT Predicated Duration/Target Date for Goal Attainment 04/14/22   OT Goals Hygiene/Grooming;Upper Body Dressing;Lower Body Dressing;Toilet Transfer/Toileting;OT Goal 1;Cognition   OT: Hygiene/Grooming minimal assist;from wheelchair   OT: Upper Body Dressing Minimal assist;from wheelchair   OT: Lower Body Dressing Moderate assist;from wheelchair   OT: Toilet  Transfer/Toileting Maximum assist;cleaning and garment management;using adaptive equipment   OT: Cognitive Patient/caregiver will verbalize understanding of cognitive assessment results/recommendations as needed for safe discharge planning   OT: Goal 1 Pt will complete LUE AROM/AAROM/Self ROM program with Min A to maximize use of LUE for ADL and IADL

## 2022-04-07 NOTE — PROGRESS NOTES
[facility]    Medicine Progress Note - Hospitalist Service    Date of Admission:  4/6/2022    Assessment & Plan          Mia Rodriguez is a 88 year old female who presented to the [facility] ER with acute neuro changes and associated Fall.  She was felt to have an acute CVA and urgently taken to radiology for embolectomy.     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    - Systolic goal -160 range.      - Resumption of anticoagulation per neuro.   -  Continue strict NPO   -  PT/OT/Speech/SW/CC consults.  Continue gentle non-dextrose IVF for now.  -  Continue close neuro monitoring  -  Defer follow-up brain imaging timing to neurology/neuroradiology  - Will need ongoing goals of care discussion with patient and family regarding nutrition and dispo     Abnormal UA:  - UA slightly abnormal, doubt UTI.  Culture pending, hold on abx.       Fall:  -  No overt majory injury. Pelvis XR w/o acute findings 4/6     Atrial fibrillation with RVR during procedure:  -  Continue cardizem drip.  Titrate to HR  range while maintaining BP in target range.  - Strict NPO so unable to start oral meds  -  Hold Eliquis     Hypothyroidism:  - Hold PO levothyroxine while NPO     CKD, history of renal stones:  -  Monitor     COPD, smoking history:  -  No overt exacerbation.  Albuterol PRN.  Monitor.     Reflux:  -  Pepcid     Osteoporosis:  -  Monitor, baseline on prolia       Diet: NPO for Medical/Clinical Reasons Except for: No Exceptions    DVT Prophylaxis: Pneumatic Compression Devices  Bob Catheter: PRESENT, indication: Strict 1-2 Hour I&O  Central Lines: None  Cardiac Monitoring: ACTIVE order. Indication: ICU  Code Status: Full Code      Disposition Plan   Expected Discharge:    Anticipated discharge location:  Awaiting care coordination huddle  Delays:            The  patient's care was discussed with the Bedside Nurse, Patient and OT Team.    Tino Lemus MD  Hospitalist Service  [facility]  Securely message with the Vocera Web Console (learn more here)  Text page via [employer] Paging/Directory         Clinically Significant Risk Factors Present on Admission                 Time Spent on this Encounter   I spent 37 minutes on the unit/floor managing the care of Mia Rodriguez. Over 50% of my time was spent on the following:   - Counseling the patient and/or family regarding: diagnostic results and prognosis  - Coordination of care with the: nurse and therapies    Review of data since admission, discussion with patient and assessment of new baseline. Discussed events of prior 24 hours in detail, answered numerous questions.     Tino Lemus MD      ______________________________________________________________________    Interval History   Seen around noon. Significant left sided deficits and right gaze preference.  Denies pain. ROS x6 is neg. States she is thirsty and would like a glass of water.     Data reviewed today: I reviewed all medications, new labs and imaging results over the last 24 hours. I personally reviewed the head CT image(s) showing developing infarct and stable SAH.    Physical Exam   Vital Signs: Temp: (!) 96.7  F (35.9  C) Temp src: Axillary BP: (!) 131/95 Pulse: 98   Resp: 12 SpO2: 97 % O2 Device: Nasal cannula Oxygen Delivery: 1 LPM  Weight: 83 lbs 8.87 oz  Constitutional: Awake, alert, cooperative, no apparent distress  Respiratory: Clear to auscultation bilaterally, no crackles or wheezing  Cardiovascular: Regular rate and rhythm, normal S1 and S2, and no murmur noted  GI: Normal bowel sounds, soft, non-distended, non-tender  Skin/Integumen: No rashes, no cyanosis, no edema  Other: Right gaze preference, hemiplegic on left, right facial droop.      Data   Recent Labs   Lab 04/07/22  0559 04/06/22  1610  22  0827   WBC 13.5*  --  11.0   HGB 12.1  --  15.8*   *  --  100     --  180   INR 1.20*  --  1.09   *  --  144   POTASSIUM 3.6  --  3.6   CHLORIDE 117*  --  113*   CO2 23  --  24   BUN 20  --  23   CR 0.70  --  0.81   ANIONGAP 7  --  7   CYNDEE 8.1*  --  9.1   GLC 89 127* 121*     Recent Results (from the past 24 hour(s))   Echocardiogram Complete - For age > 60 yrs   Result Value    LVEF  55%    Narrative    [id number]  [id number]  [id number]  [id number]^CHARMAINE^TEMITOPE^AMY     [facility]  Echocardiography Laboratory  [address] [place], [place] [address]     Name: ROSALIND BERNABE  MRN: [id number]  : 1934  Study Date: 2022 01:56 PM  Age: 88 yrs  Gender: Female  Patient Location: [facility]  Reason For Study: Cerebrovascular Incident  Ordering Physician: TEMITOPE MONTANO  Referring Physician: Birgit Cordero  Performed By: Layla Le     BSA: 1.3 m2  Height: 59 in  Weight: 82 lb  HR: 84  BP: 147/88 mmHg  ______________________________________________________________________________  Procedure  Complete Portable Echo Adult.  ______________________________________________________________________________  Interpretation Summary     1. The left ventricle is normal in structure, function and size. The visual  ejection fraction is estimated at 55%.  2. The right ventricle is normal in structure, function and size.  3. There is moderate (2+) tricuspid regurgitation. The right ventricular  systolic pressure is approximated at 35mmHg plus the right atrial pressure.  4. There is mild to moderate (1-2+) aortic regurgitation.     Echo from 2019 showed EF 60%, 1-2+ TR, 1+ AI.  ______________________________________________________________________________  Left Ventricle  The left ventricle is normal in structure, function and size. There is normal  left ventricular wall thickness. The visual ejection fraction is estimated at  55%. Left ventricular diastolic function is  indeterminate. Normal left  ventricular wall motion.     Right Ventricle  The right ventricle is normal in structure, function and size.     Atria  There is severe biatrial enlargement. There is no atrial shunt seen.     Mitral Valve  The mitral leaflets appear thickened, hooded, and/or redundant, consistent  with myxomatous degeneration. There is trace mitral regurgitation.     Tricuspid Valve  There is moderate (2+) tricuspid regurgitation. The right ventricular systolic  pressure is approximated at 35mmHg plus the right atrial pressure.     Aortic Valve  There is mild trileaflet aortic sclerosis. There is mild to moderate (1-2+)  aortic regurgitation.     Pulmonic Valve  The pulmonic valve is normal in structure and function.     Vessels  Normal ascending, transverse (arch), and descending aorta. Normal IVC size  with reduced respiratory collapse.     Pericardium  There is no pericardial effusion.     Rhythm  The rhythm was atrial fibrillation.  ______________________________________________________________________________  MMode/2D Measurements & Calculations  IVSd: 0.95 cm     LVIDd: 3.4 cm  LVIDs: 2.2 cm  LVPWd: 0.85 cm  FS: 33.0 %  LV mass(C)d: 83.0 grams  LV mass(C)dI: 65.8 grams/m2  Ao root diam: 3.2 cm  LA dimension: 2.7 cm  asc Aorta Diam: 3.0 cm  LA/Ao: 0.86  LA Volume (BP): 56.0 ml  LA Volume Index (BP): 44.4 ml/m2  RWT: 0.51     Doppler Measurements & Calculations  MV E max aury: 68.1 cm/sec  MV dec time: 0.16 sec  AI P1/2t: 864.0 msec  TR max aury: 297.1 cm/sec  TR max P.3 mmHg  E/E' av.9  Lateral E/e': 6.8  Medial E/e': 11.1     ______________________________________________________________________________  Report approved by: Makenna Calabrese 2022 03:34 PM         CT Head w/o Contrast    Narrative    EXAM: CT HEAD W/O CONTRAST  LOCATION: [facility]  DATE/TIME: 2022 6:31 PM    INDICATION: Headache, intracranial hemorrhage suspected.  COMPARISON: Head  CT 4/6/2022 performed at 1126 hours and 0832 hours.  TECHNIQUE: Routine CT Head without IV contrast. Multiplanar reformats. Dose reduction techniques were used.    FINDINGS:  Stable small volume acute subarachnoid hemorrhage within the cingulate sulcus. No new intracranial hemorrhage. Stable contrast staining within the right lentiform nucleus. Increased conspicuity of loss of gray-white matter differentiation within the   right superior frontal gyrus, concerning for developing superior frontal gyrus and posterior right frontal lobe, concerning for developing acute infarcts involving the right middle cerebral artery posterior division and right anterior cerebral artery   territories. No significant mass effect. No midline shift, herniation or hydrocephalus.     Stable small focus of chronic infarction in the right occipital lobe, within the right posterior cerebral artery territory. Stable tiny chronic wedge-shaped infarct within the right cerebellar hemisphere. Stable mild to moderate chronic small vessel   ischemic changes throughout the cerebral hemispheric white matter and generalized brain parenchymal volume loss. Ventricles are not enlarged out of proportion to the cerebral sulci.    Prior bilateral cataract surgery. Visualized portions of the orbits are otherwise unremarkable. No paranasal sinus mucosal disease. No middle ear or mastoid effusion.        Impression    IMPRESSION:  1.  Stable small volume acute subarachnoid hemorrhage within the right cingulate sulcus. No new intracranial hemorrhage.  2.  Developing regions of acute infarction within the right anterior cerebral artery territory and right middle cerebral artery posterior division territory.  3.  Stable chronic infarction within the right posterior cerebral artery territory.  4.  Stable mild to moderate chronic small vessel ischemic disease and generalized brain parenchymal volume loss.     Medications     dilTIAZem HCl-Sodium Chloride 5 mg/hr  (04/07/22 0902)     - MEDICATION INSTRUCTIONS -       sodium chloride 75 mL/hr at 04/07/22 0350       artificial saliva  2 spray Swish & Spit 4x Daily     famotidine  20 mg Intravenous Q48H     sodium chloride (PF)  3 mL Intracatheter Q8H

## 2022-04-07 NOTE — PLAN OF CARE
Pt did not sleep much tonight until after 5am and was restless until then.  Neuro status remained relatively unchanged except being sleepy starting @ 5. A&Ox4 and strong on R side but minimal spontaneous movement on the L, unable to follow commands, and no sensation. Stable vitals with Cardizem running at 5-10/hr. Urine output 185mL.    Clemente Teixeira RN on 4/7/2022 at 6:19 AM

## 2022-04-07 NOTE — PROGRESS NOTES
04/07/22 0929   Quick Adds   Type of Visit Initial PT Evaluation   Living Environment   People in Home alone   Current Living Arrangements house   Home Accessibility stairs to enter home;stairs within home   Number of Stairs, Main Entrance 7   Stair Railings, Main Entrance railings safe and in good condition   Number of Stairs, Within Home, Primary seven   Stair Railings, Within Home, Primary railings safe and in good condition   Transportation Anticipated family or friend will provide   Living Environment Comments Pt reports she lives in her own home with 7 stairs from street level and another 7 stairs to bedroom level.   Self-Care   Usual Activity Tolerance good   Current Activity Tolerance poor   Regular Exercise No   Equipment Currently Used at Home walker, rolling   Fall history within last six months yes   Number of times patient has fallen within last six months 1   Activity/Exercise/Self-Care Comment Pt reports she has a walker but she usually uses it as an exercise bar, is able to ambulate without assistive device, has groceries brought in and assist with meals and housekeeping.   General Information   Onset of Illness/Injury or Date of Surgery 04/06/22   Referring Physician Thiago Cortez, DO   Patient/Family Therapy Goals Statement (PT) To get better   Pertinent History of Current Problem (include personal factors and/or comorbidities that impact the POC) Pt is 88 year old female adm on 4/6/22 via EMS with stroke like symptoms, fell in kitchen and neighbors found her on floor, were unable to get her off floor so called EMS. Pt with L sided weakness, facial droop, R gaze preference, slurred speech. Pt emergently to IR for cerebral angiogram and mechanical thrombectomy for right distal M1 occlusion. Repeat head CT showed stable small volume acute SAH within right cingulate sulcus, developing regions of acute infarction R anterior cerebral artery territory and right MCA posterior division territory,  stable chronic infacrt within R PCA territory.   Existing Precautions/Restrictions fall   Cognition   Affect/Mental Status (Cognition) WFL   Orientation Status (Cognition) oriented x 3   Cognitive Status Comments Some slurred speech, follows commands appropriately   Posture    Posture Forward head position;Protracted shoulders   Range of Motion (ROM)   ROM Comment B LE ROM WFL, pt does have increased tone L UE   Strength (Manual Muscle Testing)   Strength Comments Pt is generally deconditioned, can move R UE and R LE against gravity, no active movement of L UE noted, able to slightly move L toes with increased time for processing, otherwise no active movement noted on L.   Bed Mobility   Comment, (Bed Mobility) Max assist of 1-2   Transfers   Comment, (Transfers) Recommend use of lift at this time.   Balance   Balance Comments Max assist for sitting balance   Sensory Examination   Sensory Perception Comments Decreased sensation L side   Clinical Impression   Criteria for Skilled Therapeutic Intervention Yes, treatment indicated   PT Diagnosis (PT) Impaired mobility   Influenced by the following impairments Decreased strength, decreased balance, decreased activity tolerance, decreased sensation L side   Functional limitations due to impairments Decreased ability to participate in daily tasks   Clinical Presentation (PT Evaluation Complexity) Stable/Uncomplicated   Clinical Presentation Rationale Current presentation, [facility]   Clinical Decision Making (Complexity) low complexity   Planned Therapy Interventions (PT) balance training;bed mobility training;gait training;home exercise program;neuromuscular re-education;patient/family education;strengthening;transfer training   Risk & Benefits of therapy have been explained evaluation/treatment results reviewed;care plan/treatment goals reviewed;risks/benefits reviewed;current/potential barriers reviewed;participants voiced agreement with care plan;participants  included;patient;son   PT Discharge Planning   PT Discharge Recommendation (DC Rec) Acute Rehab Center-Motivated patient will benefit from intensive, interdisciplinary therapy.  Anticipate will be able to tolerate 3 hours of therapy per day;Transitional Care Facility   PT Rationale for DC Rec Pt is below baseline level of function, anticipate need for continued inpatient rehab when medically stable for discharge from hospital. Depending on pt's tolerance to activity at time of discharge, may tolerate ARU, will continue to follow during hospital stay and update discharge recommendations as needed.   PT Brief overview of current status Max assist of 1-2, R gaze preference, L neglect, recommend lift for transfers at this time   Total Evaluation Time   Total Evaluation Time (Minutes) 10   Physical Therapy Goals   PT Frequency Daily   PT Predicated Duration/Target Date for Goal Attainment 04/14/22   PT Goals Bed Mobility;Transfers;Gait   PT: Bed Mobility Minimal assist;Supine to/from sit;Rolling   PT: Transfers Minimal assist;Sit to/from stand;Bed to/from chair   PT: Gait Minimal assist;10 feet;Assistive device

## 2022-04-07 NOTE — PHARMACY-ADMISSION MEDICATION HISTORY
"Pharmacy Medication History  Admission medication history interview status for the 4/6/2022  admission is complete. See EPIC admission navigator for prior to admission medications     Location of Interview: Patient room  Medication history sources: Son brought in all pt's pill bottles, pt normally manages herself    Significant changes made to the medication list:  Changed albuterol to levalbuterol  Added OTC's: Calcium, tylenol, benadryl, melatonin    In the past week, patient estimated taking medication this percent of the time: greater than 90% per son pt is very disciplined about taking her medications    Additional medication history information:   Pt also had a \"Ripple\" THC supplement in her bag which she got from her sister who is suffering from cancer.  Unsure if she is currently taking or not, did not add to the list.     Medication reconciliation completed by provider prior to medication history? No    Time spent in this activity: 15 mins    Prior to Admission medications    Medication Sig Last Dose Taking? Auth Provider   acetaminophen (TYLENOL) 500 MG tablet Take 500-1,000 mg by mouth every 6 hours as needed for mild pain prn rarely uses Yes Unknown, Entered By History   apixaban ANTICOAGULANT (ELIQUIS) 2.5 MG tablet Take 1 tablet (2.5 mg) by mouth 2 times daily 4/5/2022 at Unknown time Yes Xuan Correa APRN CNP   atorvastatin (LIPITOR) 20 MG tablet Take 1 tablet (20 mg) by mouth At Bedtime 4/5/2022 Yes Xuan Correa APRN CNP   calcium-vitamin D (OSCAL) 250-125 MG-UNIT TABS per tablet Take 1 tablet by mouth At Bedtime Past Week at Unknown time Yes Unknown, Entered By History   denosumab (PROLIA) 60 MG/ML SOLN Inject 60 mg Subcutaneous. q 6 months  Yes Reported, Patient   diltiazem ER (DILT-XR) 120 MG 24 hr capsule Take 1 capsule (120 mg) by mouth daily 4/5/2022 Yes Xuan Correa APRN CNP   diphenhydrAMINE (BENADRYL) 25 MG capsule Take 25 mg by mouth every 6 hours as needed for itching, " allergies or sleep prn rarely uses Yes Unknown, Entered By History   levalbuterol (XOPENEX HFA) 45 MCG/ACT inhaler Inhale 1-2 puffs into the lungs every 4 hours as needed for shortness of breath / dyspnea or wheezing prn Yes Unknown, Entered By History   melatonin 5 MG tablet Take 5 mg by mouth nightly as needed for sleep prn Yes Unknown, Entered By History     Karena Rothman, PharmD     The information provided in this note is only as accurate as the sources available at the time of update(s)

## 2022-04-07 NOTE — PROGRESS NOTES
SPIRITUAL HEALTH SERVICES Progress Note  [facility]  ICU    Attempted visit per [facility] request for SH services (routine).    PT declines visit with  for today.    Follow-up with Pt/family in next 24 - 48 hours.    Horacio Radford  Associate   Pager

## 2022-04-08 NOTE — PROVIDER NOTIFICATION
Dr. Cates paged to ask if neurology is comfortable with D5 1/2 NS for this patient, fluid not running on transfer from ICU. Patient had low BG this afternoon while in ICU. Current BG = 95.  Return call from Dr. Cates. OK to begin running IVF as ordered.

## 2022-04-08 NOTE — CONSULTS
[facility]    Stroke Progress Note    Reason for Consult: Stroke Code     Chief Complaint: One-sided Weakness    HPI  Mia Rodriguez is a 88 year old female hx TIA, CKD, afib on Eliquis 2.5mg bid, HTN, CAD, lives at home with helpers coming in mRS 2, woke up 0530, LKN 0700 fell in kitched, pressed life alert, found down with R gaze, L facial droop, L weakness, dysarthric speech.    CTH no bleed or stroke ASPECT 10, CTA H+N shows R distal M1 occlusion, possible soft plaque vs tyhrombus R ICA, significant aortic and R carotid disease. CTP shows 9ml core, 182ml penumbra in RMCA territory, mismatch ratio 20.     Patient in afib.    Imaging Findings    CTH no bleed or infarct, ASPECT 10  CTA H+N shows R distal M1 occlusion, possible soft plaque vs tyhrombus R ICA, significant aortic and R carotid disease  CTP shows 9ml core, 182ml penumbra in RMCA territory, mismatch ratio 20    CTH 4/7/22 - Post IR  - Focus of hyperdensity at the high posteromedial right frontal lobe  could represent a tiny focus of intracranial hemorrhage. Continued  surveillance recommended.    CTH 4/7/12 - Follow-up imaging      1.  Stable small volume acute subarachnoid hemorrhage within the right cingulate sulcus. No new intracranial hemorrhage.  2.  Developing regions of acute infarction within the right anterior cerebral artery territory and right middle cerebral artery posterior division territory.  3.  Stable chronic infarction within the right posterior cerebral artery territory.  4.  Stable mild to moderate chronic small vessel ischemic disease and generalized brain parenchymal volume loss.    MRI brain 4/8/22    IMPRESSION:  1.  Evolving ischemic infarcts in the right middle cerebral and right anterior cerebral artery distributions.  2.  Mild dural thickening and enhancement along the right cerebral convexity.  3.  Generalized brain atrophy and presumed microvascular ischemic changes as detailed  "above.    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 CT CAP recently did not show malignancy to suggest hypercoag. Not TNK candidate 2/2 Eliquis therapy, CTH showed no infarct, CTP shows small core with large penumbra, mismatch ratio 20. Dx and treatment discussed with patient and family, taken to IR tici 2b after 3 passes.     Mild improvement this am, awaiting MRI. Small SAH stable on follow-up CTH post intervention.    Recommendations    - Neurochecks and Vital Signs every q4h   - Permissive HTN <160  - Start Aspirin 81mg every day, continue holding Eliquis  - Continue home statin  - Telemetry  - Bedside Glucose Monitoring  - PT/OT/SLP  - Stroke Education  - Euthermia, Euglycemia    - Follow-up [facility] clinic 6-8 weeks post discontinue  - Should have Follow-up CTH 14 days post infarct if no new bleed STOP Aspirin, re-Start Eliquis 2.5mg bid    Patient Follow-up    - No further recs, stroke sign off, please call with questions, if fails swallow please call us if needed for PEG discussion    The Stroke Staff is Dr. Mustafa.    Jesu Cates MD  Vascular Neurology Fellow  To page me or covering stroke neurology team member, click here: [email or web address]   Choose \"On Call\" tab at top, then search dropdown box for \"Neurology Adult\", select location, press Enter, then look for stroke/neuro ICU/telestroke.    ______________________________________________________    Clinically Significant Risk Factors Present on Admission                      Past Medical History   Past Medical " History:   Diagnosis Date     Arrhythmia     atrial fib.-on coumadin     Atrial fibrillation (H)      Breast cancer (H)      COPD (chronic obstructive pulmonary disease) (H)      COPD exacerbation (H) 9/2/2015     Coronary artery disease      Hypertension      Malignant neoplasm (H) 6/2/2008    Right Breast Cancer     Osteoporosis      Renal disease     kidney stones     Thyroid disease     Hypothyroid     Unspecified cerebral artery occlusion with cerebral infarction     TIA     Uterine fibroid      Past Surgical History   Past Surgical History:   Procedure Laterality Date     BIOPSY  6/2/2008    Left /RightBreast Biopsy     BIOPSY  6/1/2010    Right Breast Biopsy     BREAST SURGERY  6/12/2008    Right Breast Lumpectomy     CYSTOSCOPY, DILATE URETHRA, COMBINED  10/5/2012    Procedure: COMBINED CYSTOSCOPY, DILATE URETHRA;  urethral dilation;  Surgeon: Kaushal Harris MD;  Location:  OR     CYSTOSCOPY, RETROGRADES, EXTRACT STONE, COMBINED Right 4/16/2015    Procedure: COMBINED CYSTOSCOPY, RETROGRADES, EXTRACT STONE;  Surgeon: Kaushal Harris MD;  Location:  OR     EXCISE LESION LIP N/A 10/22/2014    Procedure: EXCISE LESION LIP;  Surgeon: Brent Jolley MD;  Location:  SD     GYN SURGERY  1981    LISA with ovaries intact-fibroid     IR CAROTID CEREBRAL ANGIOGRAM BILATERAL  4/6/2022     LASER HOLMIUM LITHOTRIPSY URETER(S), INSERT STENT, COMBINED  10/5/2012    Procedure: COMBINED CYSTOSCOPY, URETEROSCOPY, LASER HOLMIUM LITHOTRIPSY URETER(S), INSERT STENT;  RIGHT URETEROSCOPY ,right ureteral biopsy,WITH LASER HOLMIUM LITHOTRIPSY, INSERT STENT RIGHT URETER;  Surgeon: Kaushal Harris MD;  Location:  OR     LASER HOLMIUM LITHOTRIPSY URETER(S), INSERT STENT, COMBINED Right 4/16/2015    Procedure: COMBINED CYSTOSCOPY, URETEROSCOPY, LASER HOLMIUM LITHOTRIPSY URETER(S), INSERT STENT;  Surgeon: Kaushal Harris MD;  Location:  OR     Medications   Home Meds  Prior to Admission  medications    Medication Sig Start Date End Date Taking? Authorizing Provider   albuterol (PROAIR HFA/PROVENTIL HFA/VENTOLIN HFA) 108 (90 Base) MCG/ACT inhaler Inhale 2 puffs into the lungs every 4 hours as needed for shortness of breath / dyspnea or wheezing    Reported, Patient   apixaban ANTICOAGULANT (ELIQUIS) 2.5 MG tablet Take 1 tablet (2.5 mg) by mouth 2 times daily 1/31/22   Xuan Correa APRN CNP   atorvastatin (LIPITOR) 20 MG tablet Take 1 tablet (20 mg) by mouth At Bedtime 1/31/22   Xuan Correa APRN CNP   denosumab (PROLIA) 60 MG/ML SOLN Inject 60 mg Subcutaneous. q 6 months    Reported, Patient   diltiazem ER (DILT-XR) 120 MG 24 hr capsule Take 1 capsule (120 mg) by mouth daily 1/31/22   Xuan Correa APRN CNP       Scheduled Meds    artificial saliva  2 spray Swish & Spit 4x Daily     famotidine  20 mg Intravenous Q48H     sodium chloride (PF)  3 mL Intracatheter Q8H       Infusion Meds    dilTIAZem HCl-Sodium Chloride 10 mg/hr (04/08/22 1332)     - MEDICATION INSTRUCTIONS -         PRN Meds  albuterol, hydrALAZINE, lidocaine 4%, lidocaine (buffered or not buffered), - MEDICATION INSTRUCTIONS -, ondansetron **OR** ondansetron, prochlorperazine **OR** prochlorperazine **OR** prochlorperazine, sodium chloride (PF)    Allergies   Allergies   Allergen Reactions     Digoxin Other (See Comments)     Weakness, SOB     Megace [Megestrol Acetate]      Increased LFTs       Family History   Family History   Problem Relation Age of Onset     Alzheimer Disease Mother      Osteoporosis Mother      C.A.D. Father      Cardiovascular Father      Cerebrovascular Disease Father      Breast Cancer Maternal Grandmother      Unknown/Adopted Maternal Grandfather      Unknown/Adopted Paternal Grandmother         old age     Cancer Paternal Grandfather         stomach     Social History   Social History     Tobacco Use     Smoking status: Former Smoker     Packs/day: 1.00     Years: 30.00     Pack years: 30.00      Types: Cigarettes     Start date:      Quit date: 1989     Years since quittin.3     Smokeless tobacco: Never Used     Tobacco comment: Started:  Stopped:   Substance Use Topics     Alcohol use: No     Alcohol/week: 0.0 standard drinks     Drug use: No       Review of Systems   The 10 point Review of Systems is negative other than noted in the HPI or here.        PHYSICAL EXAMINATION  Temp:  [97.2  F (36.2  C)-98.2  F (36.8  C)] 97.8  F (36.6  C)  Pulse:  [] 90  Resp:  [11-27] 25  BP: (111-167)/() 146/64  SpO2:  [88 %-95 %] 88 %     Neurologic  Mental Status:  alert, oriented x 3, follows commands, dysarthric speech improving, naming and repetition normal  Cranial Nerves:  L field cut, PERRL, R gaze deviation now able to overcome, facial sensation intact and symmetric, L droop, hearing not formally tested but intact to conversation, palate elevation symmetric and uvula midline  Motor:  RUE RLE 5/5 LUE 1/5 LLE 2/5  Reflexes:  toes down-going  Sensory:  Loss sensation LUE and LE to LT  Coordination:  normal finger-to-nose and heel-to-shin bilaterally without dysmetria, rapid alternating movements symmetric  Station/Gait:  deferred   L neglect    Dysphagia Screen  Per Nursing    Stroke Scales    NIHSS  1a. Level of Consciousness 0-->Alert, keenly responsive   1b. LOC Questions 0-->Answers both questions correctly   1c. LOC Commands 0-->Performs both tasks correctly   2.   Best Gaze 1-->Partial gaze palsy, gaze is abnormal in one or both eyes, but forced deviation or total gaze paresis is not present   3.   Visual 2-->Complete hemianopia   4.   Facial Palsy 1-->Minor paralysis (flattened nasolabial fold, asymmetry on smiling)   5a. Motor Arm, Left 3-->No effort against gravity, limb falls   5b. Motor Arm, Right 0-->No drift, limb holds 90 (or 45) degrees for full 10 secs   6a. Motor Leg, Left 3-->No effort against gravity, leg falls to bed immediately   6b. Motor Leg, right 0-->No drift, leg  holds 30 degree position for full 5 secs   7.   Limb Ataxia 2-->Present in two limbs   8.   Sensory 1-->Mild-to-moderate sensory loss, patient feels pinprick is less sharp or is dull on the affected side, or there is a loss of superficial pain with pinprick, but patient is aware of being touched   9.   Best Language 0-->No aphasia, normal   10. Dysarthria 1-->Mild-to-moderate dysarthria, patient slurs at least some words and, at worst, can be understood with some difficulty   11. Extinction and Inattention  1-->Visual, tactile, auditory, spatial, or personal inattention or extinction to bilateral simultaneous stimulation in one of the sensory modalities   Total 15 (04/07/22 2100)       Modified Letty Score (Pre-morbid)  2 - Slight disability.  Able to look after own affairs without assistance, but unable to carry out all previous activities.    Imaging  I personally reviewed all imaging; relevant findings per HPI.     Lab Results Data   CBC  Recent Labs   Lab 04/07/22  0559 04/06/22 0827   WBC 13.5* 11.0   RBC 3.73* 4.88   HGB 12.1 15.8*   HCT 37.7 49.0*    180     Basic Metabolic Panel    Recent Labs   Lab 04/07/22  0559 04/06/22  1610 04/06/22  0827   *  --  144   POTASSIUM 3.6  --  3.6   CHLORIDE 117*  --  113*   CO2 23  --  24   BUN 20  --  23   CR 0.70  --  0.81   GLC 89 127* 121*   CYNDEE 8.1*  --  9.1     Liver Panel  No results for input(s): PROTTOTAL, ALBUMIN, BILITOTAL, ALKPHOS, AST, ALT, BILIDIRECT in the last 168 hours.  INR    Recent Labs   Lab Test 04/07/22  0559 04/06/22  0827 10/22/14  1228   INR 1.20* 1.09 1.03      Lipid Profile    Recent Labs   Lab Test 04/06/22  0827 01/15/19  0927 01/12/18  0821 05/06/16  1554 02/09/14  0750 02/08/14  1735   CHOL 148 158 188   < > 219* 286*   HDL 53 51 65   < > 63 68   LDL 74 88 97   < > 137* 192*   TRIG 107 93 128   < > 99 133   CHOLHDLRATIO  --   --   --   --  3.5 4.2    < > = values in this interval not displayed.     A1C    Recent Labs   Lab Test  04/06/22  0827   A1C 5.4     Troponin I    Recent Labs   Lab 04/06/22  0827   TROPONINIS 14          Stroke Code Data Data   Stroke Code Data  (for stroke code without tele)  Stroke code activated 04/06/22   0824   First stroke provider response 04/06/22   0829   Last known normal 04/06/22   0700   Time of discovery   (or onset of symptoms) 04/06/22   0700   Head CT read by Stroke Neuro Dr/Provider 04/06/22   0836   Was stroke code de-escalated? No

## 2022-04-08 NOTE — PLAN OF CARE
[id number] RN Shift Summary     No acute neuro change, left side facial droop, left side hemiplegia. Neglect seem to have improved. Patient failed SLP swallow test, to be kept NPO. Hospitalist notified about patient's low blood glucose, PRN order placed. Blood glucose rechecked, now WDL. Urine output low the last two hours, 20/hr. Patient complains of dry mouth, swabbed frequently. Denies pain. Skin intact with pink blanchable coccyx, left hand bruising. Transferred to station 73 with NA.    Goal Outcome Evaluation:    Plan of Care Reviewed With: family, son, daughter

## 2022-04-08 NOTE — PROGRESS NOTES
[facility]    Medicine Progress Note - Hospitalist Service    Date of Admission:  4/6/2022    Assessment & Plan            Mia Rodriguez is a 88 year old female who presented to the [facility] ER with acute neuro changes and associated Fall.  She was felt to have an acute CVA and urgently taken to radiology for embolectomy.     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    - Systolic goal -160 range.      - Resumption of anticoagulation per neuro.   -  Continue strict NPO   -  PT/OT/Speech/SW/CC consults.  Continue gentle non-dextrose IVF for now.  -  Continue close neuro monitoring  -  Defer follow-up brain imaging timing to neurology/neuroradiology  - Will need ongoing goals of care discussion with patient and family regarding nutrition and dispo     Abnormal UA:  - UA slightly abnormal, doubt UTI.  Culture pending, hold on abx.       Fall:  -  No overt majory injury. Pelvis XR w/o acute findings 4/6     Atrial fibrillation with RVR during procedure:  -  Continue cardizem drip.  Titrate to HR  range while maintaining BP in target range.  - Strict NPO so unable to start oral meds  -  Hold Eliquis     Hypothyroidism:  - Hold PO levothyroxine while NPO     CKD, history of renal stones:  -  Monitor     COPD, smoking history:  -  No overt exacerbation.  Albuterol PRN.  Monitor.     Reflux:  -  Pepcid     Osteoporosis:  -  Monitor, baseline on prolia    HyperNa  - likely iatrogenic from IVF and from absent PO  - start 1/2 NS at 75. Unable to use D5W 2/2 CVA       Diet: NPO for Medical/Clinical Reasons Except for: No Exceptions    DVT Prophylaxis: Pneumatic Compression Devices  Bob Catheter: PRESENT, indication: Strict 1-2 Hour I&O  Central Lines: None  Cardiac Monitoring: ACTIVE order. Indication: ICU  Code Status: Full Code      Disposition Plan    Expected Discharge: 04/12/2022   Anticipated discharge location:  Awaiting care coordination huddle  Delays:            The patient's care was discussed with the Bedside Nurse, Patient and OT Team.    Tino Lemus MD  Hospitalist Service  [facility]  Securely message with the Vocera Web Console (learn more here)  Text page via [place] Paging/Directory         Clinically Significant Risk Factors Present on Admission                     ______________________________________________________________________    Interval History   Seen in AM and examined. Appears a bit better rested today and she states she slept poorly but does feel a bit better. Denies pain or other localizing symptoms. Speech is maybe a bit more clear today.     Data reviewed today: I reviewed all medications, new labs and imaging results over the last 24 hours. I personally reviewed the head CT image(s) showing developing infarct and stable SAH.    Physical Exam   Vital Signs: Temp: 97.8  F (36.6  C) Temp src: Oral BP: (!) 146/64 Pulse: 90   Resp: 25 SpO2: (!) 88 % O2 Device: None (Room air)    Weight: 83 lbs 8.87 oz  Constitutional: Awake, alert, cooperative, no apparent distress  Respiratory: Clear to auscultation bilaterally, no crackles or wheezing  Cardiovascular: Regular rate and rhythm, normal S1 and S2, and no murmur noted  GI: Normal bowel sounds, soft, non-distended, non-tender  Skin/Integumen: No rashes, no cyanosis, no edema  Other: Right gaze preference, hemiplegic on left, right facial droop.      Data   Recent Labs   Lab 04/07/22  0559 04/06/22  1610 04/06/22  0827   WBC 13.5*  --  11.0   HGB 12.1  --  15.8*   *  --  100     --  180   INR 1.20*  --  1.09   *  --  144   POTASSIUM 3.6  --  3.6   CHLORIDE 117*  --  113*   CO2 23  --  24   BUN 20  --  23   CR 0.70  --  0.81   ANIONGAP 7  --  7   CYNDEE 8.1*  --  9.1   GLC 89 127* 121*     Recent Results (from the past 24 hour(s))   MR Brain  w/o & w Contrast    Narrative    EXAM: MR BRAIN W/O and W CONTRAST  LOCATION: [facility]  DATE/TIME: 4/7/2022 7:57 PM    INDICATION: Stroke, follow up.  COMPARISON: Head CT 04/06/2022.  CONTRAST: 4 mL Gadavist.  TECHNIQUE: Routine multiplanar multisequence head MRI without and with intravenous contrast.    FINDINGS:  INTRACRANIAL CONTENTS: Evolving ischemic infarcts involving portions of the right middle cerebral and right anterior cerebral artery territories including the parasagittal right parietal lobe, lateral aspect of the right frontoparietal junction, lateral   right frontal lobe, inferior right frontal lobe and periatrial white matter on the right are noted. No mass, acute hemorrhage, or extra-axial fluid collections. Normal brain parenchymal signal. Mild generalized cerebral atrophy. No hydrocephalus.   Moderate cerebellar atrophy. Mild dural thickening and enhancement along the right cerebral convexity. No other abnormal contrast enhancement.    SELLA: No abnormality accounting for technique.    OSSEOUS STRUCTURES/SOFT TISSUES: Normal marrow signal. The major intracranial vascular flow voids are maintained.     ORBITS: No abnormality accounting for technique.     SINUSES/MASTOIDS: No paranasal sinus mucosal disease. No middle ear or mastoid effusion.       Impression    IMPRESSION:  1.  Evolving ischemic infarcts in the right middle cerebral and right anterior cerebral artery distributions.  2.  Mild dural thickening and enhancement along the right cerebral convexity.  3.  Generalized brain atrophy and presumed microvascular ischemic changes as detailed above.     Medications     dilTIAZem HCl-Sodium Chloride 10 mg/hr (04/07/22 4547)     - MEDICATION INSTRUCTIONS -       sodium chloride 100 mL/hr at 04/08/22 0800       artificial saliva  2 spray Swish & Spit 4x Daily     famotidine  20 mg Intravenous Q48H     sodium chloride (PF)  3 mL Intracatheter Q8H

## 2022-04-08 NOTE — PHARMACY-CONSULT NOTE
Pharmacy Consult to evaluate for medication related stroke core measures    Mia Rodriguez, 88 year old female admitted for Acute Right Hemispheric CVA with Right Distal M1 thrombus on 4/6/2022.    Thrombolytic was not given because of Pt on anticoagulation    VTE Prophylaxis SCDs /PCDs placed on 4/6, as appropriate prior to end of hospital day 2.    Antithrombotic: aspirin started on 4/8, as appropriate by end of hospital day 2. Continue antithrombotic therapy on discharge to meet quality measures, unless contraindicated.    Anticoagulation if history of A-fib/flutter: Patient does not have history of A-fib/flutter - anticoagulation not required for medication related stroke core measures.     LDL Cholesterol Calculated   Date Value Ref Range Status   04/06/2022 74 <=100 mg/dL Final   01/15/2019 88 <100 mg/dL Final     Comment:     Desirable:       <100 mg/dl       Statin (atorvastatin 20mg PTA) held as NPO, not safe to swallow.    Recommendations: follow up when safe to resume eliquis & atorvastatin    Thank you for the consult.    Karena Rothman [facility] 4/8/2022 1:48 PM

## 2022-04-09 NOTE — CONSULTS
"CLINICAL NUTRITION SERVICES  -  ASSESSMENT NOTE      Recommendations Ordered by Registered Dietitian (RD):     Nutrition Support Enteral:  Type of Feeding Tube: to be placed  Enteral Frequency:  Continuous  Enteral Regimen: Jevity 1.5 @ 40 mL/hr (goal rate)  Total Enteral Provisions: 1440 cals (38 cals/kg), 61 gm pro (1.6 gm/kg), 20 gm fiber, 730 mL free water  Free Water Flush: 60 mL every 4 hrs (while on IVF)    Start TF at 20 mL/hr.  Increase by 10 mL every 24 hrs to goal rate of 40 mL/hr.     Malnutrition:   % Weight Loss:  None noted  % Intake:  Decreased intake does not meet criteria for malnutrition  - NPO past 4 days, not a big eater in general  Subcutaneous Fat Loss:  Low fat stores  Muscle Loss:  Temporal region - moderate depletion and Clavicle bone region - moderate depletion (do not suspect acute loss, likely some age related sarcopenia)  Fluid Retention:  None noted    Malnutrition Diagnosis: Patient does not meet two of the above criteria necessary for diagnosing malnutrition        REASON FOR ASSESSMENT  Mia Rodriguez is a 88 year old female seen by Registered Dietitian for Provider Order - Registered Dietitian to Assess and Order TF per Medical Nutrition Therapy Protocol      NUTRITION HISTORY  Chart reviewed  Visited with pt and dtr this morning  Pt follows a regular diet at home  Dislikes chocolate   Not a big eater      CURRENT NUTRITION ORDERS  Diet Order:     NPO    Pt failed swallow eval - plan for trial of NG   Plan to start EN today    Per chart review - \"patient stated to MD Lemus and daughter Bennie on 4/9 that she would never want a permanent feeding tube\"      NUTRITION FOCUSED PHYSICAL ASSESSMENT FOR DIAGNOSING MALNUTRITION)  Yes               Observed:    Muscle wasting (refer to documentation in Malnutrition section)    Obtained from Chart/Interdisciplinary Team:  BESSIE    ANTHROPOMETRICS  Height: 4' 11\"  Weight:(4/6) 37.9 kg / 83 lbs 8.87 oz  Body mass index is 16.88 " kg/m .  Weight Status:  Underweight BMI <18.5  IBW: 44.3 kg  % IBW: 86%  Weight History:   Records reflect chronic low body wt  Appears wt has been stable  Wt Readings from Last 10 Encounters:   04/06/22 37.9 kg (83 lb 8.9 oz)   01/31/22 36 kg (79 lb 6.4 oz)   07/06/21 36.3 kg (80 lb)   01/05/21 36.1 kg (79 lb 9.6 oz)   10/16/20 36.3 kg (80 lb)   10/13/20 36.3 kg (80 lb)   10/06/20 36.3 kg (80 lb)   06/03/20 36.3 kg (80 lb)   02/20/20 40 kg (88 lb 1.6 oz)   07/24/19 40.8 kg (90 lb)         LABS  4/9: Na 145 (H)         K 2.7 (L)         Cr 0.51 (L)      MEDICATIONS  IVF (D5 containing) @ 75 mL/hr      ASSESSED NUTRITION NEEDS PER APPROVED PRACTICE GUIDELINES:    Dosing Weight 37.9 kg  Estimated Energy Needs: 8498-5387 kcals (35-40 Kcal/Kg)  Justification: underweight  Estimated Protein Needs: 55-75 grams protein (1.5-2 g pro/Kg)  Justification: Repletion  Estimated Fluid Needs: 1009-6704  mL (25-30 mL/kg)  Justification: maintenance    MALNUTRITION:  % Weight Loss:  None noted  % Intake:  Decreased intake does not meet criteria for malnutrition  - NPO past 4 days, not a big eater in general  Subcutaneous Fat Loss:  Low fat stores  Muscle Loss:  Temporal region - moderate depletion and Clavicle bone region - moderate depletion (do not suspect acute loss, likely some age related sarcopenia)  Fluid Retention:  None noted    Malnutrition Diagnosis: Patient does not meet two of the above criteria necessary for diagnosing malnutrition      NUTRITION DIAGNOSIS:  Inadequate protein-energy intake related to NPO status as evidenced by pt not meeting estimated nutrition needs      NUTRITION INTERVENTIONS  Recommendations / Nutrition Prescription  NPO    Nutrition Support Enteral:  Type of Feeding Tube: to be placed  Enteral Frequency:  Continuous  Enteral Regimen: Jevity 1.5 @ 40 mL/hr (goal rate)  Total Enteral Provisions: 1440 cals (38 cals/kg), 61 gm pro (1.6 gm/kg), 20 gm fiber, 730 mL free water  Free Water Flush: 60 mL  every 4 hrs (while on IVF)    Start TF at 20 mL/hr.  Increase by 10 mL every 24 hrs to goal rate of 40 mL/hr.   .      Implementation  Nutrition education ---> reviewed TF plans with pt and dtr, questions answered  EN Composition and EN Schedule ---> orders completed in EPIC  .      Nutrition Goals  EN to meet % est needs  .      MONITORING AND EVALUATION:  Progress towards goals will be monitored and evaluated per protocol and Practice Guidelines

## 2022-04-09 NOTE — PROGRESS NOTES
Pt here with R MCA stroke and stable R SAH. A&O X4. Neuros left sided hemiparesis- does with draw to pain. Has left droop, slurred speech with a dry mouth- does improve with moisture. VSS. Tele A-fib CVR. NPO diet, OK to have a few ice chips with RN supervision. Pt had NG tube placed this afternoon- tolerating well. Pt has TF running 40mL/hr with flushes programed 60mL Q4. Takes pills crushed in NG tube. Continues on diltazem drip was decreased to 5ml/hr as Pt is now bridging with meds through tube. Bob was pulled this afternoon, purewick in place. Up with Ax2 lift. Pt potassium was replaced this morning, waiting on lab results to come back. Pt was up in the chair this afternoon for a few hours. R groin site, CDI. Denies pain. Pt scoring green on the Aggression Stop Light Tool. Discharge pending.     84325 Exp Problem Focused - Low Complex

## 2022-04-09 NOTE — PLAN OF CARE
Goal Outcome Evaluation:  A and O x4. Tele reading a fib RVR. VSS, on 10 mg/hr diltiazem drip. D5 1/2 NS running at 75 mL/hr, BG = 95. L droop. LUE/LLE 1/5 strength, decreased sensation. No gaze preference or field cut noted on assessment. NPO except ice chips with RN, no coughing. Frequent oral care provided, pt complains of dry mouth. Daughter and son at bedside. Scoring green on aggression screening tool.

## 2022-04-09 NOTE — PLAN OF CARE
Pt here with R MCA stroke and stable R SAH. A&O x4. Slow to respond at times with slurred speech. Has L droop, L side numbness, LUE/LLE withdraws to pain, no spontaneous movement. VSS. Continues on diltazem drip at 10ml/hr. Tele A fib CVR. NPO, ice chips with RN supervision. Frequent oral cares provided. Up with lift, T/R. Bob in place for strict I&O. Denies pain. R groin site WDL. Pt scoring green on the Aggression Stop Light Tool. Discharge pending.

## 2022-04-09 NOTE — PROGRESS NOTES
[facility]    Medicine Progress Note - Hospitalist Service    Date of Admission:  4/6/2022    Assessment & Plan            Mia Rodriguez is a 88 year old female who presented to the [facility] ER with acute neuro changes and associated Fall.  She was felt to have an acute CVA and urgently taken to radiology for embolectomy.     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    - Systolic goal -160 range.      - Resumption of anticoagulation per neuro.   -  Continue strict NPO   -  PT/OT/Speech/SW/CC consults.  Continue gentle non-dextrose IVF for now.  -  Continue close neuro monitoring  -  Defer follow-up brain imaging timing to neurology/neuroradiology  - Will need ongoing goals of care discussion with patient and family regarding nutrition and dispo     Abnormal UA:  - UA slightly abnormal, doubt UTI.  Culture pending, hold on abx.       Fall:  -  No overt majory injury. Pelvis XR w/o acute findings 4/6     Atrial fibrillation with RVR during procedure:  -  Continue cardizem drip.  Titrate to HR  range while maintaining BP in target range.  - Strict NPO so unable to start oral meds  -  Hold Eliquis     Hypothyroidism:  - Hold PO levothyroxine while NPO     CKD, history of renal stones:  -  Monitor     COPD, smoking history:  -  No overt exacerbation.  Albuterol PRN.  Monitor.     Reflux:  -  Pepcid     Osteoporosis:  -  Monitor, baseline on prolia    HyperNa  - likely iatrogenic from IVF and from absent PO  - start 1/2 NS at 75. Unable to use D5W 2/2 CVA    Hypoglycemia  - started D5 fluids    Hypokalemia  - replace per protocol  - add K to fluids    Severe dysphagia post CVA  - place NG 4/9  - consult to nutrition for initiation of tube feeds  - patient stated to MD Lemus and daughter Bennie on 4/9 that she would never want a permanent feeding  tube.  - appreciate SLP  - possible video swallow on 4/11 or 4/12    Difficulty sleeping  - related to neurochecks    Goals of care  - discussion ongoing but patient states no permanent feeding tube. She is willing to trial NG for a period to see how much recovery she will have have.   - she states a clear preference for continued restorative plan of care as of 4/9 but she will let us know if quality of life becomes intolerable. Discussed idea of eat for comfort, briefly comfort cares, and quality vs quantity of life on 4/9       Diet: NPO for Medical/Clinical Reasons Except for: No Exceptions    DVT Prophylaxis: Pneumatic Compression Devices  Bob Catheter: PRESENT, indication: Strict 1-2 Hour I&O  Central Lines: None  Cardiac Monitoring: ACTIVE order. Indication: Stroke, acute (48 hours)  Code Status: Full Code      Disposition Plan   Expected Discharge: 04/12/2022   Anticipated discharge location:  Awaiting care coordination huddle  Delays:            The patient's care was discussed with the Bedside Nurse, Patient and OT Team.    Tino Lemus MD  Hospitalist Service  [facility]  Securely message with the Vocera Web Console (learn more here)  Text page via [employer] Paging/Directory         Clinically Significant Risk Factors Present on Admission                     ______________________________________________________________________    Interval History   Seen in AM and examined. She had some hypoglycemia yesterday and was started on some D5. She states he needs are met but she is very sleepy. No pain. No localizing symptoms. Failed swallow study again.     Data reviewed today: I reviewed all medications, new labs and imaging results over the last 24 hours. I personally reviewed the head CT image(s) showing developing infarct and stable SAH.    Physical Exam   Vital Signs: Temp: 98.4  F (36.9  C) Temp src: Axillary BP: 122/73 Pulse: 83   Resp: 27 SpO2: 92 % O2 Device: None (Room  air)    Weight: 83 lbs 8.87 oz  Constitutional: Awake, alert, cooperative, no apparent distress  Respiratory: Clear to auscultation bilaterally, no crackles or wheezing  Cardiovascular: Regular rate and rhythm, normal S1 and S2, and no murmur noted  GI: Normal bowel sounds, soft, non-distended, non-tender  Skin/Integumen: No rashes, no cyanosis, no edema  Other: Right gaze preference, comes to midline much more easily today. hemiplegic on left, right facial droop.      Data   Recent Labs   Lab 04/09/22  0647 04/09/22  0201 04/08/22  2129 04/08/22  1524 04/07/22  0559 04/06/22  1610 04/06/22  0827   WBC 9.7  --   --   --  13.5*  --  11.0   HGB 13.6  --   --   --  12.1  --  15.8*   *  --   --   --  101*  --  100     --   --   --  154  --  180   INR  --   --   --   --  1.20*  --  1.09   *  --   --   --  147*  --  144   POTASSIUM 2.7*  --   --   --  3.6  --  3.6   CHLORIDE 119*  --   --   --  117*  --  113*   CO2 18*  --   --   --  23  --  24   BUN 13  --   --   --  20  --  23   CR 0.51*  --   --   --  0.70  --  0.81   ANIONGAP 8  --   --   --  7  --  7   CYNDEE 7.1*  --   --   --  8.1*  --  9.1   * 93 112*   < > 89   < > 121*    < > = values in this interval not displayed.     No results found for this or any previous visit (from the past 24 hour(s)).  Medications     dextrose 5% and 0.45% NaCl + KCl 40 mEq/L       dilTIAZem HCl-Sodium Chloride 10 mg/hr (04/09/22 0212)     - MEDICATION INSTRUCTIONS -         artificial saliva  2 spray Swish & Spit 4x Daily     aspirin  81 mg Oral Daily    Or     aspirin  300 mg Rectal Daily     famotidine  20 mg Intravenous Q48H     sodium chloride (PF)  3 mL Intracatheter Q8H

## 2022-04-10 NOTE — PLAN OF CARE
Pt here with R sided strokes and stable R SAH. A&O x4, forgetful. Had 1 episode of confusion overnight- resolved quickly. Neuros with slurred speech, L droop, L sided numbness, LUE/LLE no spontaneous movement, withdraws to pain. VSS. Tele A fib CVR. Continues on diltiazem drip at 5ml/hr. NPO, on TF at 20ml/hr, to be increased on days. Has 60 ml flushes q4 hours. Incontinent post sellers removal, purewick in place. PVR 35ml. Up with lift, T/R. Denies pain. Pt scoring green on the Aggression Stop Light Tool. Plan pending.

## 2022-04-10 NOTE — PROGRESS NOTES
"[facility]    Medicine Progress Note - Hospitalist Service    Date of Admission:  4/6/2022    Assessment & Plan            Mia Rodriguez is a 88 year old female who presented to the [facility] ER with acute neuro changes and associated Fall.  She was felt to have an acute CVA and urgently taken to radiology for embolectomy.     Addendum 1545:  New fever, ? Right sided crackles, abdominal xr from 4/9 with clear lung bases. Suspect aspiration pneumonia due to significant oropharyngeal dysphagia.  Discussed with patient, daughter in room, son on Facetime.  Patient would never want to be intubated. States to son \"if I get to that point, let me go\". For her quality of life is being able to interact and spend time with her family.   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    - Systolic goal -160 range.      - Continue strict NPO with tube feeds   - PT/OT/Speech/SW/CC consults.  - Continue close neuro monitoring  - ASA 81mg daily for now   - Repeat NCCT in 2 weeks, if stable then would restart Eliquis 2.5mg BID + stop ASA at that time   - Follow-up with [facility] in 6-8 weeks   - Stroke neuro following peripherally, please page for questions    Abnormal UA:  - UA slightly abnormal, doubt UTI.  Culture pending, hold on abx.       Fall:  -  No overt majory injury. Pelvis XR w/o acute findings 4/6     Atrial fibrillation with RVR:  -  Continue cardizem drip.  Titrate to HR  range while maintaining BP in target range.  - Continue to increase oral dilt as needed to maintain HR   -  Hold Eliquis     Hypothyroidism:  - Hold PO levothyroxine while NPO     CKD, history of renal stones:  -  Monitor     COPD, smoking history:  -  No overt exacerbation.  Albuterol PRN.  " Monitor.     Reflux:  -  Pepcid     Osteoporosis:  -  Monitor, baseline on prolia    HyperNa, resolved  - likely iatrogenic from IVF and from absent PO    Hypoglycemia, resolved  - continue tube feeds    Hypokalemia, resolved  - replace per protocol    Severe dysphagia post CVA  - place NG 4/9  - consult to nutrition for initiation of tube feeds  - patient stated to MD Lemus and daughter Bennie on 4/9 that she would never want a permanent feeding tube.  - appreciate SLP  - possible video swallow on 4/11 or 4/12    Difficulty sleeping  - related to neurochecks    Goals of care  - discussion ongoing but patient states no permanent feeding tube. She is willing to trial NG for a period to see how much recovery of swallow function she will have have.   - she states a clear preference for continued restorative plan of care as of 4/9 but she will let us know if quality of life becomes intolerable. Discussed idea of eat for comfort, briefly comfort cares, and quality vs quantity of life on 4/9       Diet: Adult Formula Drip Feeding: Continuous Jevity 1.5; Other - Specify in Comment; Goal Rate: 40; mL/hr; Medication - Feeding Tube Flush Frequency: At least 15-30 mL water before and after medication administration and with tube clogging; Amount to Se...  NPO for Medical/Clinical Reasons Except for: Other, NPO but receiving Tube Feeding; Specify: 1-5 ice chips per hour max with RN supervision, cue pt to cough if wet voice/throat clear; Squeezed out swabs for moisture/oral cares    DVT Prophylaxis: Pneumatic Compression Devices  Bob Catheter: Not present  Central Lines: None  Cardiac Monitoring: ACTIVE order. Indication: Stroke, acute (48 hours)  Code Status: Full Code      Disposition Plan   Expected Discharge: 04/12/2022   Anticipated discharge location:  Awaiting care coordination huddle  Delays:            The patient's care was discussed with the Bedside Nurse and Patient.    Tino Lemus MD  Hospitalist  [facility]  Securely message with the [employer] Web Console (learn more here)  Text page via [employer] Paging/Directory         Clinically Significant Risk Factors Present on Admission                     ______________________________________________________________________    Interval History   Seen in AM. Some increased HR in early AM, went up on dilt gtt. Feels stronger better rested today. Feels like her nose is running with NG tube    Data reviewed today: I reviewed all medications, new labs and imaging results over the last 24 hours. I personally reviewed the head CT image(s) showing developing infarct and stable SAH.    Physical Exam   Vital Signs: Temp: 97.5  F (36.4  C) Temp src: Oral BP: (!) 131/98 Pulse: 110   Resp: 28 SpO2: 96 % O2 Device: None (Room air)    Weight: 83 lbs 8.87 oz  Constitutional: Awake, alert, cooperative, no apparent distress  Respiratory: Clear to auscultation bilaterally, no crackles or wheezing  Cardiovascular: Regular rate and rhythm, normal S1 and S2, and no murmur noted  GI: Normal bowel sounds, soft, non-distended, non-tender  Skin/Integumen: No rashes, no cyanosis, no edema  Other: Right gaze preference, comes to midline. hemiplegic on left, right facial droop.      Data   Recent Labs   Lab 04/10/22  0702 04/10/22  0241 04/09/22  1809 04/09/22  1725 04/09/22  1202 04/09/22  0747 04/09/22  0647 04/08/22  1524 04/07/22  0559 04/06/22  1610 04/06/22  0827   WBC 14.0*  --   --   --   --   --  9.7  --  13.5*  --  11.0   HGB 14.4  --   --   --   --   --  13.6  --  12.1  --  15.8*   MCV 98  --   --   --   --   --  101*  --  101*  --  100     --   --   --   --   --  153  --  154  --  180   INR  --   --   --   --   --   --   --   --  1.20*  --  1.09   NA  --   --   --   --   --   --  145*  --  147*  --  144   POTASSIUM  --   --   --  3.5  --   --  2.7*  --  3.6  --  3.6   CHLORIDE  --   --   --   --   --   --  119*  --  117*  --  113*   CO2  --   --    --   --   --   --  18*  --  23  --  24   BUN  --   --   --   --   --   --  13  --  20  --  23   CR  --   --   --   --   --   --  0.51*  --  0.70  --  0.81   ANIONGAP  --   --   --   --   --   --  8  --  7  --  7   CYNDEE  --   --   --   --   --   --  7.1*  --  8.1*  --  9.1   GLC  --  103* 141*  --  118*   < > 113*   < > 89   < > 121*    < > = values in this interval not displayed.     Recent Results (from the past 24 hour(s))   XR Abdomen Port 1 View    Narrative    EXAM: XR ABDOMEN PORT 1 VIEWS  LOCATION: [facility]  DATE/TIME: 4/9/2022 12:53 PM    INDICATION: NG tube placement.  COMPARISON: None.      Impression    IMPRESSION: An enteric tube is in place with tip likely in the gastric antrum or first portion of the duodenum. Small bilateral pleural effusions, greater on the right. Tortuous calcified thoracic aorta.     Medications     dextrose       dilTIAZem HCl-Sodium Chloride 5 mg/hr (04/10/22 0447)     - MEDICATION INSTRUCTIONS -         artificial saliva  2 spray Swish & Spit 4x Daily     aspirin  81 mg Oral Daily    Or     aspirin  300 mg Rectal Daily     diltiazem  30 mg Oral or Feeding Tube Q6H ARIK     famotidine  20 mg Intravenous Q24H     sodium chloride (PF)  3 mL Intracatheter Q8H

## 2022-04-11 NOTE — PROGRESS NOTES
Pt here with R MCA stroke and stable R SAH. A&O X4. Neuros left sided hemiparesis- does with draw to pain. Has left droop, slurred speech with a dry mouth- does improve with moisture. VSS- placed on 2L oxymask. Tele A-fib CVR. NPO diet. Pt had NG with TF running 40mL/hr goal with flushes programed 60mL Q4. Takes pills crushed in NG tube. Continues on diltazem drip was decreased to 5ml/hr this evening. Pt is bridging with meds through tube. Purewick in place. Up with Ax2 lift. R groin site, CDI. Denies pain. Code status was updated to DNR. Potassium replaced this morning. Phos was also replaced- 1 dose given this shift. Plan for video swallow study tomorrow morning at 9 am. Family will have pallative consult after video results. Pt scoring green on the Aggression Stop Light Tool. Discharge pending.

## 2022-04-11 NOTE — CONSULTS
[facility]  Palliative Care Consultation Note    Patient: Mia Rodriguez  Date of Admission:  4/6/2022    Requesting Clinician / Team: Dr Velasquez  Reason for consult: Goals of care     Recommendations:    Goals of Care (see in depth discussion below): Patient requests to have a family conference for goals of care with her children present.     Spoke with daughter Bennie who set up time for 2:30 PM on 4/12. She requests that I meet with all 3 children in [facility] in [facility] prior to meeting in Agustina's room for primary discussion (as only 2 will be able to go into room with Agustina).    Code status: No CPR; pre-arrest intubation OK    Advanced Care Planning:    Patient has a completed Health Care Directive: Reportedly yes, but not available to us currently. Agustina states that she does have a HCD but it is not  on file here.  -Dysphagia related to acute CVA. Agustina will be having a video swallow study tomorrow morning. She currently has a NG tube for nutrition. Agustina seems open to having an altered diet if necessary.    Weakness/left hemiparesis related to CVA.   -PT/OT for evaluation and possible therapies  -Appreciate SLT expertise and recommendations.  -Supportive care on unit- assist with positioning for skin integrity and comfort.    Patient case was reviewed with Yessenia Grissom/RN, SLT    SOFIA Vargas  [facility]  Contact information available via [facility] Paging/Directory      Thank you for the opportunity to participate in the care of this patient and family. Our team: will continue to follow.     During regular M-F work hours ([phone]) -- if you are not sure who specifically to contact -- please contact us on [facility] Smart Web.     After regular work hours and on weekends/holidays, you can call our answering service at [phone].     Attestation:  Total time on the floor involved in the patient's care: 65 minutes  Total time spent in counseling/care  coordination: >50% discussing patient condition, assessment of symptoms, reviewing current status with MD and RN,     Assessments:  Mia Rodriguez is a 88 year old female with PMH significant for A-fib, CAD, benign essential HTN, COPD, TIA, diverticulitis, CKD, osteoporosis, remote history of breast cancer, small lung nodule. She presented to ED via EMT after suffering a fall with neuro changes at her home on 4/06/22. 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 As a result of her stroke she has been experiencing dysphagia and will have a video swallow study tomorrow for further evaluation. She had made comments that she would not wish to have a permanent feeding tube.    , lives independently at home. Last hospitalization was 10/06/2022 for diverticulitis.  Has followed with Dr Cantrell of Cardiology, who has suggested AV node ablation and permanent pacemaker implantation numerous times to Agustina in the past.  She has always told Dr Cantrell that she will think about it and then never decides to go ahead with the procedure. Also has been followed by [facility] 4/05/2021.    Today, the patient was seen for:   Goals of care  Decisional support  Patient and family support    I introduced palliative care services to Agustina in her room and our multidisciplinary team. Explained that our service offers an extra layer of support for individuals who are experiencing serious, life threatening illnesses, which can include assistance with symptom management, emotional and spiritual support as well as complex medical decision making.The main issue according to Agustina is the feeding tube. She is agreeable to a NG temporary FT but not for a GT. She had a friend who had a GT in the past and this has made her not wish to pursue this option. When I asked Agustina to elaborate more on her  feelings/experiences she defers and wishes to have the conversation with her children present.  She would like her children to be involved in any discussions regarding goals of care and would like to set up a meeting.     I contacted daughter Bennie who will coordinate a meeting with family. The family can meet in hospital tomorrow at 2:30 PM but would like all 3 siblings present. Discussed Covid 19 visitation limits of 2 family members per day in room. Bennie requested an exception -t I did not think this is possible but she wanted me to check with RN and her manager, who confirmed that exceptions for 3 visitors not possible. Bennie would like to meet with writer and her two siblings in a conference room to review Agustina's medical situation and then choose 2 siblings to go up into her room for main discussion.      Prognosis, Goals, & Planning:        Prognosis, Goals, and/or Advance Care Planning were addressed today: Yes      Functional Status just prior to hospitalization: 2 (Ambulatory and capable of all selfcare but unable to carry out any work activities; may need help with IADLs up and about > 50% of waking hours)          Patient has decision-making capacity today for complex decisions: Yes      Patient's decision making preferences: shared with support from loved ones          I have concerns about the patient/family's health literacy today: No           Coping, Meaning, & Spirituality:   Mood, coping, and/or meaning in the context of serious illness were addressed today: Yes  Summary/Comments:     Social:     Living situation: lives independently in [facility]    Key family / caregivers: children: daughter Bennie, and 2 sons.    Occupational history: homemaker    History of Present Illness:   History gathered today from: patient, medical chart, medical team members, unit team members  Adopted from H&P:  Mia Rodriguez is a 88 year old female who presented to ED via EMT after suffering a fall with neuro  changes at her home on 4/06/22. She underwent rapid stroke evaluation in ED for suspected CVA and was sent to radiology for urgent embolectomy.     Key Palliative Symptom Data:  # Pain severity the last 12 hours: none  # Dyspnea severity the last 12 hours: low  # Nausea severity the last 12 hours: none  # Anxiety severity the last 12 hours: low    ROS:  Comprehensive ROS is reviewed and is negative except as here & per HPI: Fatigue and weakness     Past Medical History:  Past Medical History:   Diagnosis Date     Arrhythmia     atrial fib.-on coumadin     Atrial fibrillation (H)      Breast cancer (H)      COPD (chronic obstructive pulmonary disease) (H)      COPD exacerbation (H) 9/2/2015     Coronary artery disease      Hypertension      Malignant neoplasm (H) 6/2/2008    Right Breast Cancer     Osteoporosis      Renal disease     kidney stones     Thyroid disease     Hypothyroid     Unspecified cerebral artery occlusion with cerebral infarction     TIA     Uterine fibroid         Past Surgical History:  Past Surgical History:   Procedure Laterality Date     BIOPSY  6/2/2008    Left /RightBreast Biopsy     BIOPSY  6/1/2010    Right Breast Biopsy     BREAST SURGERY  6/12/2008    Right Breast Lumpectomy     CYSTOSCOPY, DILATE URETHRA, COMBINED  10/5/2012    Procedure: COMBINED CYSTOSCOPY, DILATE URETHRA;  urethral dilation;  Surgeon: Kaushal Harris MD;  Location:  OR     CYSTOSCOPY, RETROGRADES, EXTRACT STONE, COMBINED Right 4/16/2015    Procedure: COMBINED CYSTOSCOPY, RETROGRADES, EXTRACT STONE;  Surgeon: Kaushal Harris MD;  Location:  OR     EXCISE LESION LIP N/A 10/22/2014    Procedure: EXCISE LESION LIP;  Surgeon: Brent Jolley MD;  Location:  SD     GYN SURGERY  1981    LISA with ovaries intact-fibroid     IR CAROTID CEREBRAL ANGIOGRAM BILATERAL  4/6/2022     LASER HOLMIUM LITHOTRIPSY URETER(S), INSERT STENT, COMBINED  10/5/2012    Procedure: COMBINED CYSTOSCOPY, URETEROSCOPY, LASER  HOLMIUM LITHOTRIPSY URETER(S), INSERT STENT;  RIGHT URETEROSCOPY ,right ureteral biopsy,WITH LASER HOLMIUM LITHOTRIPSY, INSERT STENT RIGHT URETER;  Surgeon: Kaushal Harris MD;  Location:  OR     LASER HOLMIUM LITHOTRIPSY URETER(S), INSERT STENT, COMBINED Right 4/16/2015    Procedure: COMBINED CYSTOSCOPY, URETEROSCOPY, LASER HOLMIUM LITHOTRIPSY URETER(S), INSERT STENT;  Surgeon: Kaushal Harris MD;  Location:  OR         Family History:  Family History   Problem Relation Age of Onset     Alzheimer Disease Mother      Osteoporosis Mother      C.A.D. Father      Cardiovascular Father      Cerebrovascular Disease Father      Breast Cancer Maternal Grandmother      Unknown/Adopted Maternal Grandfather      Unknown/Adopted Paternal Grandmother         old age     Cancer Paternal Grandfather         stomach         Allergies:  Allergies   Allergen Reactions     Digoxin Other (See Comments)     Weakness, SOB     Megace [Megestrol Acetate]      Increased LFTs        Medications:  I have reviewed this patient's medication profile and medications from this hospitalization.     Noted scheduled meds are:    artificial saliva  2 spray Swish & Spit 4x Daily     aspirin  81 mg Oral Daily    Or     aspirin  300 mg Rectal Daily     cefTRIAXone  1 g Intravenous Q24H     diltiazem  60 mg Oral or Feeding Tube Q6H ARIK     famotidine  20 mg Intravenous Q24H     sodium chloride (PF)  3 mL Intracatheter Q8H       Noted PRN meds are:  acetaminophen **OR** acetaminophen, albuterol, dextrose, glucose **OR** dextrose **OR** glucagon, hydrALAZINE, lidocaine 4%, lidocaine (buffered or not buffered), - MEDICATION INSTRUCTIONS -, melatonin, metoclopramide, nitroGLYcerin, ondansetron **OR** ondansetron, prochlorperazine **OR** prochlorperazine **OR** prochlorperazine, sodium chloride (PF)    Physical Exam:  Vital Signs: Temp: 97.6  F (36.4  C) Temp src: Axillary BP: 121/76 Pulse: 85   Resp: 27 SpO2: 93 % O2 Device: None (Room air)  Oxygen Delivery: 1 LPM  Weight: 83 lbs 8.87 oz    Physical Exam  GENERAL:  Alert, fatigued, no distress, frail, elderly female.  HEAD: Normocephalic atraumatic  SCLERA: Anicteric  EXTREMITIES: Warm, mild edema  ABDOMEN:  Soft, nontender  RESPIRATORY: non labored  CARDIOVASCULAR: RRR  NEUROLOGIC: Alert, left sided weakness  PSYCH: Calm, cooperative.    Data reviewed:  Recent imaging reviewed.  Results for orders placed or performed during the hospital encounter of 04/06/22   CT Head w/o Contrast    Impression    IMPRESSION: Diffuse cerebral volume loss and cerebral white matter  changes consistent with chronic small vessel ischemic disease. No  evidence for acute intracranial pathology.    Radiation dose for this scan was reduced using automated exposure  control, adjustment of the mA and/or kV according to patient size, or  iterative reconstruction technique.     CONRAD CONTRERAS MD         SYSTEM ID:  [id number]   CTA Head Neck with Contrast    Impression    IMPRESSION:  1. Probable thromboembolic occlusion at the right MCA trifurcation.  This results in delayed filling of the distal right internal carotid  artery likely due to outflow obstruction.  2. Otherwise, normal neck and head CTA.    This imaging study was discussed with the ordering provider, Dr. JEREMY ALDANA, by Dr. Contreras on 4/6/2022 8:49 AM.    Radiation dose for this scan was reduced using automated exposure  control, adjustment of the mA and/or kV according to patient size, or  iterative reconstruction technique.     CONRAD CONTRERAS MD         SYSTEM ID:  [id number]   Cervical spine CT w/o contrast    Impression    IMPRESSION: Minimal degenerative retrolisthesis of C2 upon C3 and C4  upon C5. Minimal degenerative anterolisthesis of C7 upon T1. Alignment  of the cervical vertebrae is otherwise normal. Vertebral body heights  of the cervical spine are normal. Craniocervical alignment is normal.  There are no fractures of the cervical spine. Inferior  endplate  deformity of T2 is again noted and does not appear appreciably changed  from a chest CT from 3/23/2021 and likely represents a degenerative  Schmorl's node deformity. No spinal canal stenosis. No prevertebral  soft tissue swelling.      Radiation dose for this scan was reduced using automated exposure  control, adjustment of the mA and/or kV according to patient size, or  iterative reconstruction technique    CONRAD BURGOS MD         SYSTEM ID:  [id number]   XR Chest Port 1 View    Impression    IMPRESSION: Single AP view of the chest was obtained. Stable  cardiomediastinal silhouette. Mild bibasilar pulmonary opacities,  likely atelectasis. No significant pleural effusion or pneumothorax.  No definite acute osseous pathology. If clinical suspicion of rib  fractures, remains high, rib series is more sensitive for detection of  subtle rib fractures.    YANE TRAN MD         SYSTEM ID:  [id number]   CT Head Perfusion w Contrast    Impression    IMPRESSION: There is delayed arrival of the contrast bolus throughout  the majority of the right middle cerebral and right anterior cerebral  artery territories consistent with the right middle cerebral artery  occlusion resulting in delayed filling of the right internal carotid  circulation noted on the accompanying CT angiogram. There is decreased  cerebral blood flow throughout the same distribution. There is  decreased cerebral blood volume in the cortex and white matter of the  mid right frontal lobe as well as at the lateral aspect of the right  temporal lobe worrisome for infarct. No other perfusion defects.    Radiation dose for this scan was reduced using automated exposure  control, adjustment of the mA and/or kV according to patient size, or  iterative reconstruction technique    CONRAD BURGOS MD         SYSTEM ID:  [id number]   IR Carotid Cerebral Angiogram Bilateral    Impression    Impression:   Successful mechanical thrombectomy of the right M1  MCA with TICI 2b  recanalization after 3 passes of Solitaire accompanied with penumbra  aspiration technique. There is a partial recanalization of the right  angular artery in the M3 segment with a right parietal lobe perfusion  deficit.     Patient has severely atherosclerotic right common femoral artery and  severely calcified and atherosclerotic right superficial and deep  femoral arteries. Guillermo pressure was held to achieve hemostasis.    Daxa Schulz MD  Endovascular Surgical Neuroradiology Fellow  Pager: [phone]      I was present and scrubbed in for the entire procedure    I have personally reviewed the examination and initial interpretation  and I agree with the findings.    YOKO JOLLY MD         SYSTEM ID:  [id number]   CT Head w/o Contrast    Impression    IMPRESSION:   1. Focus of hyperdensity at the high posteromedial right frontal lobe  could represent a tiny focus of intracranial hemorrhage. Continued  surveillance recommended.  2. Diffuse cerebral volume loss and cerebral white matter changes  consistent with chronic small vessel ischemic disease.      Radiation dose for this scan was reduced using automated exposure  control, adjustment of the mA and/or kV according to patient size, or  iterative reconstruction technique    CONRAD BURGOS MD         SYSTEM ID:  [id number]   XR Pelvis Port 1/2 Views    Impression    IMPRESSION: No evidence of acute fracture. Mild bilateral hip  degenerative changes. Bob catheter in place. Contrast in the  bladder.     ZEINA CISSE MD         SYSTEM ID:  [id number]   CT Head w/o Contrast    Impression    IMPRESSION:  1.  Stable small volume acute subarachnoid hemorrhage within the right cingulate sulcus. No new intracranial hemorrhage.  2.  Developing regions of acute infarction within the right anterior cerebral artery territory and right middle cerebral artery posterior division territory.  3.  Stable chronic infarction within the right posterior  cerebral artery territory.  4.  Stable mild to moderate chronic small vessel ischemic disease and generalized brain parenchymal volume loss.   MR Brain w/o & w Contrast    Impression    IMPRESSION:  1.  Evolving ischemic infarcts in the right middle cerebral and right anterior cerebral artery distributions.  2.  Mild dural thickening and enhancement along the right cerebral convexity.  3.  Generalized brain atrophy and presumed microvascular ischemic changes as detailed above.   XR Abdomen Port 1 View    Impression    IMPRESSION: An enteric tube is in place with tip likely in the gastric antrum or first portion of the duodenum. Small bilateral pleural effusions, greater on the right. Tortuous calcified thoracic aorta.   XR Chest Port 1 View    Impression    IMPRESSION: Mild cardiac enlargement. Small bilateral pleural effusions with bibasilar infiltrate/atelectasis greater on the right. Underlying emphysematous change. Feeding tube.   Echocardiogram Complete - For age > 60 yrs   Result Value Ref Range    LVEF  55%      Lab Results   Component Value Date    WBC 10.8 04/11/2022    WBC 14.0 (H) 04/10/2022    WBC 9.7 04/09/2022    HGB 13.4 04/11/2022    HGB 14.4 04/10/2022    HGB 13.6 04/09/2022    HCT 39.5 04/11/2022    HCT 43.3 04/10/2022    HCT 41.7 04/09/2022     04/11/2022     04/10/2022     04/09/2022     04/11/2022     04/10/2022     (H) 04/09/2022    POTASSIUM 3.2 (L) 04/11/2022    POTASSIUM 3.5 04/10/2022    POTASSIUM 3.5 04/09/2022    CHLORIDE 116 (H) 04/11/2022    CHLORIDE 116 (H) 04/10/2022    CHLORIDE 119 (H) 04/09/2022    CO2 19 (L) 04/11/2022    CO2 19 (L) 04/10/2022    CO2 18 (L) 04/09/2022    BUN 17 04/11/2022    BUN 13 04/10/2022    BUN 13 04/09/2022    CR 0.64 04/11/2022    CR 0.58 04/10/2022    CR 0.51 (L) 04/09/2022     (H) 04/11/2022     (H) 04/11/2022    GLC 94 04/10/2022    DD 0.6 (H) 03/26/2017    NTBNPI 1960 (H) 02/15/2009    TROPI   03/26/2017     <0.015  The 99th percentile for upper reference range is 0.045 ug/L.  Troponin values in   the range of 0.045 - 0.120 ug/L may be associated with risks of adverse   clinical events.      TROPI  03/26/2017     <0.015  The 99th percentile for upper reference range is 0.045 ug/L.  Troponin values in   the range of 0.045 - 0.120 ug/L may be associated with risks of adverse   clinical events.      TROPI  08/17/2015     <0.015  The 99th percentile for upper reference range is 0.045 ug/L.  Troponin values in   the range of 0.045 - 0.120 ug/L may be associated with risks of adverse   clinical events.      AST 53 (H) 10/06/2020    AST 13 10/02/2014    AST 15 09/26/2014    ALT 52 (H) 10/06/2020    ALT 10 01/12/2018    ALT 35 05/06/2016    ALKPHOS 55 10/06/2020    ALKPHOS 46 10/02/2014    ALKPHOS 46 09/26/2014    BILITOTAL 0.7 10/06/2020    BILITOTAL 0.5 10/02/2014    BILITOTAL 1.0 09/26/2014    INR 1.20 (H) 04/07/2022    INR 1.09 04/06/2022    INR 1.03 10/22/2014      Lab Results   Component Value Date    ALBUMIN 2.5 10/06/2020

## 2022-04-11 NOTE — PROGRESS NOTES
INTERNAL MEDICINE UPDATE    Called re: code status    Son present. Had concerns, we did not talk enough about potential intubation with pt before making decision on DNR-DNI. I d/w pt and son. Noted that if she had respiratory failure needing intubation, it is possible it could be treatable to the point that she could be extubated, but depending on the cause and given her stroke, prognosis could differ based on the situation. She said that she could foresee a trial of intubation if necessary depending on the situation. As such, wanted to remove the DNI. I d/w her about the DNR and she wants to continue this.    PLAN:  - Change code status from DNR-DNI to DNR only.    Yonis Tucker Jr., MD  [phone] (p)  Text Page  [place]

## 2022-04-11 NOTE — PROGRESS NOTES
Pt here with R MCA stroke and stable R SAH. A&O X4. Neuros left sided hemiparesis- does with draw to pain. Has left droop, slurred speech with a dry mouth- does improve with moisture. VSS. Tele A-fib CVR. NPO diet, OK to have a few ice chips with RN supervision. Pt had NG tube placed yesterday- tolerating well. Pt has TF running 30mL/hr with flushes programed 60mL Q4. Will increase by 10mL at 1600 tomorrow to reach goal. Takes pills crushed in NG tube. Continues on diltazem drip was increased back to 10ml/hr this morning. Pt is bridging with meds through tube. Purewick in place. Up with Ax2 lift. R groin site, CDI. Denies pain. Pt was lethargic this afternoon and spiked a fever, MD was notified and did visit Pt and family at bedside. ABX were started and chest X-ray was completed at the bedside- results pending. Code status was updated to DNR/DNI. Pt scoring green on the Aggression Stop Light Tool. Discharge pending.

## 2022-04-11 NOTE — PLAN OF CARE
Pt here with R sided strokes and stable R SAH. More lethargic overnight, opens eyes to voice. Disoriented to exact date overnight, forgetful. Neuros with slurred speech, L droop, LUE no sensation, LLE numbness below knee, LUE/LLE no spontaneous movement, withdraws to pain. VSS. Tele A fib CVR. On diltiazem drip at 10ml/hr. NPO, on TF at 30ml/hr with 60 ml flushes q4 hours. Plan to increase TF to goal of 40ml/hr this afternoon. Purewick in place for urinary incontinence. R groin site CDI. Up with lift, T/R. Denies pain. Pt scoring green on the Aggression Stop Light Tool. Plan pending.

## 2022-04-11 NOTE — PROVIDER NOTIFICATION
"MD Notification    Notified Person: MD    Notified Person Name: Dr. Tucker    Notification Date/Time: 4/10/22 at 2001    Notification Interaction: amcom    Purpose of Notification:\"722 - LO -  2nd attempt. Son is at bedside and requesting to talk with you about code status. Can you please stop by before 2030 tonight? thank you! Candie RN *[id number]\"    Comments: MD called and stated he would come to bedside and speak with son and pt    "

## 2022-04-11 NOTE — PROGRESS NOTES
[facility]    Medicine Progress Note - Hospitalist Service    Date of Admission:  4/6/2022    Assessment & Plan            Mia Rodriguez is a 88 year old female who presented to the [facility] ER with acute neuro changes and associated Fall.  She was felt to have an acute CVA and urgently taken to radiology for embolectomy.        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    - Systolic goal -160 range.      - Continue strict NPO with tube feeds   - PT/OT/Speech/SW/CC consults  - Continue close neuro monitoring  - ASA 81mg daily for now   - Repeat NCCT in 2 weeks, if stable then would restart Eliquis 2.5mg BID + stop ASA at that time   - Follow-up with [facility] in 6-8 weeks   - Stroke neuro following peripherally, please page for questions    Probable aspiration pneumonia   On 4/10, New fever, ? Right sided crackles, abdominal xr from 4/9 with clear lung bases. Suspect aspiration pneumonia due to significant oropharyngeal dysphagia.    CXR-Mild cardiac enlargement. Small bilateral pleural effusions with bibasilar infiltrate/atelectasis greater on the right. Underlying emphysematous change.   - started on ceftriaxone, will continue   - wbc has normalized, currently afebrile  - at that time, provider discussed with pt/family and code changed to DNR/DNI. In the evening, cross cover MD spoke with son and code changed to DNR- ok with intubation. Refer to progress notes on 4/10 for details.      Abnormal UA:  - UA slightly abnormal, doubt UTI.  Culture pending, hold on abx.       Fall:  -  No overt majory injury. Pelvis XR w/o acute findings 4/6     Atrial fibrillation with RVR:  -  Continue cardizem drip.  Titrate to HR  range while maintaining BP in target range.  - increase diltiazem to 60mg q6hrs, titrate off dilt drip as tolerated  -  Hold  Eliquis     Hypothyroidism:  - Hold PO levothyroxine while NPO     CKD, history of renal stones:  -  Monitor     COPD, smoking history:  -  No overt exacerbation.  Albuterol PRN.  Monitor.     Reflux:  -  Pepcid     Osteoporosis:  -  Monitor, baseline on prolia     HyperNa, resolved  - likely iatrogenic from IVF and from absent PO     Hypoglycemia, resolved  - continue tube feeds     Hypokalemia, resolved  - replace per protocol     Severe dysphagia post CVA  - place NG 4/9  - consult to nutrition for initiation of tube feeds  - patient stated to MD Lemus and daughter Bennie on 4/9 that she would never want a permanent feeding tube.  - appreciate SLP  - possible video swallow on 4/11 or 4/12     Difficulty sleeping  - related to neurochecks     Goals of care  - discussion ongoing but patient states no permanent feeding tube. She is willing to trial NG for a period to see how much recovery of swallow function she will have have.   - she states a clear preference for continued restorative plan of care as of 4/9 but she will let us know if quality of life becomes intolerable.   - Palliative care consult for goals of care discussion         Diet: Adult Formula Drip Feeding: Continuous Jevity 1.5; Other - Specify in Comment; Goal Rate: 40; mL/hr; Medication - Feeding Tube Flush Frequency: At least 15-30 mL water before and after medication administration and with tube clogging; Amount to Se...  NPO for Medical/Clinical Reasons Except for: Other, NPO but receiving Tube Feeding; Specify: 1-5 ice chips per hour max with RN supervision, cue pt to cough if wet voice/throat clear; Squeezed out swabs for moisture/oral cares    DVT Prophylaxis: Pneumatic Compression Devices  Bob Catheter: Not present  Central Lines: None  Cardiac Monitoring: ACTIVE order. Indication: Tachyarrhythmias, acute (48 hours)  Code Status: No CPR- Pre-arrest intubation OK      Disposition Plan   Expected Discharge: 04/12/2022     Anticipated  discharge location:  Awaiting care coordination huddle  Delays: { TIP  Update expected discharge date and delays and refresh note (tipsheet)     :[id number]}          The patient's care was discussed with the Bedside Nurse, Patient and Patient's Family.    María Elena Velasquez MD  Hospitalist Service  [facility]  Securely message with the Vocera Web Console (learn more here)  Text page via [employer] Paging/Directory         Clinically Significant Risk Factors Present on Admission                    ______________________________________________________________________    Interval History   Patient sitting up in chair. Daughter, Bennie, at bedside.   Patient denies pain. Denies feeling short of breath.   Tmax 98.6 overnight.   WBC has normalized.   Remains on dilt drip and PO dilt    Data reviewed today: I reviewed all medications, new labs and imaging results over the last 24 hours. I personally reviewed no images or EKG's today.    Physical Exam   Vital Signs: Temp: 97.6  F (36.4  C) Temp src: Axillary BP: 121/76 Pulse: 85   Resp: 27 SpO2: 93 % O2 Device: None (Room air) Oxygen Delivery: 1 LPM  Weight: 83 lbs 8.87 oz  General Appearance: Alert, awake and no apparent distress  Respiratory: decreased breath sounds at the bases, upper lungs are clear to auscultation  Cardiovascular: irregularly irregular  GI: soft and non-tender  Skin: warm and dry  Other: Left hemiplegia, right gaze preference    Data   Recent Labs   Lab 04/11/22  0932 04/11/22  0824 04/10/22  1627 04/10/22  0839 04/10/22  0702 04/09/22  1809 04/09/22  1725 04/09/22  0747 04/09/22  0647 04/08/22  1524 04/07/22  0559 04/06/22  1610 04/06/22  0827   WBC 10.8  --   --   --  14.0*  --   --   --  9.7  --  13.5*  --  11.0   HGB 13.4  --   --   --  14.4  --   --   --  13.6  --  12.1  --  15.8*   MCV 97  --   --   --  98  --   --   --  101*  --  101*  --  100     --   --   --  156  --   --   --  153  --  154  --  180   INR  --   --    --   --   --   --   --   --   --   --  1.20*  --  1.09     --   --   --  143  --   --   --  145*  --  147*  --  144   POTASSIUM 3.2*  --   --   --  3.5  --  3.5  --  2.7*  --  3.6  --  3.6   CHLORIDE 116*  --   --   --  116*  --   --   --  119*  --  117*  --  113*   CO2 19*  --   --   --  19*  --   --   --  18*  --  23  --  24   BUN 17  --   --   --  13  --   --   --  13  --  20  --  23   CR 0.64  --   --   --  0.58  --   --   --  0.51*  --  0.70  --  0.81   ANIONGAP 7  --   --   --  8  --   --   --  8  --  7  --  7   CYNDEE 7.1*  --   --   --  7.1*  --   --   --  7.1*  --  8.1*  --  9.1   * 107* 94   < > 106*   < >  --    < > 113*   < > 89   < > 121*    < > = values in this interval not displayed.     Recent Results (from the past 24 hour(s))   XR Chest Port 1 View    Narrative    EXAM: XR CHEST PORT 1 VIEW  LOCATION: [facility]  DATE/TIME: 4/10/2022 6:47 PM    INDICATION: New fever, concern for aspiration.  COMPARISON: 3/23/2021.      Impression    IMPRESSION: Mild cardiac enlargement. Small bilateral pleural effusions with bibasilar infiltrate/atelectasis greater on the right. Underlying emphysematous change. Feeding tube.     Medications     dextrose       dilTIAZem HCl-Sodium Chloride 10 mg/hr (04/11/22 0003)     - MEDICATION INSTRUCTIONS -         artificial saliva  2 spray Swish & Spit 4x Daily     aspirin  81 mg Oral Daily    Or     aspirin  300 mg Rectal Daily     cefTRIAXone  1 g Intravenous Q24H     diltiazem  60 mg Oral or Feeding Tube Q6H ARIK     famotidine  20 mg Intravenous Q24H     sodium chloride (PF)  3 mL Intracatheter Q8H

## 2022-04-11 NOTE — PLAN OF CARE
[id number]: Pt here with right MCA CVA and stable SAH. A&Ox4. Thick, slightly slurred speech; speech more clear after oral cares. Left sided facial droop unchanged. LUE 0/5 and LLE slight contraction, withdrawing to pain. Pt reports no sensation to left-sided extremities. Denies pain. VSS. Dilt gtt continues at 10mL/hr. HR 80s-100s. Tele A.Fib w/ CVR. LUE 2+ edema, elevated with pillow.TF running at 30mL/hr. Sepsis protocol fired; lactic acid 1.7. Son at bedside until end of visitation hours. Requesting to speak with MD. Dr. Tucker visited at bedside; patient's code status changed from DNR/DNI to DNR only, intubation okay.  also requested and consult placed for tomorrow. Discharge plan pending.

## 2022-04-12 NOTE — PROGRESS NOTES
SLP VFSS Report 04/12/22 1026   General Information   Onset of Illness/Injury or Date of Surgery 04/06/22   Referring Physician Dr. Cortez   Patient/Family Therapy Goal Statement (SLP) Patient did not state.  POC goals to be determined at care conference.   Pertinent History of Current Problem Per MD note: Mia Rodriguez is a 88 year old female who presented after a fall with neuro changes.  Presents via EMS from home with stroke symptoms. Per EMS report, the patient woke up at about 0530 this morning and went to her kitchen for breakfast at about 0630. At about 0700 while eating some yogurt, her pendant registered a fall. Her neighbors found her on the floor in her kitchen unable to get back up, so they called EMS. EMS notes left-sided weakness, left facial droop, right deviated gaze, and slurred speech. She complained to them of nausea without vomiting.   After rapid stroke eval in ER she was sent for embolectomy.   General Observations Pt was alert and cooperative.  RR 23-30 while on 2-3L via nasal cannula.  Regular solids not attempted given oral and pharyngeal residue with previous texture.   VFSS Evaluation   Radiologist Dr. Ziegler   Views Taken left lateral  (AP not indicated given residue cleared)   Physical Location of Procedure [facility]   VFSS Eval: Thin Liquid Texture Trial   Mode of Presentation, Thin Liquid spoon   Order of Presentation 12, 13, 14   Preparatory Phase Poor bolus control   Oral Phase, Thin Liquid Premature pharyngeal entry   Pharyngeal Phase, Thin Liquid Delayed swallow reflex   Rosenbek's Penetration Aspiration Scale: Thin Liquid Trial Results 8 - contrast passes glottis, visible subglottic residue remains, absent patient response (aspiration)   Diagnostic Statement Curved epiglottis with decreased inversion, weak base of tongue function, delayed swallows, min pharyngeal residue, flash penetration trial 12 by tsp and 14 with slight chin tuck, silent penetration to the vocal  folds/aspiration with no immediate cough, cues given to cough   VFSS Eval: Mildly Thick Liquids   Mode of Presentation spoon   Order of Presentation 1, 2, 3   Preparatory Phase Holding   Oral Phase WFL   Pharyngeal Phase   (no delay)   Rosenbek's Penetration Aspiration Scale 8 - contrast passes glottis, visible subglottic residue remains, absent patient response (aspiration)   Diagnostic Statement Curved epiglottis with decreased inversion, weak base of tongue function, min-mild pharyngeal residue, no immediate cough to penetration to the vocal folds/aspiration trial 2, cues given to cough, increased quantity of penetration/aspiration with chin tuck trial - cough noted   VFSS Eval: Moderately Thick Liquids   Mode of Presentation spoon   Order of Presentation 4, 5, 6, 11   Preparatory Phase Holding   Oral Phase Delayed AP movement   Pharyngeal Phase   (no delay)   Rosenbek's Penetration Aspiration Scale 3 - contrast remains above the vocal cords, visible residue remains (penetration)   Diagnostic Statement Curved epiglottis with decreased inversion, weak base of tongue function, flash penetration x 2 trials with 1/2 tsps, mod-deep penetration x 1, cued cough cleared material, mild-min pharyngeal residue, mild flash penetration with attempt at chin tuck - not coordinated well   VFSS Evaluation: Puree Solid Texture Trial   Mode of Presentation, Puree spoon   Order of Presentation 7, 8   Preparatory Phase Poor bolus control;Holding   Oral Phase, Puree Effortful AP movement;Residue in oral cavity   Pharyngeal Phase, Puree   (no delay)   Rosenbek's Penetration Aspiration Scale: Puree Food Trial Results 1 - no aspiration, contrast does not enter airway   Diagnostic Statement Curved epiglottis with decreased inversion, weak base of tongue function, min-no pharyngeal residue   VFSS Eval: Soft & Bite Sized   Mode of Presentation spoon   Order of presentation 9, 10   Preparatory Phase Poor bolus control;Insufficient  mastication   Oral Phase Effortful AP movement;Delayed AP movement;Residue in oral cavity;Premature phrayngeal entry   Pharyngeal Phase   (no delay)   Rosenbek's Penetration Aspiration Scale 1 - no aspiration, contrast does not enter airway   Diagnostic Statement Curved epiglottis with decreased inversion, weak base of tongue function, poor oral control with prolonged oral phase, significant oral residue, mild-mod pharyngeal residue, 2nd swallow did not clear, alternating to mod thick cleared to min levels   Swallowing Recommendations   Diet Consistency Recommendations pureed (level 4);moderately thick liquids/liquidized (level 3)   Supervision Level for Intake 1:1 supervision needed   Mode of Delivery Recommendations no straws;bolus size, small;liquids via spoon only;slow rate of intake  (1/2 tsp per trial)   Swallowing Maneuver Recommendations alternate food and liquid intake;effortful (hard) swallow;extra swallow   Monitoring/Assistance Required (Eating/Swallowing) monitor for cough or change in vocal quality with intake   Recommended Feeding/Eating Techniques (Swallow Eval) maintain upright posture during/after eating for 30 minutes   Medication Administration Recommendations, Swallowing (SLP) Crush meds with puree   Comment, Swallowing Recommendations Small meals/quantities at a sitting; hold po if respiratory status declines and/or aspiration signs are observed   Clinical Impression   Criteria for Skilled Therapeutic Interventions Met (SLP Eval) Yes, treatment indicated   SLP Diagnosis Moderate oral-pharyngeal dysphagia   Risks & Benefits of therapy have been explained evaluation/treatment results reviewed;care plan/treatment goals reviewed;risks/benefits reviewed;current/potential barriers reviewed;participants voiced agreement with care plan;participants included;patient;daughter   Clinical Impression Comments Pt presents with moderate oral-pharyngeal dysphagia per today's VFSS.  Deficits include poor oral  control and AP movement, bolus holding, delayed swallows with thin liquids, curved epiglottis with decreased inversion, and weak base of tongue function.  Deficits resulted in oral residue, min to mild-moderate pharyngeal residue, flash penetration and silent penetration to the vocal folds/aspiration on mildly thick liquids by tsp, flash penetration and mod-deep penetration with residual on moderately thick liquids by tsp, and flash penetration and penetration to the vocal folds/silent aspiration on thin liquids by tsp.  The chin tuck increased penetration/aspiration x 1 trial.  Only flash penetration was observed with moderately thick liquids in 1/2 tsp amounts.  Strict use of swallow precautions/strategies are needed to reduce the risk of aspiration with moderately thick liquids.  Recommend cautious initiation of a puree diet IDDSI Level 4 and moderately thick liquids with 1:1 supervision/assist and swallow strategies as listed above.  Recommend holding po intake if aspiration signs are noted and/or increased respiratory difficulty observed.  Recommend continued discussion on POC wishes if safest po diet is not tolerated well and/or inadequate nutriton/hydration is acheived with po intake alone.  Will defer to pt/family/MD for diet upgrade orders if wishes are for reduced to no po modification for quality of life despite increased aspiration risk.  If goals remain restorative, plan to continue swallow Tx to assess diet tolerance, train strategies, complete exercises, and trial minced to moist to soft and bite size solids as indicated per pt progress.  Safest option is to repeat an OP VFSS in 2-4 weeks prior to liquid upgrade given pt's silent aspiration risk.  Extensive education was provided to pt and her daughter.  Daughter to further discuss plan this pm.   SLP Discharge Planning   SLP Discharge Recommendation [facility] patient will benefit from intensive, interdisciplinary therapy.   Anticipate will be able to tolerate 3 hours of therapy per day   SLP Rationale for DC Rec Patient is significantly below her baseline swallow and speech/language. Excellent participation and family support   SLP Brief overview of current status  Moderate oral-pharyngeal dysphagia; Rec: cautious initiation of an IDDSI Diet level 4 and mod thick liquids, 1/2 tsp at a time, cue 2 hard swallows per 1/2 tsp, alternate textures, hold if respiratory difficulty/aspiration signs noted    Total Evaluation Time   Total Evaluation Time (Minutes) 20   SLP Goals   SLP Predicated Duration/Target Date for Goal Attainment 04/18/22   SLP: Safely tolerate diet without signs/symptoms of aspiration Soft & bite sized diet;Moderately thick liquids;With use of compensatory swallow strategies;With use of swallow precautions;Independently

## 2022-04-12 NOTE — PLAN OF CARE
Pt here with R MCA stroke. A&Ox4 but forgetful. Neuros are L droop, slurred speech, L sided weakness 1/5, withdraws to pain, BLE edema. VSS ex 3L/oxymask.  Diltiazem drip at 5 ml/hr. Tele A-Fib . NPO, NG tube in place, patent and intact, running at 40 ml/hr at goal with 60 ml q4 hrs flush. Up with A2/lift, T&Repo. Pure wick in place, bladder scanned x1 194 ml. Denies pain. Pt scoring green on the Aggression Stop Light Tool. Plan is for palliative consult. Discharge pending.

## 2022-04-12 NOTE — PROGRESS NOTES
[facility]    Medicine Progress Note - Hospitalist Service    Date of Admission:  4/6/2022    Assessment & Plan          Mia Rodriguez is a 88 year old female who presented to the [facility] ER with acute neuro changes and associated Fall.  She was felt to have an acute CVA and urgently taken to radiology for embolectomy.     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    - Systolic goal -160 range.      - PT/OT/Speech/SW/CC consults  - Continue close neuro monitoring  - ASA 81mg daily for now   - Repeat NCCT in 2 weeks, if stable then would restart Eliquis 2.5mg BID + stop ASA at that time   - Follow-up with [facility] in 6-8 weeks   - Stroke neuro following peripherally, please page for questions    Severe dysphagia post CVA  - place NG 4/9  - patient stated to MD Lemus and daughter Bennie on 4/9 that she would never want a permanent feeding tube.  - Video swallow completed on/   - appreciate SLP- diet advanced to pureed diet level per slp  - start calorie count  - continue tube feeds and wean as tolerated    Probable aspiration pneumonia   On 4/10, New fever, ? Right sided crackles, abdominal xr from 4/9 with clear lung bases. Suspect aspiration pneumonia due to significant oropharyngeal dysphagia.    CXR-Mild cardiac enlargement. Small bilateral pleural effusions with bibasilar infiltrate/atelectasis greater on the right. Underlying emphysematous change.   - started on ceftriaxone on/ , will continue   - wbc has normalized, currently afebrile  - patient currently placed on O2  2Lpm since evening of 4/11  - will check CXR today--has some scattered wheezing although denies feeling sob  - provider on 4/10 discussd with pt/family and code changed to DNR/DNI. In the evening, cross cover MD spoke with son and code changed to DNR- ok with intubation. Refer to  progress notes on 4/10 for details.     Addendum:  CXR  IMPRESSION: Similar small right effusion and associated compressive  atelectasis and or infiltrate, question worsening left-sided pleural  effusion and associated atelectasis and/or infiltrate. Enteric tube  tip below the margin of study.  - check K.   - will give Lasix 10mg IV x 1      Abnormal UA:  - UA slightly abnormal, doubt UTI.  Culture pending, hold on abx.       Fall:  -  No overt majory injury. Pelvis XR w/o acute findings 4/6     Atrial fibrillation with RVR: improved rate control  - HR in 80s-100  - stop dilt drip  - continue with  diltiazem to 60mg q6hrs, titrate as needed  -  Hold Eliquis as above     Hypothyroidism:  - Hold PO levothyroxine while NPO     CKD, history of renal stones:  -  Monitor     COPD, smoking history:  -  No overt exacerbation.  Albuterol PRN.  Monitor.     Reflux:  -  Pepcid     Osteoporosis:  -  Monitor, baseline on prolia     HyperNa, resolved  - likely iatrogenic from IVF and from absent PO     Hypoglycemia, resolved  - continue tube feeds     Hypokalemia and hypophosphetmia  - replace per protocol       Difficulty sleeping  - related to neurochecks     Goals of care  * discussion ongoing but patient states no permanent feeding tube. She is willing to trial NG for a period to see how much recovery of swallow function she will have have.   - she states a clear preference for continued restorative plan of care as of 4/9 but she will let us know if quality of life becomes intolerable.   - Palliative care consult for goals of care discussion, family meeting planned for today         Diet: Adult Formula Drip Feeding: Continuous Jevity 1.5; Nasoduodenal tube; Goal Rate: 40; mL/hr; Medication - Feeding Tube Flush Frequency: At least 15-30 mL water before and after medication administration and with tube clogging; Amount to Send (Nutri...  Combination Diet Pureed Diet (level 4); Liquidized/Moderately Thick (level 3) (by tsp); No  Straws    DVT Prophylaxis: Pneumatic Compression Devices  Bob Catheter: Not present  Central Lines: None  Cardiac Monitoring: ACTIVE order. Indication: Tachyarrhythmias, acute (48 hours)  Code Status: No CPR- Pre-arrest intubation OK      Disposition Plan   Expected Discharge: 04/13/2022     Anticipated discharge location:  Awaiting care coordination huddle  Delays:           The patient's care was discussed with the Bedside Nurse and Patient.    María Elena Velasquez MD  Hospitalist Service  [facility]  Securely message with the Vocera Web Console (learn more here)  Text page via [employer] Paging/Directory         Clinically Significant Risk Factors Present on Admission                    ______________________________________________________________________    Interval History   Patient states she feels good. She is happy she was able to get some food today after swallow eval.   Denies chest pain, sob, abd pain, n/v.    Data reviewed today: I reviewed all medications, new labs and imaging results over the last 24 hours. I personally reviewed no images or EKG's today.    Physical Exam   Vital Signs: Temp: 98.7  F (37.1  C) Temp src: Axillary BP: 138/79 Pulse: 87   Resp: 30 SpO2: 95 % O2 Device: Nasal cannula Oxygen Delivery: 2 LPM  Weight: 83 lbs 8.87 oz  General Appearance: Alert, awake and no apparent distress  Respiratory: decreased breath sounds at the bases and scattered wheezingg  Cardiovascular: irregularly irregular  GI: soft and non-tender  Skin: warm and dry  Other: Left hemiplegia, right gaze preference    Data   Recent Labs   Lab 04/12/22  0719 04/12/22  0125 04/12/22  0048 04/11/22  2302 04/11/22  1602 04/11/22  1224 04/11/22  0932 04/10/22  0839 04/10/22  0702 04/09/22  0747 04/09/22  0647 04/08/22  1524 04/07/22  0559 04/06/22  1610 04/06/22  0827   WBC  --   --   --   --   --   --  10.8  --  14.0*  --  9.7  --  13.5*  --  11.0   HGB  --   --   --   --   --   --  13.4  --  14.4   --  13.6  --  12.1  --  15.8*   MCV  --   --   --   --   --   --  97  --  98  --  101*  --  101*  --  100   PLT  --   --   --   --   --   --  162  --  156  --  153  --  154  --  180   INR  --   --   --   --   --   --   --   --   --   --   --   --  1.20*  --  1.09   NA  --   --   --   --   --   --  142  --  143  --  145*  --  147*  --  144   POTASSIUM  --   --   --   --  4.2  --  3.2*  --  3.5   < > 2.7*  --  3.6  --  3.6   CHLORIDE  --   --   --   --   --   --  116*  --  116*  --  119*  --  117*  --  113*   CO2  --   --   --   --   --   --  19*  --  19*  --  18*  --  23  --  24   BUN  --   --   --   --   --   --  17  --  13  --  13  --  20  --  23   CR  --   --   --   --   --   --  0.64  --  0.58  --  0.51*  --  0.70  --  0.81   ANIONGAP  --   --   --   --   --   --  7  --  8  --  8  --  7  --  7   CYNDEE  --   --   --   --   --   --  7.1*  --  7.1*  --  7.1*  --  8.1*  --  9.1   * 88 169*   < >  --    < > 123*   < > 106*   < > 113*   < > 89   < > 121*    < > = values in this interval not displayed.     Recent Results (from the past 24 hour(s))   XR Video Swallow with SLP or OT    Narrative    VIDEO SWALLOWING EVALUATION April 12, 2022 9:55 AM     HISTORY: Dysphagia.    COMPARISON: None.    FLUOROSCOPY TIME: 3.5 minutes.  SPOT IMAGES OR CINE RUNS: 14.      Impression    IMPRESSION:  Thin: Premature spillage to the piriform sinuses with reduced  epiglottic inversion. Aspiration. With chin tuck maneuver, there was  mild to moderate flash penetration.    Mildly thick: Deep penetration/aspiration observed. Mild pharyngeal  residue.    Moderately thick: Moderate to deep penetration. Mild penetration noted  with chin tuck maneuver.    Puree: No penetration or aspiration.    Semisolid: No penetration or aspiration. Moderate pharyngeal residue  primarily in the vallecula. There was relatively good clearance of the  residue utilizing moderately thick liquid wash.    Solid: Not administered.    This study only includes  the cervical esophagus. Please see separate  report from speech pathology for additional details.     Medications     dextrose       dilTIAZem HCl-Sodium Chloride 5 mg/hr (04/12/22 0830)     - MEDICATION INSTRUCTIONS -         artificial saliva  2 spray Swish & Spit 4x Daily     aspirin  81 mg Oral Daily    Or     aspirin  300 mg Rectal Daily     cefTRIAXone  1 g Intravenous Q24H     diltiazem  60 mg Oral or Feeding Tube Q6H ARIK     famotidine  20 mg Intravenous Q24H     sodium chloride (PF)  3 mL Intracatheter Q8H

## 2022-04-12 NOTE — PROGRESS NOTES
[facility]  Palliative Care Daily Progress Note       Recommendations & Counseling        Goals of Care (see in depth discussion below):     Video swallow study was completed this morning and recommendations are for IDDSI diet level 4 and moderately thick liquids. Agustina is motivated to continue ongoing SLT rehab exercises/assessments with hopes for continued improvement. Agreeable to modified diet.    Discussed with 3 children today and they would like to give Agustina time to participate in SLT/rehab and see how she progresses. If her condition should worsen, hospice can be addressed at that time.    Code status: No CPR; pre-arrest intubation OK.      Case was reviewed with Dr Velasquez,     Pooja Rodriguez, CNS  [facility]  Contact information available via [facility] Paging/Directory        Thank you for the opportunity to participate in the care of this patient and family. Our team: will continue to follow.     During regular M-F work hours ([phone]) -- if you are not sure who specifically to contact -- please contact us in [facility] Smart Web.     After regular work hours and on weekends/holidays, you can call our answering service at [phone].     Attestation:  Total time on the floor involved in the patient's care: 35 minutes  Total time spent in counseling/care coordination: >50%    Time in addition to above E/M time is 50 minutes (from 2:30 PM -3: 20 PM) without patient contact discussing patient condition, plan of care, goals of care, quality of life issues, discharge destinations/rehab facilities.   Total time: 85 minutes   Assessments          Mia Rodriguez is a 88 year old female with PMH significant for A-fib, CAD, benign essential HTN, COPD, TIA, diverticulitis, CKD, osteoporosis, remote history of breast cancer, small lung nodule. She presented to ED via EMT after suffering a fall with neuro changes at her home on 4/06/22. She underwent rapid stroke  evaluation in ED for suspected CVA and was sent to radiology for urgent embolectomy of acute right hemispheric CVA with right distal M1 thrombus. As a result of her stroke she has been experiencing dysphagia and will have a video swallow study tomorrow for further evaluation. She had made comments that she would not wish to have a permanent feeding tube.    Today, the patient was seen for:  Goals of care    Prognosis, Goals, or Advance Care Planning was addressed today with: Yes.  Mood, coping, and/or meaning in the context of serious illness were addressed today: Yes.          Interval History:     Chart review/discussion with unit or clinical team members:   Agustina has passed her VSS and will be able to consume an altered diet for safety. She will continue her NG tube for now until adequate calories can be taken in.     Per patient or family/caregivers today:  Spoke with Agustina today. She was pleased that she will be able to take in nutrition and liquids by mouth and is motivated to work with SLT with hopes to get stronger. She had not wanted to pursue a PEG feeding tube for long term nutrition and is relieved that this is not such a pressing issue now that she can take in orally.     Also met with Agustina's 3 children in a meeting room. We reviewed the SLT VSS today and they were all relieved that Agustina is able to take in oral liquids/foods as they thought that Slava would be making a decision regarding hospice today as she would have refused a G-tube for ongoing nutrition.  They are pleased that Slava is so motivated to work with speech-language therapies to improve her swallow capability and the fact that she is willing to try the altered diet.  I did explain that she is still very fragile and at high risk for aspiration pneumonias as her ability to swallow may fluctuate due to becoming weak from another illness or further strokes.  They are hoping she will continue to do well but understand that she may become weaker  again at some point and if this happens would continue the discussion regarding comfort care/hospice focused care.  If at any point in the future Slava stated that she would not want to continue's SLT therapy or alter diet, her children Bennie, Laz and Kaushal would want to respect her wishes, however at this time Slava is very motivated.     Key Palliative Symptoms:  We are not helping to manage these symptoms currently in this patient.           Review of Systems:     Besides above, an additional  system ROS was reviewed and is unremarkable          Medications:     I have reviewed this patient's medication profile and medications during this hospitalization.    Noted meds:      artificial saliva  2 spray Swish & Spit 4x Daily     aspirin  81 mg Oral Daily    Or     aspirin  300 mg Rectal Daily     cefTRIAXone  1 g Intravenous Q24H     diltiazem  60 mg Oral or Feeding Tube Q6H ARIK     famotidine  20 mg Intravenous Q24H     sodium chloride (PF)  3 mL Intracatheter Q8H     acetaminophen **OR** acetaminophen, albuterol, dextrose, glucose **OR** dextrose **OR** glucagon, hydrALAZINE, lidocaine 4%, lidocaine (buffered or not buffered), - MEDICATION INSTRUCTIONS -, melatonin, metoclopramide, nitroGLYcerin, ondansetron **OR** ondansetron, prochlorperazine **OR** prochlorperazine **OR** prochlorperazine, sodium chloride (PF)             Physical Exam:   Temp: 98.7  F (37.1  C) Temp src: Axillary BP: 138/79 Pulse: 87   Resp: 30 SpO2: 95 % O2 Device: Nasal cannula Oxygen Delivery: 2 LPM  GENERAL:  Alert, fatigued, no distress.  HEAD: Normocephalic atraumatic  SCLERA: Anicteric  EXTREMITIES: Warm  ABDOMEN:  Soft, nontender  RESPIRATORY: Breathing relaxed and non labored.  NEUROLOGIC: Alert.  PSYCH: Calm, cooperative.           Data Reviewed:     Recent imaging reviewed, my comments on pertinents:   Results for orders placed or performed during the hospital encounter of 04/06/22   CT Head w/o Contrast    Impression     IMPRESSION: Diffuse cerebral volume loss and cerebral white matter  changes consistent with chronic small vessel ischemic disease. No  evidence for acute intracranial pathology.    Radiation dose for this scan was reduced using automated exposure  control, adjustment of the mA and/or kV according to patient size, or  iterative reconstruction technique.     CONRAD CONTRERAS MD         SYSTEM ID:  [id number]   CTA Head Neck with Contrast    Impression    IMPRESSION:  1. Probable thromboembolic occlusion at the right MCA trifurcation.  This results in delayed filling of the distal right internal carotid  artery likely due to outflow obstruction.  2. Otherwise, normal neck and head CTA.    This imaging study was discussed with the ordering provider, Dr. JEREMY ALDANA, by Dr. Contreras on 4/6/2022 8:49 AM.    Radiation dose for this scan was reduced using automated exposure  control, adjustment of the mA and/or kV according to patient size, or  iterative reconstruction technique.     CONRAD CONTRERAS MD         SYSTEM ID:  [id number]   Cervical spine CT w/o contrast    Impression    IMPRESSION: Minimal degenerative retrolisthesis of C2 upon C3 and C4  upon C5. Minimal degenerative anterolisthesis of C7 upon T1. Alignment  of the cervical vertebrae is otherwise normal. Vertebral body heights  of the cervical spine are normal. Craniocervical alignment is normal.  There are no fractures of the cervical spine. Inferior endplate  deformity of T2 is again noted and does not appear appreciably changed  from a chest CT from 3/23/2021 and likely represents a degenerative  Schmorl's node deformity. No spinal canal stenosis. No prevertebral  soft tissue swelling.      Radiation dose for this scan was reduced using automated exposure  control, adjustment of the mA and/or kV according to patient size, or  iterative reconstruction technique    CONRAD CONTRERAS MD         SYSTEM ID:  [id number]   XR Chest Port 1 View    Impression    IMPRESSION:  Single AP view of the chest was obtained. Stable  cardiomediastinal silhouette. Mild bibasilar pulmonary opacities,  likely atelectasis. No significant pleural effusion or pneumothorax.  No definite acute osseous pathology. If clinical suspicion of rib  fractures, remains high, rib series is more sensitive for detection of  subtle rib fractures.    YANE TRAN MD         SYSTEM ID:  [id number]   CT Head Perfusion w Contrast    Impression    IMPRESSION: There is delayed arrival of the contrast bolus throughout  the majority of the right middle cerebral and right anterior cerebral  artery territories consistent with the right middle cerebral artery  occlusion resulting in delayed filling of the right internal carotid  circulation noted on the accompanying CT angiogram. There is decreased  cerebral blood flow throughout the same distribution. There is  decreased cerebral blood volume in the cortex and white matter of the  mid right frontal lobe as well as at the lateral aspect of the right  temporal lobe worrisome for infarct. No other perfusion defects.    Radiation dose for this scan was reduced using automated exposure  control, adjustment of the mA and/or kV according to patient size, or  iterative reconstruction technique    CONRAD BURGOS MD         SYSTEM ID:  [id number]   IR Carotid Cerebral Angiogram Bilateral    Impression    Impression:   Successful mechanical thrombectomy of the right M1 MCA with TICI 2b  recanalization after 3 passes of Solitaire accompanied with penumbra  aspiration technique. There is a partial recanalization of the right  angular artery in the M3 segment with a right parietal lobe perfusion  deficit.     Patient has severely atherosclerotic right common femoral artery and  severely calcified and atherosclerotic right superficial and deep  femoral arteries. Guillermo pressure was held to achieve hemostasis.    Daxa Schulz MD  Endovascular Surgical Neuroradiology Fellow  Pager: [phone]      I was present and scrubbed in for the entire procedure    I have personally reviewed the examination and initial interpretation  and I agree with the findings.    YOKO JOLLY MD         SYSTEM ID:  [id number]   CT Head w/o Contrast    Impression    IMPRESSION:   1. Focus of hyperdensity at the high posteromedial right frontal lobe  could represent a tiny focus of intracranial hemorrhage. Continued  surveillance recommended.  2. Diffuse cerebral volume loss and cerebral white matter changes  consistent with chronic small vessel ischemic disease.      Radiation dose for this scan was reduced using automated exposure  control, adjustment of the mA and/or kV according to patient size, or  iterative reconstruction technique    CONRAD BURGOS MD         SYSTEM ID:  [id number]   XR Pelvis Port 1/2 Views    Impression    IMPRESSION: No evidence of acute fracture. Mild bilateral hip  degenerative changes. Bob catheter in place. Contrast in the  bladder.     ZEINA CISSE MD         SYSTEM ID:  [id number]   CT Head w/o Contrast    Impression    IMPRESSION:  1.  Stable small volume acute subarachnoid hemorrhage within the right cingulate sulcus. No new intracranial hemorrhage.  2.  Developing regions of acute infarction within the right anterior cerebral artery territory and right middle cerebral artery posterior division territory.  3.  Stable chronic infarction within the right posterior cerebral artery territory.  4.  Stable mild to moderate chronic small vessel ischemic disease and generalized brain parenchymal volume loss.   MR Brain w/o & w Contrast    Impression    IMPRESSION:  1.  Evolving ischemic infarcts in the right middle cerebral and right anterior cerebral artery distributions.  2.  Mild dural thickening and enhancement along the right cerebral convexity.  3.  Generalized brain atrophy and presumed microvascular ischemic changes as detailed above.   XR Abdomen Port 1 View    Impression     IMPRESSION: An enteric tube is in place with tip likely in the gastric antrum or first portion of the duodenum. Small bilateral pleural effusions, greater on the right. Tortuous calcified thoracic aorta.   XR Chest Port 1 View    Impression    IMPRESSION: Mild cardiac enlargement. Small bilateral pleural effusions with bibasilar infiltrate/atelectasis greater on the right. Underlying emphysematous change. Feeding tube.   XR Video Swallow with SLP or OT    Impression    IMPRESSION:  Thin: Premature spillage to the piriform sinuses with reduced  epiglottic inversion. Aspiration. With chin tuck maneuver, there was  mild to moderate flash penetration.    Mildly thick: Deep penetration/aspiration observed. Mild pharyngeal  residue.    Moderately thick: Moderate to deep penetration. Mild penetration noted  with chin tuck maneuver.    Puree: No penetration or aspiration.    Semisolid: No penetration or aspiration. Moderate pharyngeal residue  primarily in the vallecula. There was relatively good clearance of the  residue utilizing moderately thick liquid wash.    Solid: Not administered.    This study only includes the cervical esophagus. Please see separate  report from speech pathology for additional details.   XR Chest Port 1 View    Impression    IMPRESSION: Similar small right effusion and associated compressive  atelectasis and or infiltrate, question worsening left-sided pleural  effusion and associated atelectasis and/or infiltrate. Enteric tube  tip below the margin of study.    KARO FRANCIS MD         SYSTEM ID:  [id number]   Echocardiogram Complete - For age > 60 yrs   Result Value Ref Range    LVEF  55%      Lab Results   Component Value Date    WBC 10.8 04/11/2022    HGB 13.4 04/11/2022    HCT 39.5 04/11/2022     04/11/2022     04/11/2022    POTASSIUM 3.8 04/12/2022    CHLORIDE 116 (H) 04/11/2022    CO2 19 (L) 04/11/2022    BUN 17 04/11/2022    CR 0.64 04/11/2022     (H) 04/12/2022    DD  0.6 (H) 03/26/2017    NTBNPI 1960 (H) 02/15/2009    TROPI  03/26/2017     <0.015  The 99th percentile for upper reference range is 0.045 ug/L.  Troponin values in   the range of 0.045 - 0.120 ug/L may be associated with risks of adverse   clinical events.      AST 53 (H) 10/06/2020    ALT 52 (H) 10/06/2020    ALKPHOS 55 10/06/2020    BILITOTAL 0.7 10/06/2020    INR 1.20 (H) 04/07/2022

## 2022-04-12 NOTE — PROGRESS NOTES
CLINICAL NUTRITION SERVICES - REASSESSMENT NOTE      Recommendations Ordered by Registered Dietitian (RD):   Trial Magic Cup with lunch and dinner meals    Future/Additional Recommendations:   Daily weights as ordered  Will follow calorie counts to be started today   Wean/adjust TF as indicated pending PO intakes  Once labs improved, can change to nocturnal TF if patient desires   Continue Phos replacement as needed pending lab result this afternoon    Malnutrition: (4/9)  % Weight Loss:  None noted  % Intake:  Decreased intake does not meet criteria for malnutrition  - NPO past 4 days, not a big eater in general  Subcutaneous Fat Loss:  Low fat stores  Muscle Loss:  Temporal region - moderate depletion and Clavicle bone region - moderate depletion (do not suspect acute loss, likely some age related sarcopenia)  Fluid Retention:  None noted     Malnutrition Diagnosis: Patient does not meet two of the above criteria necessary for diagnosing malnutrition       EVALUATION OF PROGRESS TOWARD GOALS   Diet:  Pureed Diet (level 4) + Moderately Thick Liquids (level 3) --> per SLP recommendations following VFSS this morning   MD ordered calorie counts this afternoon which have been started by nutrition associate, will follow these.     Nutrition Support:  Patient started on TF via NGT vs NDT on 4/9. Advanced to goal rate on 4/11 despite orders to hold at current rate for low Phos. Currently running as follows ~  Type of Feeding Tube: NGT vs NDT (tip in gastric antrum vs first portion of duodenum)  Enteral Frequency:  Continuous  Enteral Regimen: Jevity 1.5 at goal 40 mL/hr   Total Enteral Provisions: 1440 kcal (38 kcal/kg), 61 g protein (1.6 g/kg), 20 g fiber and 730 mL free water  Free Water Flush: 60 mL q 4 hours       Intake/Tolerance: Patient had low K+ and Phos yesterday so RD altered orders to hold TF at current rate of 30 mL/hr; however, TF was advanced to goal rate of 40 mL/hr. K+ and Phos were replaced yesterday -  last packet of Neutraphos given this morning with scheduled lab check at 3pm this afternoon. Aside from low lytes with refeeding syndrome, has been tolerating TF well at this point it appears.       ASSESSED NUTRITION NEEDS:  Dosing Weight 37.9 kg  Estimated Energy Needs: 4739-1729 kcals (35-40 Kcal/Kg)  Justification: underweight  Estimated Protein Needs: 55-75 grams protein (1.5-2 g pro/Kg)  Justification: Repletion  Estimated Fluid Needs: 1 mL/Kcal or per provider pending fluid status       NEW FINDINGS:   - Labs: Reviewed. 4/11 --> K+ 3.2 (L), replaced - 4.2 (WNL), Mg 1.8 (WNL), Phos 1.1 (L). BGM .   - Meds: Reviewed.   - GI: Last BM x 1 on 4/10  - GOC: Patient noted she would never want a permanent FT. Open to short-term trial with NG with hopes of improvement in swallow function. Palliative care to be meeting with patient and family this afternoon for GOC discussion.   - Weight: No new weight since admission. Orders for daily weights.     Previous Goals:   EN to meet % est needs  Evaluation: Met    Previous Nutrition Diagnosis:   Inadequate protein-energy intake related to NPO status as evidenced by pt not meeting estimated nutrition needs  Evaluation: Completed      CURRENT NUTRITION DIAGNOSIS  Altered nutrition-related lab value (Phosphorus) related to refeeding syndrome with TF initiation as evidenced by Phos 1.1 mg/dL     INTERVENTIONS  Recommendations / Nutrition Prescription  See above     Implementation  Medical Food Supplement - as above     Goals  TF to meet % assessed nutrition needs until improvement in PO intake       MONITORING AND EVALUATION:  Progress towards goals will be monitored and evaluated per protocol and Practice Guidelines      Corazon Grimaldo RD, LD  Unit RD Pager: [phone]

## 2022-04-12 NOTE — CONSULTS
04/12/22 1223 Klisch, Christine M, RN     Stroke Education Note     The following information has been reviewed with the patient and family:     1. Warning signs of stroke     2. Calling 911 if having warning signs of stroke     3. All modifiable risk factors: hypertension, CAD, atrial fib, diabetes, hypercholesterolemia, smoking, substance abuse, diet, physical inactivity, obesity, sleep apnea.     4. Patient's risk factors for stroke which include: CAD,Afib     5. Follow-up plan for after discharge     6. Discharge medications which include: cardizem aspirin     In addition, the above information was given to the patient and family in writing as a part of the [facility] Stroke Class Handout.     Learner's response to risk factors / lifestyle modification education: Taking steps      Christine M Klisch, RN

## 2022-04-13 NOTE — SIGNIFICANT EVENT
Significant Event Note    Time of event: 10:01 PM April 12, 2022    Description of event:    HR 120s-130s afib     Plan:    IV Metoprolol 2.5 mg Q4H as needed   Continue Diltiazem    James Montano MD

## 2022-04-13 NOTE — PROGRESS NOTES
CALORIE COUNT      Approximate Oral Intake for:   (4/12)   Calories: 790 cals  Protein: 32 gm pro      Diet: Pureed, Moderately Thick liquids    TF currently running ---> Jevity 1.5 at goal 40 mL/hr   Total Enteral Provisions: 1440 kcal (38 kcal/kg), 61 g protein (1.6 g/kg), 20 g fiber and 730 mL free water  Free Water Flush: 60 mL q 4 hours       Estimated Needs:    Dosing Weight 37.9 kg  Estimated Energy Needs: 8067-8612 kcals (35-40 Kcal/Kg)  Justification: underweight  Estimated Protein Needs: 55-75 grams protein (1.5-2 g pro/Kg)  Justification: Repletion      Summary:   Pt started po intake yesterday  Tolerating small amounts  Will plan to run TF until pt able to meet 2/3 estimated needs orally (1000 cals, 50 gm pro)  Continue with calorie count    Martha Raymond RD, LD  Clinical Dietitian - [facility]   Pager - [phone]

## 2022-04-13 NOTE — PLAN OF CARE
Patient here with R MCA CVA, post embolectomy. Alert this morning, tired later in the day. Oriented times 4. VSS. Tele a-fib with CVR. MIGUEL hemiplegia, decreased sensation on left side. Left facial droop. On tube feeding per NG tube, at goal rate. Chest CT this morning, 250 ml IVF after this. Incontinent of bowel and bladder. Purewick in place.Turned and repositioned. Mepilex on sacrum. Discharge pending goals of care.

## 2022-04-13 NOTE — PROGRESS NOTES
[facility]    Medicine Progress Note - Hospitalist Service    Date of Admission:  4/6/2022    Assessment & Plan          Mia Rodriguez is a 88 year old female who presented to the [facility] ER with acute neuro changes and associated Fall.  She was felt to have an acute CVA and urgently taken to radiology for embolectomy.     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    - Systolic goal -160 range.      - PT/OT/Speech/SW/CC consults  - Continue close neuro monitoring  - ASA 81mg daily for now   - Repeat NCCT in 2 weeks, if stable then would restart Eliquis 2.5mg BID + stop ASA at that time   - Follow-up with [facility] in 6-8 weeks   - Stroke neuro following peripherally, please page for questions  - melatonin 2mg at bedtime scheduled to promote sleep  - monitor for delirium    Severe dysphagia post CVA  - place NG 4/9  - patient stated to MD Lemus and daughter Bennie on 4/9 that she would never want a permanent feeding tube.  - Video swallow completed on/   - appreciate SLP- diet advanced to pureed diet level per slp  - start calorie count  - continue tube feeds and wean as tolerated    Probable aspiration pneumonia   On 4/10, New fever, ? Right sided crackles, abdominal xr from 4/9 with clear lung bases. Suspect aspiration pneumonia due to significant oropharyngeal dysphagia.    CXR-Mild cardiac enlargement. Small bilateral pleural effusions with bibasilar infiltrate/atelectasis greater on the right. Underlying emphysematous change.   CXR on 4/12-Similar small right effusion and associated compressive atelectasis and or infiltrate, question worsening left-sided pleural effusion and associated atelectasis and/or infiltrate.   - started on ceftriaxone on/ , will continue   - lasix 10mg IV x 1 on 4/12  - patient currently placed on O2  2Lpm since evening of  4/11  - patient appears tachypenic, although she denies sob, has been in bed due to her stroke, will obtain CT chest to eval for PE    Addendum:  CT chest negative for PE, b/l pleural effusion.   - lasix ordered  - monitor I/Os  - attempt calling dtBennie acevedo, to update about CT, went to , did not leave a message      Abnormal UA:  - UA slightly abnormal, doubt UTI.  Culture pending, hold on abx.       Fall:  -  No overt majory injury. Pelvis XR w/o acute findings 4/6     Atrial fibrillation with RVR: improved rate control  - HR in 80s-100  - stop dilt drip on 4/12, intermittently HR going higer  - increase diltiazem to 90mg q6hrs  -  Hold Eliquis as above     Hypothyroidism:  - Hold PO levothyroxine while NPO     CKD, history of renal stones:  -  Monitor     COPD, smoking history:  -  No overt exacerbation.  Albuterol PRN.  Monitor.     Reflux:  -  Pepcid     Osteoporosis:  -  Monitor, baseline on prolia     HyperNa, resolved  - likely iatrogenic from IVF and from absent PO     Hypoglycemia, resolved  - continue tube feeds     Hypokalemia and hypophosphetmia  - replace per protocol       Difficulty sleeping  - related to neurochecks     Goals of care  * discussion ongoing but patient states no permanent feeding tube. She is willing to trial NG for a period to see how much recovery of swallow function she will have have.   - she states a clear preference for continued restorative plan of care as of 4/9 but she will let us know if quality of life becomes intolerable.   - provider on 4/10 discussd with pt/family and code changed to DNR/DNI. In the evening, cross cover MD spoke with son and code changed to DNR- ok with intubation. Refer to progress notes on 4/10 for details.  - Palliative care consulted, had conference on 4/12-refer to palliative care note. Current goal is to continue with restorative cares see if swallowing improves.        Diet: Adult Formula Drip Feeding: Continuous Jevity 1.5; Nasoduodenal tube;  Goal Rate: 40; mL/hr; Medication - Feeding Tube Flush Frequency: At least 15-30 mL water before and after medication administration and with tube clogging; Amount to Send (Nutri...  Calorie Counts  Snacks/Supplements Adult: Magic Cup; With Meals  Combination Diet Pureed Diet (level 4); Liquidized/Moderately Thick (level 3) (by tsp); No Straws    DVT Prophylaxis: Pneumatic Compression Devices  Bob Catheter: Not present  Central Lines: None  Cardiac Monitoring: ACTIVE order. Indication: Tachyarrhythmias, acute (48 hours)  Code Status: No CPR- Pre-arrest intubation OK      Disposition Plan   Expected Discharge: 04/14/2022     Anticipated discharge location:  Awaiting care coordination huddle  Delays:           The patient's care was discussed with the Bedside Nurse and Patient.    Patient's daughter, Bennie, jason. Discussed my concerns with her regarding her mom's condition and her swallow eval, respirations..    María Elena Velasquez MD  Hospitalist Service  [facility]  Securely message with the Vocera Web Console (learn more here)  Text page via [employer] Paging/Directory         Clinically Significant Risk Factors Present on Admission                    ______________________________________________________________________    Interval History   Patient did not sleep well last night. This morning she is sleeping at time of visit, but easily arouses. Denies pain and feels tired.   Had SLP eval this morning and fatigued and did not do well with bedside swallow-refer to speech note for detail.   Overnight nursing staff concerned that patient confused at night and did not sleep well.       Data reviewed today: I reviewed all medications, new labs and imaging results over the last 24 hours. I personally reviewed no images or EKG's today.    Physical Exam   Vital Signs: Temp: 98.9  F (37.2  C) Temp src: Axillary BP: (!) 154/90 Pulse: 97   Resp: 27 SpO2: 96 % O2 Device: Nasal cannula Oxygen  Delivery: 2 LPM  Weight: 83 lbs 8.87 oz  General Appearance: Alert, awake and no apparent distress  Respiratory: decreased breath sounds at the bases, clear upper lungs  Cardiovascular: irregularly irregular  GI: soft and non-tender  Skin: warm and dry  Other: Left hemiplegia, right gaze preference    Data   Recent Labs   Lab 04/13/22  0735 04/13/22  0359 04/12/22  1447 04/12/22  0719 04/11/22  2302 04/11/22  1602 04/11/22  1224 04/11/22  0932 04/10/22  0839 04/10/22  0702 04/09/22  0747 04/09/22  0647 04/08/22  1524 04/07/22  0559   WBC  --   --   --   --   --   --   --  10.8  --  14.0*  --  9.7  --  13.5*   HGB  --   --   --   --   --   --   --  13.4  --  14.4  --  13.6  --  12.1   MCV  --   --   --   --   --   --   --  97  --  98  --  101*  --  101*   PLT  --   --   --   --   --   --   --  162  --  156  --  153  --  154   INR  --   --   --   --   --   --   --   --   --   --   --   --   --  1.20*     --   --   --   --   --   --  142  --  143  --  145*  --  147*   POTASSIUM 3.7  --  3.8  --   --  4.2  --  3.2*  --  3.5   < > 2.7*  --  3.6   CHLORIDE 109  --   --   --   --   --   --  116*  --  116*  --  119*  --  117*   CO2 25  --   --   --   --   --   --  19*  --  19*  --  18*  --  23   BUN 20  --   --   --   --   --   --  17  --  13  --  13  --  20   CR 0.60  --   --   --   --   --   --  0.64  --  0.58  --  0.51*  --  0.70   ANIONGAP 8  --   --   --   --   --   --  7  --  8  --  8  --  7   CYNDEE 7.8*  --   --   --   --   --   --  7.1*  --  7.1*  --  7.1*  --  8.1*   GLC 77 104*  --  125*   < >  --    < > 123*   < > 106*   < > 113*   < > 89    < > = values in this interval not displayed.     Recent Results (from the past 24 hour(s))   XR Chest Port 1 View    Narrative    CHEST ONE VIEW  4/12/2022 1:20 PM     HISTORY: Shortness of breath.    COMPARISON: April 10, 2022      Impression    IMPRESSION: Similar small right effusion and associated compressive  atelectasis and or infiltrate, question worsening  left-sided pleural  effusion and associated atelectasis and/or infiltrate. Enteric tube  tip below the margin of study.    KARO FRANCIS MD         SYSTEM ID:  [id number]     Medications     dextrose       - MEDICATION INSTRUCTIONS -         sodium chloride 0.9%  250 mL Intravenous Once     artificial saliva  2 spray Swish & Spit 4x Daily     aspirin  81 mg Oral Daily    Or     aspirin  300 mg Rectal Daily     cefTRIAXone  1 g Intravenous Q24H     diltiazem  90 mg Oral or Feeding Tube Q6H ARIK     famotidine  20 mg Intravenous Q24H     melatonin  2 mg Oral At Bedtime     sodium chloride (PF)  3 mL Intracatheter Q8H

## 2022-04-13 NOTE — PROVIDER NOTIFICATION
MD Notification    Notified Person: MD    Notified Person Name: Hospitalist    Notification Date/Time: 4/12 2200    Notification Interaction: Paged    Purpose of Notification: HR has been 110s-130s. Afib, Sustaining mostly in the 120s-130s.  Was on a diltizem gtt but was started on PO Cardizem today.  No PRNs for HR.      Orders Received: PRN Metoprolol ordered    Comments:

## 2022-04-13 NOTE — PROGRESS NOTES
SPIRITUAL HEALTH SERVICES Progress Note  [facility]  73    Visited with PT per [facility] request for SH services (routine).    PT declines visit because she is feeling very tired. PT accepted offer by  for a short prayer.    PT indicates that she is open to a visit at a different time when she is feeling better.    Follow-up with PT in next 24 - 48 hours and while she remains in hospital.    Horacio Radford  Associate

## 2022-04-13 NOTE — PLAN OF CARE
Agustina is here for R MCA CVA and SAH.  A&Ox4, at times did have periods of confusion of where we were and ask staff to call her neighbor with update but would reorientate to time of night and that her neighbor would be sleeping.  She states she was still confused, this would wax and wane.  Around midnight, gave melatonin to support sleep.  Some improvement after patient was able to sleep for a couple hours. Speech is slurred, at times difficult to understand, improvement after oral cares.  L sided facial droop unchanged.  LUE & LLE 1/5, RUE &RLE: 4/5.  Reports numbness to L extremeties.  Denies pain.  VSS ex for HR was 120-130 around 2200, MD paged.  IV metoprolol PRN order.  HR <100 after that.  Tele: Afib CVR/RVR.  Continues on Cardizem via NG q 6hrs.  NG TF 40ml/hr, w/ 60 ml H2O flushes q 4hrs. Per SLP ok for Pureed diet with Mod. Thick liquids. Calorie count continues.  Purewick in place with good output. Attempted to use the bedpan multiple times with just smears. Denies pain.  Old pressure injury to buttocks.  Mepliex in place.  Scattered bruising.  Continues to be followed by Palliative care.  Discharge disposition pending.

## 2022-04-13 NOTE — PLAN OF CARE
SLP - Swallow Tx update: Pt was alert and cooperative; however, overall fatigue and increased swallow delay observed this am.  Pt had difficulty executing double swallows.  Cough x 1 noted during po observation from breakfast tray.  Pt became fatigued and reported feeling full after approximately 15 tsps.  RR was 24-30 during the session.  Borderline safety for po intake and ability to maintain nutrition/hydration with po intake alone noted during today's session.  Recommend limited quantity of po (4 ounces each of mod thick liquids and puree) with small meals, direct cues to use 2 hard swallows, and careful use of all swallow precautions/strategies.  Hold po if respiratory status declines, unable to verify swallows, and/or aspiration signs are noted.

## 2022-04-13 NOTE — CONSULTS
Care Management Initial Consult    General Information  Assessment completed with: Patient, Children, patient and daughter Bennie  Type of CM/SW Visit: Initial Assessment    Primary Care Provider verified and updated as needed: Yes   Readmission within the last 30 days: no previous admission in last 30 days      Reason for Consult: discharge planning  Advance Care Planning: Advance Care Planning Reviewed: other (see comments) (no acp documents on file)          Communication Assessment  Patient's communication style: spoken language (English or Bilingual)    Hearing Difficulty or Deaf: yes   Wear Glasses or Blind: yes    Cognitive  Cognitive/Neuro/Behavioral: .WDL except  Level of Consciousness: lethargic  Arousal Level: opens eyes spontaneously  Orientation: oriented x 4  Mood/Behavior: calm, cooperative  Best Language: 0 - No aphasia  Speech: slow, slurred    Living Environment:   People in home: alone     Current living Arrangements: house      Able to return to prior arrangements: other (see comments) (potentially after TCU stay)  Living Arrangement Comments: [facility] Phone: [phone], Fax: [phone] (3-4 hours per day per staff )    Family/Social Support:  Care provided by: self  Provides care for: no one  Marital Status: Single  Children          Description of Support System: Supportive, Involved    Support Assessment: Adequate family and caregiver support, Adequate social supports    Current Resources:   Patient receiving home care services:       Community Resources:    Equipment currently used at home: walker, rolling  Supplies currently used at home:      Employment/Financial:  Employment Status: retired        Financial Concerns:             Lifestyle & Psychosocial Needs:  Social Determinants of Health     Tobacco Use: Medium Risk     Smoking Tobacco Use: Former Smoker     Smokeless Tobacco Use: Never Used   Alcohol Use: Not on file   Financial Resource Strain: Not on file   Food  Insecurity: Not on file   Transportation Needs: Not on file   Physical Activity: Not on file   Stress: Not on file   Social Connections: Not on file   Intimate Partner Violence: Not on file   Depression: Not on file   Housing Stability: Not on file       Functional Status:  Prior to admission patient needed assistance:              Mental Health Status:          Chemical Dependency Status:                Values/Beliefs:  Spiritual, Cultural Beliefs, Confucianism Practices, Values that affect care: no               Additional Information:  Per care management/social work consult for discharge planning and TCU placement. Patient admitted on 4/6/22 [facility] neuro changes and associated Fall.  She was felt to have an acute CVA and urgently taken to radiology for embolectomy. Date of discharge has not yet been determined. Writer reviewed chart and met with patient and daughter Bennie who is present at bedside to introduce self and role. Prior to hospitalization patient reports she was living at a 2 level house where she lives on one floor and didn't need to use stairs and used a 4ww for exercising and was independent with daily tasks. Writer reviewed therapy recommendations for TCU at discharge and patient and daughter are in agreement with this recommendation. Patient states she had previously been to [facility] and would possibly be interested in returning, but writer provided list of facilities and medicare.gov instructions for ratings - daughter and patient to review and will follow up with writer with facilities they would be comfortable with. Patient is not currently ready for discharge.    Will continue to follow.    NEELIMA Bowman, SW    [facility]

## 2022-04-14 NOTE — PROGRESS NOTES
CALORIE COUNT      Approximate Oral Intake for: (4/14)      Calories: 568 cals  Protein: 20 gms    Diet: Pureed (L4), Moderately Thick liquids (L3)     TF currently running ---> Jevity 1.5 at goal 40 mL/hr   Total Enteral Provisions: 1440 kcal (38 kcal/kg), 61 g protein (1.6 g/kg), 20 g fiber and 730 mL free water  Free Water Flush: 60 mL q 4 hours         Estimated Needs:    Dosing Weight 37.9 kg  Estimated Energy Needs: 9071-5957 kcals (35-40 Kcal/Kg)  Justification: underweight  Estimated Protein Needs: 55-75 grams protein (1.5-2 g pro/Kg)  Justification: Repletion        Summary:   Chart reviewed  Note fatigue during SLP session yesterday - Recommend limited quantity of po (4 ounces each of mod thick liquids and puree) with small meals  Plan to continue with TF   Calorie count 4/12-4/14  Assess EN plans following calorie count summary    Martha Raymond RD, LD  Clinical Dietitian - [facility]   Pager - [phone]

## 2022-04-14 NOTE — PROGRESS NOTES
[facility]  Palliative Care Daily Progress Note       Recommendations & Counseling       Restorative focused care at this time. Agustina is motivated to participate in SLT and PT/OT with eventual discharge to TCU for rehab.     Agustina has made it known that she does not wish to have a G-tube placed for long term nutrition. SLT evaluated with VSS and found Agustina to be a candidate for a modified diet. Agustina currently has a NG FT for supplemental nutrition as she works on increasing her oral intake.     Palliative care will continue to follow for goals of care discussions as needed.        Pooja Rodriguez, CNS  [facility]  Contact information available via [facility] Paging/Directory        Thank you for the opportunity to participate in the care of this patient and family. Our team: will continue to follow.     During regular M-F work hours ([id number]) -- if you are not sure who specifically to contact -- please contact us in [facility] Smart Web.     After regular work hours and on weekends/holidays, you can call our answering service at [phone].     Attestation:  Total time on the floor involved in the patient's care: 15 minutes  Total time spent in counseling/care coordination: >50%     Assessments          Mia Rodriguez is a 88 year old female with PMH significant for A-fib, CAD, benign essential HTN, COPD, TIA, diverticulitis, CKD, osteoporosis, remote history of breast cancer, small lung nodule. She presented to ED via EMT after suffering a fall with neuro changes at her home on 4/06/22. 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 As a result of her stroke she has been experiencing dysphagia and will have a video swallow study tomorrow for further evaluation. She had made comments that she would not wish to have a permanent feeding  tube.    Today, the patient was seen for:  Goals of care    Prognosis, Goals, or Advance Care Planning was addressed today with: Yes.  Mood, coping, and/or meaning in the context of serious illness were addressed today: Yes.  Summary/Comments: Agustnia states that she is doing better today- is having visits with her children.            Interval History:     Chart review/discussion with unit or clinical team members:   RN was at bedside giving update on day.    Per patient or family/caregivers today:  Agustina was sitting up in bedside recliner today with her daughter Bennie at her side.  She is feeling better than yesterday and ate three quarters of her oatmeal this morning.  The goal is to increase her oral intake to adequately supply her body with nutrition as she does not want to pursue a feeding tube for long-term use.  Her goal is to continue working with PT/OT and speech-language therapy to get stronger and see how much she can improve.    Key Palliative Symptoms:  We are not helping to manage these symptoms currently in this patient.           Review of Systems:     Besides above, an additional  system ROS was reviewed and is unremarkable          Medications:     I have reviewed this patient's medication profile and medications during this hospitalization.    Noted meds:      artificial saliva  2 spray Swish & Spit 4x Daily     aspirin  81 mg Oral Daily    Or     aspirin  300 mg Rectal Daily     cefTRIAXone  1 g Intravenous Q24H     diltiazem  90 mg Oral or Feeding Tube Q6H ARIK     famotidine  20 mg Intravenous Q24H     melatonin  2 mg Oral At Bedtime     potassium chloride  20 mEq Oral BID     sodium chloride (PF)  3 mL Intracatheter Q8H     acetaminophen **OR** acetaminophen, albuterol, dextrose, glucose **OR** dextrose **OR** glucagon, hydrALAZINE, lidocaine 4%, lidocaine (buffered or not buffered), - MEDICATION INSTRUCTIONS -, metoclopramide, metoprolol, nitroGLYcerin, ondansetron **OR** ondansetron,  prochlorperazine **OR** prochlorperazine **OR** prochlorperazine, sodium chloride (PF)             Physical Exam:   Temp: 98.5  F (36.9  C) Temp src: Oral BP: 138/81 Pulse: 97   Resp: 17 SpO2: 92 % O2 Device: Nasal cannula Oxygen Delivery: 2 LPM  GENERAL:  Alert, fatigued, no distress.  HEAD: Normocephalic atraumatic  SCLERA: Anicteric  EXTREMITIES: Warm  RESPIRATORY: Breathing relaxed and non labored  NEUROLOGIC: Alert.  PSYCH: Calm, cooperative.           Data Reviewed:     Recent imaging reviewed, my comments on pertinents:   Results for orders placed or performed during the hospital encounter of 04/06/22   CT Head w/o Contrast    Impression    IMPRESSION: Diffuse cerebral volume loss and cerebral white matter  changes consistent with chronic small vessel ischemic disease. No  evidence for acute intracranial pathology.    Radiation dose for this scan was reduced using automated exposure  control, adjustment of the mA and/or kV according to patient size, or  iterative reconstruction technique.     CONRAD CONTRERAS MD         SYSTEM ID:  [id number]   CTA Head Neck with Contrast    Impression    IMPRESSION:  1. Probable thromboembolic occlusion at the right MCA trifurcation.  This results in delayed filling of the distal right internal carotid  artery likely due to outflow obstruction.  2. Otherwise, normal neck and head CTA.    This imaging study was discussed with the ordering provider, Dr. JEREMY ALDANA, by Dr. Contreras on 4/6/2022 8:49 AM.    Radiation dose for this scan was reduced using automated exposure  control, adjustment of the mA and/or kV according to patient size, or  iterative reconstruction technique.     CONRAD CONTRERAS MD         SYSTEM ID:  [id number]   Cervical spine CT w/o contrast    Impression    IMPRESSION: Minimal degenerative retrolisthesis of C2 upon C3 and C4  upon C5. Minimal degenerative anterolisthesis of C7 upon T1. Alignment  of the cervical vertebrae is otherwise normal. Vertebral body  heights  of the cervical spine are normal. Craniocervical alignment is normal.  There are no fractures of the cervical spine. Inferior endplate  deformity of T2 is again noted and does not appear appreciably changed  from a chest CT from 3/23/2021 and likely represents a degenerative  Schmorl's node deformity. No spinal canal stenosis. No prevertebral  soft tissue swelling.      Radiation dose for this scan was reduced using automated exposure  control, adjustment of the mA and/or kV according to patient size, or  iterative reconstruction technique    CONRAD BURGOS MD         SYSTEM ID:  [id number]   XR Chest Port 1 View    Impression    IMPRESSION: Single AP view of the chest was obtained. Stable  cardiomediastinal silhouette. Mild bibasilar pulmonary opacities,  likely atelectasis. No significant pleural effusion or pneumothorax.  No definite acute osseous pathology. If clinical suspicion of rib  fractures, remains high, rib series is more sensitive for detection of  subtle rib fractures.    YANE TRAN MD         SYSTEM ID:  [id number]   CT Head Perfusion w Contrast    Impression    IMPRESSION: There is delayed arrival of the contrast bolus throughout  the majority of the right middle cerebral and right anterior cerebral  artery territories consistent with the right middle cerebral artery  occlusion resulting in delayed filling of the right internal carotid  circulation noted on the accompanying CT angiogram. There is decreased  cerebral blood flow throughout the same distribution. There is  decreased cerebral blood volume in the cortex and white matter of the  mid right frontal lobe as well as at the lateral aspect of the right  temporal lobe worrisome for infarct. No other perfusion defects.    Radiation dose for this scan was reduced using automated exposure  control, adjustment of the mA and/or kV according to patient size, or  iterative reconstruction technique    CONRAD BURGOS MD         SYSTEM ID:   [id number]   IR Carotid Cerebral Angiogram Bilateral    Impression    Impression:   Successful mechanical thrombectomy of the right M1 MCA with TICI 2b  recanalization after 3 passes of Solitaire accompanied with penumbra  aspiration technique. There is a partial recanalization of the right  angular artery in the M3 segment with a right parietal lobe perfusion  deficit.     Patient has severely atherosclerotic right common femoral artery and  severely calcified and atherosclerotic right superficial and deep  femoral arteries. Guillermo pressure was held to achieve hemostasis.    Daxa Schulz MD  Endovascular Surgical Neuroradiology Fellow  Pager: [phone]      I was present and scrubbed in for the entire procedure    I have personally reviewed the examination and initial interpretation  and I agree with the findings.    YOKO JOLLY MD         SYSTEM ID:  [id number]   CT Head w/o Contrast    Impression    IMPRESSION:   1. Focus of hyperdensity at the high posteromedial right frontal lobe  could represent a tiny focus of intracranial hemorrhage. Continued  surveillance recommended.  2. Diffuse cerebral volume loss and cerebral white matter changes  consistent with chronic small vessel ischemic disease.      Radiation dose for this scan was reduced using automated exposure  control, adjustment of the mA and/or kV according to patient size, or  iterative reconstruction technique    CONRAD BURGOS MD         SYSTEM ID:  [id number]   XR Pelvis Port 1/2 Views    Impression    IMPRESSION: No evidence of acute fracture. Mild bilateral hip  degenerative changes. Bob catheter in place. Contrast in the  bladder.     ZEINA CISSE MD         SYSTEM ID:  [id number]   CT Head w/o Contrast    Impression    IMPRESSION:  1.  Stable small volume acute subarachnoid hemorrhage within the right cingulate sulcus. No new intracranial hemorrhage.  2.  Developing regions of acute infarction within the right anterior cerebral artery  territory and right middle cerebral artery posterior division territory.  3.  Stable chronic infarction within the right posterior cerebral artery territory.  4.  Stable mild to moderate chronic small vessel ischemic disease and generalized brain parenchymal volume loss.   MR Brain w/o & w Contrast    Impression    IMPRESSION:  1.  Evolving ischemic infarcts in the right middle cerebral and right anterior cerebral artery distributions.  2.  Mild dural thickening and enhancement along the right cerebral convexity.  3.  Generalized brain atrophy and presumed microvascular ischemic changes as detailed above.   XR Abdomen Port 1 View    Impression    IMPRESSION: An enteric tube is in place with tip likely in the gastric antrum or first portion of the duodenum. Small bilateral pleural effusions, greater on the right. Tortuous calcified thoracic aorta.   XR Chest Port 1 View    Impression    IMPRESSION: Mild cardiac enlargement. Small bilateral pleural effusions with bibasilar infiltrate/atelectasis greater on the right. Underlying emphysematous change. Feeding tube.   XR Video Swallow with SLP or OT    Impression    IMPRESSION:  Thin: Premature spillage to the piriform sinuses with reduced  epiglottic inversion. Aspiration. With chin tuck maneuver, there was  mild to moderate flash penetration.    Mildly thick: Deep penetration/aspiration observed. Mild pharyngeal  residue.    Moderately thick: Moderate to deep penetration. Mild penetration noted  with chin tuck maneuver.    Puree: No penetration or aspiration.    Semisolid: No penetration or aspiration. Moderate pharyngeal residue  primarily in the vallecula. There was relatively good clearance of the  residue utilizing moderately thick liquid wash.    Solid: Not administered.    This study only includes the cervical esophagus. Please see separate  report from speech pathology for additional details.    KARO MAC MD         SYSTEM ID:  [id number]   XR Chest Port 1  View    Impression    IMPRESSION: Similar small right effusion and associated compressive  atelectasis and or infiltrate, question worsening left-sided pleural  effusion and associated atelectasis and/or infiltrate. Enteric tube  tip below the margin of study.    KARO FRANCIS MD         SYSTEM ID:  [id number]   CT Chest Pulmonary Embolism w Contrast    Impression    IMPRESSION:  1.  No pulmonary embolism demonstrated.  2.  Moderate bilateral pleural effusions and associated compressive  atelectasis and/or infiltrate.  3.  Moderate to severe emphysema.     KARO FRANCIS MD         SYSTEM ID:  [id number]   Echocardiogram Complete - For age > 60 yrs   Result Value Ref Range    LVEF  55%      Lab Results   Component Value Date    WBC 10.8 04/11/2022    WBC 14.0 (H) 04/10/2022    WBC 9.7 04/09/2022    HGB 13.4 04/11/2022    HGB 14.4 04/10/2022    HGB 13.6 04/09/2022    HCT 39.5 04/11/2022    HCT 43.3 04/10/2022    HCT 41.7 04/09/2022     04/11/2022     04/10/2022     04/09/2022     04/14/2022     04/13/2022     04/11/2022    POTASSIUM 3.4 04/14/2022    POTASSIUM 3.7 04/13/2022    POTASSIUM 3.8 04/12/2022    CHLORIDE 111 (H) 04/14/2022    CHLORIDE 109 04/13/2022    CHLORIDE 116 (H) 04/11/2022    CO2 27 04/14/2022    CO2 25 04/13/2022    CO2 19 (L) 04/11/2022    BUN 18 04/14/2022    BUN 20 04/13/2022    BUN 17 04/11/2022    CR 0.62 04/14/2022    CR 0.60 04/13/2022    CR 0.64 04/11/2022     (H) 04/14/2022    GLC 68 (L) 04/14/2022    GLC 99 04/14/2022    DD 0.6 (H) 03/26/2017    NTBNPI 1960 (H) 02/15/2009    TROPI  03/26/2017     <0.015  The 99th percentile for upper reference range is 0.045 ug/L.  Troponin values in   the range of 0.045 - 0.120 ug/L may be associated with risks of adverse   clinical events.      TROPI  03/26/2017     <0.015  The 99th percentile for upper reference range is 0.045 ug/L.  Troponin values in   the range of 0.045 - 0.120 ug/L may be associated with  risks of adverse   clinical events.      TROPI  08/17/2015     <0.015  The 99th percentile for upper reference range is 0.045 ug/L.  Troponin values in   the range of 0.045 - 0.120 ug/L may be associated with risks of adverse   clinical events.      AST 53 (H) 10/06/2020    AST 13 10/02/2014    AST 15 09/26/2014    ALT 52 (H) 10/06/2020    ALT 10 01/12/2018    ALT 35 05/06/2016    ALKPHOS 55 10/06/2020    ALKPHOS 46 10/02/2014    ALKPHOS 46 09/26/2014    BILITOTAL 0.7 10/06/2020    BILITOTAL 0.5 10/02/2014    BILITOTAL 1.0 09/26/2014    INR 1.20 (H) 04/07/2022    INR 1.09 04/06/2022    INR 1.03 10/22/2014

## 2022-04-14 NOTE — PLAN OF CARE
Reason for Admission: CVI    Cognitive/Mentation: A/Ox 4/forgetful  Neuros/CMS: Intact ex left side hemiplegia, left droop, left side decreased sensation but can feel pressure which is an improvement.  VS: stable.   Tele: A-Fib CVR.  GI: BS positive,  flatus, last BM 4/14. Incontinent.  : external cath.   Pulmonary: LS diminished.  Pain: right hip[.   Skin: dry/frail/bruised extremities  Activity: Assist x 2 with lift.  Diet: level 4 with mod liquids. Takes pills via tube feed.     Therapies recs: TCU  Discharge: pending placement    Aggression Stoplight Tool: green    End of shift summary: patient appetite improving , less coughing when eating, more awake during the day, tolerates chair and dangling on bed, family here today.

## 2022-04-14 NOTE — PLAN OF CARE
Goal Outcome Evaluation:    Plan of Care Reviewed With: patient, family     Overall Patient Progress: no change    Pt here with R MCA CVA and acute SAH. A&Ox4. Neuros L facial droop, LUE 1/5 RUE 4/5 LLE 1/5 RLE 3/5, L side severely decreased sensation, some slurred speech . VSS. Tele Afib CVR. pureed diet, mod thick liquids. Takes pills in NGT. Up with A2 lift. Denies pain. Pt scoring green on the Aggression Stop Light Tool. Plan continue calorie count and NGT trial. Discharge plan pending.

## 2022-04-14 NOTE — PROGRESS NOTES
Dysarthria Evaluation  04/14/22 9554   General Information   Onset of Illness/Injury or Date of Surgery 04/06/22   Referring Physician Dr. Cortez   Patient/Family Therapy Goal Statement (SLP) Pt wants to improve speech clarity   Pertinent History of Current Problem CVA   General Observations Pt alert and very motivated to improve swallow and speech function   Type of Evaluation   Type of Evaluation Speech, Language, Cognition   Oral Motor   Oral Musculature anomalies present   Facial Symmetry (Oral Motor)   Facial Symmetry (Oral Motor) left side impairment   Left Side Facial Asymmetry moderate impairment   Cough/Swallow/Gag Reflex (Oral Motor)   Volitional Throat Clear/Cough (Oral Motor) impaired;reduced strength   Vocal Quality/Secretion Management (Oral Motor)   Vocal Quality (Oral Motor) hypophonic;breathy   General Swallowing Observations   Current Diet/Method of Nutritional Intake (General Swallowing Observations, NIS) pureed (level 4);moderately thick liquids/liquidized (level 3)   Respiratory Support (General Swallowing Observations) supplemental oxygen;nasal cannula   Speech   Vocal Loudness (Motor Speech) monoloudness   Speech Intelligibility (Motor Speech) conversational level;phrase/sentence level;word level   Breath Support (Motor Speech) minimal impairment;moderate impairment   Comment, Motor Speech Assessment Pt presents with mild to moderate dysarthria in convarsational sample. Pt very motivated and asking excellent questions on how to improve speech clarify. Reviewed strategies. Will need formal speech/language evaluation at the next level of care. Pt motivated to start dysarthria strategies while hospitalized.   Speech Fluency (Motor Speech) restarts/self-corrections   Rate/Prosody (Motor Speech) rapid rate;monotone speech   Articulation (Motor Speech) errors with increasing word length;imprecise articulation;reduction of syllables   Dysarthria differential diagnosis Flaccid/LMN   Respiration  (motor speech) Clavicular   Phonation (motor speech) Loudness (soft);Variable loudness;Strained-strangled quality   Word Level, Speech Intelligibility (Motor Speech) moderate impairment   Conversational Level, Speech Intelligibility (Motor Speech) moderate impairment;minimal impairment   Phrase/Sentence Level, Speech Intelligibility (Motor Speech) minimal impairment;moderate impairment   Auditory Comprehension   Follows Commands (Auditory Comprehension) WNL   Verbal Expression   Automatic Speech (Verbal Expression) WNL   Pragmatic Language   Verbal Skills (Pragmatic Language) WNL   Nonverbal Skills (Pragmatic Language) WNL   General Therapy Interventions   Planned Therapy Interventions Communication   Communication Improve speech intelligibility   Clinical Impression   Criteria for Skilled Therapeutic Interventions Met (SLP Eval) Yes, treatment indicated   SLP Diagnosis Dysarthria   Risks & Benefits of therapy have been explained evaluation/treatment results reviewed;care plan/treatment goals reviewed;risks/benefits reviewed;current/potential barriers reviewed;participants voiced agreement with care plan;participants included;patient   SLP Discharge Planning   SLP Discharge Recommendation Acute Rehab Center-Motivated patient will benefit from intensive, interdisciplinary therapy.  Anticipate will be able to tolerate 3 hours of therapy per day    Total Evaluation Time   Total Evaluation Time (Minutes) 10   SLP Goals   Therapy Frequency (SLP Eval) daily   SLP Goals Communication   SLP: Communicate basic wants and needs verbally;independent  (using dysarthria strategies)

## 2022-04-14 NOTE — PLAN OF CARE
Pt here with R MCA stroke and R SAH. A&O x4, can be forgetful. Neuros with slurred speech, L droop, L side withdraws to pain with no spontaneous movement. L side decreased sensation. VSS on 2L O2. Tele A fib CVR. Pureed diet with mod thick liquids. On TF at 40ml/hr and 60ml flushes q4 hours. Takes pills through NG tube. Up with lift, T/R. Denies pain. Purewick in place for urinary incontinence. Pt scoring green on the Aggression Stop Light Tool. Plan to continue on calorie count, discharge pending.

## 2022-04-14 NOTE — PROGRESS NOTES
[facility]    Medicine Progress Note - Hospitalist Service    Date of Admission:  4/6/2022    Assessment & Plan          Mia Rodriguez is a 88 year old female who presented to the [facility] ER with acute neuro changes and associated Fall.  She was felt to have an acute CVA and urgently taken to radiology for embolectomy.     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    - Systolic goal -160 range.      - PT/OT/Speech/SW/CC consults  - Continue close neuro monitoring  - ASA 81mg daily for now   - Repeat NCCT in 2 weeks, if stable then would restart Eliquis 2.5mg BID + stop ASA at that time   - Follow-up with [facility] in 6-8 weeks   - Stroke neuro following peripherally, please page for questions  - melatonin 2mg at bedtime scheduled to promote sleep  - monitor for delirium    Severe dysphagia post CVA  - place NG 4/9  - patient stated to MD Lemus and daughter Bennie on 4/9 that she would never want a permanent feeding tube.  - Video swallow completed on 4/12  - appreciate SLP- diet advanced to pureed diet level per slp  - start calorie count  - continue tube feeds and wean as tolerated    Probable aspiration pneumonia   Bilateral pleural effusions  On 4/10, New fever, ? Right sided crackles, abdominal xr from 4/9 with clear lung bases. Suspect aspiration pneumonia due to significant oropharyngeal dysphagia.    * CXR-Mild cardiac enlargement. Small bilateral pleural effusions with bibasilar infiltrate/atelectasis greater on the right. Underlying emphysematous change.   * CXR on 4/12-Similar small right effusion and associated compressive atelectasis and or infiltrate, question worsening left-sided pleural effusion and associated atelectasis and/or infiltrate.   *CT chest PE due to increased rr given her immobility following cva- negative for PE, mod b/l  pleural effusion and associated compressive atelectasis, mod to severe emphysema  - continue ceftriaxone to complete treatment for possible aspiration pneumonia  - lasix 10mg IV x 1 on 4/12 and 4/13. Will give Lasix 20mg IV x 1 on 4/14, she is fluid up from IVF during initial hospital course  - will also give KCl 20mEQ BID x 2 doses with lasix  - patient currently placed on O2  2Lpm since evening of 4/11, wean as tolerated    Abnormal UA:  - UA slightly abnormal, doubt UTI.  Urine culture was no growth     Fall:  -  No overt majory injury. Pelvis XR w/o acute findings 4/6     Atrial fibrillation with RVR: improved rate control  - HR in 80s-100  - stopped  dilt drip on 4/12, intermittently HR going higer  - continue diltiazem 90mg q6hrs  - Hold Eliquis as above     Hypothyroidism:  - Hold PO levothyroxine while NPO     CKD, history of renal stones:  -  Monitor     COPD, smoking history:  -  No overt exacerbation.  Albuterol PRN.  Monitor.     Reflux:  -  Pepcid     Osteoporosis:  -  Monitor, baseline on prolia     HyperNa, resolved  - likely iatrogenic from IVF and from absent PO     Hypoglycemia  - continue tube feeds     Hypokalemia and hypophosphetmia  - replace per protocol    Difficulty sleeping  - related to neurochecks     Goals of care  * discussion ongoing but patient states no permanent feeding tube. She is willing to trial NG for a period to see how much recovery of swallow function she will have have.   - she states a clear preference for continued restorative plan of care as of 4/9 but she will let us know if quality of life becomes intolerable.   - provider on 4/10 discussd with pt/family and code changed to DNR/DNI. In the evening, cross cover MD spoke with son and code changed to DNR- ok with intubation. Refer to progress notes on 4/10 for details.  - Palliative care consulted, had conference on 4/12-refer to palliative care note. Current goal is to continue with restorative cares see if swallowing  improves and code status remains 22-year-old DNR- okay for intubation         Diet: Adult Formula Drip Feeding: Continuous Jevity 1.5; Nasoduodenal tube; Goal Rate: 40; mL/hr; Medication - Feeding Tube Flush Frequency: At least 15-30 mL water before and after medication administration and with tube clogging; Amount to Send (Nutri...  Calorie Counts  Snacks/Supplements Adult: Magic Cup; With Meals  Combination Diet Pureed Diet (level 4); Liquidized/Moderately Thick (level 3) (by tsp); No Straws    DVT Prophylaxis: Pneumatic Compression Devices  Bob Catheter: Not present  Central Lines: None  Cardiac Monitoring: ACTIVE order. Indication: Tachyarrhythmias, acute (48 hours)  Code Status: No CPR- Pre-arrest intubation OK      Disposition Plan   Expected Discharge: 04/15/2022   ---in the next 1-2 days. controlled HR, wean off oxygen and stable oral intake to progress at TCU.   Anticipated discharge location:  Awaiting care coordination huddle  Delays:           The patient's care was discussed with the Bedside Nurse and Patient.    Patient's daughter, Bennie, updated.     María Elena Velasquez MD  Hospitalist Service  [facility]  Securely message with the Vocera Web Console (learn more here)  Text page via [employer] Paging/Directory         Clinically Significant Risk Factors Present on Admission                    ______________________________________________________________________    Interval History   Patient more alert today and working with therapy. Denies chest pain or feeling short of breath. She is afebrile.   Appears as she has slept better last night per discussion with nursing staff.     Data reviewed today: I reviewed all medications, new labs and imaging results over the last 24 hours. I personally reviewed no images or EKG's today.    Physical Exam   Vital Signs: Temp: 98.5  F (36.9  C) Temp src: Oral BP: 138/81 Pulse: 97   Resp: 17 SpO2: 92 % O2 Device: Nasal cannula Oxygen  Delivery: 2 LPM  Weight: 83 lbs 8.87 oz  General Appearance: Alert, awake and no apparent distress  Respiratory: decreased breath sounds at the bases, clear to ausculation bilaterally, no wheezing  Cardiovascular: irregularly irregular  GI: soft and non-tender  Ext: trace to 1+LE edema bilaterally  Other: Left hemiplegia, right gaze preference    Data   Recent Labs   Lab 04/14/22  0151 04/13/22  0735 04/13/22  0359 04/12/22  1447 04/11/22  2302 04/11/22  1602 04/11/22  1224 04/11/22  0932 04/10/22  0839 04/10/22  0702 04/09/22  0747 04/09/22  0647   WBC  --   --   --   --   --   --   --  10.8  --  14.0*  --  9.7   HGB  --   --   --   --   --   --   --  13.4  --  14.4  --  13.6   MCV  --   --   --   --   --   --   --  97  --  98  --  101*   PLT  --   --   --   --   --   --   --  162  --  156  --  153   NA  --  142  --   --   --   --   --  142  --  143  --  145*   POTASSIUM  --  3.7  --  3.8  --  4.2  --  3.2*  --  3.5   < > 2.7*   CHLORIDE  --  109  --   --   --   --   --  116*  --  116*  --  119*   CO2  --  25  --   --   --   --   --  19*  --  19*  --  18*   BUN  --  20  --   --   --   --   --  17  --  13  --  13   CR  --  0.60  --   --   --   --   --  0.64  --  0.58  --  0.51*   ANIONGAP  --  8  --   --   --   --   --  7  --  8  --  8   CYNDEE  --  7.8*  --   --   --   --   --  7.1*  --  7.1*  --  7.1*   GLC 99 77 104*  --    < >  --    < > 123*   < > 106*   < > 113*    < > = values in this interval not displayed.     No results found for this or any previous visit (from the past 24 hour(s)).  Medications     dextrose       - MEDICATION INSTRUCTIONS -         artificial saliva  2 spray Swish & Spit 4x Daily     aspirin  81 mg Oral Daily    Or     aspirin  300 mg Rectal Daily     cefTRIAXone  1 g Intravenous Q24H     diltiazem  90 mg Oral or Feeding Tube Q6H ARIK     famotidine  20 mg Intravenous Q24H     melatonin  2 mg Oral At Bedtime     sodium chloride (PF)  3 mL Intracatheter Q8H

## 2022-04-15 NOTE — PLAN OF CARE
Reason for Admission: R MCA    Cognitive/Mentation: A/Ox 4   Neuros/CMS: left side hemiplegia, left droop, slow speech  VS: stable.  GI: BS present, passing flatus. incontinent.  : purwick incontinent.  Pulmonary: LS diminished.  Pain: denies pain upon assessment.     Drains/Lines: PIV patent and intact  Skin: scattered bruising  Activity: Assist x two with lift.  Diet: pureed diet with thickened liquids. Takes pills crushed in ngtube.     Therapies recs: pending  Discharge: pending    Aggression Stoplight Tool: green    End of shift summary: tube feed orders changed, to be turned on at night.

## 2022-04-15 NOTE — PLAN OF CARE
Pt here with R MCA CVA and acute SAH. A&O x4. Neuros with slow/slurred speech, L facial droop, L sided hemiplegia, and L sided decreased sensation. VSS on 2L O2. Tele Afib RVR- tachy at times, not sustaining. Pureed diet, mod thickened liquids. NG tube with TF running at 40ml/hr and Q4hr 60ml flushes. Takes pills crushed in NG. Tylenol x 1 given for elevated temp. Incontinent of B&B. Purewick in place with adequate output. Up with A2lift. Denies pain. Pt scoring green on the Aggression Stop Light Tool. Discharge to TCU pending.

## 2022-04-15 NOTE — PROGRESS NOTES
"CALORIE COUNT      Approximate Oral Intake for: (4/14)      Calories: 711cals  Protein: 24 gms  Included 2 magic cups    Diet: Pureed (L4), Moderately Thick liquids (L3)- limited to 4 oz/meal per SLP- however more than 4 oz being ordered     TF currently running ---> Jevity 1.5 at goal 40 mL/hr  Total Enteral Provisions: 1440 kcal (38 kcal/kg), 61 g protein (1.6 g/kg), 20 g fiber and 730 mL free water  Free Water Flush: 60 mL q 4 hours, meeting 100% of estimated needs except fluid        Estimated Needs:    Dosing Weight 37.9 kg  Estimated Energy Needs: 3511-7600 kcals (35-40 Kcal/Kg)  Justification: underweight  Estimated Protein Needs: 55-75 grams protein (1.5-2 g pro/Kg)  Justification: Repletion        Summary:   Chart reviewed  4-5 loose stools/day   Pt consistently meeting 50% of estimated needs with p.o intake. She reports she is not fond of the puree textures, appetite is \"not that great\" but eating more per family. Pt does not like thick liquids and has not been drinking them.  Discussed reduction in TF and change to noc cycle d/t consistent but inadequate intake and to promote increased appetite. Discussed intake goals including fluids to discharge FT.  Pt declined increasing Magic cup to breakfast but does like and take them.    Will change TF to jevity 1.5 55 ml/hr x 10 hr noc + 120 flush q 4 hr= 550 ml, 825 kcal, 35 g protein, 11 g fiber, 119 g cho, 418 ml free  H20 + flush = 1138ml h20, meeting 60% of estimated nutrition needs  Ordered new weight - last 4/6  Recommend culturelle for abx associated diarrhea  Continue Calorie counts another 3 days    Pt continues to require supplemental enteral nutrition to meet nutrition needs.       Joann Almaraz, RDN, LD      "

## 2022-04-15 NOTE — PROGRESS NOTES
[facility]    Medicine Progress Note - Hospitalist Service    Date of Admission:  4/6/2022    Assessment & Plan          Mia Rodriguez is a 88 year old female who [facility] acute neuro changes and associated Fall.  She was felt to have an acute CVA and urgently taken to radiology for embolectomy.     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    - Systolic goal -160 range.      - PT/OT/Speech/SW/CC consults  - Continue close neuro monitoring  - ASA 81mg daily for now   - Repeat NCCT in 2 weeks, if stable then would restart Eliquis 2.5mg BID + stop ASA at that time   - Follow-up with [facility] in 6-8 weeks   - Stroke neuro following peripherally, please page for questions  - melatonin 2mg at bedtime scheduled to promote sleep  - monitor for delirium    Severe dysphagia post CVA  Underweight (wt 37.9Kg - BMI 16.8)  - place NG 4/9  - patient stated to MD Lemus and daughter Bennie on 4/9 that she would never want a permanent feeding tube.  - Video swallow completed on 4/12  - appreciate SLP- diet advanced to pureed diet level per slp   - TF switched to night time only per dietitian on 4/15, wean as tolerated  - calorie count  - continue tube feeds and wean as tolerated    Probable aspiration pneumonia   Bilateral pleural effusions  On 4/10, New fever, ? Right sided crackles, abdominal xr from 4/9 with clear lung bases. Suspect aspiration pneumonia due to significant oropharyngeal dysphagia.    * CXR-Mild cardiac enlargement. Small bilateral pleural effusions with bibasilar infiltrate/atelectasis greater on the right. Underlying emphysematous change.   * CXR on 4/12-Similar small right effusion and associated compressive atelectasis and or infiltrate, question worsening left-sided pleural effusion and associated atelectasis and/or  infiltrate.   *CT chest PE due to increased rr given her immobility following cva- negative for PE, mod b/l pleural effusion and associated compressive atelectasis, mod to severe emphysema  - completed 5 days of Ceftriaxone on 4/14. Discontinued.   - lasix 10mg IV x 1 on 4/12 and 4/13. Lasix 20mg IV x 1 on 4/14, will give her additional Lasix 20mg IV x 1 today with KCl. Her peripheral LE edema is improving  - patient currently placed on O2  2Lpm since evening of 4/11, currently off oxygen on 4/15 afternoon.   - will monitor    Abnormal UA:  - UA slightly abnormal, doubt UTI.  Urine culture was no growth     Fall:  -  No overt majory injury. Pelvis XR w/o acute findings 4/6     Atrial fibrillation with RVR: improved rate control  - HR in 80s-100  - stopped  dilt drip on 4/12  - continue diltiazem 90mg q6hrs  - Hold Eliquis as above  - HR has been stable in last 24-48 hrs, discontinue telemetry     Hypothyroidism:  - Hold PO levothyroxine while NPO     CKD, history of renal stones:  -  Monitor     COPD, smoking history:  -  No overt exacerbation.  Albuterol PRN.  Monitor.     Reflux:  -  Pepcid     Osteoporosis:  -  Monitor, baseline on prolia     HyperNa, resolved  - likely iatrogenic from IVF and from absent PO     Hypoglycemia  - continue tube feeds     Hypokalemia and hypophosphetmia  - replace per protocol    Difficulty sleeping  - related to neurochecks     Goals of care  * discussion ongoing but patient states no permanent feeding tube. She is willing to trial NG for a period to see how much recovery of swallow function she will have have.   - she states a clear preference for continued restorative plan of care as of 4/9 but she will let us know if quality of life becomes intolerable.   - provider on 4/10 discussd with pt/family and code changed to DNR/DNI. In the evening, cross cover MD spoke with son and code changed to DNR- ok with intubation. Refer to progress notes on 4/10 for details.  - Palliative care  consulted, had conference on 4/12-refer to palliative care note. Current goal is to continue with restorative cares see if swallowing improves and code status remains DNR- okay for intubation         Diet: Snacks/Supplements Adult: Magic Cup; With Meals  Combination Diet Pureed Diet (level 4); Liquidized/Moderately Thick (level 3) (by tsp); No Straws  Adult Formula Drip Feeding: Continuous Jevity 1.5; Nasoduodenal tube; Goal Rate: 55; mL/hr; From: 8:00 PM; 6:00 AM; Medication - Feeding Tube Flush Frequency: At least 15-30 mL water before and after medication administration and with tube cloggin...  Calorie Counts    DVT Prophylaxis: Pneumatic Compression Devices  Bob Catheter: Not present  Central Lines: None  Cardiac Monitoring: None  Code Status: No CPR- Pre-arrest intubation OK      Disposition Plan   Expected Discharge: 04/15/2022    -- anticipate discharge in the next 2-3 days, if able to tolerate adequate PO intake and TF weaned off or if TCU able to accommodate her TF and wean off while she advances diet.   --- if patient does not make progress or unable to maintain adequate PO intake, further goals of care needs to be addressed as she currently states does not want permanent feeding tube.           Anticipated discharge location:  Awaiting care coordination huddle  Delays:           The patient's care was discussed with the Bedside Nurse and Patient.    Patient's daughter, Bennie, jason.     María Elena Velasquez MD  Hospitalist Service  [facility]  Securely message with the Vocera Web Console (learn more here)  Text page via "[employer] Paging/Directory         Clinically Significant Risk Factors Present on Admission                    ______________________________________________________________________    Interval History   Patient states she is doing fine. Denies n/v or abdominal pain.   She is afebrile.   Took off oxygen in RM and sats in mid to high 90s on RA.   Denies sob,  chest pain.       Data reviewed today: I reviewed all medications, new labs and imaging results over the last 24 hours. I personally reviewed no images or EKG's today.    Physical Exam   Vital Signs: Temp: 98.7  F (37.1  C) Temp src: Axillary BP: 123/65 Pulse: 69   Resp: 18 SpO2: 94 % O2 Device: Nasal cannula Oxygen Delivery: 2 LPM  Weight: 83 lbs 8.87 oz  General Appearance: Alert, awake and no apparent distress  Respiratory: decreased breath sounds at the bases, clear to ausculation bilaterally, no wheezing  Cardiovascular: irregularly irregular  GI: soft and non-tender  Ext: trace to 1+LE edema bilaterally  Other: Left hemiplegia, right gaze preference    Data   Recent Labs   Lab 04/15/22  1139 04/15/22  0823 04/15/22  0617 04/14/22  1431 04/14/22  1320 04/14/22  0151 04/13/22  0735 04/11/22  1224 04/11/22  0932 04/10/22  0839 04/10/22  0702 04/09/22  0747 04/09/22  0647   WBC  --   --   --   --   --   --   --   --  10.8  --  14.0*  --  9.7   HGB  --   --   --   --   --   --   --   --  13.4  --  14.4  --  13.6   MCV  --   --   --   --   --   --   --   --  97  --  98  --  101*   PLT  --   --   --   --   --   --   --   --  162  --  156  --  153   NA  --   --  140  --  143  --  142  --  142  --  143  --  145*   POTASSIUM  --   --  3.9  --  3.4  --  3.7   < > 3.2*  --  3.5   < > 2.7*   CHLORIDE  --   --  109  --  111*  --  109  --  116*  --  116*  --  119*   CO2  --   --  26  --  27  --  25  --  19*  --  19*  --  18*   BUN  --   --  20  --  18  --  20  --  17  --  13  --  13   CR  --   --  0.58  --  0.62  --  0.60  --  0.64  --  0.58  --  0.51*   ANIONGAP  --   --  5  --  5  --  8  --  7  --  8  --  8   CYNDEE  --   --  8.8  --  8.6  --  7.8*  --  7.1*  --  7.1*  --  7.1*   * 100* 126*   < > 68*   < > 77   < > 123*   < > 106*   < > 113*    < > = values in this interval not displayed.     No results found for this or any previous visit (from the past 24 hour(s)).  Medications     dextrose       - MEDICATION  INSTRUCTIONS -         artificial saliva  2 spray Swish & Spit 4x Daily     aspirin  81 mg Oral Daily    Or     aspirin  300 mg Rectal Daily     cefTRIAXone  1 g Intravenous Q24H     diltiazem  90 mg Oral or Feeding Tube Q6H ARIK     famotidine  20 mg Intravenous Q24H     furosemide  20 mg Intravenous Once     melatonin  2 mg Oral At Bedtime     potassium chloride  20 mEq Oral Once     sodium chloride (PF)  3 mL Intracatheter Q8H

## 2022-04-16 NOTE — PROGRESS NOTES
[facility]    Medicine Progress Note - Hospitalist Service    Date of Admission:  4/6/2022    Assessment & Plan            Mia Rodriguez is a 88 year old female who presented to the [facility] ER with acute neuro changes and associated Fall.  She was felt to have an acute CVA and urgently taken to radiology for embolectomy.     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    - Systolic goal -160 range.      - PT/OT/Speech/SW/CC consults  - Continue close neuro monitoring  - ASA 81mg daily for now   - Repeat NCCT in 2 weeks, if stable then would restart Eliquis 2.5mg BID + stop ASA at that time   - Follow-up with [facility] in 6-8 weeks   - Stroke neuro following peripherally, please page for questions  - melatonin 2mg at bedtime scheduled to promote sleep  - monitor for delirium     Severe dysphagia post CVA  Underweight (wt 37.9Kg - BMI 16.8)  - place NG 4/9  - patient stated to MD Lemus and daughter Bennie on 4/9 that she would never want a permanent feeding tube.  - Video swallow completed on 4/12  - appreciate SLP- diet advanced to pureed diet level per slp   - TF switched to night time only per dietitian on 4/15, wean as tolerated  - calorie count  - continue tube feeds and wean as tolerated     Probable aspiration pneumonia   Bilateral pleural effusions  On 4/10, New fever, ? Right sided crackles, abdominal xr from 4/9 with clear lung bases. Suspect aspiration pneumonia due to significant oropharyngeal dysphagia.    * CXR-Mild cardiac enlargement. Small bilateral pleural effusions with bibasilar infiltrate/atelectasis greater on the right. Underlying emphysematous change.   * CXR on 4/12-Similar small right effusion and associated compressive atelectasis and or infiltrate, question worsening left-sided pleural effusion and associated atelectasis and/or  infiltrate.   *CT chest PE due to increased rr given her immobility following cva- negative for PE, mod b/l pleural effusion and associated compressive atelectasis, mod to severe emphysema  - completed 5 days of Ceftriaxone on 4/14. Discontinued.   - lasix 10mg IV x 1 on 4/12 and 4/13. Lasix 20mg IV x 1 on 4/14, will give her additional Lasix 20mg IV x 1 today with KCl. Her peripheral LE edema is improvingbut is still there and was needing oxygen this am and I did add another dose of lasix this am and was off oxygen later in the noon     - pa  Abnormal UA:  - UA slightly abnormal, doubt UTI.  Urine culture was no growth     Fall:  -  No overt majory injury. Pelvis XR w/o acute findings 4/6     Atrial fibrillation with RVR: improved rate control  - HR in 80s-100  - stopped  dilt drip on 4/12  - continue diltiazem 90mg q6hrs  - Hold Eliquis as above  - HR has been stable in last 24-48 hrs, discontinue telemetry     Hypothyroidism:  - Hold PO levothyroxine while NPO     CKD, history of renal stones:  -  Monitor     COPD, smoking history:  -  No overt exacerbation.  Albuterol PRN.  Monitor.     Reflux:  -  Pepcid     Osteoporosis:  -  Monitor, baseline on prolia     HyperNa, resolved  - likely iatrogenic from IVF and from absent PO     Hypoglycemia  - continue tube feeds     Hypokalemia and hypophosphetmia  - replace per protocol     Difficulty sleeping  - related to neurochecks     Goals of care  * discussion ongoing but patient states no permanent feeding tube. She is willing to trial NG for a period to see how much recovery of swallow function she will have have.   - she states a clear preference for continued restorative plan of care as of 4/9 but she will let us know if quality of life becomes intolerable.   - provider on 4/10 discussd with pt/family and code changed to DNR/DNI. In the evening, cross cover MD spoke with son and code changed to DNR- ok with intubation. Refer to progress notes on 4/10 for details.  -  Palliative care consulted, had conference on 4/12-refer to palliative care note. Current goal is to continue with restorative cares see if swallowing improves and code status remains DNR- okay for intubation             Diet: Snacks/Supplements Adult: Magic Cup; With Meals  Adult Formula Drip Feeding: Continuous Jevity 1.5; Nasoduodenal tube; Goal Rate: 55; mL/hr; From: 8:00 PM; 6:00 AM; Medication - Feeding Tube Flush Frequency: At least 15-30 mL water before and after medication administration and with tube cloggin...  Calorie Counts  Combination Diet Pureed Diet (level 4); Liquidized/Moderately Thick (level 3) (by tsp); No Straws    DVT Prophylaxis: Pneumatic Compression Devices  Bob Catheter: Not present  Central Lines: None  Cardiac Monitoring: None  Code Status: No CPR- Pre-arrest intubation OK      Disposition Plan   Expected Discharge: 04/18/2022     Anticipated discharge location:  Awaiting care coordination huddle  Delays:           The patient's care was discussed with the Bedside Nurse, Patient and Patient's Family . I called the daughter vidal at 430 pm and she was given update and plan of care was discussed     Jovita Franco MD  Hospitalist Service  [facility]  Securely message with the [employer] Web Console (learn more here)  Text page via [employer] Paging/Directory         Clinically Significant Risk Factors Present on Admission                    ______________________________________________________________________    Interval History     The patient this morning and she was laying in the bed and had NG tube and nasal cannula and patient mentioned to me that she has decreased appetite and it is difficult to eat.  She was able to tell me the name of the place and did tell me that she has 3 kids.    I did discuss the plan of care with the patient's nurse    Data reviewed today: I reviewed all medications, new labs and imaging results over the last 24 hours. I personally reviewed  no images or EKG's today.    Physical Exam   Vital Signs: Temp: 97.8  F (36.6  C) Temp src: Oral BP: (!) 144/74 Pulse: 90   Resp: 18 SpO2: 100 % O2 Device: Nasal cannula Oxygen Delivery: 1/2 LPM  Weight: 95 lbs 14.4 oz        General: Patient appears comfortable and in no acute distress.  NG tube  HEENT: Head is atraumatic, normocephalic.    Neck: Neck is supple  Respiratory: Lungs are clear to auscultation bilaterally with no wheeze or crackles, nasal cannula  Cardiovascular: Regular rate , S1 and S2 normal with no murmer or rubs or gallops, edema over the legs  Abdomen:   soft , non tender , non distended and bowel sound present   Skin: No skin rashes or lesions to inspection or palpation.  Neurologic: Left hemiplegia  Musculoskeletal: Normal Range of motion over upper and lower on the right and she did not move her extremities on the left  Psychiatric: cooperative     Data   Recent Labs   Lab 04/16/22  1609 04/16/22  1133 04/16/22  0830 04/16/22  0807 04/15/22  0823 04/15/22  0617 04/14/22  1431 04/14/22  1320 04/11/22  1224 04/11/22  0932 04/10/22  0839 04/10/22  0702   WBC  --   --   --   --   --   --   --   --   --  10.8  --  14.0*   HGB  --   --   --   --   --   --   --   --   --  13.4  --  14.4   MCV  --   --   --   --   --   --   --   --   --  97  --  98   PLT  --   --   --   --   --   --   --   --   --  162  --  156   NA  --   --   --  139  --  140  --  143   < > 142  --  143   POTASSIUM  --   --   --  3.8  --  3.9  --  3.4   < > 3.2*  --  3.5   CHLORIDE  --   --   --  106  --  109  --  111*   < > 116*  --  116*   CO2  --   --   --  29  --  26  --  27   < > 19*  --  19*   BUN  --   --   --  21  --  20  --  18   < > 17  --  13   CR  --   --   --  0.63  --  0.58  --  0.62   < > 0.64  --  0.58   ANIONGAP  --   --   --  4  --  5  --  5   < > 7  --  8   CYNDEE  --   --   --  9.3  --  8.8  --  8.6   < > 7.1*  --  7.1*   * 111* 94 97   < > 126*   < > 68*   < > 123*   < > 106*    < > = values in this interval  not displayed.     No results found for this or any previous visit (from the past 24 hour(s)).  Medications     dextrose       - MEDICATION INSTRUCTIONS -         artificial saliva  2 spray Swish & Spit 4x Daily     aspirin  81 mg Oral Daily    Or     aspirin  300 mg Rectal Daily     diltiazem  90 mg Oral or Feeding Tube Q6H ARIK     lactobacillus rhamnosus (GG)  1 capsule Oral or Feeding Tube BID     melatonin  2 mg Oral At Bedtime     sodium chloride (PF)  3 mL Intracatheter Q8H

## 2022-04-16 NOTE — PLAN OF CARE
Reason for Admission: R MCA     Cognitive/Mentation: A/Ox 4   Neuros/CMS: left side hemiplegia, left droop, slow speech  VS: stable.  GI: BS present, passing flatus. incontinent.  : purwick incontinent.  Pulmonary: LS diminished.  Pain: denies pain upon assessment.      Drains/Lines: PIV patent and intact  Skin: scattered bruising meplex place on coccyx repositioning performed Q 2 hours  Activity: Assist x two with lift.  Diet: pureed diet with moderatelty thickened liquids. Takes pills crushed in ngtube.      Therapies recs: pending  Discharge: pending     Aggression Stoplight Tool: green     End of shift summary:    Pulsate mattress applied by writer 4/15 pt reports increased comfort.

## 2022-04-16 NOTE — PLAN OF CARE
Goal Outcome Evaluation:    Plan of Care Reviewed With: patient     Overall Patient Progress: improving      Reason for Admission: CVA  RN from 7P to 7:30A    Cognitive/Mentation: A/Ox 4  Neuros/CMS: Intact ex L hemiparesis, sensation intact, L sided facial droop, dysphagia  VS: VSS, slight htn, not meeting criteria for PRN.   Tele: Afib CVR vs RVR, RVR not sustained.  GI: BS +, + flatus, last BM 4/16. Incontinent.  : WNL and using purewick. Inontinent.  Pulmonary: LS coarse, improved with coughing.  Pain: denies.     Drains/Lines: G tube in nare, bridled, unchanged, TF infusing from 8P to 6A  Skin: scattered bruising, pressure injury on coccyx   Activity: Assist x 1-2 with lift.  Diet: Pureed with mod thick liquids. Takes pills crushed though the G tube.     Therapies recs: TCU  Discharge: pending improvement    Aggression Stoplight Tool: green    End of shift summary: pt stable throughout the shift

## 2022-04-16 NOTE — PLAN OF CARE
Care plan note:      Recent Vitals:  Temp: 98.1  F (36.7  C) Temp src: Oral BP: 138/79 Pulse: 98   Resp: 18 SpO2: 95 % O2 Device: Nasal cannula      Orientation/Neuro: Alert and Oriented x 4  Pain: The patient is not having any pain.   Tele: Atrial fibrillation - controlled   IV medications: None   Mobility: Assist of 2 and Total w/ lift   Skin: Wounds: coccyx.   GI: WDL and last BM 04/45  : Using Purewick     Diet: Tolerating diet:  Poor, Few bites only.  Orders Placed This Encounter      Combination Diet Pureed Diet (level 4); Liquidized/Moderately Thick (level 3) (by tsp); No Straws      Safety/Concerns:  Fall Risk and Garrison Risk  Aggression Color: Green    Plan: Noted coughing while on current diet. Pt was only able to take a few bites.  Blood glucose on  low side, TF to start at 2000   Continue to monitor.      Telly Cabrales RN     Goal Outcome Evaluation: No change.

## 2022-04-17 NOTE — PLAN OF CARE
Reason for Admission: R MCA CVA    Cognitive/Mentation: A/Ox 4  Neuros/CMS: Intact ex left hemiplegia, left facial droop, and slow quiet speech  VS: Stable. Tele: Afib CVR.  GI: BS +, + flatus, last BM today. Incontinent.  : Purewick in place.  Pulmonary: LS diminished, on 2 L O2 via NC.  Pain: Denies.     Drains: None  Skin: Scattered bruising, mepilex to coccyx  Activity: Assist x 2 with lift.  Diet: Pureed with moderately thickened liquids. Takes pills crushed through NG tube. Assist to feed.    Aggression Stoplight Score: Green  Therapies recs: Pending  Discharge: Pending    End of shift summary: Tube feeding started at 8 pm tonight, will run until 6 am. Patient repositioned every 2 hours.

## 2022-04-17 NOTE — PLAN OF CARE
Care plan note:      Recent Vitals:  Temp: 98.1  F (36.7  C) Temp src: Axillary BP: 118/60 Pulse: 84   Resp: 20 SpO2: 92 % O2 Device: None (Room air)      Orientation/Neuro: Alert and Oriented x 4  Pain: The patient is not having any pain.   Tele: NA.   IV medications: None   Mobility: Assist of 2 and Total w/ lift   Skin: Wounds: coccyx.   GI: WDL  : WDL     Diet: Tolerating diet:   pt sleeping this  evening, will attempt  feeding dinner once she is awake. Per sons request.  Nocturnal tube feeding will start at 2000.  Pt aspirated after a few bites of pureed, feeding stopped.   Orders Placed This Encounter      Combination Diet Pureed Diet (level 4); Liquidized/Moderately Thick (level 3) (by tsp); No Straws      Safety/Concerns:  Fall Risk and Garrison Risk  Aggression Color: Green    Plan: No neuro changes, pt denies any complaint. Awaiting placement.    Continue to monitor.      Telly Cabrales RN     Goal Outcome Evaluation: No change.

## 2022-04-17 NOTE — PROGRESS NOTES
Reason for Admission: R CVA  RN from 11P to 7:30A    Cognitive/Mentation: A/Ox 4  Neuros/CMS: Intact ex L hemiparesis, sensation intact, L sided facial droop, dysphagia  VS: VSS, slight htn, not meeting criteria for PRN.   Tele: Afib CVR vs RVR, RVR not sustained.  GI: BS +, + flatus, last BM 4/16. Incontinent.  : WNL and using purewick. Inontinent.  Pulmonary: LS dim.  Pain: denies.     Drains/Lines: G tube in nare, bridled, unchanged, TF infusing from 8P to 6A  Skin: scattered bruising, pressure injury on coccyx   Activity: Assist x 1-2 with lift.  Diet: Pureed with mod thick liquids. Takes pills crushed though the G tube.     Therapies recs: TCU  Discharge: pending improvement    Aggression Stoplight Tool: green    End of shift summary: pt stable throughout the shift

## 2022-04-17 NOTE — PROGRESS NOTES
CALORIE COUNT      Approximate Oral Intake for:   4/16   Calories:  900  Protein:  43    Nocturnal cycle TF Jevity 1.5 @ 55 ml/hr x 10 hr (8 pm - 6 am) = 825 kcal, 35 g protein, 11 g fiber, 119 g cho, 418 ml free     Total (Oral + noct TF):  1725 kcals (46 kcal/kg and 115% energy needs), 78 gm pro (2.1 gm/kg and 104% pro needs)    Estimated Needs:    Dosing Weight 37.9 kg  Estimated Energy Needs: 8872-6849 kcals (35-40 Kcal/Kg)  Justification: underweight  Estimated Protein Needs: 55-75 grams protein (1.5-2 g pro/Kg)  Justification: Repletion    Summary:   Yesterday oral intake improved and met ~ 3/4 estimated needs..  Note weight 4/15 = 43.5 kg (up 6 kg from admit). Pt with mild edema in extremities.  BM x2 yesterday (Culturelle started for bowel health).  Continue calorie count.    Esme Alfaro, RD, LD, CNSC

## 2022-04-17 NOTE — PROGRESS NOTES
[facility]    Medicine Progress Note - Hospitalist Service    Date of Admission:  4/6/2022    Assessment & Plan            Mia Rodriguez is a 88 year old female who presented to the [facility] ER with acute neuro changes and associated Fall.  She was felt to have an acute CVA and urgently taken to radiology for embolectomy.     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    - Systolic goal -160 range.      - PT/OT/Speech/SW/CC consults  - Continue close neuro monitoring  - ASA 81mg daily for now   - Repeat NCCT in 2 weeks, if stable then would restart Eliquis 2.5mg BID + stop ASA at that time   - Follow-up with [facility] in 6-8 weeks   - Stroke neuro following peripherally, please page for questions  - melatonin 2mg at bedtime scheduled to promote sleep  - monitor for delirium  -I spoke to the nurse and we will have physical therapy and Occupational Therapy assess her again and see how she does  - daughter thinks she Is sleep and I did go over her labs and she has no fever, no wbc count and we will monitor   - we will have opinion PT/OT tomorrow and make a decision about disposition     Severe dysphagia post CVA  Underweight (wt 37.9Kg - BMI 16.8)  - place NG 4/9  - patient stated to MD Lemus and daughter Bennie on 4/9 that she would never want a permanent feeding tube.  - Video swallow completed on 4/12  - appreciate SLP- diet advanced to pureed diet level per slp   - TF switched to night time only per dietitian on 4/15, wean as tolerated  - calorie count  - continue tube feeds and wean as tolerated with the nursing staff and her oral intake is still less     Probable aspiration pneumonia   Bilateral pleural effusions  Hypoxia  On 4/10, New fever, ? Right sided crackles, abdominal xr from 4/9 with clear lung bases. Suspect aspiration pneumonia due to  significant oropharyngeal dysphagia.    * CXR-Mild cardiac enlargement. Small bilateral pleural effusions with bibasilar infiltrate/atelectasis greater on the right. Underlying emphysematous change.   * CXR on 4/12-Similar small right effusion and associated compressive atelectasis and or infiltrate, question worsening left-sided pleural effusion and associated atelectasis and/or infiltrate.   *CT chest PE due to increased rr given her immobility following cva- negative for PE, mod b/l pleural effusion and associated compressive atelectasis, mod to severe emphysema  - completed 5 days of Ceftriaxone on 4/14. Discontinued.   - lasix 10mg IV x 1 on 4/12 and 4/13. Lasix 20mg IV x 1 on 4/14, 4/15 and 4/16  -On my exam patient is on room air and her creatinine is stable and her edema is little better and we will continue to monitor    Abnormal UA:  - UA slightly abnormal, doubt UTI.  Urine culture was no growth     Fall:  -  No overt majory injury. Pelvis XR w/o acute findings 4/6     Atrial fibrillation with RVR: improved rate control  - HR in 80s-100  - stopped  dilt drip on 4/12  - continue diltiazem 90mg q6hrs  - Hold Eliquis as above  - HR has been stable in last 24-48 hrs in the telemetry was discontinued     Hypothyroidism:  - Hold PO levothyroxine while NPO     CKD, history of renal stones:  -  Monitor     COPD, smoking history:  -  No overt exacerbation.  Albuterol PRN.  Monitor.     Reflux:  -  Pepcid     Osteoporosis:  -  Monitor, baseline on prolia     HyperNa, resolved  - likely iatrogenic from IVF and from absent PO     Hypoglycemia  - continue tube feeds     Hypokalemia and hypophosphetmia  - replace per protocol     Difficulty sleeping  - related to neurochecks     Goals of care  * discussion ongoing but patient states no permanent feeding tube. She is willing to trial NG for a period to see how much recovery of swallow function she will have have.   - she states a clear preference for continued  restorative plan of care as of 4/9 but she will let us know if quality of life becomes intolerable.   - provider on 4/10 discussd with pt/family and code changed to DNR/DNI. In the evening, cross cover MD spoke with son and code changed to DNR- ok with intubation. Refer to progress notes on 4/10 for details.  - Palliative care consulted, had conference on 4/12-refer to palliative care note. Current goal is to continue with restorative cares see if swallowing improves and code status remains DNR- okay for intubation             Diet: Snacks/Supplements Adult: Magic Cup; With Meals  Adult Formula Drip Feeding: Continuous Jevity 1.5; Nasoduodenal tube; Goal Rate: 55; mL/hr; From: 8:00 PM; 6:00 AM; Medication - Feeding Tube Flush Frequency: At least 15-30 mL water before and after medication administration and with tube cloggin...  Calorie Counts  Combination Diet Pureed Diet (level 4); Liquidized/Moderately Thick (level 3) (by tsp); No Straws    DVT Prophylaxis: Pneumatic Compression Devices  Bob Catheter: Not present  Central Lines: None  Cardiac Monitoring: None  Code Status: No CPR- Pre-arrest intubation OK      Disposition Plan   Expected Discharge: 04/18/2022     Anticipated discharge location:  Awaiting care coordination huddle  Delays:           The patient's care was discussed with the Bedside Nurse, Patient and Patient's Family . I called the daughter vidal at 411 pm and she was given update and plan of care was discussed     Jovita Franco MD  Hospitalist Service  [facility]  Securely message with the [employer] Web Console (learn more here)  Text page via [employer] Paging/Directory     I am off service from tomorrow morning and her care will be taken over by hospital medicine team    Clinically Significant Risk Factors Present on Admission                    ______________________________________________________________________    Interval History     I saw the patient with the patient's  nurse and the patient was laying in the bed and is alert and oriented x3 and she feels that she is pretty weak and her appetite is less.  She was on room air when I saw her and denies any shortness of breath or chest pain    Data reviewed today: I reviewed all medications, new labs and imaging results over the last 24 hours. I personally reviewed no images or EKG's today.    Physical Exam   Vital Signs: Temp: 98.1  F (36.7  C) Temp src: Axillary BP: 137/69 Pulse: 97   Resp: 16 SpO2: 97 % O2 Device: None (Room air) Oxygen Delivery: 2 LPM  Weight: 95 lbs 14.4 oz        General: Patient appears comfortable and in no acute distress.  NG tube  HEENT: Head is atraumatic, normocephalic.    Neck: Neck is supple  Respiratory: Lungs are clear to auscultation bilaterally with no wheeze or crackles, nasal cannula  Cardiovascular: Regular rate , S1 and S2 normal with no murmer or rubs or gallops, edema over the legs  Abdomen:   soft , non tender , non distended and bowel sound present   Skin: No skin rashes or lesions to inspection or palpation.  Neurologic: Left hemiplegia  Musculoskeletal: Normal Range of motion over upper and lower on the right and she did not move her extremities on the left  Psychiatric: cooperative     Data   Recent Labs   Lab 04/17/22  1207 04/17/22  0857 04/17/22  0856 04/17/22  0528 04/16/22  0830 04/16/22  0807 04/15/22  0823 04/15/22  0617 04/14/22  1431 04/14/22  1320 04/11/22  1224 04/11/22  0932   WBC  --  10.3  --   --   --   --   --   --   --   --   --  10.8   HGB  --  13.1  --   --   --   --   --   --   --   --   --  13.4   MCV  --  99  --   --   --   --   --   --   --   --   --  97   PLT  --  184  --   --   --   --   --   --   --   --   --  162   NA  --   --   --   --   --  139  --  140  --  143   < > 142   POTASSIUM  --  3.5  --   --   --  3.8  --  3.9  --  3.4   < > 3.2*   CHLORIDE  --   --   --   --   --  106  --  109  --  111*   < > 116*   CO2  --   --   --   --   --  29  --  26  --  27   <  > 19*   BUN  --   --   --   --   --  21  --  20  --  18   < > 17   CR  --   --   --   --   --  0.63  --  0.58  --  0.62   < > 0.64   ANIONGAP  --   --   --   --   --  4  --  5  --  5   < > 7   CYNDEE  --   --   --   --   --  9.3  --  8.8  --  8.6   < > 7.1*   GLC 86  --  87 111*   < > 97   < > 126*   < > 68*   < > 123*    < > = values in this interval not displayed.     No results found for this or any previous visit (from the past 24 hour(s)).  Medications     dextrose       - MEDICATION INSTRUCTIONS -         artificial saliva  2 spray Swish & Spit 4x Daily     [START ON 4/18/2022] aspirin  81 mg Oral or Feeding Tube Daily    Or     [START ON 4/18/2022] aspirin  300 mg Rectal Daily     diltiazem  90 mg Oral or Feeding Tube Q6H ARIK     lactobacillus rhamnosus (GG)  1 capsule Oral or Feeding Tube BID     melatonin  2 mg Oral At Bedtime     sodium chloride (PF)  3 mL Intracatheter Q8H

## 2022-04-17 NOTE — PLAN OF CARE
Reason for Admission: R MCA CVA     Cognitive/Mentation: A/Ox 4  Neuros/CMS: Intact ex left hemiplegia, left facial droop, and slow quiet speech  VS: Stable.  GI: BS +, + flatus, last BM today. Incontinent.  : Purewick in place.  Pulmonary: LS diminished.  Pain: Denies.      Drains: None  Skin: Scattered bruising, mepilex to coccyx for pressure wound  Activity: Assist x 2 with lift.  Diet: Pureed with moderately thickened liquids. Takes pills crushed through NG tube. Assist to feed.     Aggression Stoplight Score: Green  Therapies recs: Pending  Discharge: Pending     End of shift summary: Patient resting intermittently throughout the day, consistently requesting melatonin during the day to sleep, educated patient on taking at bedtime and not during the day. Repositioned to comfort every 2 hours, did decline repositioning once.

## 2022-04-18 NOTE — PROGRESS NOTES
CALORIE COUNT      Approximate Oral Intake for: (4/17)      Calories: 557 cals  Protein: 26 gm pro    Nocturnal cycle TF Jevity 1.5 @ 55 ml/hr x 10 hr (8 pm - 6 am) = 825 kcal, 35 g protein, 11 g fiber, 119 g cho, 418 ml free        Total (Oral + noct TF):  1382 kcals (36 kcal/kg and 100% energy needs), 61 gm pro (1.6 gm/kg and 100% pro needs)        Estimated Needs:    Dosing Weight 37.9 kg  Estimated Energy Needs: 3481-3548 kcals (35-40 Kcal/Kg)  Justification: underweight  Estimated Protein Needs: 55-75 grams protein (1.5-2 g pro/Kg)  Justification: Repletion      Summary:   Question accuracy of today's wt (up 21# past 3 days)  Net I/O: -868 mL    Vitals:    04/06/22 0826 04/06/22 1215 04/15/22 1549 04/18/22 0049   Weight: 37.5 kg (82 lb 10.8 oz) 37.9 kg (83 lb 8.9 oz) 43.5 kg (95 lb 14.4 oz) 52.8 kg (116 lb 6.5 oz)       Diet: Pureed, Moderately thick liquids           Room Service with Assist           Chocolate magic cup with meals    Pt noted to have aspiration episode after starting dinner last night - stopped eating after a few bites  States that she prefers vanilla magic cup vs chocolate  Will continue calorie count today  Nocturnal TF as above    Martha Raymond RD, LD  Clinical Dietitian - [facility]   Pager - [phone]

## 2022-04-18 NOTE — PLAN OF CARE
SLP Swallow Tx Update - RN contacted SLP this am given nursing aide report that pt was coughing overtly with po despite careful use of precautions and pt on safest possible diet.  Report of coughing with po also noted by RN 4/17 pm.  Pt has demonstrated borderline safety for po intake and risk for inability to maintain nutrition/hydration on po intake alone.  Pt has also stated wishes for no long term tube feeding. Recommend holding po intake until further MD/pt/family discussion regarding POC wishes going forward.  RN in agreement.  MD updated regarding above.  [Least hazardous diet remains IDDSI Level 4 and moderately thick liquids by tsp]    ADDENDUM: Pt tolerated puree x 8 trials with mod-max cues/SLP assist without overt coughing directly after swallows this pm during swallow Tx.  Slight cough on saliva/secretions noted in conversation.  Decreased coughing observed this pm with limited po trials; however, it appears pt will continue to have variable tolerance of po intake with intermittent signs of aspiration despite po modification and use of precautions due to level of fatigue and variable swallow timing.  Recommend NPO except 1-3 tsps puree for comfort with precautions (see order) until POC defined further with pt/family/MD discussion.

## 2022-04-18 NOTE — PLAN OF CARE
Goal Outcome Evaluation:   A&O. Left hemiplegia and left droop. VSS. NA was feeding patient breakfast and pt was coughing w/ food intake, made NPO and resumed tube feeding @ lower rate @ 45 ml/hr till 6 a.m and SLP will reevaluate tomorrow. Meds through tube feed. Coccyx ulcer dressing changed this AM, repositioned Q2H on sides. External cath patent. Denies pain. Pt scoring green on the Aggression Stop Light Tool.

## 2022-04-18 NOTE — PROVIDER NOTIFICATION
Chief Complaint   Patient presents with   • Colonoscopy     recall letter       Subjective    Chio Mary is a 64 y.o. female. she is here today.    History of Present Illness  64-year-old female presents to discuss screening colonoscopy.  History of chronic lymphatic leukemia followed by Dr. Jovanny Turner in [facility].  States her colonoscopy was due last year however she had to have  chemotherapy was unable to complete colonoscopy at that time.  States she was set up to get 8 doses of chemotherapy only had due 2 and is currently in remission.  Her last colonoscopy 2/13/15 noted adenomatous colonic polyp of the sigmoid colon.  Repeat was recommended in 3 years for surveillance.  She denies any abdominal pain, nausea, vomiting or changes in her bowel habits.  She denies any family history of colorectal cancer.  Plan; we'll schedule patient for screening colonoscopy due to history of adenomatous colonic polyp in the sigmoid colon.     The following portions of the patient's history were reviewed and updated as appropriate:   Past Medical History:   Diagnosis Date   • Abnormal liver function     improved   • Blood in feces    • Chronic lymphatic leukemia     Chronic lymphoid leukemia, disease - CLL, Dr. Mccullough was following. Care transferred to Dr. Michaels 11/2014. Care transferred to Dr. Soriano, 11/2015      • Degenerative joint disease involving multiple joints     left foot pain aggravated by daily walking for exercise   • Depressive disorder     anxiety. on fluoxetine   • Epigastric pain    • Essential hypertension     severe. improved with weight loss   • Gastritis    • Generalized anxiety disorder     on xanax at bedtime. on fluoxetine. stopped buspar 2/2016   • H/O bone density study 11/11/2014    x2; 11/11/14 - Persistent osteopenia. Lumbar improved 11%.; 06/2010 - revealed osteopenia   • History of colon polyps     x2 3/2010   • Hypokalemia     on oral potassium and spironolactone   •  MD Notification    Notified Person: MD    Notified Person Name: Dr. Benton    Notification Date/Time: 4/17/21 2010    Notification Interaction: Amcom page     Purpose of Notification: Pt seems to have aspirated while attempting to eat dinner. O2 sats >94%. Do you want a chest xray or anything?    Orders Received: 2 view chest xray ordered   Hypomagnesemia     started magox 7/2016   • Iron deficiency anemia secondary to blood loss (chronic)     much improved after iron infusions   • Migraine     h/o   • Obesity    • Osteopenia     next DEXA due 11/2016   • Primary malignant neoplasm of kidney     renal cell carcinoma s/p R nephrectomy 4/2003   • Primary malignant neoplasm of right kidney - R nephrectomy 2003     renal cell carcinoma s/p R nephrectomy 4/2003   • Prolapsed thoracic intervertebral disc     Prolapsed intervertebral disc without myelopathy - T8, T9      • Pure hypercholesterolemia     changed zocor and zetia to lipitor 11/2014   • Sleep disorder     on xanax   • Tobacco use     tobacco use and exposure - quit 2002   • Tubular adenoma     h/o 2010   • Type 2 diabetes mellitus     on low-dose metformin. higher dose metformin produced diarrhea   • Vitamin D deficiency     on oral vit D     Past Surgical History:   Procedure Laterality Date   • BACK SURGERY      low back disk surgery   • CHOLECYSTECTOMY     • COLONOSCOPY  03/22/2010    Two polyps were found in the cecum and ascending colon. Internal and external hemorrhoids were found.    • COLONOSCOPY W/ POLYPECTOMY  02/13/2015    1 polyp in sigmoid colon; removed by snare cautery polypectomy. Internal & external hemorrhoids found.   • ENDOSCOPY W/ PEG TUBE PLACEMENT  02/13/2015    Normal esophagus. Gastritis in stomach. Biopsy taken. Normal duodenum. Biopsy taken.   • HYSTERECTOMY     • INGUINAL HERNIA REPAIR LAPAROSCOPIC AND UMBILICAL HERNIA REPAIR Right 01/2004    R inguinal, umbilical hernia repair   • INJECTION OF MEDICATION  06/23/2014    depo medrol 80mg   • INJECTION OF MEDICATION  03/24/2015    IV Infusion, Med    • INJECTION OF MEDICATION  06/23/2014    rocephin   • NEPHRECTOMY Right 2003    s/p R nephrectomy 2003 Dr. Franke secondary to RCCA   • OTHER SURGICAL HISTORY  03/18/2013    Inj(s) Tend-Sheath, Ligament, Single 06182    • OTHER SURGICAL HISTORY  10/23/2013    Small Joint  Injection/Aspiration     • TOTAL HIP ARTHROPLASTY      s/p R 2009, s/p L 2009   • VARICOSE VEIN SURGERY      phleb veins - extrem (to 20) - Varicose veins, left lower extremity. Stripping and ligation fo saphenous varicose veins, left thigh and left lower lip.     Family History   Problem Relation Age of Onset   • Pancreatic cancer Mother    • Colon cancer Other      strong family h/o   • Cancer Other    • Thyroid disease Other    • Breast cancer Sister      OB History      Para Term  AB Living    2 2 2       SAB TAB Ectopic Molar Multiple Live Births                 Current Outpatient Prescriptions   Medication Sig Dispense Refill   • ALPRAZolam (XANAX) 0.5 MG tablet Take 1-2 tablets by mouth At Night As Needed for Sleep. 60 tablet 2   • aspirin 81 MG EC tablet Take 81 mg by mouth Daily.     • atorvastatin (LIPITOR) 40 MG tablet Take 1 tablet by mouth Daily. 90 tablet 3   • carvedilol (COREG) 6.25 MG tablet Take 1 tablet by mouth 2 (Two) Times a Day With Meals. 180 tablet 3   • cholecalciferol (VITAMIN D3) 1000 UNITS tablet Take 1,000 Units by mouth Daily.     • Empagliflozin (JARDIANCE) 10 MG tablet Take 10 mg by mouth Every Morning. For diabetes 90 tablet 3   • FLUoxetine (PROzac) 10 MG capsule Take 3 capsules by mouth Every Morning. 270 capsule 3   • lisinopril (PRINIVIL,ZESTRIL) 20 MG tablet Take 1 tablet by mouth Daily. For blood pressure 90 tablet 3   • loratadine (CLARITIN) 10 MG tablet Take 10 mg by mouth Daily.     • omeprazole (priLOSEC) 40 MG capsule Take 20 mg by mouth Every Morning.     • POTASSIUM CHLORIDE ER PO Take 100 mEq/day by mouth Daily.     • spironolactone (ALDACTONE) 25 MG tablet Take 1 tablet by mouth 2 (Two) Times a Day. 180 tablet 3   • vitamin B-12 (CYANOCOBALAMIN) 500 MCG tablet Take 2,500 mcg by mouth Every Other Day.     • vitamin C (ASCORBIC ACID) 500 MG tablet Take 500 mg by mouth Daily.       No current facility-administered medications for this visit.   "    Allergies   Allergen Reactions   • Nsaids      Due to only one kidney     Social History     Social History   • Marital status:      Social History Main Topics   • Smoking status: Former Smoker   • Smokeless tobacco: Never Used   • Alcohol use No   • Drug use: No   • Sexual activity: Defer     Other Topics Concern   • Not on file       Review of Systems  Review of Systems   Constitutional: Negative for activity change, appetite change, chills, diaphoresis, fatigue, fever and unexpected weight change.   HENT: Negative for sore throat and trouble swallowing.    Respiratory: Negative for shortness of breath.    Gastrointestinal: Negative for abdominal distention, abdominal pain, anal bleeding, blood in stool, constipation, diarrhea, nausea, rectal pain and vomiting.   Musculoskeletal: Negative for arthralgias.   Skin: Negative for pallor.   Neurological: Negative for light-headedness.        /86   Pulse 80   Ht 162.6 cm (64.02\")   Wt 93.4 kg (205 lb 12.8 oz)   BMI 35.31 kg/m²     Objective    Physical Exam   Constitutional: She is oriented to person, place, and time. She appears well-developed and well-nourished. She is cooperative. No distress.   HENT:   Head: Normocephalic and atraumatic.   Neck: Normal range of motion. Neck supple. No thyromegaly present.   Cardiovascular: Normal rate, regular rhythm and normal heart sounds.    Pulmonary/Chest: Effort normal and breath sounds normal. She has no wheezes. She has no rhonchi. She has no rales.   Abdominal: Soft. Normal appearance and bowel sounds are normal. She exhibits no shifting dullness and no distension. There is no hepatosplenomegaly. There is no tenderness. There is no rigidity and no guarding. No hernia.   Lymphadenopathy:     She has no cervical adenopathy.   Neurological: She is alert and oriented to person, place, and time.   Skin: Skin is warm, dry and intact. No rash noted. No pallor.   Psychiatric: She has a normal mood and affect. " Her speech is normal.     Lab on 02/05/2018   Component Date Value Ref Range Status   • WBC 02/05/2018 2.25* 3.20 - 9.80 10*3/mm3 Final   • RBC 02/05/2018 4.23  3.77 - 5.16 10*6/mm3 Final   • Hemoglobin 02/05/2018 12.5  12.0 - 15.5 g/dL Final   • Hematocrit 02/05/2018 38.4  35.0 - 45.0 % Final   • MCV 02/05/2018 90.8  80.0 - 98.0 fL Final   • MCH 02/05/2018 29.6  26.5 - 34.0 pg Final   • MCHC 02/05/2018 32.6  31.4 - 36.0 g/dL Final   • RDW 02/05/2018 14.9* 11.5 - 14.5 % Final   • RDW-SD 02/05/2018 48.3* 36.4 - 46.3 fl Final   • MPV 02/05/2018 9.8  8.0 - 12.0 fL Final   • Platelets 02/05/2018 99* 150 - 450 10*3/mm3 Final   • Vitamin B-12 02/05/2018 870  239 - 931 pg/mL Final   • Glucose 02/05/2018 129* 70 - 100 mg/dL Final   • BUN 02/05/2018 17  8 - 25 mg/dL Final   • Creatinine 02/05/2018 0.90  0.40 - 1.30 mg/dL Final   • Sodium 02/05/2018 136  134 - 146 mmol/L Final   • Potassium 02/05/2018 3.7  3.4 - 5.4 mmol/L Final   • Chloride 02/05/2018 99* 100 - 112 mmol/L Final   • CO2 02/05/2018 30.0  20.0 - 32.0 mmol/L Final   • Calcium 02/05/2018 9.3  8.4 - 10.8 mg/dL Final   • Total Protein 02/05/2018 7.0  6.7 - 8.2 g/dL Final   • Albumin 02/05/2018 4.20  3.20 - 5.50 g/dL Final   • ALT (SGPT) 02/05/2018 28  10 - 60 U/L Final   • AST (SGOT) 02/05/2018 31  10 - 60 U/L Final   • Alkaline Phosphatase 02/05/2018 72  15 - 121 U/L Final   • Total Bilirubin 02/05/2018 1.9* 0.2 - 1.0 mg/dL Final   • eGFR Non African Amer 02/05/2018 63  45 - 104 mL/min/1.73 Final   • Globulin 02/05/2018 2.8  2.5 - 4.6 gm/dL Final   • A/G Ratio 02/05/2018 1.5  1.0 - 3.0 g/dL Final   • BUN/Creatinine Ratio 02/05/2018 18.9  7.0 - 25.0 Final   • Anion Gap 02/05/2018 7.0  5.0 - 15.0 mmol/L Final   • Hemoglobin A1C 02/05/2018 6.1* 4 - 5.6 % Final   • LDL Cholesterol  02/05/2018 99  0 - 129 mg/dL Final   • Microalbumin/Creatinine Ratio 02/05/2018 41.8* 0.0 - 30.0 mg/g Final   • Creatinine, Urine 02/05/2018 121.9  mg/dL Final   • Microalbumin, Urine  02/05/2018 5.1  mg/L Final   • Ferritin 02/05/2018 27.10  11.10 - 264.00 ng/mL Final   • Magnesium 02/05/2018 1.7* 1.8 - 2.5 mg/dL Final   • Neutrophil % 02/05/2018 57.0  37.0 - 80.0 % Final   • Lymphocyte % 02/05/2018 29.0  10.0 - 50.0 % Final   • Monocyte % 02/05/2018 9.0  0.0 - 12.0 % Final   • Eosinophil % 02/05/2018 2.0  0.0 - 7.0 % Final   • Basophil % 02/05/2018 1.0  0.0 - 2.0 % Final   • Bands %  02/05/2018 2.0  0.0 - 5.0 % Final   • Neutrophils Absolute 02/05/2018 1.33* 2.00 - 8.60 10*3/mm3 Final   • Lymphocytes Absolute 02/05/2018 0.65  0.60 - 4.20 10*3/mm3 Final   • Monocytes Absolute 02/05/2018 0.20  0.00 - 0.90 10*3/mm3 Final   • Eosinophils Absolute 02/05/2018 0.05  0.00 - 0.70 10*3/mm3 Final   • Basophils Absolute 02/05/2018 0.02  0.00 - 0.20 10*3/mm3 Final   • Anisocytosis 02/05/2018 Slight/1+  None Seen Final   • Ovalocytes 02/05/2018 Slight/1+  None Seen Final   • WBC Morphology 02/05/2018 Normal  Normal Final   • Platelet Estimate 02/05/2018 Decreased  Normal Final     Assessment/Plan      1. Encounter for colonoscopy due to history of adenomatous colonic polyps    .       Orders placed during this encounter include:      COLONOSCOPY (N/A)    Review and/or summary of lab tests, radiology, procedures, medications. Review and summary of old records and obtaining of history. The risks and benefits of my recommendations, as well as other treatment options were discussed with the patient today. Questions were answered.    No orders of the defined types were placed in this encounter.      Follow-up: Return if symptoms worsen or fail to improve.          This document has been electronically signed by ISMAEL Leone on March 19, 2018 10:10 AM             Results for orders placed or performed in visit on 02/05/18   CBC Auto Differential   Result Value Ref Range    WBC 2.25 (L) 3.20 - 9.80 10*3/mm3    RBC 4.23 3.77 - 5.16 10*6/mm3    Hemoglobin 12.5 12.0 - 15.5 g/dL    Hematocrit 38.4 35.0 - 45.0 %     MCV 90.8 80.0 - 98.0 fL    MCH 29.6 26.5 - 34.0 pg    MCHC 32.6 31.4 - 36.0 g/dL    RDW 14.9 (H) 11.5 - 14.5 %    RDW-SD 48.3 (H) 36.4 - 46.3 fl    MPV 9.8 8.0 - 12.0 fL    Platelets 99 (L) 150 - 450 10*3/mm3   Microalbumin / Creatinine Urine Ratio   Result Value Ref Range    Microalbumin/Creatinine Ratio 41.8 (H) 0.0 - 30.0 mg/g    Creatinine, Urine 121.9 mg/dL    Microalbumin, Urine 5.1 mg/L   Manual Differential   Result Value Ref Range    Neutrophil % 57.0 37.0 - 80.0 %    Lymphocyte % 29.0 10.0 - 50.0 %    Monocyte % 9.0 0.0 - 12.0 %    Eosinophil % 2.0 0.0 - 7.0 %    Basophil % 1.0 0.0 - 2.0 %    Bands %  2.0 0.0 - 5.0 %    Neutrophils Absolute 1.33 (L) 2.00 - 8.60 10*3/mm3    Lymphocytes Absolute 0.65 0.60 - 4.20 10*3/mm3    Monocytes Absolute 0.20 0.00 - 0.90 10*3/mm3    Eosinophils Absolute 0.05 0.00 - 0.70 10*3/mm3    Basophils Absolute 0.02 0.00 - 0.20 10*3/mm3    Anisocytosis Slight/1+ None Seen    Ovalocytes Slight/1+ None Seen    WBC Morphology Normal Normal    Platelet Estimate Decreased Normal   Magnesium   Result Value Ref Range    Magnesium 1.7 (L) 1.8 - 2.5 mg/dL   LDL Cholesterol, Direct   Result Value Ref Range    LDL Cholesterol  99 0 - 129 mg/dL   Hemoglobin A1c   Result Value Ref Range    Hemoglobin A1C 6.1 (H) 4 - 5.6 %   Ferritin   Result Value Ref Range    Ferritin 27.10 11.10 - 264.00 ng/mL   Vitamin B12   Result Value Ref Range    Vitamin B-12 870 239 - 931 pg/mL   Comprehensive Metabolic Panel   Result Value Ref Range    Glucose 129 (H) 70 - 100 mg/dL    BUN 17 8 - 25 mg/dL    Creatinine 0.90 0.40 - 1.30 mg/dL    Sodium 136 134 - 146 mmol/L    Potassium 3.7 3.4 - 5.4 mmol/L    Chloride 99 (L) 100 - 112 mmol/L    CO2 30.0 20.0 - 32.0 mmol/L    Calcium 9.3 8.4 - 10.8 mg/dL    Total Protein 7.0 6.7 - 8.2 g/dL    Albumin 4.20 3.20 - 5.50 g/dL    ALT (SGPT) 28 10 - 60 U/L    AST (SGOT) 31 10 - 60 U/L    Alkaline Phosphatase 72 15 - 121 U/L    Total Bilirubin 1.9 (H) 0.2 - 1.0 mg/dL    eGFR  Non  Amer 63 45 - 104 mL/min/1.73    Globulin 2.8 2.5 - 4.6 gm/dL    A/G Ratio 1.5 1.0 - 3.0 g/dL    BUN/Creatinine Ratio 18.9 7.0 - 25.0    Anion Gap 7.0 5.0 - 15.0 mmol/L   Results for orders placed or performed in visit on 10/03/17   CBC Auto Differential   Result Value Ref Range    WBC 93.17 (H) 3.20 - 9.80 10*3/mm3    RBC 3.80 3.77 - 5.16 10*6/mm3    Hemoglobin 11.0 (L) 12.0 - 15.5 g/dL    Hematocrit 36.4 35.0 - 45.0 %    MCV 95.8 80.0 - 98.0 fL    MCH 28.9 26.5 - 34.0 pg    MCHC 30.2 (L) 31.4 - 36.0 g/dL    RDW 16.4 (H) 11.5 - 14.5 %    RDW-SD 56.8 (H) 36.4 - 46.3 fl    MPV 10.7 8.0 - 12.0 fL    Platelets 61 (L) 150 - 450 10*3/mm3    Neutrophil %  37.0 - 80.0 %    Lymphocyte %  10.0 - 50.0 %    Monocyte %  0.0 - 12.0 %    Eosinophil % 0.1 0.0 - 7.0 %    Basophil % 0.2 0.0 - 2.0 %    Immature Grans %  0.0 - 0.5 %    Neutrophils, Absolute  2.00 - 8.60 10*3/mm3    Lymphocytes, Absolute  0.60 - 4.20 10*3/mm3    Monocytes, Absolute  0.00 - 0.90 10*3/mm3    Eosinophils, Absolute 0.13 0.00 - 0.70 10*3/mm3    Basophils, Absolute 0.21 (H) 0.00 - 0.20 10*3/mm3    Immature Grans, Absolute  0.00 - 0.02 10*3/mm3   CBC Auto Differential   Result Value Ref Range    WBC 93.49 (C) 3.20 - 9.80 10*3/mm3    RBC 3.80 3.77 - 5.16 10*6/mm3    Hemoglobin 11.0 (L) 12.0 - 15.5 g/dL    Hematocrit 36.4 35.0 - 45.0 %    MCV 95.8 80.0 - 98.0 fL    MCH 28.9 26.5 - 34.0 pg    MCHC 30.1 (L) 31.4 - 36.0 g/dL    RDW 16.3 (H) 11.5 - 14.5 %    RDW-SD 56.8 (H) 36.4 - 46.3 fl    MPV 9.9 8.0 - 12.0 fL    Platelets 61 (L) 150 - 450 10*3/mm3   Manual Differential   Result Value Ref Range    Neutrophil % 5.0 (L) 37.0 - 80.0 %    Lymphocyte % 92.0 (H) 10.0 - 50.0 %    Monocyte % 3.0 0.0 - 12.0 %    Neutrophils Absolute 4.67 2.00 - 8.60 10*3/mm3    Lymphocytes Absolute 86.01 (H) 0.60 - 4.20 10*3/mm3    Monocytes Absolute 2.80 (H) 0.00 - 0.90 10*3/mm3    RBC Morphology Normal Normal    WBC Morphology Normal Normal    Platelet Morphology Normal  Normal   Results for orders placed or performed in visit on 08/01/17   Scan Slide   Result Value Ref Range    Scan Slide     CBC Auto Differential   Result Value Ref Range    WBC 85.68 (C) 3.20 - 9.80 10*3/mm3    RBC 3.92 3.77 - 5.16 10*6/mm3    Hemoglobin 11.6 (L) 12.0 - 15.5 g/dL    Hematocrit 37.1 35.0 - 45.0 %    MCV 94.6 80.0 - 98.0 fL    MCH 29.6 26.5 - 34.0 pg    MCHC 31.3 (L) 31.4 - 36.0 g/dL    RDW 16.1 (H) 11.5 - 14.5 %    RDW-SD 53.0 (H) 36.4 - 46.3 fl    MPV 10.3 8.0 - 12.0 fL    Platelets 82 (L) 150 - 450 10*3/mm3     *Note: Due to a large number of results and/or encounters for the requested time period, some results have not been displayed. A complete set of results can be found in Results Review.

## 2022-04-18 NOTE — PROGRESS NOTES
Pt here with R MCA CVA. A&O x4. Neuros with L hemiplegia, L facial droop. VSS on RA. Pt seemed to have aspirated after attempting to eat dinner. Chest xray completed, see results. Pureed diet, moderately thick liquids. Takes pills through NG tube. TF running at 55 mL/hr with 120 mL FWF, stop feed at 8:30 am. Up with Ax2 and lift. Purewick in place, incontinent of B&B. Denies pain. Pt scoring green on the Aggression Stop Light Tool. Discharge pending therapy re-evaluation.

## 2022-04-18 NOTE — PROGRESS NOTES
[facility]    Medicine Progress Note - Hospitalist Service    Date of Admission:  4/6/2022    Assessment & Plan            Mia Rodriguez is a 88 year old female who presented to the [facility] ER with acute neuro changes and associated fall.  She was felt to have an acute CVA and urgently taken to radiology for embolectomy.     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    - Systolic goal -160 range.      - PT/OT/Speech/SW/CC consults  - Continue close neuro monitoring  - ASA 81mg daily for now   - Repeat NCCT in 2 weeks, if stable then would restart Eliquis 2.5mg BID + stop ASA at that time   - Follow-up with [facility] in 6-8 weeks   - Stroke neuro following peripherally, please page for questions  - melatonin 2mg at bedtime scheduled to promote sleep  - monitor for delirium     Severe dysphagia post CVA  Underweight (wt 37.9Kg - BMI 16.8)  - place NG 4/9  - patient stated to MD Lemus and daughter Bennie on 4/9 that she would never want a permanent feeding tube.  - Video swallow completed on 4/12  - appreciate SLP- diet advanced to pureed diet level per slp   - TF switched to night time only per dietitian on 4/15, wean as tolerated  - calorie count  - continue tube feeds and wean as tolerated   - increase in dysphagia 4/18. Will hold PO until 4/19, then retrial PO. Will start TF 4/18 and continue     Probable aspiration pneumonia   Bilateral pleural effusions  Hypoxia  On 4/10, New fever, ? Right sided crackles, abdominal xr from 4/9 with clear lung bases. Suspect aspiration pneumonia due to significant oropharyngeal dysphagia.    * CXR-Mild cardiac enlargement. Small bilateral pleural effusions with bibasilar infiltrate/atelectasis greater on the right. Underlying emphysematous change.   * CXR on 4/12-Similar small right effusion and associated compressive  atelectasis and or infiltrate, question worsening left-sided pleural effusion and associated atelectasis and/or infiltrate.   *CT chest PE due to increased rr given her immobility following cva- negative for PE, mod b/l pleural effusion and associated compressive atelectasis, mod to severe emphysema  - completed 5 days of Ceftriaxone on 4/14. Discontinued.   - lasix 10mg IV x 1 on 4/12 and 4/13. Lasix 20mg IV x 1 on 4/14, 4/15 and 4/16    Abnormal UA  - UA slightly abnormal, doubt UTI.  Urine culture was no growth     Fall  -  No overt majory injury. Pelvis XR w/o acute findings 4/6     Atrial fibrillation with RVR: improved rate control  - HR in 80s-100  - stopped  dilt drip on 4/12  - continue diltiazem 90mg q6hrs  - Hold Eliquis as above  - HR has been stable in last 24-48 hrs in the telemetry was discontinued     Hypothyroidism:  - Hold PO levothyroxine while NPO     CKD, history of renal stones:  -  Monitor     COPD, smoking history:  -  No overt exacerbation.  Albuterol PRN.  Monitor.     Reflux:  -  Pepcid     Osteoporosis:  -  Monitor, baseline on prolia     HyperNa, resolved  - likely iatrogenic from IVF and from absent PO     Hypoglycemia  - continue tube feeds     Hypokalemia and hypophosphetmia  - replace per protocol     Difficulty sleeping  - related to neurochecks     Goals of care  * discussion ongoing but patient states no permanent feeding tube. She is willing to trial NG for a period to see how much recovery of swallow function she will have have.   - she states a clear preference for continued restorative plan of care as of 4/9 but she will let us know if quality of life becomes intolerable.   - provider on 4/10 discussd with pt/family and code changed to DNR/DNI. In the evening, cross cover MD spoke with son and code changed to DNR- ok with intubation. Refer to progress notes on 4/10 for details.  - Palliative care consulted, had conference on 4/12-refer to palliative care note. Current goal is  to continue with restorative cares see if swallowing improves and code status remains DNR- okay for intubation             Diet: Snacks/Supplements Adult: Magic Cup; With Meals  Adult Formula Drip Feeding: Continuous Jevity 1.5; Nasoduodenal tube; Goal Rate: 55; mL/hr; From: 8:00 PM; 6:00 AM; Medication - Feeding Tube Flush Frequency: At least 15-30 mL water before and after medication administration and with tube cloggin...  Calorie Counts  Combination Diet Pureed Diet (level 4); Liquidized/Moderately Thick (level 3) (by tsp); No Straws    DVT Prophylaxis: Pneumatic Compression Devices  Bob Catheter: Not present  Central Lines: None  Cardiac Monitoring: None  Code Status: No CPR- Pre-arrest intubation OK      Disposition Plan   Expected Discharge: 04/18/2022     Anticipated discharge location:  Awaiting care coordination huddle  Delays:           The patient's care was discussed with the Bedside Nurse, Patient and Patient's Family.    Tino Lemus MD  Hospitalist Service  [facility]  Securely message with the Vocera Web Console (learn more here)  Text page via [employer] Paging/Directory     Clinically Significant Risk Factors Present on Admission                    Time Spent on this Encounter   I spent 37 minutes on the unit/floor managing the care of Mia Rodriguez. Over 50% of my time was spent on the following:   - Counseling the patient and/or family regarding: diagnostic results, prognosis and risks and benefits of treatment options  - Coordination of care with the: consultant(s) and nurse    Discussed aspiration, diet, nutrition, life expectancy. Coordination with palliative care, RN. Discussed with family    Tino Lemus MD      ______________________________________________________________________    Interval History   Seen, examined. RN reports overt aspriation with any food on 4/18 with coughing and gagging. SLP reports no further downgrades in diet available.  Discussed with patient, she is feeling stronger than our last encounter. Doesn't note the aspiration. Discussed options. Updated RN with plan    Data reviewed today: I reviewed all medications, new labs and imaging results over the last 24 hours. I personally reviewed no images or EKG's today.    Physical Exam   Vital Signs: Temp: 97.7  F (36.5  C) Temp src: Axillary BP: 138/70 Pulse: 94   Resp: 18 SpO2: 95 % O2 Device: None (Room air) Oxygen Delivery: 2 LPM  Weight: 116 lbs 6.45 oz        General: Patient appears comfortable and in no acute distress.  NG tube  HEENT: Head is atraumatic, normocephalic.    Neck: Neck is supple  Respiratory: Lungs are clear to auscultation bilaterally with no wheeze or crackles, nasal cannula  Cardiovascular: Regular rate , S1 and S2 normal with no murmer or rubs or gallops, edema over the legs  Abdomen:   soft , non tender , non distended and bowel sound present   Skin: No skin rashes or lesions to inspection or palpation.  Neurologic: Left hemiplegia  Musculoskeletal: Normal Range of motion over upper and lower on the right and she did not move her extremities on the left  Psychiatric: cooperative     Data   Recent Labs   Lab 04/18/22  0608 04/18/22  0021 04/17/22  1730 04/17/22  1207 04/17/22  0857 04/16/22  0830 04/16/22  0807 04/15/22  0823 04/15/22  0617 04/14/22  1431 04/14/22  1320 04/11/22  1224 04/11/22  0932   WBC  --   --  9.3  --  10.3  --   --   --   --   --   --   --  10.8   HGB  --   --  12.3  --  13.1  --   --   --   --   --   --   --  13.4   MCV  --   --  99  --  99  --   --   --   --   --   --   --  97   PLT  --   --  182  --  184  --   --   --   --   --   --   --  162   NA  --   --   --   --   --   --  139  --  140  --  143   < > 142   POTASSIUM  --   --   --   --  3.5  --  3.8  --  3.9  --  3.4   < > 3.2*   CHLORIDE  --   --   --   --   --   --  106  --  109  --  111*   < > 116*   CO2  --   --   --   --   --   --  29  --  26  --  27   < > 19*   BUN  --   --   --    --   --   --  21  --  20  --  18   < > 17   CR  --   --   --   --   --   --  0.63  --  0.58  --  0.62   < > 0.64   ANIONGAP  --   --   --   --   --   --  4  --  5  --  5   < > 7   CYNDEE  --   --   --   --   --   --  9.3  --  8.8  --  8.6   < > 7.1*   * 132*  --  86  --    < > 97   < > 126*   < > 68*   < > 123*    < > = values in this interval not displayed.     Recent Results (from the past 24 hour(s))   XR Chest 2 Views    Narrative    EXAM: XR CHEST 2 VW  LOCATION: [facility]  DATE/TIME: 4/17/2022 9:34 PM    INDICATION: aspiration  COMPARISON: 04/10/2022      Impression    IMPRESSION: There are mild bilateral pleural effusions. Borderline heart size with normal pulmonary vascularity. Enteric tube below the diaphragm. Partial calcified thoracic aorta. Slight infiltrate/atelectasis in the lung bases. Since 04/10/2022 there   is been a significant decrease in the infiltrate/atelectasis in the lung bases, especially on the right.     Medications     dextrose       - MEDICATION INSTRUCTIONS -         artificial saliva  2 spray Swish & Spit 4x Daily     aspirin  81 mg Oral or Feeding Tube Daily    Or     aspirin  300 mg Rectal Daily     diltiazem  90 mg Oral or Feeding Tube Q6H ARIK     lactobacillus rhamnosus (GG)  1 capsule Oral or Feeding Tube BID     melatonin  2 mg Oral At Bedtime     sodium chloride (PF)  3 mL Intracatheter Q8H

## 2022-04-18 NOTE — PROGRESS NOTES
[facility]  Palliative Care Daily Progress Note       Recommendations & Counseling       Agustina has made it known on several occassions and again today that she does not wish to have a G-tube placement for long term nutrition.     SLT had previously evaluated with VSS and found Agustina to be a candidate for a modified diet, however yesterday she had an aspiration event while eating. SLT evaluated her today and she was placed on NPO- with further evaluation tomorrow. Agustina currently has a NG FT for supplemental nutrition.    Agustina made the comment that she does not want intubation for respiratory compromise and will be speaking with her children about this. I also called daughter Bennie to update her about her mother's comments. Bennie will speak with her brothers and they will check in with Agustina about her concerns.     Palliative care will continue to follow for goals of care discussions as needed.     Dysphagia related to recent stroke.  -Appreciate SLT expertise and recommendations:  [Least hazardous diet remains IDDSI Level 4 and moderately thick liquids by tsp].  -Agree with NPO status today and reassess tomorrow.       Pooja Rodriguez, [facility]  Contact information available via [facility] Paging/Directory      Thank you for the opportunity to participate in the care of this patient and family. Our team: will continue to follow.     During regular M-F work hours (5634-6713) -- if you are not sure who specifically to contact -- please contact us in [facility] Smart Web.     After regular work hours and on weekends/holidays, you can call our answering service at [phone].     Attestation:  Total time on the floor involved in the patient's care: 35 minutes  Total time spent in counseling/care coordination, goals of care: >50%     Assessments          Mia Rodriguez is a 88 year old female with PMH significant for A-fib, CAD, benign essential HTN, COPD, TIA,  "diverticulitis, CKD, osteoporosis, remote history of breast cancer, small lung nodule. She presented to ED via EMT after suffering a fall with neuro changes at her home on 4/06/22. 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 As a result of her stroke she has been experiencing dysphagia and will have a video swallow study tomorrow for further evaluation. She had made comments that she would not wish to have a permanent feeding tube.    Today, the patient was seen for:  Goals of care    Prognosis, Goals, or Advance Care Planning was addressed today with: Yes.  Mood, coping, and/or meaning in the context of serious illness were addressed today: Yes.  Summary/Comments:             Interval History:     Chart review/discussion with unit or clinical team members:   4/18 SLP note: NPO status for today due to recent increased difficulty swallowing, possible aspiration. Dr Lemus spoke with me as well and concerns regarding swallow function- discussion with pt today and decision was made for NPO today and see if Agustina is doing better tomorrow.     Per patient or family/caregivers today:  Spoke with patient in room today. She is disheartened about her swallowing set back. She is adamant about not wanting a feeding tube for long term artificial feeding. They are going to give her some time to see if tomorrow is a better day for her swallowing ability. She made the comment that she has concerns about being intubated and wanting her code status changed to no intubation. When I asked if we should check in with her children to make sure that they are understanding her wishes, she stated that \"it is my decision.\" I supported her on this fact. Agustina stated that she will be speaking with her kids about this later on.  Agustina understands that hospice is an option for her if she chooses a comfort diet if her " ability to swallow food safely does not improve.     Writer also spoke with daughter Bennie and reviewed her mother's concerns regarding code status. We discussed that intubation may be a difficult intervention to undergo with limited benefits, but potential for suffering. Bennie will speak with her brothers and mother so that these concerns can be addressed. Writer will check in with patient and family tomorrow to see if there are any questions or concerns.    Key Palliative Symptoms:  We are not helping to manage these symptoms currently in this patient.           Review of Systems:     Besides above, an additional  system ROS was reviewed and is unremarkable          Medications:     I have reviewed this patient's medication profile and medications during this hospitalization.    Noted meds:      artificial saliva  2 spray Swish & Spit 4x Daily     aspirin  81 mg Oral or Feeding Tube Daily    Or     aspirin  300 mg Rectal Daily     diltiazem  90 mg Oral or Feeding Tube Q6H ARIK     lactobacillus rhamnosus (GG)  1 capsule Oral or Feeding Tube BID     melatonin  2 mg Oral At Bedtime     sodium chloride (PF)  3 mL Intracatheter Q8H     acetaminophen **OR** acetaminophen, albuterol, dextrose, glucose **OR** dextrose **OR** glucagon, hydrALAZINE, lidocaine 4%, lidocaine (buffered or not buffered), - MEDICATION INSTRUCTIONS -, metoprolol, nitroGLYcerin, ondansetron **OR** ondansetron, prochlorperazine **OR** prochlorperazine **OR** prochlorperazine, sodium chloride (PF)             Physical Exam:   Temp: 97.5  F (36.4  C) Temp src: Axillary BP: 131/68 Pulse: 83   Resp: 18 SpO2: 92 % O2 Device: None (Room air)    GENERAL:  Alert, fatigued, no distress.  HEAD: Normocephalic atraumatic  SCLERA: Anicteric  EXTREMITIES: Warm  RESPIRATORY: Breathing relaxed and non labored  NEUROLOGIC: Alert.  PSYCH: Calm, cooperative.           Data Reviewed:     Recent imaging reviewed, my comments on pertinents:   Results for orders placed  or performed during the hospital encounter of 04/06/22   CT Head w/o Contrast    Impression    IMPRESSION: Diffuse cerebral volume loss and cerebral white matter  changes consistent with chronic small vessel ischemic disease. No  evidence for acute intracranial pathology.    Radiation dose for this scan was reduced using automated exposure  control, adjustment of the mA and/or kV according to patient size, or  iterative reconstruction technique.     CONRAD CONTRERAS MD         SYSTEM ID:  [id number]   CTA Head Neck with Contrast    Impression    IMPRESSION:  1. Probable thromboembolic occlusion at the right MCA trifurcation.  This results in delayed filling of the distal right internal carotid  artery likely due to outflow obstruction.  2. Otherwise, normal neck and head CTA.    This imaging study was discussed with the ordering provider, Dr. JEREMY ALDANA, by Dr. Contreras on 4/6/2022 8:49 AM.    Radiation dose for this scan was reduced using automated exposure  control, adjustment of the mA and/or kV according to patient size, or  iterative reconstruction technique.     CONRAD CONTRERAS MD         SYSTEM ID:  [id number]   Cervical spine CT w/o contrast    Impression    IMPRESSION: Minimal degenerative retrolisthesis of C2 upon C3 and C4  upon C5. Minimal degenerative anterolisthesis of C7 upon T1. Alignment  of the cervical vertebrae is otherwise normal. Vertebral body heights  of the cervical spine are normal. Craniocervical alignment is normal.  There are no fractures of the cervical spine. Inferior endplate  deformity of T2 is again noted and does not appear appreciably changed  from a chest CT from 3/23/2021 and likely represents a degenerative  Schmorl's node deformity. No spinal canal stenosis. No prevertebral  soft tissue swelling.      Radiation dose for this scan was reduced using automated exposure  control, adjustment of the mA and/or kV according to patient size, or  iterative reconstruction  technique    CONRAD BURGOS MD         SYSTEM ID:  [id number]   XR Chest Port 1 View    Impression    IMPRESSION: Single AP view of the chest was obtained. Stable  cardiomediastinal silhouette. Mild bibasilar pulmonary opacities,  likely atelectasis. No significant pleural effusion or pneumothorax.  No definite acute osseous pathology. If clinical suspicion of rib  fractures, remains high, rib series is more sensitive for detection of  subtle rib fractures.    YANE TRAN MD         SYSTEM ID:  [id number]   CT Head Perfusion w Contrast    Impression    IMPRESSION: There is delayed arrival of the contrast bolus throughout  the majority of the right middle cerebral and right anterior cerebral  artery territories consistent with the right middle cerebral artery  occlusion resulting in delayed filling of the right internal carotid  circulation noted on the accompanying CT angiogram. There is decreased  cerebral blood flow throughout the same distribution. There is  decreased cerebral blood volume in the cortex and white matter of the  mid right frontal lobe as well as at the lateral aspect of the right  temporal lobe worrisome for infarct. No other perfusion defects.    Radiation dose for this scan was reduced using automated exposure  control, adjustment of the mA and/or kV according to patient size, or  iterative reconstruction technique    CONRAD BURGOS MD         SYSTEM ID:  [id number]   IR Carotid Cerebral Angiogram Bilateral    Impression    Impression:   Successful mechanical thrombectomy of the right M1 MCA with TICI 2b  recanalization after 3 passes of Solitaire accompanied with penumbra  aspiration technique. There is a partial recanalization of the right  angular artery in the M3 segment with a right parietal lobe perfusion  deficit.     Patient has severely atherosclerotic right common femoral artery and  severely calcified and atherosclerotic right superficial and deep  femoral arteries. Guillermo pressure  was held to achieve hemostasis.    Daxa Schulz MD  Endovascular Surgical Neuroradiology Fellow  Pager: [phone]      I was present and scrubbed in for the entire procedure    I have personally reviewed the examination and initial interpretation  and I agree with the findings.    YOKO JOLLY MD         SYSTEM ID:  [id number]   CT Head w/o Contrast    Impression    IMPRESSION:   1. Focus of hyperdensity at the high posteromedial right frontal lobe  could represent a tiny focus of intracranial hemorrhage. Continued  surveillance recommended.  2. Diffuse cerebral volume loss and cerebral white matter changes  consistent with chronic small vessel ischemic disease.      Radiation dose for this scan was reduced using automated exposure  control, adjustment of the mA and/or kV according to patient size, or  iterative reconstruction technique    CONRAD BURGOS MD         SYSTEM ID:  [id number]   XR Pelvis Port 1/2 Views    Impression    IMPRESSION: No evidence of acute fracture. Mild bilateral hip  degenerative changes. Bob catheter in place. Contrast in the  bladder.     ZEINA CISSE MD         SYSTEM ID:  [id number]   CT Head w/o Contrast    Impression    IMPRESSION:  1.  Stable small volume acute subarachnoid hemorrhage within the right cingulate sulcus. No new intracranial hemorrhage.  2.  Developing regions of acute infarction within the right anterior cerebral artery territory and right middle cerebral artery posterior division territory.  3.  Stable chronic infarction within the right posterior cerebral artery territory.  4.  Stable mild to moderate chronic small vessel ischemic disease and generalized brain parenchymal volume loss.   MR Brain w/o & w Contrast    Impression    IMPRESSION:  1.  Evolving ischemic infarcts in the right middle cerebral and right anterior cerebral artery distributions.  2.  Mild dural thickening and enhancement along the right cerebral convexity.  3.  Generalized brain  atrophy and presumed microvascular ischemic changes as detailed above.   XR Abdomen Port 1 View    Impression    IMPRESSION: An enteric tube is in place with tip likely in the gastric antrum or first portion of the duodenum. Small bilateral pleural effusions, greater on the right. Tortuous calcified thoracic aorta.   XR Chest Port 1 View    Impression    IMPRESSION: Mild cardiac enlargement. Small bilateral pleural effusions with bibasilar infiltrate/atelectasis greater on the right. Underlying emphysematous change. Feeding tube.   XR Video Swallow with SLP or OT    Impression    IMPRESSION:  Thin: Premature spillage to the piriform sinuses with reduced  epiglottic inversion. Aspiration. With chin tuck maneuver, there was  mild to moderate flash penetration.    Mildly thick: Deep penetration/aspiration observed. Mild pharyngeal  residue.    Moderately thick: Moderate to deep penetration. Mild penetration noted  with chin tuck maneuver.    Puree: No penetration or aspiration.    Semisolid: No penetration or aspiration. Moderate pharyngeal residue  primarily in the vallecula. There was relatively good clearance of the  residue utilizing moderately thick liquid wash.    Solid: Not administered.    This study only includes the cervical esophagus. Please see separate  report from speech pathology for additional details.    KARO MAC MD         SYSTEM ID:  [id number]   XR Chest Port 1 View    Impression    IMPRESSION: Similar small right effusion and associated compressive  atelectasis and or infiltrate, question worsening left-sided pleural  effusion and associated atelectasis and/or infiltrate. Enteric tube  tip below the margin of study.    KARO FRANCIS MD         SYSTEM ID:  [id number]   CT Chest Pulmonary Embolism w Contrast    Impression    IMPRESSION:  1.  No pulmonary embolism demonstrated.  2.  Moderate bilateral pleural effusions and associated compressive  atelectasis and/or infiltrate.  3.  Moderate to  severe emphysema.     KARO FRANCIS MD         SYSTEM ID:  [id number]   Echocardiogram Complete - For age > 60 yrs   Result Value Ref Range    LVEF  55%      Lab Results   Component Value Date    WBC 9.3 04/17/2022    WBC 10.3 04/17/2022    WBC 10.8 04/11/2022    HGB 12.3 04/17/2022    HGB 13.1 04/17/2022    HGB 13.4 04/11/2022    HCT 37.4 04/17/2022    HCT 40.0 04/17/2022    HCT 39.5 04/11/2022     04/17/2022     04/17/2022     04/11/2022     04/16/2022     04/15/2022     04/14/2022    POTASSIUM 3.5 04/17/2022    POTASSIUM 3.8 04/16/2022    POTASSIUM 3.9 04/15/2022    CHLORIDE 106 04/16/2022    CHLORIDE 109 04/15/2022    CHLORIDE 111 (H) 04/14/2022    CO2 29 04/16/2022    CO2 26 04/15/2022    CO2 27 04/14/2022    BUN 21 04/16/2022    BUN 20 04/15/2022    BUN 18 04/14/2022    CR 0.63 04/16/2022    CR 0.58 04/15/2022    CR 0.62 04/14/2022     (H) 04/18/2022     (H) 04/18/2022    GLC 86 04/17/2022    DD 0.6 (H) 03/26/2017    NTBNPI 1960 (H) 02/15/2009    TROPI  03/26/2017     <0.015  The 99th percentile for upper reference range is 0.045 ug/L.  Troponin values in   the range of 0.045 - 0.120 ug/L may be associated with risks of adverse   clinical events.      TROPI  03/26/2017     <0.015  The 99th percentile for upper reference range is 0.045 ug/L.  Troponin values in   the range of 0.045 - 0.120 ug/L may be associated with risks of adverse   clinical events.      TROPI  08/17/2015     <0.015  The 99th percentile for upper reference range is 0.045 ug/L.  Troponin values in   the range of 0.045 - 0.120 ug/L may be associated with risks of adverse   clinical events.      AST 53 (H) 10/06/2020    AST 13 10/02/2014    AST 15 09/26/2014    ALT 52 (H) 10/06/2020    ALT 10 01/12/2018    ALT 35 05/06/2016    ALKPHOS 55 10/06/2020    ALKPHOS 46 10/02/2014    ALKPHOS 46 09/26/2014    BILITOTAL 0.7 10/06/2020    BILITOTAL 0.5 10/02/2014    BILITOTAL 1.0 09/26/2014    INR 1.20  (H) 04/07/2022    INR 1.09 04/06/2022    INR 1.03 10/22/2014

## 2022-04-19 NOTE — PROGRESS NOTES
"CLINICAL NUTRITION SERVICES - REASSESSMENT NOTE      Recommendations Ordered by Registered Dietitian (RD):   With PO diet re-initiation, will change back to nocturnal TF of Jevity 1.5 at 55 mL/hr x 10 hours (8 pm - 6 am) = 825 kcal (22 kcal/kg, 63% minimum needs), 35 g protein (0.9 g/kg, 64% minimum needs), 11 fiber, 119 g CHO and 418 mL free water daily    Future/Additional Recommendations:   Will continue to follow and adjust TF as appropriate pending PO intake/diet recommendations  Diet per SLP  Continue magic cup supplement BID  Monitor GOC     If limited PO intake and/or NPO diet recommendations per SLP would recommend change TF to continuous regimen with Jevity 1.5 at 40 mL/hr = 1440 kcals (38 kcal/kg), 61 g PRO (1.6 g/kg), 729 ml free H20, 207 g CHO, and 20 g fiber daily.   Malnutrition: (4/19)  % Weight Loss:  None noted  % Intake:  Decreased intake does not meet criteria for malnutrition  - supplemental EN to meet nutrition needs with PO intake   Subcutaneous Fat Loss:  Low fat stores at baseline   Muscle Loss:  Temporal region - moderate depletion and Clavicle bone region - moderate depletion (do not suspect acute loss, likely some age related sarcopenia)  Fluid Retention:  None noted     Malnutrition Diagnosis: Patient does not meet two of the above criteria necessary for diagnosing malnutrition       EVALUATION OF PROGRESS TOWARD GOALS   Diet:  Pureed Diet (level 4) + Moderately Thick Liquids (level 3) re-initiated this morning per MD   Yesterday was made NPO per SLP recommendations after patient coughing during breakfast meal and aspiration event on 4/17  Per SLP: \"Pt has demonstrated borderline safety for po intake and risk for inability to maintain nutrition/hydration on po intake alone. Pt has also states wishes for no long term tube feeding. Recommend holding po intake until MD/pt/family discussion regarding POC wishes going forward.\"     Supplements: Magic Cup with lunch and dinner meals "     Intake/Tolerance: Calorie Counts have been completed over the past 6 days (4/12-4/14 and 4/16-4/18) with summary as follows -   4/12: 790 kcal and 32 g protein  4/13: 568 kcal and 20 g protein  4/14: 711 kcal and 24 g protein  4/16: 900 kcal and 45 g protein  4/17: 557 kcal and 25 g protein  4/18: 493 kcal and 13 g protein    AVERAGE: 670 kcal/day (18 kcal/kg, 52% minimum needs) and 26.5 g protein/day (0.7 g/kg, 48% minimum needs)    Patient likes the magic cup and often is taking 1-2 daily. Declines having more than this. Yesterday patient had breakfast tray of cream of wheat, sausage, and lemon water. Had a magic cup supplement for lunch and then was placed NPO per SLP recommendations.     Nutrition Support: Changed to nocturnal TF on 4/15 for 10 hour cycle (8 pm - 6 am) to run at 55 mL/hr (Jevity 1.5). Noted MD changed order yesterday to start TF at 12:05 PM and run until 6 AM at rate of 45 mL/hr. Ordered as follows -     Type of Feeding Tube: NGT vs NDT (4/9)  Enteral Frequency:  Continuous  Enteral Regimen: Jevity 1.5 at goal 45 mL/hr x 18 hours (12 PM - 6 AM)  Total Enteral Provisions: 810 mL providing 1215 kcal (32 kcal/kg), 52 g protein (1.4 g/kg), 175 g CHO, 17 g fiber and 616 mL free water   Free Water Flush: 120 mL q 4 hours  Total Fluid (TF + FWF) = 1336 mL/day (35 mL/Kg)    Previous nocturnal TF order --> Jevity 1.5 at goal 55 mL/hr x 10 hours = 825 kcal (22 kcal/kg, 63% minimum needs), 35 g protein (0.9 g/kg, 64% minimum needs), 11 fiber, 119 g CHO and 418 mL free water daily       Intake/Tolerance: TF ran x 18 hours yesterday, now has been off since 6 AM this morning. Previously had been running x 10 hours overnight since 4/15.     TF (nocturnal x 10 hours) + average PO intake over the past 6 days = 1495 kcal/day (39 kcal/kg) and 61.5 g protein/day (1.6 g/kg)      ASSESSED NUTRITION NEEDS:  Dosing Weight 37.9 kg  Estimated Energy Needs: 9901-9587 kcals (35-40  Kcal/Kg)  Justification: underweight  Estimated Protein Needs: 55-75 grams protein (1.5-2 g pro/Kg)  Justification: Repletion  Estimated Fluid Needs: 1 mL/Kcal or per provider pending fluid status       NEW FINDINGS:   - Labs: Reviewed. BMP yesterday --> Lytes WNL. BG 94 this morning.   - Meds: culturell BID  - GI: BM x 2 daily over the past 3 days, x 6 on 4/15 and x 3 on 4/14  - Weight: Up significantly from admission weight though unclear accuracy of yesterday's weight (could be scale error with noted use of bed scale). Mild 2+ BLE edema noted.     Vitals:    04/06/22 0826 04/06/22 1215 04/15/22 1549 04/18/22 0049   Weight: 37.5 kg (82 lb 10.8 oz) 37.9 kg (83 lb 8.9 oz) 43.5 kg (95 lb 14.4 oz) 52.8 kg (116 lb 6.5 oz)       Previous Goals:   TF to meet % assessed nutrition needs until improvement in PO intake   Evaluation: Met    Previous Nutrition Diagnosis:   Altered nutrition-related lab value (Phosphorus) related to refeeding syndrome with TF initiation as evidenced by Phos 1.1 mg/dL   Evaluation: Completed      CURRENT NUTRITION DIAGNOSIS  Inadequate oral intake related to dysphagia, decreased appetite as evidenced by PO intake meeting 50% minimum energy needs over the past 6 days via calorie counts above     INTERVENTIONS  Recommendations / Nutrition Prescription  With PO diet re-initiation, will change back to nocturnal TF of Jevity 1.5 at 55 mL/hr x 10 hours (8 pm - 6 am) = 825 kcal (22 kcal/kg, 63% minimum needs), 35 g protein (0.9 g/kg, 64% minimum needs), 11 fiber, 119 g CHO and 418 mL free water daily     Will continue to follow and adjust TF as appropriate pending PO intake/diet recommendations  Diet per SLP  Continue magic cup supplement BID  Monitor GOC     If limited PO intake and/or NPO diet recommendations per SLP would recommend change TF to continuous regimen with Jevity 1.5 at 40 mL/hr = 1440 kcals (38 kcal/kg), 61 g PRO (1.6 g/kg), 729 ml free H20, 207 g CHO, and 20 g fiber  daily.    Implementation  EN Schedule - as above     Goals  TF + PO intake to meet % assessed nutrition needs       MONITORING AND EVALUATION:  Progress towards goals will be monitored and evaluated per protocol and Practice Guidelines      Corazon Grimaldo RD, LD  Unit RD Pager: [phone]

## 2022-04-19 NOTE — PLAN OF CARE
Pt here with R MCA CVA. Pt is A&Ox4, VSS on RA. Ax2, lift. Pureed diet, mod thickened liq, RN supervised feeds, pills through NG tube. Night TF from 8Pm-6PM, 120 q4 free water flushes. Neuros with L sided hemiplegia, L droop. Pt is incontinent of B&B, purewick in place, has had BMs today. Redness to perineal area, barrier cream applied. Pt has a pressure injury, mepilex in place. Discharge pending.

## 2022-04-19 NOTE — PROGRESS NOTES
[facility]  Palliative Care Daily Progress Note       Recommendations & Counseling       This morning Agustina had expressed to Dr Lemus that she did not wish to be intubated and wanted her code status changed.     Dysphagia related to recent stroke:   -SLT continues to work with Agustina on swallow safety and dietary intake by mouth. She had been NPO 04/18 due to concerns regarding aspiration. This morning Agustina was able to eat 50% of her breakfast and wishes to continue work with SLT on her swallowing strength and to increase dietary intake.    -Currently has a NG FT for short term supplemental nutrition.  -Agustina has made it known on several occassions that she does not wish to proceed to G-tube placement for long term nutritional support.   -Appreciate SLT expertise and recommendations.       SOFIA Vargas  [facility]  Contact information available via [facility] Paging/Directory      Thank you for the opportunity to participate in the care of this patient and family. Our team: will continue to follow.     During regular M-F work hours (2417-1000) -- if you are not sure who specifically to contact -- please contact us in [facility] Smart Web.     After regular work hours and on weekends/holidays, you can call our answering service at [phone].     Attestation:  Total time on the floor involved in the patient's care: 15 minutes  Total time spent in counseling/care coordination, goals of care: >50%     Assessments          Mia Rodriguez is a 88 year old female with PMH significant for A-fib, CAD, benign essential HTN, COPD, TIA, diverticulitis, CKD, osteoporosis, remote history of breast cancer, small lung nodule. She presented to ED via EMT after suffering a fall with neuro changes at her home on 4/06/22. She underwent rapid stroke evaluation in ED for suspected CVA and was sent to radiology for urgent embolectomy of acute right hemispheric CVA with right distal M1  thrombus. As a result of her stroke she has been experiencing dysphagia and will have a video swallow study tomorrow for further evaluation. She had made comments that she would not wish to have a permanent feeding tube.    Today, the patient was seen for:  Goals of care    Prognosis, Goals, or Advance Care Planning was addressed today with: Yes.  Mood, coping, and/or meaning in the context of serious illness were addressed today: No.  Summary/Comments:             Interval History:     Chart review/discussion with unit or clinical team members:   Spoke with Dr Lemus and RN. Dr Lemus had discussed code status with Agustina per her request this morning. She did not wish to undergo intubation for respiratory failure and requested that her code status be changed, which he did as she is her own decision maker; he reviewed with daughter Bennie. GOC remain restorative at this time. Agustina understands that she can move to a comfort focused plan of care if she wishes at any time if rehab and dysphagia prove to be too difficult to overcome.     Per patient or family/caregivers today:  Met with Agustina in room. She was able to eat 50% of her breakfast and is in process of ordering her lunch. She is hoping that she can continue to improve her swallow function and oral nutritional intake. She had requested to Dr Lemus that her code status be changed to no intubation. She wishes to continue with current cares and see how she progresses at this time.     Key Palliative Symptoms:  We are not helping to manage these symptoms currently in this patient.           Review of Systems:     Besides above, an additional  system ROS was reviewed and is unremarkable          Medications:     I have reviewed this patient's medication profile and medications during this hospitalization.    Noted meds:      artificial saliva  2 spray Swish & Spit 4x Daily     aspirin  81 mg Oral or Feeding Tube Daily    Or     aspirin  300 mg Rectal Daily      diltiazem  90 mg Oral or Feeding Tube Q6H ARIK     lactobacillus rhamnosus (GG)  1 capsule Oral or Feeding Tube BID     melatonin  2 mg Oral At Bedtime     sodium chloride (PF)  3 mL Intracatheter Q8H     acetaminophen **OR** acetaminophen, albuterol, dextrose, glucose **OR** dextrose **OR** glucagon, hydrALAZINE, lidocaine 4%, lidocaine (buffered or not buffered), - MEDICATION INSTRUCTIONS -, metoprolol, nitroGLYcerin, ondansetron **OR** ondansetron, prochlorperazine **OR** prochlorperazine **OR** prochlorperazine, sodium chloride (PF)             Physical Exam:   Temp: 98  F (36.7  C) Temp src: Oral BP: (!) 144/78 Pulse: 91   Resp: 16 SpO2: 96 % O2 Device: None (Room air)    GENERAL:  Alert, lying in bed, no distress, frail appearance.  HEAD: Normocephalic atraumatic  SCLERA: Anicteric  EXTREMITIES: Warm  RESPIRATORY: Breathing relaxed and non labored  NEUROLOGIC: Alert.  PSYCH: Calm, cooperative.           Data Reviewed:     Recent imaging reviewed, my comments on pertinents:   Results for orders placed or performed during the hospital encounter of 04/06/22   CT Head w/o Contrast    Impression    IMPRESSION: Diffuse cerebral volume loss and cerebral white matter  changes consistent with chronic small vessel ischemic disease. No  evidence for acute intracranial pathology.    Radiation dose for this scan was reduced using automated exposure  control, adjustment of the mA and/or kV according to patient size, or  iterative reconstruction technique.     CONRAD CONTRERAS MD         SYSTEM ID:  [id number]   CTA Head Neck with Contrast    Impression    IMPRESSION:  1. Probable thromboembolic occlusion at the right MCA trifurcation.  This results in delayed filling of the distal right internal carotid  artery likely due to outflow obstruction.  2. Otherwise, normal neck and head CTA.    This imaging study was discussed with the ordering provider, Dr. JEREMY ALDANA, by Dr. Contreras on 4/6/2022 8:49 AM.    Radiation dose for  this scan was reduced using automated exposure  control, adjustment of the mA and/or kV according to patient size, or  iterative reconstruction technique.     CONRAD BURGOS MD         SYSTEM ID:  [id number]   Cervical spine CT w/o contrast    Impression    IMPRESSION: Minimal degenerative retrolisthesis of C2 upon C3 and C4  upon C5. Minimal degenerative anterolisthesis of C7 upon T1. Alignment  of the cervical vertebrae is otherwise normal. Vertebral body heights  of the cervical spine are normal. Craniocervical alignment is normal.  There are no fractures of the cervical spine. Inferior endplate  deformity of T2 is again noted and does not appear appreciably changed  from a chest CT from 3/23/2021 and likely represents a degenerative  Schmorl's node deformity. No spinal canal stenosis. No prevertebral  soft tissue swelling.      Radiation dose for this scan was reduced using automated exposure  control, adjustment of the mA and/or kV according to patient size, or  iterative reconstruction technique    CONRAD BURGOS MD         SYSTEM ID:  [id number]   XR Chest Port 1 View    Impression    IMPRESSION: Single AP view of the chest was obtained. Stable  cardiomediastinal silhouette. Mild bibasilar pulmonary opacities,  likely atelectasis. No significant pleural effusion or pneumothorax.  No definite acute osseous pathology. If clinical suspicion of rib  fractures, remains high, rib series is more sensitive for detection of  subtle rib fractures.    YANE TRAN MD         SYSTEM ID:  [id number]   CT Head Perfusion w Contrast    Impression    IMPRESSION: There is delayed arrival of the contrast bolus throughout  the majority of the right middle cerebral and right anterior cerebral  artery territories consistent with the right middle cerebral artery  occlusion resulting in delayed filling of the right internal carotid  circulation noted on the accompanying CT angiogram. There is decreased  cerebral blood flow  throughout the same distribution. There is  decreased cerebral blood volume in the cortex and white matter of the  mid right frontal lobe as well as at the lateral aspect of the right  temporal lobe worrisome for infarct. No other perfusion defects.    Radiation dose for this scan was reduced using automated exposure  control, adjustment of the mA and/or kV according to patient size, or  iterative reconstruction technique    CONRAD BURGOS MD         SYSTEM ID:  [id number]   IR Carotid Cerebral Angiogram Bilateral    Impression    Impression:   Successful mechanical thrombectomy of the right M1 MCA with TICI 2b  recanalization after 3 passes of Solitaire accompanied with penumbra  aspiration technique. There is a partial recanalization of the right  angular artery in the M3 segment with a right parietal lobe perfusion  deficit.     Patient has severely atherosclerotic right common femoral artery and  severely calcified and atherosclerotic right superficial and deep  femoral arteries. Guillermo pressure was held to achieve hemostasis.    Daxa Schulz MD  Endovascular Surgical Neuroradiology Fellow  Pager: [phone]      I was present and scrubbed in for the entire procedure    I have personally reviewed the examination and initial interpretation  and I agree with the findings.    YOKO JOLLY MD         SYSTEM ID:  [id number]   CT Head w/o Contrast    Impression    IMPRESSION:   1. Focus of hyperdensity at the high posteromedial right frontal lobe  could represent a tiny focus of intracranial hemorrhage. Continued  surveillance recommended.  2. Diffuse cerebral volume loss and cerebral white matter changes  consistent with chronic small vessel ischemic disease.      Radiation dose for this scan was reduced using automated exposure  control, adjustment of the mA and/or kV according to patient size, or  iterative reconstruction technique    CONRAD BURGOS MD         SYSTEM ID:  [id number]   XR Pelvis Port 1/2  Views    Impression    IMPRESSION: No evidence of acute fracture. Mild bilateral hip  degenerative changes. Bob catheter in place. Contrast in the  bladder.     ZEINA CISSE MD         SYSTEM ID:  [id number]   CT Head w/o Contrast    Impression    IMPRESSION:  1.  Stable small volume acute subarachnoid hemorrhage within the right cingulate sulcus. No new intracranial hemorrhage.  2.  Developing regions of acute infarction within the right anterior cerebral artery territory and right middle cerebral artery posterior division territory.  3.  Stable chronic infarction within the right posterior cerebral artery territory.  4.  Stable mild to moderate chronic small vessel ischemic disease and generalized brain parenchymal volume loss.   MR Brain w/o & w Contrast    Impression    IMPRESSION:  1.  Evolving ischemic infarcts in the right middle cerebral and right anterior cerebral artery distributions.  2.  Mild dural thickening and enhancement along the right cerebral convexity.  3.  Generalized brain atrophy and presumed microvascular ischemic changes as detailed above.   XR Abdomen Port 1 View    Impression    IMPRESSION: An enteric tube is in place with tip likely in the gastric antrum or first portion of the duodenum. Small bilateral pleural effusions, greater on the right. Tortuous calcified thoracic aorta.   XR Chest Port 1 View    Impression    IMPRESSION: Mild cardiac enlargement. Small bilateral pleural effusions with bibasilar infiltrate/atelectasis greater on the right. Underlying emphysematous change. Feeding tube.   XR Video Swallow with SLP or OT    Impression    IMPRESSION:  Thin: Premature spillage to the piriform sinuses with reduced  epiglottic inversion. Aspiration. With chin tuck maneuver, there was  mild to moderate flash penetration.    Mildly thick: Deep penetration/aspiration observed. Mild pharyngeal  residue.    Moderately thick: Moderate to deep penetration. Mild penetration noted  with chin  tuck maneuver.    Puree: No penetration or aspiration.    Semisolid: No penetration or aspiration. Moderate pharyngeal residue  primarily in the vallecula. There was relatively good clearance of the  residue utilizing moderately thick liquid wash.    Solid: Not administered.    This study only includes the cervical esophagus. Please see separate  report from speech pathology for additional details.    KARO MAC MD         SYSTEM ID:  [id number]   XR Chest Port 1 View    Impression    IMPRESSION: Similar small right effusion and associated compressive  atelectasis and or infiltrate, question worsening left-sided pleural  effusion and associated atelectasis and/or infiltrate. Enteric tube  tip below the margin of study.    KARO FRANCIS MD         SYSTEM ID:  [id number]   CT Chest Pulmonary Embolism w Contrast    Impression    IMPRESSION:  1.  No pulmonary embolism demonstrated.  2.  Moderate bilateral pleural effusions and associated compressive  atelectasis and/or infiltrate.  3.  Moderate to severe emphysema.     KARO FRANCIS MD         SYSTEM ID:  [id number]   Echocardiogram Complete - For age > 60 yrs   Result Value Ref Range    LVEF  55%      Lab Results   Component Value Date    WBC 9.2 04/19/2022    WBC 9.3 04/17/2022    WBC 10.3 04/17/2022    HGB 13.4 04/19/2022    HGB 12.3 04/17/2022    HGB 13.1 04/17/2022    HCT 40.8 04/19/2022    HCT 37.4 04/17/2022    HCT 40.0 04/17/2022     04/19/2022     04/17/2022     04/17/2022     04/19/2022     04/18/2022     04/16/2022    POTASSIUM 3.6 04/19/2022    POTASSIUM 3.6 04/18/2022    POTASSIUM 3.5 04/17/2022    CHLORIDE 108 04/19/2022    CHLORIDE 108 04/18/2022    CHLORIDE 106 04/16/2022    CO2 28 04/19/2022    CO2 29 04/18/2022    CO2 29 04/16/2022    BUN 20 04/19/2022    BUN 24 04/18/2022    BUN 21 04/16/2022    CR 0.70 04/19/2022    CR 0.65 04/18/2022    CR 0.63 04/16/2022    GLC 85 04/19/2022     (H) 04/19/2022    GLC 94  04/19/2022    DD 0.6 (H) 03/26/2017    NTBNPI 1960 (H) 02/15/2009    TROPI  03/26/2017     <0.015  The 99th percentile for upper reference range is 0.045 ug/L.  Troponin values in   the range of 0.045 - 0.120 ug/L may be associated with risks of adverse   clinical events.      TROPI  03/26/2017     <0.015  The 99th percentile for upper reference range is 0.045 ug/L.  Troponin values in   the range of 0.045 - 0.120 ug/L may be associated with risks of adverse   clinical events.      TROPI  08/17/2015     <0.015  The 99th percentile for upper reference range is 0.045 ug/L.  Troponin values in   the range of 0.045 - 0.120 ug/L may be associated with risks of adverse   clinical events.      AST 64 (H) 04/19/2022    AST 53 (H) 10/06/2020    AST 13 10/02/2014    ALT 58 (H) 04/19/2022    ALT 52 (H) 10/06/2020    ALT 10 01/12/2018    ALKPHOS 80 04/19/2022    ALKPHOS 55 10/06/2020    ALKPHOS 46 10/02/2014    BILITOTAL 0.8 04/19/2022    BILITOTAL 0.7 10/06/2020    BILITOTAL 0.5 10/02/2014    INR 1.20 (H) 04/07/2022    INR 1.09 04/06/2022    INR 1.03 10/22/2014

## 2022-04-19 NOTE — PLAN OF CARE
"    SLP - Swallow Tx was provided this pm.  Reviewed RN report regarding no overt coughing during po intake at breakfast with some coughing after meal.  Pt was able to follow mod SLP cues to use strategies with puree trials during today's session.  No coughing observed and voice quality remained clear.  Discussed slight improvement in swallow function today.  Pt stated she doesn't want to continue swallow Tx with exercises, but would like to \"die with dignity\".  Pt stated she feels her quality of life would be poor due to overall neuro deficits.  Will defer to MD/pt/family on continued POC discussion.  If hospice/comfort care is pursued, will defer to MD for liberalization of diet as indicated/if pt desires advanced po for comfort.  SLP to follow/provide Tx as indicated.    "

## 2022-04-19 NOTE — PROGRESS NOTES
Pt here with R MCA CVA. A&O x4. Neuros with L hemiplegia, L facial droop. VSS on RA. NPO until speech re-evaluation. Takes pills through NG tube. TF running at 45 mL/hr with 120 mL FWF, stopped at 0600. Up with Ax2 and lift. Purewick in place, incontinent of B&B. Denies pain. Pt scoring green on the Aggression Stop Light Tool. Discharge pending.

## 2022-04-19 NOTE — PLAN OF CARE
Neuro: Unchanged neuro exam with left hemiplegia.  Alert today.  Endorses mild depressive feelings.   GI:  Risk for aspiration. 1:1 supervision with eating and fully upright Rev trend bed for optimal chair like position. Tube feeds overnight with plan for follow up speech eval tomorrow. Family at bedside today very supportive and participating in cares.  : Purewick in place.    Denies pain. ROM provided to Left UE.  Activity Dangle at bedside and up to Bed chair.        Goal Outcome Evaluation:    Plan of Care Reviewed With: patient, daughter, son       Outcome Evaluation: Improving status

## 2022-04-19 NOTE — PROVIDER NOTIFICATION
"Sent FYI page to Dr. Harris \"Pt did well, ate 50% without coughing; though noted to have a coughing episode after she had finished eating probably on saliva, O2 sats still good. Thank you!\"  "

## 2022-04-19 NOTE — PROGRESS NOTES
[facility]    Medicine Progress Note - Hospitalist Service    Date of Admission:  4/6/2022    Assessment & Plan            Mia Rodriguez is a 88 year old female who presented to the [facility] ER with acute neuro changes and associated fall.  She was felt to have an acute CVA and urgently taken to radiology for embolectomy.     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    - Systolic goal -160 range.      - PT/OT/Speech/SW/CC consults  - Continue close neuro monitoring  - ASA 81mg daily for now   - Repeat NCCT in 2 weeks, if stable then would restart Eliquis 2.5mg BID + stop ASA at that time   - Follow-up with [facility] in 6-8 weeks   - Stroke neuro following peripherally, please page for questions  - melatonin 2mg at bedtime scheduled to promote sleep  - monitor for delirium     Severe dysphagia post CVA  Underweight (wt 37.9Kg - BMI 16.8)  - place NG 4/9  - patient stated to MD Lemus and daughter Bennie on 4/9 that she would never want a permanent feeding tube.  - Video swallow completed on 4/12  - appreciate SLP- diet advanced to pureed diet level per slp   - TF switched to night time only per dietitian on 4/15, wean as tolerated  - calorie count  - continue tube feeds and wean as tolerated   - increase in dysphagia 4/18. Will hold PO until 4/19, then retrial PO. Will start TF 4/18 and continue     Probable aspiration pneumonia   Bilateral pleural effusions  Hypoxia  On 4/10, New fever, ? Right sided crackles, abdominal xr from 4/9 with clear lung bases. Suspect aspiration pneumonia due to significant oropharyngeal dysphagia.    * CXR-Mild cardiac enlargement. Small bilateral pleural effusions with bibasilar infiltrate/atelectasis greater on the right. Underlying emphysematous change.   * CXR on 4/12-Similar small right effusion and associated compressive  atelectasis and or infiltrate, question worsening left-sided pleural effusion and associated atelectasis and/or infiltrate.   *CT chest PE due to increased rr given her immobility following cva- negative for PE, mod b/l pleural effusion and associated compressive atelectasis, mod to severe emphysema  - completed 5 days of Ceftriaxone on 4/14. Discontinued.   - lasix 10mg IV x 1 on 4/12 and 4/13. Lasix 20mg IV x 1 on 4/14, 4/15 and 4/16    Abnormal UA  - UA slightly abnormal, doubt UTI.  Urine culture was no growth     Fall  -  No overt majory injury. Pelvis XR w/o acute findings 4/6     Atrial fibrillation with RVR: improved rate control  - HR in 80s-100  - stopped  dilt drip on 4/12  - continue diltiazem 90mg q6hrs  - Hold Eliquis as above  - HR has been stable in last 24-48 hrs in the telemetry was discontinued     Hypothyroidism:  - Hold PO levothyroxine while NPO     CKD, history of renal stones:  -  Monitor     COPD, smoking history:  -  No overt exacerbation.  Albuterol PRN.  Monitor.     Reflux:  -  Pepcid     Osteoporosis:  -  Monitor, baseline on prolia     HyperNa, resolved  - likely iatrogenic from IVF and from absent PO     Hypoglycemia  - continue tube feeds     Hypokalemia and hypophosphetmia  - replace per protocol     Difficulty sleeping  - related to neurochecks     Goals of care  * discussion ongoing but patient states no permanent feeding tube. She is willing to trial NG for a period to see how much recovery of swallow function she will have have.   - she states a clear preference for continued restorative plan of care as of 4/9 but she will let us know if quality of life becomes intolerable.   - provider on 4/10 discussd with pt/family and code changed to DNR/DNI. In the evening, cross cover MD spoke with son and code changed to DNR- ok with intubation. Refer to progress notes on 4/10 for details.  - Palliative care consulted, had conference on 4/12-refer to palliative care note. Current goal is  "to continue with restorative cares see if swallowing improves and code status remains DNR- okay for intubation  - 4/19 patient states \"I do not want to be intubated.\" when asked if she would like me to discuss with family, \"No, just change it.\" Code status changed to DNR/DNI.         Diet: Snacks/Supplements Adult: Magic Cup; With Meals  Adult Formula Drip Feeding: Continuous Jevity 1.5; Nasoduodenal tube; Goal Rate: 45; mL/hr; From: 12:05 PM; 6:00 AM; Medication - Feeding Tube Flush Frequency: At least 15-30 mL water before and after medication administration and with tube cloggi...  Pureed Diet (level 4) Liquidized/Moderately Thick (level 3) (with tsp, RN supervised)    DVT Prophylaxis: Pneumatic Compression Devices  Bob Catheter: Not present  Central Lines: None  Cardiac Monitoring: None  Code Status: No CPR- Do NOT Intubate      Disposition Plan   Expected Discharge: 04/20/2022     Anticipated discharge location:  Awaiting care coordination huddle  Delays:           The patient's care was discussed with the Bedside Nurse, Patient and Patient's Family.    Tino Lemus MD  Hospitalist Service  [facility]  Securely message with the Vocera Web Console (learn more here)  Text page via [employer] Paging/Directory     Clinically Significant Risk Factors Present on Admission                    Time Spent on this Encounter   I spent 46 minutes on the unit/floor managing the care of Mia Rodriguez. Over 50% of my time was spent on the following:   - Counseling the patient and/or family regarding: diagnostic results, prognosis and risks and benefits of treatment options  - Coordination of care with the: consultant(s) and nurse    Discussed aspiration, diet, nutrition, life expectancy, code status, comfort care and hospice philosophy. Coordination with palliative care, RN. Discussed with family by phone for 16 min. Discussed with pt for 20 min. Discussed with NESTOR Lemus, " "MD      ______________________________________________________________________    Interval History   Seen, examined. Seen in AM before breakfast. Feels fatigued. Energy level is \"zero\". States does not want to be intubated. Wanted to talk more about what comfort care/hospice are/would look like.     Data reviewed today: I reviewed all medications, new labs and imaging results over the last 24 hours. I personally reviewed no images or EKG's today.    Physical Exam   Vital Signs: Temp: 98.1  F (36.7  C) Temp src: Oral BP: 123/62 Pulse: 86   Resp: 16 SpO2: 92 % O2 Device: None (Room air)    Weight: 116 lbs 6.45 oz        General: Patient appears comfortable and in no acute distress.  NG tube  HEENT: Head is atraumatic, normocephalic.    Neck: Neck is supple  Respiratory: Lungs are clear to auscultation bilaterally with no wheeze or crackles  Cardiovascular: Regular rate , S1 and S2 normal with no murmer or rubs or gallops, edema over the legs  Abdomen:   soft , non tender , non distended and bowel sound present   Skin: No skin rashes or lesions to inspection or palpation.  Neurologic: Left hemiplegia  Musculoskeletal: Normal Range of motion over upper and lower on the right and she did not move her extremities on the left  Psychiatric: cooperative     Data   Recent Labs   Lab 04/19/22  0805 04/18/22  0917 04/18/22  0608 04/18/22  0021 04/17/22  1730 04/17/22  1207 04/17/22  0857 04/16/22  0830 04/16/22  0807 04/15/22  0823 04/15/22  0617   WBC  --   --   --   --  9.3  --  10.3  --   --   --   --    HGB  --   --   --   --  12.3  --  13.1  --   --   --   --    MCV  --   --   --   --  99  --  99  --   --   --   --    PLT  --   --   --   --  182  --  184  --   --   --   --    NA  --  141  --   --   --   --   --   --  139  --  140   POTASSIUM  --  3.6  --   --   --   --  3.5  --  3.8  --  3.9   CHLORIDE  --  108  --   --   --   --   --   --  106  --  109   CO2  --  29  --   --   --   --   --   --  29  --  26   BUN  --  24  " --   --   --   --   --   --  21  --  20   CR  --  0.65  --   --   --   --   --   --  0.63  --  0.58   ANIONGAP  --  4  --   --   --   --   --   --  4  --  5   CYNDEE  --  9.5  --   --   --   --   --   --  9.3  --  8.8   GLC 94 123* 103*   < >  --    < >  --    < > 97   < > 126*    < > = values in this interval not displayed.     No results found for this or any previous visit (from the past 24 hour(s)).  Medications     dextrose       - MEDICATION INSTRUCTIONS -         artificial saliva  2 spray Swish & Spit 4x Daily     aspirin  81 mg Oral or Feeding Tube Daily    Or     aspirin  300 mg Rectal Daily     diltiazem  90 mg Oral or Feeding Tube Q6H ARIK     lactobacillus rhamnosus (GG)  1 capsule Oral or Feeding Tube BID     melatonin  2 mg Oral At Bedtime     sodium chloride (PF)  3 mL Intracatheter Q8H

## 2022-04-20 NOTE — PROGRESS NOTES
[facility]    Medicine Progress Note - Hospitalist Service    Date of Admission:  4/6/2022    Assessment & Plan            Mia Rodriguez is a 88 year old female who presented to the [facility] ER with acute neuro changes and associated fall.  She was felt to have an acute CVA and urgently taken to radiology for embolectomy.     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    - Systolic goal -160 range.      - PT/OT/Speech/SW/CC consults  - Continue close neuro monitoring  - ASA 81mg daily for now   - Repeat NCCT in 2 weeks, if stable then would restart Eliquis 2.5mg BID + stop ASA at that time   - Follow-up with [facility] in 6-8 weeks   - Stroke neuro following peripherally, please page for questions  - melatonin 2mg at bedtime scheduled to promote sleep  - monitor for delirium    Overall remain stable at this time.      Severe dysphagia post CVA  Underweight (wt 37.9Kg - BMI 16.8)  - place NG 4/9  - patient stated to MD Lemus and daughter Bennie on 4/9 that she would never want a permanent feeding tube.  - Video swallow completed on 4/12  - appreciate SLP- diet advanced to pureed diet level per slp   - TF switched to night time only per dietitian on 4/15, wean as tolerated  - calorie count  - continue tube feeds and wean as tolerated   - increase in dysphagia 4/18.   She is currently on overnight tube feeding and Pureed dysphagia diet level 4, Speech to continue follow.  Patient does not want any G tube at this time.    Probable aspiration pneumonia   Bilateral pleural effusions  Acute Hypoxic respiratory failure: resolved.   On 4/10, New fever, ? Right sided crackles, abdominal xr from 4/9 with clear lung bases. Suspect aspiration pneumonia due to significant oropharyngeal dysphagia.    * CXR-Mild cardiac enlargement. Small bilateral  pleural effusions with bibasilar infiltrate/atelectasis greater on the right. Underlying emphysematous change.   * CXR on 4/12-Similar small right effusion and associated compressive atelectasis and or infiltrate, question worsening left-sided pleural effusion and associated atelectasis and/or infiltrate.   *CT chest PE due to increased rr given her immobility following cva- negative for PE, mod b/l pleural effusion and associated compressive atelectasis, mod to severe emphysema  - completed 5 days of Ceftriaxone on 4/14. Discontinued.   - lasix 10mg IV x 1 on 4/12 and 4/13. Lasix 20mg IV x 1 on 4/14, 4/15 and 4/16  Overall improve now, off oxygen and off diuretics.     Abnormal UA  - UA slightly abnormal, doubt UTI.  Urine culture was no growth     Fall  -  No overt majory injury. Pelvis XR w/o acute findings 4/6     Atrial fibrillation with RVR: improved rate control  - HR in 80s-100  - stopped  dilt drip on 4/12  - continue diltiazem 90mg q6hrs  - Hold Eliquis as above  - HR has been stable in last 24-48 hrs in the telemetry was discontinued     Hypothyroidism:  - Hold PO levothyroxine while NPO     CKD, history of renal stones:  -  Monitor     COPD, smoking history:  -  No overt exacerbation.  Albuterol PRN.  Monitor.     Reflux:  -  Pepcid     Osteoporosis:  -  Monitor, baseline on prolia     HyperNa, resolved  - likely iatrogenic from IVF and from absent PO     Hypoglycemia  - continue tube feeds     Hypokalemia and hypophosphetmia  - replace per protocol     Difficulty sleeping  - related to neurochecks     Goals of care  * discussion ongoing but patient states no permanent feeding tube. She is willing to trial NG for a period to see how much recovery of swallow function she will have have.   - she states a clear preference for continued restorative plan of care as of 4/9 but she will let us know if quality of life becomes intolerable.   - provider on 4/10 discussd with pt/family and code changed to DNR/DNI.  "In the evening, cross cover MD spoke with son and code changed to DNR- ok with intubation. Refer to progress notes on 4/10 for details.  - Palliative care consulted, had conference on 4/12-refer to palliative care note. Current goal is to continue with restorative cares see if swallowing improves and code status remains DNR- okay for intubation  - 4/19 patient states \"I do not want to be intubated.\" when asked if she would like me to discuss with family, \"No, just change it.\" Code status changed to DNR/DNI.         Diet: Snacks/Supplements Adult: Magic Cup; With Meals  Pureed Diet (level 4) Liquidized/Moderately Thick (level 3) (with tsp, RN supervised)  Adult Formula Drip Feeding: Continuous Jevity 1.5; Nasoduodenal tube; Goal Rate: 55 mL/hr x 10 hours; mL/hr; From: 8:00 PM; 6:00 AM; Medication - Feeding Tube Flush Frequency: At least 15-30 mL water before and after medication administration and ...    DVT Prophylaxis: Pneumatic Compression Devices  Bob Catheter: Not present  Central Lines: None  Cardiac Monitoring: None  Code Status: No CPR- Do NOT Intubate      Disposition Plan   Expected Discharge: 04/23/2022     Anticipated discharge location:  Awaiting care coordination huddle  Delays:           The patient's care was discussed with the Bedside Nurse, Patient and Patient's Family.    Colby Xiong MD  Hospitalist Service  [facility]  Securely message with the Vocera Web Console (learn more here)  Text page via [employer] Paging/Directory     Clinically Significant Risk Factors Present on Admission                        Colby Xiong MD      ______________________________________________________________________    Interval History   Patient seen and evaluated today in her room, feeling well, trying to eat, no fever, chills, headache or dizziness at this tie.     Data reviewed today: I reviewed all medications, new labs and imaging results over the last 24 hours. I personally reviewed no " images or EKG's today.    Physical Exam   Vital Signs: Temp: 98.1  F (36.7  C) Temp src: Oral BP: (!) 149/81 Pulse: 87   Resp: 16 SpO2: 95 % O2 Device: None (Room air)    Weight: 116 lbs 6.45 oz        General: Patient appears comfortable and in no acute distress.  NG tube in place.   HEENT: Head is atraumatic, normocephalic.    Neck: Neck is supple  Respiratory: Lungs are clear to auscultation bilaterally with no wheeze or crackles  Cardiovascular: Regular rate , S1 and S2 normal with no murmer or rubs or gallops, edema over the legs  Abdomen:   soft , non tender , non distended and bowel sound present   Skin: No skin rashes or lesions to inspection or palpation.  Neurologic: Left hemiplegia  Musculoskeletal: Normal Range of motion over upper and lower on the right and she did not move her extremities on the left  Psychiatric: cooperative     Data   Recent Labs   Lab 04/20/22  0243 04/19/22  2158 04/19/22  1639 04/19/22  1215 04/19/22  0840 04/19/22  0805 04/18/22  0917 04/18/22  0021 04/17/22  1730 04/17/22  1207 04/17/22  0857 04/16/22  0830 04/16/22  0807   WBC  --   --   --   --  9.2  --   --   --  9.3  --  10.3  --   --    HGB  --   --   --   --  13.4  --   --   --  12.3  --  13.1  --   --    MCV  --   --   --   --  99  --   --   --  99  --  99  --   --    PLT  --   --   --   --  207  --   --   --  182  --  184  --   --    NA  --   --   --   --  142  --  141  --   --   --   --   --  139   POTASSIUM  --   --   --   --  3.6  --  3.6  --   --   --  3.5  --  3.8   CHLORIDE  --   --   --   --  108  --  108  --   --   --   --   --  106   CO2  --   --   --   --  28  --  29  --   --   --   --   --  29   BUN  --   --   --   --  20  --  24  --   --   --   --   --  21   CR  --   --   --   --  0.70  --  0.65  --   --   --   --   --  0.63   ANIONGAP  --   --   --   --  6  --  4  --   --   --   --   --  4   CYNDEE  --   --   --   --  9.0  --  9.5  --   --   --   --   --  9.3   * 122* 81   < > 101*   < > 123*   < >  --     < >  --    < > 97   ALBUMIN  --   --   --   --  2.1*  --   --   --   --   --   --   --   --    PROTTOTAL  --   --   --   --  5.9*  --   --   --   --   --   --   --   --    BILITOTAL  --   --   --   --  0.8  --   --   --   --   --   --   --   --    ALKPHOS  --   --   --   --  80  --   --   --   --   --   --   --   --    ALT  --   --   --   --  58*  --   --   --   --   --   --   --   --    AST  --   --   --   --  64*  --   --   --   --   --   --   --   --     < > = values in this interval not displayed.     No results found for this or any previous visit (from the past 24 hour(s)).  Medications     dextrose       - MEDICATION INSTRUCTIONS -         artificial saliva  2 spray Swish & Spit 4x Daily     aspirin  81 mg Oral or Feeding Tube Daily    Or     aspirin  300 mg Rectal Daily     diltiazem  90 mg Oral or Feeding Tube Q6H ARIK     lactobacillus rhamnosus (GG)  1 capsule Oral or Feeding Tube BID     melatonin  2 mg Oral At Bedtime     sodium chloride (PF)  3 mL Intracatheter Q8H

## 2022-04-20 NOTE — PLAN OF CARE
Reason for Admission: R MCA CVA    Cognitive/Mentation: A/Ox 4  Neuros/CMS: Intact ex L facial droop, L hemiplegia, and dysphagia.   VS: Stable.  GI: BS +, + flatus, last BM today. Incontinent.  : Purewick in place, adequate output. Incontinent.  Pulmonary: LS clear.  Pain: Denies.     Drains: None  Skin: Scattered bruising. Sacral wound covered with mepilex  Activity: Assist x 2 with lift.  Diet: Pureed with moderately thickened liquids. Takes pills crushed through NG tube.     Aggression Stoplight Score: Green  Therapies recs: TCU  Discharge: Pending    End of shift summary: Patient requested to be changed to DNR/DNI yesterday, wristband replaced. Resource RN assisted patient with meals, no coughing noted. Turned and repositioned every 2 hours.

## 2022-04-21 NOTE — PROGRESS NOTES
"[facility] Nurse Inpatient Pressure Injury Assessment   Reason for consultation: Evaluate and treat Coccyx      ASSESSMENT  Pressure Injury: on Coccyx , present on admission   Pressure Injury is Stage 2   Contributing factor of the pressure injury: pressure, shear, nutrition, age, immobility and moisture  Status: initial assessment  Recommend provider order: None, at this time     TREATMENT PLAN  Coccyx wound: Every 3 days   1. Clean wound with saline or MicroKlenz Spray, pat dry  2. Wipe / \"clean\" the surrounding periwound tissue with skin prep (Cavilon No Sting Skin Prep #941974) and allow to dry. This will help protect periwound and help dressing adherence  3. Press a Mepilex  Sacral Dressing (PS#[id number])  to the area, making sure to conform nicely to skin curvatures.   4. Time and date dressing change  NOTE  -Reposition pt side to side sixto when in bed, every 2 hours-get the pt way over on side to completely offload pressure. This will benefit skin and respiratory function   -Keep heels elevated and floating on pillows at all times. Try using at least 2 pillows under each calf.  -When up to the chair pt needs to fully offload every 2 hours and use a chair cushion if needed       Pressure Injury Prevention (PIP) Plan:  If patient is declining pressure injury prevention interventions: Explore reason why and address patient's concerns and Educate on pressure injury risk and prevention intervention(s)  Mattress: Follow bed algorithm, reassess daily and order specialty mattress, if indicated.  HOB: Maintain at or below 30 degrees, unless contraindicated  Repositioning in bed: Every 1-2 hours  and Left/right positioning; avoid supine  Heels: Keep elevated off mattress and Pillows under calves  Protective Dressing: Sacral Mepilex for prevention (#[id number]),  especially for the agitated patient   Positioning Equipment: None  Chair positioning: Chair cushion (#[id number])    If patient has a buttock pressure injury, or high risk for PI " use chair cushion or SPS.  Moisture Management: Perineal cleansing /protection: Follow Incontinence Protocol, Avoid brief in bed and Clean and dry skin folds with bathing   Under Devices: Inspect skin under all medical devices during skin inspection  and Ensure tubes are stabilized without tension  Ask provider to discontinue device when no longer needed.          Orders Written  WOC Nurse follow-up plan:weekly  Nursing to notify the Provider(s) and re-consult the WOC Nurse if wound(s) deteriorates or new skin concern.    Patient History  According to provider note(s):  Mia Rodriguez is a 88 year old female who presented to the [facility] ER with acute neuro changes and associated fall.  She was felt to have an acute CVA and urgently taken to radiology for embolectomy.    Objective Data  Containment of urine/stool:     Current Diet/ Nutrition:  Orders Placed This Encounter      Pureed Diet (level 4) Liquidized/Moderately Thick (level 3) (with tsp, RN supervised)      Output:   I/O last 3 completed shifts:  In: 360 [P.O.:140]  Out: 450 [Urine:450]    Risk Assessment:   Sensory Perception: 2-->very limited  Moisture: 3-->occasionally moist  Activity: 2-->chairfast  Mobility: 2-->very limited  Nutrition: 3-->adequate  Friction and Shear: 2-->potential problem  Garrison Score: 14      Labs:   Recent Labs   Lab 04/19/22  0840   ALBUMIN 2.1*   HGB 13.4   WBC 9.2       Physical Exam  Skin inspection: focused Coccyx, buttock      Wound Location:  Coccyx  Date of last Photo 4/21/22        Wound History: Pt with coccyx pressure injury present on admission  Measurements (length x width x depth, in cm) 4 cm x 1.7 cm  x  0.1 cm   Wound Base:  100 % pale pink/tan tissue  Tunneling N/A  Undermining N/A  Palpation of the wound bed: normal directly over very bony prominence  Periwound skin: intact, erythema- blanchable and macerated  Color: red  Temperature: normal   Drainage:, small  Description of drainage: serous  Odor: none  Pain:  moderate, aching and throbbing    Interventions  Current support surface: Standard  Low air loss mattress  Current off-loading measures: Chair cushion and Pillows  Repositioning aid: Pillows  Visual inspection of wound(s) completed   Tube Securement: NA  Wound Care: was done per plan of care.  Supplies: floor stock  Educated provided: importance of repositioning, plan of care, Moisture management and Off-loading pressure  Education provided to: patient  and nurse  Discussed importance of:repositioning every 2 hours, off-loading pressure to wound, their role in pressure injury prevention, head elevation <30 degrees and moisture management  Discussed plan of care with Patient and Nurse    Bennie Murphy RN CWOCN

## 2022-04-21 NOTE — PLAN OF CARE
Reason for Admission: R MCA CVA     Cognitive/Mentation: A/Ox 4  Neuros/CMS: Intact ex L facial droop, L hemiplegia, and dysphagia.   VS: Stable.  GI: BS +, + flatus, last BM today. Incontinent.  : Purewick in place, adequate output. Incontinent.  Pulmonary: LS clear.  Pain: Pain to coccyx from pressure injury, tylenol given.      Drains: None  Skin: Scattered bruising. Sacral wound covered with mepilex, WOC consult added  Activity: Assist x 2 with lift.  Diet: Pureed with moderately thickened liquids. Takes pills crushed through NG tube or crushed in applesauce     Aggression Stoplight Score: Green  Therapies recs: TCU  Discharge: Pending     End of shift summary: Resource RN assisted patient with meals, no coughing with breakfast but did cough with lunch. SLP saw and attempted to advance diet but patient didn't tolerate, previous diet maintained. Turned and repositioned every 2 hours, up to the chair for lunch but moved back soon after d/t coccyx pain. Patient's family planning to talk to palliative care tomorrow and meet with social work regarding plan for discharge.

## 2022-04-21 NOTE — PLAN OF CARE
[id number]    Reason for Admission: R MCA CVA    Cognitive/Mentation: A/Ox 4  Neuros/CMS: Intact ex L hemiplegia, withdraws to pain. L droop  VS: STable.  GI: BS active x4, passing flatus, last BM 4/20/22. inContinent.  : Voiding adequate amounts. inContinent.  Pulmonary: LS clear throughout.  Pain: denies.     Drains/Lines: NG, clamped during the day,  Skin: open area on coccyx, mepilex in place  Activity: Assist x 2 with lift.  Diet: pureed with moderately thick liquids. Takes through tube.   Therapies recs: pending  Discharge: pending    Aggression Stoplight Tool: green

## 2022-04-21 NOTE — PROGRESS NOTES
CLINICAL NUTRITION SERVICES - REASSESSMENT NOTE      Recommendations Ordered by Registered Dietitian (RD):     Magic Cup BID with lunch and dinner  Add Gelatein PLUS to lunch meal for added protein  Re-start calorie count 4/21-4/23 ---> goal is that pt consumes at least 1200 cals and 45 gm pro per day  (400 lexie and 15 gm pro per meal)       Malnutrition: (4/19)  % Weight Loss:  None noted  % Intake:  Decreased intake does not meet criteria for malnutrition  - supplemental EN to meet nutrition needs with PO intake   Subcutaneous Fat Loss:  Low fat stores at baseline   Muscle Loss:  Temporal region - moderate depletion and Clavicle bone region - moderate depletion (do not suspect acute loss, likely some age related sarcopenia)  Fluid Retention:  None noted     Malnutrition Diagnosis: Patient does not meet two of the above criteria necessary for diagnosing malnutrition       EVALUATION OF PROGRESS TOWARD GOALS   Diet:    Pureed (L4), Moderately Thick Liquids (L3)  Magic Cup with lunch and dinner    Nutrition Support:    Type of Feeding Tube: ND  Enteral Frequency:  Night Feeding  Enteral Regimen: Jevity 1.5 @ 55 mL/hr x 10 hrs (8pm-6am)  Total Enteral Provisions: 825 kcal (22 kcal/kg, 72% needs), 35 g protein (0.9 g/kg, 78% needs), 11 fiber, 119 g CHO and 418 mL free water daily   Free Water Flush: 120 mL every 4 hrs   TOTAL (TF + flushes) = 1138 mL free water    Intake/Tolerance:    Chart reviewed    4/20: SLP ---> Improved initiation noted on initial swallows during Tx. Rec: continue an IDDSI Diet Level 4 puree and mod thick liquids by spoon with 1:1 assist/supervision and careful use of precautions    Plan for VSS today    BM x6  Culturell daily    RN just fed pt breakfast - Greek yogurt, fruit, juice (~300 cals)        ASSESSED NUTRITION NEEDS:  Dosing Weight 37.9 kg (admit wt 4/6)  Estimated Energy Needs: 4657-0432 kcals (30-35 Kcal/Kg)  Justification: underweight  Estimated Protein Needs: 45-55 grams protein  (1.2-1.5 g pro/Kg)  Justification: Repletion  Estimated Fluid Needs: 1 mL/Kcal or per provider pending fluid status       NEW FINDINGS:     Vitals:    04/06/22 0826 04/06/22 1215 04/15/22 1549 04/18/22 0049   Weight: 37.5 kg (82 lb 10.8 oz) 37.9 kg (83 lb 8.9 oz) 43.5 kg (95 lb 14.4 oz) 52.8 kg (116 lb 6.5 oz)     Net I/O: -1,323 mL    Previous Goals (4/19):   TF + PO intake to meet % assessed nutrition needs   Evaluation: Met    Previous Nutrition Diagnosis (4/19):   Inadequate oral intake related to dysphagia, decreased appetite as evidenced by PO intake meeting 50% minimum energy needs over the past 6 days via calorie counts above   Evaluation: No change, modified below        CURRENT NUTRITION DIAGNOSIS  Inadequate oral intake related to dysphagia/modified diet and decreased appetite as evidenced by continued need for EN to meet nutrition needs    INTERVENTIONS  Recommendations / Nutrition Prescription  Pureed, Moderately Thick Liquids (per SLP)  Magic Cup BID with lunch and dinner  Add Gelatein PLUS to lunch meal for added protein  Re-start calorie count 4/21-4/23 ---> goal is that pt consumes at least 1200 cals and 45 gm pro per day  (400 lexie and 15 gm pro per meal)      Goals  Pt to meet lower end of nutrition needs orally (1200 cals, 45 gm pro)      MONITORING AND EVALUATION:  Progress towards goals will be monitored and evaluated per protocol and Practice Guidelines

## 2022-04-21 NOTE — PROGRESS NOTES
[facility]    Medicine Progress Note - Hospitalist Service    Date of Admission:  4/6/2022    Assessment & Plan            Mia Rodriguez is a 88 year old female who presented to the [facility] ER with acute neuro changes and associated fall.  She was felt to have an acute CVA and urgently taken to radiology for embolectomy.     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    - Systolic goal -160 range.      - PT/OT/Speech/SW/CC consults  - Continue close neuro monitoring  - ASA 81mg daily for now   - Repeat NCCT in 2 weeks, if stable then would restart Eliquis 2.5mg BID + stop ASA at that time   - Follow-up with [facility] in 6-8 weeks   - Stroke neuro following peripherally, appreciate input.   - melatonin 2mg at bedtime scheduled to promote sleep  - monitor for delirium    Overall remain stable at this time. Awaiting safe disposition      Severe dysphagia post CVA  Underweight (wt 37.9Kg - BMI 16.8)  - place NG 4/9  - patient stated to MD Lemus and daughter Bennie on 4/9 that she would never want a permanent feeding tube.  - Video swallow completed on 4/12  - appreciate SLP- diet advanced to pureed diet level per slp   - TF switched to night time only per dietitian on 4/15, wean as tolerated  - calorie count  - continue tube feeds and wean as tolerated   - increase in dysphagia 4/18.   She is currently on overnight tube feeding and Pureed dysphagia diet level 4, Speech to continue follow.  Patient does not want any G tube at this time, calorie count and document oral intake     Probable aspiration pneumonia   Bilateral pleural effusions  Acute Hypoxic respiratory failure: resolved.   On 4/10, New fever, ? Right sided crackles, abdominal xr from 4/9 with clear lung bases. Suspect aspiration pneumonia due to significant oropharyngeal  dysphagia.    * CXR-Mild cardiac enlargement. Small bilateral pleural effusions with bibasilar infiltrate/atelectasis greater on the right. Underlying emphysematous change.   * CXR on 4/12-Similar small right effusion and associated compressive atelectasis and or infiltrate, question worsening left-sided pleural effusion and associated atelectasis and/or infiltrate.   *CT chest PE due to increased rr given her immobility following cva- negative for PE, mod b/l pleural effusion and associated compressive atelectasis, mod to severe emphysema  - completed 5 days of Ceftriaxone on 4/14. Discontinued.   - lasix 10mg IV x 1 on 4/12 and 4/13. Lasix 20mg IV x 1 on 4/14, 4/15 and 4/16  Overall improve now, off oxygen and off diuretics.     Abnormal UA  - UA slightly abnormal, doubt UTI.  Urine culture was no growth     Fall  -  No overt majory injury. Pelvis XR w/o acute findings 4/6     Atrial fibrillation with RVR: improved rate control  - HR in 80s-100  - stopped  dilt drip on 4/12  - continue diltiazem 90mg q6hrs  - Hold Eliquis as above  - HR has been stable in last 24-48 hrs in the telemetry was discontinued     Hypothyroidism:  - will resume her Levothyroxine now.      CKD, history of renal stones:  -  Monitor     COPD, smoking history:  -  No overt exacerbation.  Albuterol PRN.  Monitor.     Reflux:  -  Pepcid     Osteoporosis:  -  Monitor, baseline on prolia     HyperNa, resolved  - likely iatrogenic from IVF and from absent PO     Hypoglycemia  - continue tube feeds     Hypokalemia and hypophosphetmia  - replace per protocol     Difficulty sleeping  - related to neurochecks     Goals of care  * discussion ongoing but patient states no permanent feeding tube. She is willing to trial NG for a period to see how much recovery of swallow function she will have have.   - she states a clear preference for continued restorative plan of care as of 4/9 but she will let us know if quality of life becomes intolerable.   -  "provider on 4/10 discussd with pt/family and code changed to DNR/DNI. In the evening, cross cover MD spoke with son and code changed to DNR- ok with intubation. Refer to progress notes on 4/10 for details.  - Palliative care consulted, had conference on 4/12-refer to palliative care note. Current goal is to continue with restorative cares see if swallowing improves and code status remains DNR- okay for intubation  - 4/19 patient states \"I do not want to be intubated.\" when asked if she would like me to discuss with family, \"No, just change it.\" Code status changed to DNR/DNI.         Diet: Snacks/Supplements Adult: Magic Cup; With Meals  Pureed Diet (level 4) Liquidized/Moderately Thick (level 3) (with tsp, RN supervised)  Adult Formula Drip Feeding: Continuous Jevity 1.5; Nasoduodenal tube; Goal Rate: 55 mL/hr x 10 hours; mL/hr; From: 8:00 PM; 6:00 AM; Medication - Feeding Tube Flush Frequency: At least 15-30 mL water before and after medication administration and ...  Room Service  Calorie Counts  Snacks/Supplements Adult: Other; Lunch: pineapple GelateinPLUS; With Meals    DVT Prophylaxis: Pneumatic Compression Devices  Bob Catheter: Not present  Central Lines: None  Cardiac Monitoring: None  Code Status: No CPR- Do NOT Intubate      Disposition Plan   Expected Discharge: 04/23/2022     Anticipated discharge location:  Awaiting care coordination huddle  Delays:           The patient's care was discussed with the Bedside Nurse, Patient and Patient's Family.    Colby Xiong MD  Hospitalist Service  [facility]  Securely message with the Vocera Web Console (learn more here)  Text page via [employer] Paging/Directory     Clinically Significant Risk Factors Present on Admission                        Colby Xiong MD      ______________________________________________________________________    Interval History   Patient not feeling well as she has to decide where she can go, otherwise denies " any pain, nausea, vomiting, fever, chills, headache or dizziness at this time.     No other significant event overnight.     Data reviewed today: I reviewed all medications, new labs and imaging results over the last 24 hours. I personally reviewed no images or EKG's today.    Physical Exam   Vital Signs: Temp: 98.5  F (36.9  C) Temp src: Oral BP: 124/77 Pulse: 88   Resp: 16 SpO2: 94 % O2 Device: None (Room air)    Weight: 116 lbs 6.45 oz        General: Patient appears comfortable and in no acute distress.  NG tube in place.   HEENT: Head is atraumatic, normocephalic.    Neck: Neck is supple  Respiratory: Lungs are clear to auscultation bilaterally with no wheeze or crackles  Cardiovascular: Regular rate , S1 and S2 normal with no murmer or rubs or gallops, edema over the legs  Abdomen:   soft , non tender , non distended and bowel sound present   Skin: No skin rashes or lesions to inspection or palpation.  Neurologic: Left hemiplegia  Musculoskeletal: Normal Range of motion over upper and lower on the right and she did not move her extremities on the left  Psychiatric: cooperative     Data   Recent Labs   Lab 04/21/22  0733 04/21/22  0145 04/20/22  0243 04/19/22  1215 04/19/22  0840 04/19/22  0805 04/18/22  0917 04/18/22  0021 04/17/22  1730 04/17/22  1207 04/17/22  0857 04/16/22  0830 04/16/22  0807   WBC  --   --   --   --  9.2  --   --   --  9.3  --  10.3  --   --    HGB  --   --   --   --  13.4  --   --   --  12.3  --  13.1  --   --    MCV  --   --   --   --  99  --   --   --  99  --  99  --   --    PLT  --   --   --   --  207  --   --   --  182  --  184  --   --    NA  --   --   --   --  142  --  141  --   --   --   --   --  139   POTASSIUM  --   --   --   --  3.6  --  3.6  --   --   --  3.5  --  3.8   CHLORIDE  --   --   --   --  108  --  108  --   --   --   --   --  106   CO2  --   --   --   --  28  --  29  --   --   --   --   --  29   BUN  --   --   --   --  20  --  24  --   --   --   --   --  21   CR  --    --   --   --  0.70  --  0.65  --   --   --   --   --  0.63   ANIONGAP  --   --   --   --  6  --  4  --   --   --   --   --  4   CYNDEE  --   --   --   --  9.0  --  9.5  --   --   --   --   --  9.3   GLC 96 79 130*   < > 101*   < > 123*   < >  --    < >  --    < > 97   ALBUMIN  --   --   --   --  2.1*  --   --   --   --   --   --   --   --    PROTTOTAL  --   --   --   --  5.9*  --   --   --   --   --   --   --   --    BILITOTAL  --   --   --   --  0.8  --   --   --   --   --   --   --   --    ALKPHOS  --   --   --   --  80  --   --   --   --   --   --   --   --    ALT  --   --   --   --  58*  --   --   --   --   --   --   --   --    AST  --   --   --   --  64*  --   --   --   --   --   --   --   --     < > = values in this interval not displayed.     No results found for this or any previous visit (from the past 24 hour(s)).  Medications     dextrose       - MEDICATION INSTRUCTIONS -         artificial saliva  2 spray Swish & Spit 4x Daily     aspirin  81 mg Oral or Feeding Tube Daily    Or     aspirin  300 mg Rectal Daily     diltiazem  90 mg Oral or Feeding Tube Q6H [facility]     lactobacillus rhamnosus (GG)  1 capsule Oral or Feeding Tube BID     melatonin  2 mg Oral or NG Tube At Bedtime     sodium chloride (PF)  3 mL Intracatheter Q8H

## 2022-04-22 NOTE — PLAN OF CARE
Pt here with R MCA CVA. Pt lethargic throughout shift, oriented x4 to difficult to assess d/t lethargy, forgetful. Dr. Xiong notified episode of increased RR, SOB, desating and need for O2, RRT called, placed Oxymask, weaned down to 3L started at 10L, sating mid 90's, low grade temp., Tylenol given, intermittent tachy HR, not sustaining HR > 120, otherwise VSS. CMS and Neuro's intact except for L facial droop, slow to respond, slurred speech, LUE hemiplegia, withdraws, decreased sensation, LLE hemiplegia, withdraws, decreased sensation. Denies pain. T&R q2hrs, A2 lift. Incont of B&B, purewick in place adequate output. +BS, passing flatus, loose BM x3 today. Wound care completed on sacral pressure injury per order, new Mepilex placed. Emesis x2 in AM. Zofran and compazine given. Patient made NPO except for meds per Dr. Xiong, hold overnight TF. NG tube patent. Freq oral care. IVF ordered, hold free water flushes except when given meds. See lab results from orders from RRT today. Palliative spoke to family, family considering comfort care but want Mother to be part of the decision making. Pt scoring green on Aggression Stop Light Tool.  Discharge pending.

## 2022-04-22 NOTE — PROGRESS NOTES
RRT was called due to increased oxygen needs. Upon arrival pt was on a 6L oxymask with SpO2 88-90%. Oxygen increased to 10L oxymask. SpO2 slowly increased into the mid 90's.   ABG was drawn per MD order.  Ronen's test performed prior to radial ABG draw. Collateral circulation confirmed.  Site:Left radial  Device:10L oxymask.  Awaiting ABG results.  No other RT orders at this time.  4/22/2022  Kimberly Finley, RT

## 2022-04-22 NOTE — PROGRESS NOTES
[facility]    Medicine Progress Note - Hospitalist Service    Date of Admission:  4/6/2022    Assessment & Plan            Mia Rodriguez is a 88 year old female who presented to the [facility] ER with acute neuro changes and associated fall.  She was felt to have an acute CVA and urgently taken to radiology for embolectomy.     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    - Systolic goal -160 range.      - PT/OT/Speech/SW/CC consults  - Continue close neuro monitoring  - ASA 81mg daily for now   - Repeat NCCT in 2 weeks, if stable then would restart Eliquis 2.5mg BID + stop ASA at that time   - Follow-up with [facility] in 6-8 weeks   - Stroke neuro following peripherally, appreciate input.   - melatonin 2mg at bedtime scheduled to promote sleep  - monitor for delirium    Patient overall remain stable at this time.      Severe dysphagia post CVA  Underweight (wt 37.9Kg - BMI 16.8)  - place NG 4/9  - patient stated to MD Lemus and daughter Bennie on 4/9 that she would never want a permanent feeding tube.  - Video swallow completed on 4/12  - appreciate SLP- diet advanced to pureed diet level per slp   - TF switched to night time only per dietitian on 4/15, wean as tolerated  - calorie count  - continue tube feeds and wean as tolerated   - increase in dysphagia 4/18.   She is currently on overnight tube feeding and Pureed dysphagia diet level 4, Speech to continue follow.  Patient does not want any G tube at this time, calorie count and document oral intake starting from today   Dysphagia and tube feeding is the main issue at this time.     Probable aspiration pneumonia   Bilateral pleural effusions  Acute Hypoxic respiratory failure: resolved.   On 4/10, New fever, ? Right sided crackles, abdominal xr from 4/9 with clear lung bases.  Suspect aspiration pneumonia due to significant oropharyngeal dysphagia.    * CXR-Mild cardiac enlargement. Small bilateral pleural effusions with bibasilar infiltrate/atelectasis greater on the right. Underlying emphysematous change.   * CXR on 4/12-Similar small right effusion and associated compressive atelectasis and or infiltrate, question worsening left-sided pleural effusion and associated atelectasis and/or infiltrate.   *CT chest PE due to increased rr given her immobility following cva- negative for PE, mod b/l pleural effusion and associated compressive atelectasis, mod to severe emphysema  - completed 5 days of Ceftriaxone on 4/14. Discontinued.   - lasix 10mg IV x 1 on 4/12 and 4/13. Lasix 20mg IV x 1 on 4/14, 4/15 and 4/16  Overall improve now, off oxygen and off diuretics.     Abnormal UA  - UA slightly abnormal, doubt UTI.  Urine culture was no growth     Fall  -  No overt majory injury. Pelvis XR w/o acute findings 4/6     Atrial fibrillation with RVR: improved rate control  - HR in 80s-100  - stopped  dilt drip on 4/12  - continue diltiazem 90mg q6hrs  - Hold Eliquis as above  - HR has been stable in last 24-48 hrs in the telemetry was discontinued     Hypothyroidism:  - will resume her Levothyroxine now.      CKD, history of renal stones:  -  Monitor     COPD, smoking history:  -  No overt exacerbation.  Albuterol PRN.  Monitor.     Reflux:  -  Pepcid     Osteoporosis:  -  Monitor, baseline on prolia     HyperNa, resolved  - likely iatrogenic from IVF and from absent PO     Hypoglycemia  - continue tube feeds     Hypokalemia and hypophosphetmia  - replace per protocol     Difficulty sleeping  - related to neurochecks     Goals of care  * discussion ongoing but patient states no permanent feeding tube. She is willing to trial NG for a period to see how much recovery of swallow function she will have have.   - she states a clear preference for continued restorative plan of care as of 4/9 but she  "will let us know if quality of life becomes intolerable.   - provider on 4/10 discussd with pt/family and code changed to DNR/DNI. In the evening, cross cover MD spoke with son and code changed to DNR- ok with intubation. Refer to progress notes on 4/10 for details.  - Palliative care consulted, had conference on 4/12-refer to palliative care note. Current goal is to continue with restorative cares see if swallowing improves and code status remains DNR- okay for intubation  - 4/19 patient states \"I do not want to be intubated.\" when asked if she would like me to discuss with family, \"No, just change it.\" Code status changed to DNR/DNI.         Diet: Snacks/Supplements Adult: Magic Cup; With Meals  Pureed Diet (level 4) Liquidized/Moderately Thick (level 3) (with tsp, RN supervised)  Adult Formula Drip Feeding: Continuous Jevity 1.5; Nasoduodenal tube; Goal Rate: 55 mL/hr x 10 hours; mL/hr; From: 8:00 PM; 6:00 AM; Medication - Feeding Tube Flush Frequency: At least 15-30 mL water before and after medication administration and ...  Room Service  Calorie Counts  Snacks/Supplements Adult: Other; Lunch: pineapple GelateinPLUS; With Meals  Calorie Counts    DVT Prophylaxis: Pneumatic Compression Devices  Bob Catheter: Not present  Central Lines: None  Cardiac Monitoring: None  Code Status: No CPR- Do NOT Intubate      Disposition Plan   Expected Discharge: 04/25/2022     Anticipated discharge location:  Awaiting care coordination huddle  Delays:     Placement - TCU         The patient's care was discussed with the Bedside Nurse, Patient and Patient's Family.    Colby Xiong MD  Hospitalist Service  [facility]  Securely message with the Vocera Web Console (learn more here)  Text page via [employer] Paging/Directory     Clinically Significant Risk Factors Present on Admission                        Colby Xiong, " MD      ______________________________________________________________________    Interval History   Patient was sleeping at time of my visit, did woke up but wanted to sleep further, offer no complaints.     No other significant even overnight.     Data reviewed today: I reviewed all medications, new labs and imaging results over the last 24 hours. I personally reviewed no images or EKG's today.    Physical Exam   Vital Signs: Temp: 99.8  F (37.7  C) Temp src: Axillary BP: 131/71 Pulse: 90   Resp: 22 SpO2: 92 % O2 Device: None (Room air)    Weight: 116 lbs 6.45 oz        General: Patient appears comfortable and in no acute distress.  NG tube in place.   HEENT: Head is atraumatic, normocephalic.    Neck: Neck is supple  Respiratory: Lungs are clear to auscultation bilaterally with no wheeze or crackles  Cardiovascular: Regular rate , S1 and S2 normal with no murmer or rubs or gallops, edema over the legs  Abdomen:   soft , non tender , non distended and bowel sound present   Skin: No skin rashes or lesions to inspection or palpation.  Neurologic: Left hemiplegia  Musculoskeletal: Normal Range of motion over upper and lower on the right and she did not move her extremities on the left  Psychiatric: cooperative     Data   Recent Labs   Lab 04/21/22  2245 04/21/22  1623 04/21/22  1118 04/19/22  1215 04/19/22  0840 04/19/22  0805 04/18/22  0917 04/18/22  0021 04/17/22  1730 04/17/22  1207 04/17/22  0857 04/16/22  0830 04/16/22  0807   WBC  --   --   --   --  9.2  --   --   --  9.3  --  10.3  --   --    HGB  --   --   --   --  13.4  --   --   --  12.3  --  13.1  --   --    MCV  --   --   --   --  99  --   --   --  99  --  99  --   --    PLT  --   --   --   --  207  --   --   --  182  --  184  --   --    NA  --   --   --   --  142  --  141  --   --   --   --   --  139   POTASSIUM  --   --   --   --  3.6  --  3.6  --   --   --  3.5  --  3.8   CHLORIDE  --   --   --   --  108  --  108  --   --   --   --   --  106   CO2  --    --   --   --  28  --  29  --   --   --   --   --  29   BUN  --   --   --   --  20  --  24  --   --   --   --   --  21   CR  --   --   --   --  0.70  --  0.65  --   --   --   --   --  0.63   ANIONGAP  --   --   --   --  6  --  4  --   --   --   --   --  4   CYNDEE  --   --   --   --  9.0  --  9.5  --   --   --   --   --  9.3   * 78 140*   < > 101*   < > 123*   < >  --    < >  --    < > 97   ALBUMIN  --   --   --   --  2.1*  --   --   --   --   --   --   --   --    PROTTOTAL  --   --   --   --  5.9*  --   --   --   --   --   --   --   --    BILITOTAL  --   --   --   --  0.8  --   --   --   --   --   --   --   --    ALKPHOS  --   --   --   --  80  --   --   --   --   --   --   --   --    ALT  --   --   --   --  58*  --   --   --   --   --   --   --   --    AST  --   --   --   --  64*  --   --   --   --   --   --   --   --     < > = values in this interval not displayed.     No results found for this or any previous visit (from the past 24 hour(s)).  Medications     dextrose       - MEDICATION INSTRUCTIONS -         artificial saliva  2 spray Swish & Spit 4x Daily     aspirin  81 mg Oral or Feeding Tube Daily    Or     aspirin  300 mg Rectal Daily     diltiazem  90 mg Oral or Feeding Tube Q6H ARIK     lactobacillus rhamnosus (GG)  1 capsule Oral or Feeding Tube BID     melatonin  2 mg Oral or NG Tube At Bedtime     sodium chloride (PF)  3 mL Intracatheter Q8H

## 2022-04-22 NOTE — PROVIDER NOTIFICATION
Writer paged Dr. Xiong. Do you want to continue with the 120mL TF free water flushes every 4 hours ?  If not would she need IVF. Please advise

## 2022-04-22 NOTE — PROGRESS NOTES
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  Palliative care following for goals of care support.     Brief summary of visit: Met with family this afternoon in care conference with Pooja Rodriguez and Agustina's children: Joanne Avery and Laz. Agustina's health has changed markedly in the past 24 hours, and a rapid response was called when she was found unresponsive. At this time family share a sense that despite aggressive support and nutrition, Agustina is not getting better / stronger and seems to, in fact, be getting sicker. In past conversations Agustina has made it clear that she does not want this level of support long term.  SHe does not want CPR or intubation and she has told family that she does not want a PEG tube.  Family all voice support for Agustina if she elects a plan of care focused on comfort / hospice, but they asked to be able to talk to her first and to speak to the hospitalist before making a decision.       Date of Admission: 4/6/2022    Reason for consult: Patient and family support    Sources of information: Family member Joanne Avery Brian    Recommendations & Plan:  Palliative is available as needed thsi weekend if there are goals of care concerns / questions      These recommendations have been discussed with patient family, care team.          Strengths Identified:    Family involved and supportive     Relationships & Support:  Aspects of relationships and support assessed today:    Identified family members: children    Professional supports: Family have requested a GIP referral    Family coping:     Bereavement Risk concerns:     Coping, Mental Health & Adjustment to Illness:       Goals, Decision Making & Advance Care Planning:   Prognosis, Goals, and/or Advance Care Planning were assessed today: Yes  If yes, brief summary of discussion: see  above  Preferred language:   Patient's decision making preferences: with input from medical clinicians and loved ones  I have concerns about the patient/family's health literacy today: No  Patient has a completed Health Care Directive: No.   Code status per chart review: No CPR / No Intubation    Key Palliative Symptom Data:  We are not helping to manage these symptoms currently in this patient.      Clinical Social Work Interventions:   Assessment of palliative specific issues    Introduction of Palliative clinical social work interventions  Attended/participated in care conference  Goals of care discussion/facilitation      NEELIMA Armenta, [facility]   Palliative Care Clinical   Ph: [phone]

## 2022-04-22 NOTE — PROVIDER NOTIFICATION
Writer paged Dr. Anaya, On license of [facility] hosp. Dr. Rollins made patient NPO except for meds through TF. Do you want to continue with the 120mL TF free water flushes every 4 hours ?  If not would she need IVF. Please given me a call at [phone] Thx Kt    Addendum: IVF ordered, hold on free water flushes except when given meds.

## 2022-04-22 NOTE — PROVIDER NOTIFICATION
Paged regarding free water flushes.     Pt's tube feeds stopped today given vomiting and possible aspiration event. Rn wondering about given free water flushes.     Given episodes of vomiting, will hold free water except for when needed for medications.     Will start 50cc maintenance fluids overnight.     Karen Anaya

## 2022-04-22 NOTE — PROGRESS NOTES
Responded to the RRT call this afternoon, for increase SOB, hypoxia and lethargy  Patient was quite sleepy this morning as well and got a dose of Tramadol 50 mg overnight.   On Exam very lethargic, slightly confused, able to wake up more at this time.  Now requiring 10 LPM of oxygen thru mask, was on room air till yesterday.  Overnight did nauseated and vomited.    High risk for aspiration and aspiration pneumonitis. Very lethargic and confused.  Minimal air entry on exam. Family is meeting Palliative care later today.  Order Xray chest, ABG, CBC, ammonia and BMP and LFT   Give age and comorbidity and significant dysphagia, overall prognosis is guarded    Discussed in detail with the patient daughter at bedside. Continue supportive care for now  Aspiration precautions.     Meet with the family again 2 sons and a daughter and answer all there questions.     Keep her NPO, hold tube feeding tonight. Ok to give medications thru NG tube.   Discontinue Tramadol, avoid any sedating medications, family considering comfort care  But wants to talk to the patient first.

## 2022-04-22 NOTE — PROGRESS NOTES
CALORIE COUNT      Approximate Oral Intake for: (4/21)      Calories: 716 cals  Protein: 29 gm    EN via ND tube:  Jevity 1.5 @ 55 mL/hr x 10 hrs (8pm-6am)  Total Enteral Provisions: 825 kcal (22 kcal/kg, 72% needs), 35 g protein (0.9 g/kg, 78% needs), 11 fiber, 119 g CHO and 418 mL free water daily   Free Water Flush: 120 mL every 4 hrs   TOTAL (TF + flushes) = 1138 mL free water        Estimated Needs:    Dosing Weight 37.9 kg (admit wt 4/6)  Estimated Energy Needs: 9628-0936 kcals (30-35 Kcal/Kg)  Justification: underweight  Estimated Protein Needs: 45-55 grams protein (1.2-1.5 g pro/Kg)  Justification: Repletion      Summary:   Chart reviewed  Pt did not tolerate diet advancement trials yesterday  4/21: SLP ---> Cough observed during minced and moist and mod thick trials when slightly bigger piece swallowed. Rec: continue puree diet and mod thick liquids by tsp with supervision/assist and careful use of precautions    Sending magic cup supplement with lunch and dinner, and GelateinPLUS supplement with lunch  Not sure pt will be able to meet estimated nutrition needs orally  Continue calorie count and TF as above for now    Martha Raymond RD, LD  Clinical Dietitian - [facility]   Pager - [phone]

## 2022-04-22 NOTE — PROVIDER NOTIFICATION
Writer paged Dr. Xiong Patient increase RR, desating, placed on 6L Oxymask, patient stated feel SOB. patient had emesis x2 earlier in the morning. Do you want to come assess patient or RRT, palliative at 1400, family undecided? Please advise. Daughter at bedside.    Addendum: RRT called, and Dr Xiong came and spoke to daughter. Chest Xray completed, labs drawn, UA collected. Patient placed on 10L Oxymask, by RT, sating WNL, continue to monitor.    (1) 41-60 years

## 2022-04-22 NOTE — PROGRESS NOTES
[facility]  Palliative Care Daily Progress Note       Recommendations & Counseling        Continue current cares at this time as family makes decision on comfort care. When family makes decision for comfort care would recommend inpatient GIP hospice consult to see if she is appropriate to stay here with hospice support. Family would prefer inpatient hospice if possible as Agustina is so frail.    Goals of care discussion held with daughter Bennie and sons, Dhiraj and Kaushal. Reviewed Agustina's decision to be DNR/I and refusal of PEG feeding tube for long term nutrition. Reviewed nutrition services concerns that Agustina may not be able to consume enough nutrition to meet her needs orally. Aspiration event today further complicates her nutritional intake and overall health status. Family strongly considering comfort focused care as they do not wish for Agustina to suffer needlessly.     Family will consider comfort care over the weekend. They would like to speak with Dr Rollins and also will see if Agustina can have input before a final decision. Did explain that it is possible Agustina may not be able to contribute to the decision due to her mental status.     Dysphagia related to recent stroke:   Aspiration event today due to emesis of artificial nutrition.   -Agree with NPO status   -Agree with stopping NG FT for now.  - Nutrition services concerned that Agustina may not be able to take in adequate calories by mouth- continue to work with SLT on her swallowing strength and to increase dietary intake.    -If family decides on comfort care this weekend would keep current diet determined by SLT as safest to consume unless Agustina protests against thickened liquids.         Pooja Rodriguez, SOFIA  [facility]  Contact information available via [facility] Paging/Directory      Thank you for the opportunity to participate in the care of this patient and family. Our team: will continue to follow.     During regular  M-F work hours ([phone]) -- if you are not sure who specifically to contact -- please contact us in yeppt Smart Web.     After regular work hours and on weekends/holidays, you can call our answering service at [phone].     Attestation:  Total time on the floor involved in the patient's care: 35 minutes  Total time spent in counseling/care coordination, goals of care: >50%    Time in addition to above E/M time without patient contact was 60 minutes from 2PM- 3PM discussing current condition, review of hospital progress, goals of care, review of comfort care and hospice services.     Total time: 95 minutes     Assessments          Mia Rodriguez is a 88 year old female with PMH significant for A-fib, CAD, benign essential HTN, COPD, TIA, diverticulitis, CKD, osteoporosis, remote history of breast cancer, small lung nodule. She presented to ED via EMT after suffering a fall with neuro changes at her home on 4/06/22. 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 As a result of her stroke she has been experiencing dysphagia and will have a video swallow study tomorrow for further evaluation. She had made comments that she would not wish to have a permanent feeding tube.    Today, the patient was seen for:  Goals of care    Prognosis, Goals, or Advance Care Planning was addressed today with: Yes.  Mood, coping, and/or meaning in the context of serious illness were addressed today: Yes.  Summary/Comments: Family visits frequently. They have noticed how Agustina has good and bad days here at the hospital and try to support her in all decisions.            Interval History:     Chart review/discussion with unit or clinical team members:   Discussed patient case with RN, Dr Xiong, palliative ANDRZEJ Arias.     Per patient or family/caregivers today:  Daughter Bennie called palliative [facility] to meet  earlier due to Agustina having an aspiration event after emesis of tube feeds. A RR was called and she is being evaluated. Agustina's sons arrive later for 2 pm meeting with palliative care. Last meeting we had discussed the different pathways that could be chosen for care: full aggressive medical treatment; restorative treatment with boundaries placed on interventions, and comfort focused care with care focused on quality of life and treatment of sx.      Agustina made the decision for no CPR or intubation for respiratory failure. The decision was made by Agustina that she would try to work with SLT for strengthening her swallow as well as agreement with short term NG tube feeding to see how she progresses. Agustina was adamant she would not want to pursue long term tube feeds with PEG. Unfortunately Agustina has experienced complications from aspiration at mealtime several days ago and again today with aspiration of tube feeds. Nutrition services had noted that Agsutina will unlikely be able to take in enough nutrition by mouth to support her body adequately.     Discussion with family, palliative SW/Juana and [facility] regarding goals of care focused on quality of life and Agustina's preferences for care. The family does not want Agustina to suffer needlessly if she is not going to improve enough to get stronger. They support her decision not to have a PEG feeding tube. We discussed options of comfort focused care and what that entails.     It was explained to patient and family that comfort care is a good option when the burdens of aggressive treatments outweigh the benefits and are unwanted by patient, or not in their best interest. Comfort care focuses on optimizing quality of life and relief from distressing symptoms such as pain, shortness of breath, nausea, and anxiety and allowing a natural dying process. When comfort care is initiated, restorative focused care and all artificial means to sustain life are discontinued, including further  diagnostic testing, lab draws, blood glucose testing/insulin, tube feedings, IV fluids, antibiotics, medications not for comfort, and vital signs. Comfort care can be started in hospital upon approval of patient and families. We could have Agustina evaluated by inpatient hospice to see if she would be appropriate for inpt hospice which the family felt would be an ideal situation.     There were concerns from Dhiraj regarding not having the ability to treat infections with IV antibiotics on comfort care but reviewed how these infection are often recurrent and antibiotics do not usually impact quality of life in these circumstances. At times oral antibiotics can be given for UTIs if patient is able to swallow.     Family wishes to meet with Dr Xiong to review Agustina's current condition. They are hoping that Agustina's mental status could improve so she could give input into a decision. They may make a decision over the weekend regarding comfort care and explained that they should let RN and MD know so they can place orders. Palliative care will return on Monday and can check in with family at that time.      Key Palliative Symptoms:  We are not helping to manage these symptoms currently in this patient.            Review of Systems:     Besides above, an additional  system ROS was reviewed and is unremarkable          Medications:     I have reviewed this patient's medication profile and medications during this hospitalization.    Noted meds:      artificial saliva  2 spray Swish & Spit 4x Daily     aspirin  81 mg Oral or Feeding Tube Daily    Or     aspirin  300 mg Rectal Daily     diltiazem  90 mg Oral or Feeding Tube Q6H ARIK     lactobacillus rhamnosus (GG)  1 capsule Oral or Feeding Tube BID     melatonin  2 mg Oral or NG Tube At Bedtime     sodium chloride (PF)  3 mL Intracatheter Q8H     acetaminophen **OR** acetaminophen, albuterol, dextrose, glucose **OR** dextrose **OR** glucagon, hydrALAZINE, lidocaine 4%, lidocaine  (buffered or not buffered), loperamide, - MEDICATION INSTRUCTIONS -, metoprolol, nitroGLYcerin, ondansetron **OR** ondansetron, prochlorperazine **OR** prochlorperazine **OR** prochlorperazine, sodium chloride (PF)             Physical Exam:   Temp: 99.8  F (37.7  C) Temp src: Axillary BP: 131/71 Pulse: 90   Resp: 22 SpO2: 92 % O2 Device: None (Room air)     Resp: (!) 32    GENERAL:  Alert, lying in bed, distress, acutely ill appearing, frail, cachectic.  HEAD: Normocephalic atraumatic  SCLERA: Anicteric  EXTREMITIES: Warm  RESPIRATORY: Breathing with accessory muscle use. O2- 6L via NC.  NEUROLOGIC: Lethargic.           Data Reviewed:     Recent imaging reviewed, my comments on pertinents:   Results for orders placed or performed during the hospital encounter of 04/06/22   CT Head w/o Contrast    Impression    IMPRESSION: Diffuse cerebral volume loss and cerebral white matter  changes consistent with chronic small vessel ischemic disease. No  evidence for acute intracranial pathology.    Radiation dose for this scan was reduced using automated exposure  control, adjustment of the mA and/or kV according to patient size, or  iterative reconstruction technique.     CONRAD CONTRERAS MD         SYSTEM ID:  [id number]   CTA Head Neck with Contrast    Impression    IMPRESSION:  1. Probable thromboembolic occlusion at the right MCA trifurcation.  This results in delayed filling of the distal right internal carotid  artery likely due to outflow obstruction.  2. Otherwise, normal neck and head CTA.    This imaging study was discussed with the ordering provider, Dr. JEREMY ALDANA, by Dr. Contreras on 4/6/2022 8:49 AM.    Radiation dose for this scan was reduced using automated exposure  control, adjustment of the mA and/or kV according to patient size, or  iterative reconstruction technique.     CONRAD CONTRERAS MD         SYSTEM ID:  [id number]   Cervical spine CT w/o contrast    Impression    IMPRESSION: Minimal degenerative  retrolisthesis of C2 upon C3 and C4  upon C5. Minimal degenerative anterolisthesis of C7 upon T1. Alignment  of the cervical vertebrae is otherwise normal. Vertebral body heights  of the cervical spine are normal. Craniocervical alignment is normal.  There are no fractures of the cervical spine. Inferior endplate  deformity of T2 is again noted and does not appear appreciably changed  from a chest CT from 3/23/2021 and likely represents a degenerative  Schmorl's node deformity. No spinal canal stenosis. No prevertebral  soft tissue swelling.      Radiation dose for this scan was reduced using automated exposure  control, adjustment of the mA and/or kV according to patient size, or  iterative reconstruction technique    CONRAD BURGOS MD         SYSTEM ID:  [id number]   XR Chest Port 1 View    Impression    IMPRESSION: Single AP view of the chest was obtained. Stable  cardiomediastinal silhouette. Mild bibasilar pulmonary opacities,  likely atelectasis. No significant pleural effusion or pneumothorax.  No definite acute osseous pathology. If clinical suspicion of rib  fractures, remains high, rib series is more sensitive for detection of  subtle rib fractures.    YANE TRAN MD         SYSTEM ID:  [id number]   CT Head Perfusion w Contrast    Impression    IMPRESSION: There is delayed arrival of the contrast bolus throughout  the majority of the right middle cerebral and right anterior cerebral  artery territories consistent with the right middle cerebral artery  occlusion resulting in delayed filling of the right internal carotid  circulation noted on the accompanying CT angiogram. There is decreased  cerebral blood flow throughout the same distribution. There is  decreased cerebral blood volume in the cortex and white matter of the  mid right frontal lobe as well as at the lateral aspect of the right  temporal lobe worrisome for infarct. No other perfusion defects.    Radiation dose for this scan was reduced  using automated exposure  control, adjustment of the mA and/or kV according to patient size, or  iterative reconstruction technique    CONRAD BURGOS MD         SYSTEM ID:  [id number]   IR Carotid Cerebral Angiogram Bilateral    Impression    Impression:   Successful mechanical thrombectomy of the right M1 MCA with TICI 2b  recanalization after 3 passes of Solitaire accompanied with penumbra  aspiration technique. There is a partial recanalization of the right  angular artery in the M3 segment with a right parietal lobe perfusion  deficit.     Patient has severely atherosclerotic right common femoral artery and  severely calcified and atherosclerotic right superficial and deep  femoral arteries. Guillermo pressure was held to achieve hemostasis.    Daxa Schulz MD  Endovascular Surgical Neuroradiology Fellow  Pager: [phone]      I was present and scrubbed in for the entire procedure    I have personally reviewed the examination and initial interpretation  and I agree with the findings.    YOKO JOLLY MD         SYSTEM ID:  [id number]   CT Head w/o Contrast    Impression    IMPRESSION:   1. Focus of hyperdensity at the high posteromedial right frontal lobe  could represent a tiny focus of intracranial hemorrhage. Continued  surveillance recommended.  2. Diffuse cerebral volume loss and cerebral white matter changes  consistent with chronic small vessel ischemic disease.      Radiation dose for this scan was reduced using automated exposure  control, adjustment of the mA and/or kV according to patient size, or  iterative reconstruction technique    CONRAD BURGOS MD         SYSTEM ID:  [id number]   XR Pelvis Port 1/2 Views    Impression    IMPRESSION: No evidence of acute fracture. Mild bilateral hip  degenerative changes. Bob catheter in place. Contrast in the  bladder.     ZEINA CISSE MD         SYSTEM ID:  [id number]   CT Head w/o Contrast    Impression    IMPRESSION:  1.  Stable small volume acute  subarachnoid hemorrhage within the right cingulate sulcus. No new intracranial hemorrhage.  2.  Developing regions of acute infarction within the right anterior cerebral artery territory and right middle cerebral artery posterior division territory.  3.  Stable chronic infarction within the right posterior cerebral artery territory.  4.  Stable mild to moderate chronic small vessel ischemic disease and generalized brain parenchymal volume loss.   MR Brain w/o & w Contrast    Impression    IMPRESSION:  1.  Evolving ischemic infarcts in the right middle cerebral and right anterior cerebral artery distributions.  2.  Mild dural thickening and enhancement along the right cerebral convexity.  3.  Generalized brain atrophy and presumed microvascular ischemic changes as detailed above.   XR Abdomen Port 1 View    Impression    IMPRESSION: An enteric tube is in place with tip likely in the gastric antrum or first portion of the duodenum. Small bilateral pleural effusions, greater on the right. Tortuous calcified thoracic aorta.   XR Chest Port 1 View    Impression    IMPRESSION: Mild cardiac enlargement. Small bilateral pleural effusions with bibasilar infiltrate/atelectasis greater on the right. Underlying emphysematous change. Feeding tube.   XR Video Swallow with SLP or OT    Impression    IMPRESSION:  Thin: Premature spillage to the piriform sinuses with reduced  epiglottic inversion. Aspiration. With chin tuck maneuver, there was  mild to moderate flash penetration.    Mildly thick: Deep penetration/aspiration observed. Mild pharyngeal  residue.    Moderately thick: Moderate to deep penetration. Mild penetration noted  with chin tuck maneuver.    Puree: No penetration or aspiration.    Semisolid: No penetration or aspiration. Moderate pharyngeal residue  primarily in the vallecula. There was relatively good clearance of the  residue utilizing moderately thick liquid wash.    Solid: Not administered.    This study only  includes the cervical esophagus. Please see separate  report from speech pathology for additional details.    KARO MAC MD         SYSTEM ID:  [id number]   XR Chest Port 1 View    Impression    IMPRESSION: Similar small right effusion and associated compressive  atelectasis and or infiltrate, question worsening left-sided pleural  effusion and associated atelectasis and/or infiltrate. Enteric tube  tip below the margin of study.    KARO FRANCIS MD         SYSTEM ID:  [id number]   CT Chest Pulmonary Embolism w Contrast    Impression    IMPRESSION:  1.  No pulmonary embolism demonstrated.  2.  Moderate bilateral pleural effusions and associated compressive  atelectasis and/or infiltrate.  3.  Moderate to severe emphysema.     KARO FRANCIS MD         SYSTEM ID:  [id number]   Echocardiogram Complete - For age > 60 yrs   Result Value Ref Range    LVEF  55%      Lab Results   Component Value Date    WBC 9.2 04/19/2022    WBC 9.3 04/17/2022    WBC 10.3 04/17/2022    HGB 13.4 04/19/2022    HGB 12.3 04/17/2022    HGB 13.1 04/17/2022    HCT 40.8 04/19/2022    HCT 37.4 04/17/2022    HCT 40.0 04/17/2022     04/19/2022     04/17/2022     04/17/2022     04/19/2022     04/18/2022     04/16/2022    POTASSIUM 3.6 04/19/2022    POTASSIUM 3.6 04/18/2022    POTASSIUM 3.5 04/17/2022    CHLORIDE 108 04/19/2022    CHLORIDE 108 04/18/2022    CHLORIDE 106 04/16/2022    CO2 28 04/19/2022    CO2 29 04/18/2022    CO2 29 04/16/2022    BUN 20 04/19/2022    BUN 24 04/18/2022    BUN 21 04/16/2022    CR 0.70 04/19/2022    CR 0.65 04/18/2022    CR 0.63 04/16/2022     (H) 04/21/2022    GLC 78 04/21/2022     (H) 04/21/2022    DD 0.6 (H) 03/26/2017    NTBNPI 1960 (H) 02/15/2009    TROPI  03/26/2017     <0.015  The 99th percentile for upper reference range is 0.045 ug/L.  Troponin values in   the range of 0.045 - 0.120 ug/L may be associated with risks of adverse   clinical events.      TROPI   03/26/2017     <0.015  The 99th percentile for upper reference range is 0.045 ug/L.  Troponin values in   the range of 0.045 - 0.120 ug/L may be associated with risks of adverse   clinical events.      TROPI  08/17/2015     <0.015  The 99th percentile for upper reference range is 0.045 ug/L.  Troponin values in   the range of 0.045 - 0.120 ug/L may be associated with risks of adverse   clinical events.      AST 64 (H) 04/19/2022    AST 53 (H) 10/06/2020    AST 13 10/02/2014    ALT 58 (H) 04/19/2022    ALT 52 (H) 10/06/2020    ALT 10 01/12/2018    ALKPHOS 80 04/19/2022    ALKPHOS 55 10/06/2020    ALKPHOS 46 10/02/2014    BILITOTAL 0.8 04/19/2022    BILITOTAL 0.7 10/06/2020    BILITOTAL 0.5 10/02/2014    INR 1.20 (H) 04/07/2022    INR 1.09 04/06/2022    INR 1.03 10/22/2014

## 2022-04-22 NOTE — PROVIDER NOTIFICATION
MD Notification    Notified Person: MD    Notified Person Name: Dr. Perry     Notification Date/Time: 4/21/2022 2347    Notification Interaction: Smart Web page     Purpose of Notification: 722, MALCOLM Rodriguez. Tylenol given for pain at 2130 but patient is still in pain. Can something else be added for pain? Thank you, Margarita BAEZ      Orders Received:    Comments:

## 2022-04-23 NOTE — PLAN OF CARE
Pt here with R MCA CVA. A&Ox4, difficult to assess but pt answers yes/no questions correctly. Neuros forgetful, L droop, LUE and LLE hemiplagia with decreased sensation. Pt is tachycardic with orders to give IV metoprolol for HR sustaining >120, not given this shift. On 3L oxymask, other VSS. Pt is NPO with NG feeding tubes/flushes paused d/t nausea and vomiting. No emesis this shift, pt denies nausea. Takes pills crushed through NG tube. IV fluids infusing at 50ml/hr. Frequent oral care and artifical saliva spray given. Up with Ax2 lift. Pt turned and repositioned q2h. Pt has pressure injury to coccyx, mepilex changed this shift. Incontinent of bowel and bladder, purewick in place. Denies pain. Pt scoring green on the Aggression Stop Light Tool. Discharge pending- family considering transitioning to comfort care.

## 2022-04-23 NOTE — PROVIDER NOTIFICATION
Writer paged Dr. Sen.  SLP assessed patient and changed diet to Clear honey thick liquid diet, only a few spoons at a time, RN supervision only. Do we need to have a dietary reconsulted to reassess tube feeds? Or are we going to resume overnight tube feeds as previously ordered. Please advise.

## 2022-04-23 NOTE — PROGRESS NOTES
CALORIE COUNT      Approximate Oral Intake for:    4/22  Calories: 0 kcal  Protein: 0 grams      Number of Meals Recorded:     0    Number of Snacks Recorded:   0      Estimated Needs:    Dosing Weight = 37.9 kg (admit weight on 4/6)  Calories: 8350-6350 kcals (30-35 kcal/kg) - underweight on admission  Protein: 45-55 grams (1.2-1.5 g/kg) - repletion needs       Summary:   Noted patient was made NPO yesterday per MD. TF has been held overnight due to nausea with emesis x 2 yesterday. Noted RRT called yesterday for increased RR, SOB and need for supplemental O2. Palliative met with family yesterday - noted they are strongly considering transitioning patient to comfort care.     Calorie count scheduled to continue through today. Pending GOC, recommend resuming TF today as appropriate per MD. We will continue to follow.     Corazon Grimaldo RD, LD  Weekend RD Pager: [phone]

## 2022-04-23 NOTE — PROGRESS NOTES
[facility]    Medicine Progress Note - Hospitalist Service    Date of Admission:  4/6/2022    Assessment & Plan            Mia Rodriguez is a 88 year old female who presented to the [facility] ER with acute neuro changes and associated fall.  She was felt to have an acute CVA and urgently taken to radiology for embolectomy.    Goals of care discussion was done with patient and her 3 children today.  Patient had a large stroke resulting in severe dysphagia and inability to safely eat or drink.  She was noted to be more short of breath and tachypneic yesterday and was made n.p.o..  Speech pathology evaluated today and recommended keeping her n.p.o. Patient has temporary NJ feeding tube in place.  Patient is at high risk for aspiration with any oral eating as per speech pathology.  Discussed with patient about continuing current cares versus keeping her comfortable with comfort cares only.  Patient told me that she wants to be kept comfortable with comfort cares only and family agrees with the plan.  Timing of starting on comfort care measures this evening versus tomorrow as per family    Family decided that they want to start comfort care measures tomorrow AM . They want to wait on it tonight     Continue to hold  tube feeds now keep the NJ tube in and continue free water flushes 100ml Q 4hours for family for patients comfort as she complaints of being thirsty . 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    - Systolic goal -160 range.      - PT/OT/Speech/SW/CC consults  - Continue close neuro monitoring  - ASA 81mg daily for now   - Repeat NCCT in 2 weeks, if stable then would restart Eliquis 2.5mg BID + stop ASA at that time   - Follow-up with [facility] in  6-8 weeks   - Stroke neuro following peripherally, appreciate input.   - melatonin 2mg at bedtime scheduled to promote sleep  - monitor for delirium    Patient overall remain stable at this time.      Severe dysphagia post CVA  Underweight (wt 37.9Kg - BMI 16.8)  - place NG 4/9  - patient stated to MD Lemus and daughter Bennie on 4/9 that she would never want a permanent feeding tube.  - Video swallow completed on 4/12  - appreciate SLP- diet advanced to pureed diet level per slp   - TF switched to night time only per dietitian on 4/15, wean as tolerated  - calorie count  - continue tube feeds and wean as tolerated   - increase in dysphagia 4/18.   She is currently on overnight tube feeding and Pureed dysphagia diet level 4, Speech to continue follow.  Patient does not want any G tube at this time, calorie count and document oral intake starting from today   Dysphagia and tube feeding is the main issue at this time.     Probable aspiration pneumonia   Bilateral pleural effusions  Acute Hypoxic respiratory failure: resolved.   On 4/10, New fever, ? Right sided crackles, abdominal xr from 4/9 with clear lung bases. Suspect aspiration pneumonia due to significant oropharyngeal dysphagia.    * CXR-Mild cardiac enlargement. Small bilateral pleural effusions with bibasilar infiltrate/atelectasis greater on the right. Underlying emphysematous change.   * CXR on 4/12-Similar small right effusion and associated compressive atelectasis and or infiltrate, question worsening left-sided pleural effusion and associated atelectasis and/or infiltrate.   *CT chest PE due to increased rr given her immobility following cva- negative for PE, mod b/l pleural effusion and associated compressive atelectasis, mod to severe emphysema  - completed 5 days of Ceftriaxone on 4/14. Discontinued.   - lasix 10mg IV x 1 on 4/12 and 4/13. Lasix 20mg IV x 1 on 4/14, 4/15 and 4/16  Overall improve now, off oxygen and off diuretics.     Abnormal  "UA  - UA slightly abnormal, doubt UTI.  Urine culture was no growth     Fall  -  No overt majory injury. Pelvis XR w/o acute findings 4/6     Atrial fibrillation with RVR: improved rate control  - HR in 80s-100  - stopped  dilt drip on 4/12  - continue diltiazem 90mg q6hrs  - Hold Eliquis as above  - HR has been stable in last 24-48 hrs in the telemetry was discontinued     Hypothyroidism:  - will resume her Levothyroxine now.      CKD, history of renal stones:  -  Monitor     COPD, smoking history:  -  No overt exacerbation.  Albuterol PRN.  Monitor.     Reflux:  -  Pepcid     Osteoporosis:  -  Monitor, baseline on prolia     HyperNa, resolved  - likely iatrogenic from IVF and from absent PO     Hypoglycemia  - continue tube feeds     Hypokalemia and hypophosphetmia  - replace per protocol     Difficulty sleeping  - related to neurochecks     Goals of care  * discussion ongoing but patient states no permanent feeding tube. She is willing to trial NG for a period to see how much recovery of swallow function she will have have.   - she states a clear preference for continued restorative plan of care as of 4/9 but she will let us know if quality of life becomes intolerable.   - provider on 4/10 discussd with pt/family and code changed to DNR/DNI. In the evening, cross cover MD spoke with son and code changed to DNR- ok with intubation. Refer to progress notes on 4/10 for details.  - Palliative care consulted, had conference on 4/12-refer to palliative care note. Current goal is to continue with restorative cares see if swallowing improves and code status remains DNR- okay for intubation  - 4/19 patient states \"I do not want to be intubated.\" when asked if she would like me to discuss with family, \"No, just change it.\" Code status changed to DNR/DNI.         Diet: Snacks/Supplements Adult: Magic Cup; With Meals  Adult Formula Drip Feeding: Continuous Jevity 1.5; Nasoduodenal tube; Goal Rate: 55 mL/hr x 10 hours; mL/hr; " From: 8:00 PM; 6:00 AM; Medication - Feeding Tube Flush Frequency: At least 15-30 mL water before and after medication administration and ...  Room Service  Calorie Counts  Snacks/Supplements Adult: Other; Lunch: pineapple GelateinPLUS; With Meals  Calorie Counts  Combination Diet Clear Liquid Diet; Liquidized/Moderately Thick (level 3)    DVT Prophylaxis: Pneumatic Compression Devices  Bob Catheter: Not present  Central Lines: None  Cardiac Monitoring: None  Code Status: No CPR- Do NOT Intubate      Disposition Plan   Expected Discharge: 04/25/2022     Anticipated discharge location:  Awaiting care coordination huddle  Delays:     Placement - TCU         The patient's care was discussed with the Bedside Nurse, Patient and Patient's Family.    Alesia Sen MD  Hospitalist Service  [facility]  Securely message with the Vocera Web Console (learn more here)  Text page via [employer] Paging/Directory     Clinically Significant Risk Factors Present on Admission                        Alesia Sen MD      ______________________________________________________________________    Interval History   Patient more alert and awake today and able to answer my questions appropriately. Stopped IVF today. Pt getting water flushes trough the NJ tube. Speech path recommended NPO due to as[iration risk. Pt wants to continue to eat so started on clear liquid diet with thickended liquids for pleasure     Data reviewed today: I reviewed all medications, new labs and imaging results over the last 24 hours. I personally reviewed no images or EKG's today.    Physical Exam   Vital Signs: Temp: 98.4  F (36.9  C) Temp src: Oral BP: 125/65 Pulse: 104   Resp: 18 SpO2: 92 % O2 Device: Nasal cannula Oxygen Delivery: 2 LPM  Weight: 116 lbs 6.45 oz        General: Patient appears comfortable and in no acute distress.  NG tube in place.   HEENT: Head is atraumatic, normocephalic.    Neck: Neck is supple  Respiratory: Lungs  are clear to auscultation bilaterally with no wheeze or crackles  Cardiovascular: Regular rate , S1 and S2 normal with no murmer or rubs or gallops, edema over the legs  Abdomen:   soft , non tender , non distended and bowel sound present   Skin: No skin rashes or lesions to inspection or palpation.  Neurologic: Left hemiplegia  Musculoskeletal: Normal Range of motion over upper and lower on the right and she did not move her extremities on the left  Psychiatric: cooperative     Data   Recent Labs   Lab 04/23/22  1218 04/23/22  0806 04/23/22  0247 04/22/22  1412 04/22/22  1407 04/19/22  1215 04/19/22  0840 04/19/22  0805 04/18/22  0917 04/18/22  0021 04/17/22  1730   WBC  --   --   --   --  9.8  --  9.2  --   --   --  9.3   HGB  --   --   --   --  14.0  --  13.4  --   --   --  12.3   MCV  --   --   --   --  101*  --  99  --   --   --  99   PLT  --   --   --   --  246  --  207  --   --   --  182   NA  --   --   --   --  143  --  142  --  141  --   --    POTASSIUM  --   --   --   --  3.5  --  3.6  --  3.6  --   --    CHLORIDE  --   --   --   --  110*  --  108  --  108  --   --    CO2  --   --   --   --  26  --  28  --  29  --   --    BUN  --   --   --   --  31*  --  20  --  24  --   --    CR  --   --   --   --  0.74  --  0.70  --  0.65  --   --    ANIONGAP  --   --   --   --  7  --  6  --  4  --   --    CYNDEE  --   --   --   --  8.7  --  9.0  --  9.5  --   --    * 70 95   < > 135*   < > 101*   < > 123*   < >  --    ALBUMIN  --   --   --   --  1.9*  --  2.1*  --   --   --   --    PROTTOTAL  --   --   --   --  5.8*  --  5.9*  --   --   --   --    BILITOTAL  --   --   --   --  0.5  --  0.8  --   --   --   --    ALKPHOS  --   --   --   --  63  --  80  --   --   --   --    ALT  --   --   --   --  37  --  58*  --   --   --   --    AST  --   --   --   --  32  --  64*  --   --   --   --     < > = values in this interval not displayed.     No results found for this or any previous visit (from the past 24  hour(s)).  Medications     dextrose       - MEDICATION INSTRUCTIONS -         artificial saliva  2 spray Swish & Spit 4x Daily     aspirin  81 mg Oral or Feeding Tube Daily    Or     aspirin  300 mg Rectal Daily     diltiazem  90 mg Oral or Feeding Tube Q6H [facility]     lactobacillus rhamnosus (GG)  1 capsule Oral or Feeding Tube BID     melatonin  2 mg Oral or NG Tube At Bedtime     sodium chloride (PF)  3 mL Intracatheter Q8H

## 2022-04-23 NOTE — PLAN OF CARE
Goal Outcome Evaluation:  Plan of Care Reviewed With: patient, daughter, son   Overall Patient Progress: no change    Pt here with R MCA CVA. Pt A&O x4. Intermittent tachy HR otherwise VSS, weaned down to 2L NC. CMS and Neuro's intact except for L facial droop, slow to respond, slurred speech, LUE hemiplegia, withdraws, decreased sensation, LLE hemiparesis 1/5 strength, withdraws, decreased sensation. Pain managed with T&R and Tylenol. T&R q2hrs, or PRN, A2 lift, up to chair today. Incont of B&B, purewick in place, IV Lasix given x1.+BS, passing flatus, several loose BMs today. Wound care completed on sacral pressure injury per order, new Mepilex placed. Tolerating Clear moderately thick liquid diet, with spoon by RN, 4-6 spoons at a time then let patient rest. NG TF patent, night TF discontinued, cont with q4hr/100mL free water flushes, all meds given through NG. Family is moving towards comfort cares and will decided officially tomorrow. Pt scoring green on Aggression Stop Light Tool.  Discharge pending.

## 2022-04-23 NOTE — PROVIDER NOTIFICATION
Writer paged Dr. Sen. Patient more alert, oriented x4, are we able to advance back on diet, patient wanting something to drink. Has NG TF over night TF held d/t patient having emesis yesterday. Please advise. Thx

## 2022-04-24 NOTE — PLAN OF CARE
Goal Outcome Evaluation:    Plan of Care Reviewed With: patient, daughter, family, son     Comfort cares. Pt A&Ox4, forgetful. Pain controlled with morphine and tylenol. Family updated on plan of care. Full assessment deferred for comfort cares. Pills taken in NG tube. Incontinent of stool and urine. Turned and repositioned for comfort. Dressing on coccyx changed, wound pink/red. Pt advanced to full liquids, tolerated ice cream well with no coughing. On 3L O2 NC for comfort. Left sided deficits. LUE 0/5, LLE 1/5.

## 2022-04-24 NOTE — PROGRESS NOTES
CLINICAL NUTRITION SERVICES - BRIEF NOTE    Chart reviewed for patient on nutrition support therapy and calorie counts. Noted no PO intake recorded yesterday. Noted patient and family have elected to pursue comfort cares at this time. Nocturnal TF has been discontinued but NDT remains in place for FWF for hydration per patient/family request. Clear Liquid diet with moderately thick liquids initiated for PO comfort. Nutrition will sign off at this time. Please page with any needs that may arise.     Corazon Grimaldo RD, LD  Weekend RD Pager: [phone]

## 2022-04-24 NOTE — PROGRESS NOTES
SPIRITUAL HEALTH SERVICES Progress Note  SH  722    Attempted visit with Agustina per [facility] consult.  She is going on comfort care today.  Bennie, daughter of Agustina, and her daughter were outside the room while staff were doing cares.  Granddaughter thought that her grandma would freak out if a  came to the room (this was from a prior experience several years ago when  came to room while another relative was dying.)  Grand daughter did think it would be helpful for other family members including her mom and her uncles to talk to the .  Uncles are not there and Bennie said she was fine for the moment.  They will page me today if they would like to talk.  Otherwise, [facility] should follow up tomorrow.    Fernanda Chan MA  Associate   [phone] - pager

## 2022-04-24 NOTE — PROGRESS NOTES
[facility]    Medicine Progress Note - Hospitalist Service    Date of Admission:  4/6/2022    Assessment & Plan            Mia Rodriguez is a 88 year old female who presented to the [facility] ER with acute neuro changes and associated fall.  She was felt to have an acute CVA and urgently taken to radiology for embolectomy.    Goals of care discussion was done with patient and her 3 children today.  Patient had a large stroke resulting in severe dysphagia and inability to safely eat or drink.  She was noted to be more short of breath and tachypneic yesterday and was made n.p.o..  Speech pathology evaluated today and recommended keeping her n.p.o. Patient has temporary NJ feeding tube in place.  Patient is at high risk for aspiration with any oral eating as per speech pathology.  Discussed with patient about continuing current cares versus keeping her comfortable with comfort cares only.  Patient told me that she wants to be kept comfortable with comfort cares only and family agrees with the plan.  Timing of starting on comfort care measures this evening versus tomorrow as per family    Family decided that they want to start comfort care measures today  AM .   Continue to hold  tube feeds now keep the NJ tube in and continue free water flushes 100ml Q 4hours for family for patients comfort as she complaints of being thirsty . Will keep NJ tube in continue water flushes only even on comfort cares per family     Discussed with patient and family again this AM and all are agreeable to transition her to comfort care only. Comfort care orders initiated on 4/24/22. 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    - Systolic goal -160 range.      -  PT/OT/Speech/SW/CC consults  - Continue close neuro monitoring  - ASA 81mg daily for now   - Repeat NCCT in 2 weeks, if stable then would restart Eliquis 2.5mg BID + stop ASA at that time   - Follow-up with [facility] in 6-8 weeks   - Stroke neuro following peripherally, appreciate input.   - melatonin 2mg at bedtime scheduled to promote sleep  - monitor for delirium  Comfort care only      Severe dysphagia post CVA  Underweight (wt 37.9Kg - BMI 16.8)  - place NG 4/9  - patient stated to MD Lemus and daughter Bennie on 4/9 that she would never want a permanent feeding tube.  - Video swallow completed on 4/12  - appreciate SLP- diet advanced to pureed diet level per slp   - TF switched to night time only per dietitian on 4/15, wean as tolerated  - calorie count  - continue tube feeds and wean as tolerated   - increase in dysphagia 4/18.   She is currently on overnight tube feeding and Pureed dysphagia diet level 4, Speech to continue follow.  Patient does not want any G tube at this time, calorie count and document oral intake starting from today   Dysphagia and tube feeding is the main issue at this time.    Comfort acre only      Probable aspiration pneumonia   Bilateral pleural effusions  Acute Hypoxic respiratory failure: resolved.   On 4/10, New fever, ? Right sided crackles, abdominal xr from 4/9 with clear lung bases. Suspect aspiration pneumonia due to significant oropharyngeal dysphagia.    * CXR-Mild cardiac enlargement. Small bilateral pleural effusions with bibasilar infiltrate/atelectasis greater on the right. Underlying emphysematous change.   * CXR on 4/12-Similar small right effusion and associated compressive atelectasis and or infiltrate, question worsening left-sided pleural effusion and associated atelectasis and/or infiltrate.   *CT chest PE due to increased rr given her immobility following cva- negative for PE, mod b/l pleural effusion and associated compressive atelectasis, mod to severe  emphysema  - completed 5 days of Ceftriaxone on 4/14. Discontinued.   - lasix 10mg IV x 1 on 4/12 and 4/13. Lasix 20mg IV x 1 on 4/14, 4/15 and 4/16  Overall improve now, off oxygen and off diuretics.     Comfort  Care only     Abnormal UA  - UA slightly abnormal, doubt UTI.  Urine culture was no growth     Fall  -  No overt majory injury. Pelvis XR w/o acute findings 4/6     Atrial fibrillation with RVR: improved rate control  - HR in 80s-100  - stopped  dilt drip on 4/12  - continue diltiazem 90mg q6hrs  - Hold Eliquis as above  - HR has been stable in last 24-48 hrs in the telemetry was discontinued     Hypothyroidism:  - will resume her Levothyroxine now.      CKD, history of renal stones:  -  Monitor     COPD, smoking history:  -  No overt exacerbation.  Albuterol PRN.  Monitor.     Reflux:  -  Pepcid     Osteoporosis:  -  Monitor, baseline on prolia     HyperNa, resolved  - likely iatrogenic from IVF and from absent PO     Hypoglycemia  - continue tube feeds     Hypokalemia and hypophosphetmia  - replace per protocol     Difficulty sleeping  - related to neurochecks     Goals of care  Pt is comfortcare only with DNR/DNI      Diet:    Clear liquid diet with thickened liquids     DVT Prophylaxis: none as pt is comfort care only   Bob Catheter: Not present  Central Lines: None  Cardiac Monitoring: None  Code Status: No CPR- Do NOT Intubate      Disposition Plan   Expected Discharge: 04/25/2022     Anticipated discharge location:  Awaiting care coordination huddle  Delays:     Placement - TCU         The patient's care was discussed with the Bedside Nurse, Patient and Patient's Family.    Alesia Sen MD  Hospitalist Service  [facility]      Clinically Significant Risk Factors Present on Admission                          ______________________________________________________________________    Interval History   Patient and family decided to transition her to comfort care only today.  Comfort care orders initiated     Data reviewed today: I reviewed all medications, new labs and imaging results over the last 24 hours. I personally reviewed no images or EKG's today.    Physical Exam   Vital Signs: Temp: 98.7  F (37.1  C) Temp src: Oral BP: 110/62 Pulse: 96   Resp: 16 SpO2: 93 % O2 Device: Nasal cannula Oxygen Delivery: 2 LPM  Weight: 116 lbs 6.45 oz        General: Patient appears comfortable and in no acute distress.  NG tube in place.   HEENT: Head is atraumatic, normocephalic.    Neck: Neck is supple  Respiratory: Lungs are clear to auscultation bilaterally with no wheeze or crackles  Cardiovascular: Regular rate , S1 and S2 normal with no murmer or rubs or gallops, edema over the legs  Abdomen:   soft , non tender , non distended and bowel sound present   Skin: No skin rashes or lesions to inspection or palpation.  Neurologic: Left hemiplegia     Data   Recent Labs   Lab 04/24/22  0811 04/24/22  0157 04/23/22  2206 04/22/22  1412 04/22/22  1407 04/19/22  1215 04/19/22  0840 04/19/22  0805 04/18/22  0917 04/18/22  0021 04/17/22  1730   WBC  --   --   --   --  9.8  --  9.2  --   --   --  9.3   HGB  --   --   --   --  14.0  --  13.4  --   --   --  12.3   MCV  --   --   --   --  101*  --  99  --   --   --  99   PLT  --   --   --   --  246  --  207  --   --   --  182   NA  --   --   --   --  143  --  142  --  141  --   --    POTASSIUM  --   --   --   --  3.5  --  3.6  --  3.6  --   --    CHLORIDE  --   --   --   --  110*  --  108  --  108  --   --    CO2  --   --   --   --  26  --  28  --  29  --   --    BUN  --   --   --   --  31*  --  20  --  24  --   --    CR  --   --   --   --  0.74  --  0.70  --  0.65  --   --    ANIONGAP  --   --   --   --  7  --  6  --  4  --   --    CYNDEE  --   --   --   --  8.7  --  9.0  --  9.5  --   --    GLC 81 90 98   < > 135*   < > 101*   < > 123*   < >  --    ALBUMIN  --   --   --   --  1.9*  --  2.1*  --   --   --   --    PROTTOTAL  --   --   --   --  5.8*  --  5.9*   --   --   --   --    BILITOTAL  --   --   --   --  0.5  --  0.8  --   --   --   --    ALKPHOS  --   --   --   --  63  --  80  --   --   --   --    ALT  --   --   --   --  37  --  58*  --   --   --   --    AST  --   --   --   --  32  --  64*  --   --   --   --     < > = values in this interval not displayed.     No results found for this or any previous visit (from the past 24 hour(s)).  Medications     dextrose       - MEDICATION INSTRUCTIONS -         artificial saliva  2 spray Swish & Spit 4x Daily     aspirin  81 mg Oral or Feeding Tube Daily    Or     aspirin  300 mg Rectal Daily     diltiazem  90 mg Oral or Feeding Tube Q6H [facility]     melatonin  2 mg Oral or NG Tube At Bedtime

## 2022-04-24 NOTE — PLAN OF CARE
Pt here with R MCA CVA. A&Ox4, difficult to assess but pt answers yes/no questions correctly. Neuros forgetful, L droop, LUE and LLE hemiplagia with decreased sensation. Pt is tachycardic, HR not sustaining >120. On 2L NC, other VSS. Pt tolerating clear moderately thick liquids by spoon. Pills crushed through NG tube with q4h 100ml flushesw. Frequent oral care and artifical saliva spray given. Up with Ax2 lift. Pt turned and repositioned q2h. Pt has pressure injury to coccyx, mepilex changed this shift. Incontinent of bowel and bladder with frequent loose stools. PRN tylenol given x3 for pain, pt turned and repositioned to promote comfort. Pt scoring green on the Aggression Stop Light Tool. Discharge pending. Family to make decision regarding transitioning to comfort cares today.

## 2022-04-24 NOTE — PLAN OF CARE
Speech Language Therapy Discharge Summary    Reason for therapy discharge:    Change in medical status. Transition to comfort cares this date.    Progress towards therapy goal(s). See goals on Care Plan in [facility] electronic health record for goal details.  Goals not met.  Barriers to achieving goals:   transition to comfort cares.    Therapy recommendation(s):    No further therapy is recommended. Recommend advance to regular diet and thin liquids as comfort cares initiated this date. Extensive education provided to pt's son today regarding strategies to minimize aspiration risk, while allowing pt to have liberalized diet in setting of dysphagia. Discussed safe swallow strategies including: as upright as possible, liquids by spoon/swab to minimize coughing/discomfort, slow pace, and offer small amounts more frequently vs large meal. Discussed strategies for solids including: small bites, alternate liquids/solids, and avoiding mixed consistencies. Thicken liquids only if most comfortable for pt. SLP will discharge from services.

## 2022-04-24 NOTE — PROVIDER NOTIFICATION
MD Notification    Notified Person: MD    Notified Person Name: Dr. Sen    Notification Date/Time: 04/24/22 1700     Notification Interaction: online paging    Purpose of Notification: Pt just on moderate thickened clears, can they have full liquids instead of just clears?    Orders Received: Full liquids diet order added    Comments:

## 2022-04-25 NOTE — CONSULTS
Spoke to pt's daughter Bennie and son Dhiraj at length today about inpatient hospice. Per hospice providers, the pt does not qualify at this time for the GIP program. The hospice team will follow pt  and reassess eligibility daily. Encouraged family to look into [facility] home as a possibility for discharge.    Thank you for the consult.    Jill Schoenecker, RN [facility] hospice

## 2022-04-25 NOTE — PLAN OF CARE
"Occupational Therapy Discharge Summary    Reason for therapy discharge:    Patient/family request discontinuation of services.    Progress towards therapy goal(s). See goals on Care Plan in Epic electronic health record for goal details.  Goals not met.  Barriers to achieving goals:   limited tolerance for therapy and Pt and family made decision to transition to comfort cares 4/24.    Therapy recommendation(s):    Continue to support positioning w/ optimal joint protection and pressure relieving repositioning for comfort   Will defer to current care team for comfort care protocol; as able engage pt in grooming/hygiene cares.     Pt last seen by OT 4/22: \"Patient limited by poor activity tolerance and decreased arousal. Patient requiring max-total assist for all self care and mobility. Anticipate patient will require high levels of assist for forseable future.\"    Writing therapist did not treat pt, note written based on previous treating therapist notes and recommendations. Please see chart and flow sheet for additional details.        "

## 2022-04-25 NOTE — PLAN OF CARE
Pt here with R MCA CVA, transitioned to comfort cares on 4/25. Pt turned and repositioned q2h and as requested for comfort. Morphine given x1 for pain, ativan given x1 as pt was restless and unable to sleep. Slept between cares after ativan.

## 2022-04-25 NOTE — PROGRESS NOTES
"SPIRITUAL HEALTH SERVICES Progress Note  I met briefly with Agustina this afternoon. She shared that she \"has been better.\" She did not express any pain. She said her family has been very supportive during this time. No other needs at this time.     Chaplains will continue to be available during this hospitalization.       Som Will  Associate     "

## 2022-04-25 NOTE — PROGRESS NOTES
[facility]    Medicine Progress Note - Hospitalist Service    Date of Admission:  4/6/2022    Assessment & Plan            Mia Rodriguez is a 88 year old female who presented to the [facility] ER with acute neuro changes and associated fall.  She was felt to have an acute CVA and urgently taken to radiology for embolectomy.    Goals of care discussion was done with patient and her 3 children today.  Patient had a large stroke resulting in severe dysphagia and inability to safely eat or drink.  She was noted to be more short of breath and tachypneic yesterday and was made n.p.o..  Speech pathology evaluated today and recommended keeping her n.p.o. Patient has temporary NJ feeding tube in place.  Patient is at high risk for aspiration with any oral eating as per speech pathology.  Discussed with patient about continuing current cares versus keeping her comfortable with comfort cares only.  Patient told me that she wants to be kept comfortable with comfort cares only and family agrees with the plan.  Timing of starting on comfort care measures this evening versus tomorrow as per family    Family decided that they want to start comfort care measures today  AM .   Continue to hold  tube feeds now keep the NJ tube in and continue free water flushes 100ml Q 4hours for family for patients comfort as she complaints of being thirsty . Will keep NJ tube in continue water flushes only even on comfort cares per family     Discussed with patient and family again this AM and all are agreeable to transition her to comfort care only. Comfort care orders initiated on 4/24/22. 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    - Systolic  goal -160 range.      - PT/OT/Speech/SW/CC consults  - Continue close neuro monitoring  - ASA 81mg daily for now   - Repeat NCCT in 2 weeks, if stable then would restart Eliquis 2.5mg BID + stop ASA at that time   - Follow-up with [facility] in 6-8 weeks   - Stroke neuro following peripherally, appreciate input.   - melatonin 2mg at bedtime scheduled to promote sleep  - monitor for delirium  Comfort care only      Severe dysphagia post CVA  Underweight (wt 37.9Kg - BMI 16.8)  - place NG 4/9  - patient stated to MD Lemus and daughter Bennie on 4/9 that she would never want a permanent feeding tube.  - Video swallow completed on 4/12  - appreciate SLP- diet advanced to pureed diet level per slp   - TF switched to night time only per dietitian on 4/15, wean as tolerated  - calorie count  - continue tube feeds and wean as tolerated   - increase in dysphagia 4/18.   She is currently on overnight tube feeding and Pureed dysphagia diet level 4, Speech to continue follow.  Patient does not want any G tube at this time, calorie count and document oral intake starting from today   Dysphagia and tube feeding is the main issue at this time.    Comfort acre only      Probable aspiration pneumonia   Bilateral pleural effusions  Acute Hypoxic respiratory failure: resolved.   On 4/10, New fever, ? Right sided crackles, abdominal xr from 4/9 with clear lung bases. Suspect aspiration pneumonia due to significant oropharyngeal dysphagia.    * CXR-Mild cardiac enlargement. Small bilateral pleural effusions with bibasilar infiltrate/atelectasis greater on the right. Underlying emphysematous change.   * CXR on 4/12-Similar small right effusion and associated compressive atelectasis and or infiltrate, question worsening left-sided pleural effusion and associated atelectasis and/or infiltrate.   *CT chest PE due to increased rr given her immobility following cva- negative for PE, mod b/l pleural effusion and associated compressive  atelectasis, mod to severe emphysema  - completed 5 days of Ceftriaxone on 4/14. Discontinued.   - lasix 10mg IV x 1 on 4/12 and 4/13. Lasix 20mg IV x 1 on 4/14, 4/15 and 4/16  Overall improve now, off oxygen and off diuretics.     Comfort  Care only     Abnormal UA  - UA slightly abnormal, doubt UTI.  Urine culture was no growth     Fall  -  No overt majory injury. Pelvis XR w/o acute findings 4/6     Atrial fibrillation with RVR: improved rate control  - HR in 80s-100  - stopped  dilt drip on 4/12  - continue diltiazem 90mg q6hrs  - Hold Eliquis as above  - HR has been stable in last 24-48 hrs in the telemetry was discontinued     Hypothyroidism:  - will resume her Levothyroxine now.      CKD, history of renal stones:  -  Monitor     COPD, smoking history:  -  No overt exacerbation.  Albuterol PRN.  Monitor.     Reflux:  -  Pepcid     Osteoporosis:  -  Monitor, baseline on prolia     HyperNa, resolved  - likely iatrogenic from IVF and from absent PO     Hypoglycemia  - continue tube feeds     Hypokalemia and hypophosphetmia  - replace per protocol     Difficulty sleeping  - related to neurochecks     Goals of care  Pt is comfortcare only with DNR/DNI      Diet: Full Liquid Diet  Clear liquid diet with thickened liquids     DVT Prophylaxis: none as pt is comfort care only   Bob Catheter: Not present  Central Lines: None  Cardiac Monitoring: None  Code Status: No CPR- Do NOT Intubate      Disposition Plan   Expected Discharge: 04/26/2022     Anticipated discharge location:  Awaiting care coordination huddle  Delays:     Placement - TCU         The patient's care was discussed with the Bedside Nurse, Patient and hospice team     Alesia Sen MD  Hospitalist Service  [facility]      Clinically Significant Risk Factors Present on Admission                          ______________________________________________________________________    Interval History   Patient is resting comfortably in  bed     Data reviewed today: I reviewed all medications, new labs and imaging results over the last 24 hours. I personally reviewed no images or EKG's today.    Physical Exam   Vital Signs: Temp: 97.5  F (36.4  C) Temp src: Axillary BP: 136/78 Pulse: 91   Resp: 18 SpO2: 91 % O2 Device: None (Room air)    Weight: 116 lbs 6.45 oz        General: Patient appears comfortable and in no acute distress.  NJ tube in place.   HEENT: Head is atraumatic, normocephalic.    Neck: Neck is supple  Respiratory: Lungs are clear to auscultation bilaterally with no wheeze or crackles  Cardiovascular: Regular rate , S1 and S2 normal with no murmer or rubs or gallops, edema over the legs  Abdomen:   soft , non tender , non distended and bowel sound present   Skin: No skin rashes or lesions to inspection or palpation.  Neurologic: Left hemiplegia     Data   Recent Labs   Lab 04/24/22  0811 04/24/22  0157 04/23/22  2206 04/22/22  1412 04/22/22  1407 04/19/22  1215 04/19/22  0840   WBC  --   --   --   --  9.8  --  9.2   HGB  --   --   --   --  14.0  --  13.4   MCV  --   --   --   --  101*  --  99   PLT  --   --   --   --  246  --  207   NA  --   --   --   --  143  --  142   POTASSIUM  --   --   --   --  3.5  --  3.6   CHLORIDE  --   --   --   --  110*  --  108   CO2  --   --   --   --  26  --  28   BUN  --   --   --   --  31*  --  20   CR  --   --   --   --  0.74  --  0.70   ANIONGAP  --   --   --   --  7  --  6   CYNDEE  --   --   --   --  8.7  --  9.0   GLC 81 90 98   < > 135*   < > 101*   ALBUMIN  --   --   --   --  1.9*  --  2.1*   PROTTOTAL  --   --   --   --  5.8*  --  5.9*   BILITOTAL  --   --   --   --  0.5  --  0.8   ALKPHOS  --   --   --   --  63  --  80   ALT  --   --   --   --  37  --  58*   AST  --   --   --   --  32  --  64*    < > = values in this interval not displayed.     No results found for this or any previous visit (from the past 24 hour(s)).  Medications     dextrose       - MEDICATION INSTRUCTIONS -         artificial  saliva  2 spray Swish & Spit 4x Daily     aspirin  81 mg Oral or Feeding Tube Daily    Or     aspirin  300 mg Rectal Daily     diltiazem  90 mg Oral or Feeding Tube Q6H ARIK     melatonin  2 mg Oral or NG Tube At Bedtime

## 2022-04-25 NOTE — PLAN OF CARE
Physical Therapy Discharge Summary    Reason for therapy discharge:    No further expectations of functional progress.    Progress towards therapy goal(s). See goals on Care Plan in [facility] electronic health record for goal details.  Goals not met.  Barriers to achieving goals:   comfort cares.    Therapy recommendation(s):    No further therapy is recommended.

## 2022-04-26 NOTE — PLAN OF CARE
Goal Outcome Evaluation:    Plan of Care Reviewed With: patient     Overall Patient Progress: no change    Pt on comfort cares  Assessed for pain and symptoms q4 hr, pt requesting morphine for pain, administered 5 mg initially but pt complaining of pain and requesting meds before they are due, given 10 mg at 0200 and pt was able to sleep through the night.   No significant events overnight.

## 2022-04-26 NOTE — PROGRESS NOTES
[facility]    Medicine Progress Note - Hospitalist Service    Date of Admission:  4/6/2022    Assessment & Plan            Mia Rodriguez is a 88 year old female who presented to the [facility] ER with acute neuro changes and associated fall.  She was felt to have an acute CVA and urgently taken to radiology for embolectomy.    Goals of care discussion was done with patient and her 3 children today.  Patient had a large stroke resulting in severe dysphagia and inability to safely eat or drink.  She was noted to be more short of breath and tachypneic yesterday and was made n.p.o..  Speech pathology evaluated today and recommended keeping her n.p.o. Patient has temporary NJ feeding tube in place.  Patient is at high risk for aspiration with any oral eating as per speech pathology.  Discussed with patient about continuing current cares versus keeping her comfortable with comfort cares only.  Patient told me that she wants to be kept comfortable with comfort cares only and family agrees with the plan.  Timing of starting on comfort care measures this evening versus tomorrow as per family    Family decided that they want to start comfort care measures today  AM .   Continue to hold  tube feeds now keep the NJ tube in and continue free water flushes 100ml Q 4hours for family for patients comfort as she complaints of being thirsty . Will keep NJ tube in continue water flushes only even on comfort cares per family     Discussed with patient and family again this AM and all are agreeable to transition her to comfort care only. Comfort care orders initiated on 4/24/22. 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    - Systolic goal -160 range.      - PT/OT/Speech/SW/CC consults  - Continue close neuro monitoring  - ASA 81mg daily for now   - Repeat NCCT in 2 weeks, if stable then would restart Eliquis 2.5mg BID + stop ASA at that time   - Follow-up with [facility] in 6-8 weeks   - Stroke neuro following peripherally, appreciate input.   - melatonin 2mg at bedtime scheduled to promote sleep  - monitor for delirium  Comfort care only      Severe dysphagia post CVA  Underweight (wt 37.9Kg - BMI 16.8)  - place NG 4/9  - patient stated to MD Lemus and daughter Bennie on 4/9 that she would never want a permanent feeding tube.  - Video swallow completed on 4/12  - appreciate SLP- diet advanced to pureed diet level per slp   - TF switched to night time only per dietitian on 4/15, wean as tolerated  - calorie count  - continue tube feeds and wean as tolerated   - increase in dysphagia 4/18.   She is currently on overnight tube feeding and Pureed dysphagia diet level 4, Speech to continue follow.  Patient does not want any G tube at this time, calorie count and document oral intake starting from today   Dysphagia and tube feeding is the main issue at this time.    Comfort acre only      Probable aspiration pneumonia   Bilateral pleural effusions  Acute Hypoxic respiratory failure: resolved.   On 4/10, New fever, ? Right sided crackles, abdominal xr from 4/9 with clear lung bases. Suspect aspiration pneumonia due to significant oropharyngeal dysphagia.    * CXR-Mild cardiac enlargement. Small bilateral pleural effusions with bibasilar infiltrate/atelectasis greater on the right. Underlying emphysematous change.   * CXR on 4/12-Similar small right effusion and associated compressive atelectasis and or infiltrate, question worsening left-sided pleural effusion and associated atelectasis and/or infiltrate.   *CT chest PE due to increased rr given her  immobility following cva- negative for PE, mod b/l pleural effusion and associated compressive atelectasis, mod to severe emphysema  - completed 5 days of Ceftriaxone on 4/14. Discontinued.   - lasix 10mg IV x 1 on 4/12 and 4/13. Lasix 20mg IV x 1 on 4/14, 4/15 and 4/16  Overall improve now, off oxygen and off diuretics.     Comfort  Care only     Abnormal UA  - UA slightly abnormal, doubt UTI.  Urine culture was no growth     Fall  -  No overt majory injury. Pelvis XR w/o acute findings 4/6     Atrial fibrillation with RVR: improved rate control  - HR in 80s-100  - stopped  dilt drip on 4/12  - continue diltiazem 90mg q6hrs  - Hold Eliquis as above  - HR has been stable in last 24-48 hrs in the telemetry was discontinued     Hypothyroidism:  - will resume her Levothyroxine now.      CKD, history of renal stones:  -  Monitor     COPD, smoking history:  -  No overt exacerbation.  Albuterol PRN.  Monitor.     Reflux:  -  Pepcid     Osteoporosis:  -  Monitor, baseline on prolia     HyperNa, resolved  - likely iatrogenic from IVF and from absent PO     Hypoglycemia  - continue tube feeds     Hypokalemia and hypophosphetmia  - replace per protocol     Difficulty sleeping  - related to neurochecks     Goals of care  Pt is comfortcare only with DNR/DNI      Diet: Full Liquid Diet  Clear liquid diet with thickened liquids     DVT Prophylaxis: none as pt is comfort care only   Bob Catheter: Not present  Central Lines: None  Cardiac Monitoring: None  Code Status: No CPR- Do NOT Intubate      Disposition Plan   Expected Discharge: 04/27/2022     Anticipated discharge location:  Awaiting care coordination huddle  Delays:     Comfort Care/Hospice         The patient's care was discussed with the Bedside Nurse, Patient and hospice team     Alesia Sen MD  Hospitalist Service  [facility]      Clinically Significant Risk Factors Present on Admission                           ______________________________________________________________________    Interval History   Patient is resting comfortably in bed     Data reviewed today: I reviewed all medications, new labs and imaging results over the last 24 hours. I personally reviewed no images or EKG's today.    Physical Exam   Vital Signs: Temp: 98.9  F (37.2  C) Temp src: Axillary BP: (!) 144/85 Pulse: 99   Resp: 20 SpO2: (!) 89 % O2 Device: None (Room air)    Weight: 116 lbs 6.45 oz        General: Patient appears comfortable and in no acute distress.  NJ tube in place.   HEENT: Head is atraumatic, normocephalic.    Neck: Neck is supple  Respiratory: Lungs are clear to auscultation bilaterally with no wheeze or crackles  Cardiovascular: Regular rate , S1 and S2 normal with no murmer or rubs or gallops, edema over the legs  Abdomen:   soft , non tender , non distended and bowel sound present   Skin: No skin rashes or lesions to inspection or palpation.  Neurologic: Left hemiplegia     Data   Recent Labs   Lab 04/24/22  0811 04/24/22  0157 04/23/22  2206 04/22/22  1412 04/22/22  1407   WBC  --   --   --   --  9.8   HGB  --   --   --   --  14.0   MCV  --   --   --   --  101*   PLT  --   --   --   --  246   NA  --   --   --   --  143   POTASSIUM  --   --   --   --  3.5   CHLORIDE  --   --   --   --  110*   CO2  --   --   --   --  26   BUN  --   --   --   --  31*   CR  --   --   --   --  0.74   ANIONGAP  --   --   --   --  7   CYNDEE  --   --   --   --  8.7   GLC 81 90 98   < > 135*   ALBUMIN  --   --   --   --  1.9*   PROTTOTAL  --   --   --   --  5.8*   BILITOTAL  --   --   --   --  0.5   ALKPHOS  --   --   --   --  63   ALT  --   --   --   --  37   AST  --   --   --   --  32    < > = values in this interval not displayed.     No results found for this or any previous visit (from the past 24 hour(s)).  Medications     dextrose       - MEDICATION INSTRUCTIONS -         artificial saliva  2 spray Swish & Spit 4x Daily     aspirin  81 mg  Oral or Feeding Tube Daily    Or     aspirin  300 mg Rectal Daily     diltiazem  90 mg Oral or Feeding Tube Q6H ARIK     melatonin  2 mg Oral or NG Tube At Bedtime     morphine sulfate  10 mg Oral Q4H

## 2022-04-26 NOTE — PLAN OF CARE
Comfort cares patient. GIP denied patient today but will reevaluate tomorrow. Family considering [facility]. Morphine scheduled with PRN morphine for breakthrough pain. Patient repositioned q 2 hours or per patient's request. 0.5 ativan x1 for patient unable to be comfort despite pain management.

## 2022-04-26 NOTE — PROGRESS NOTES
Care Management Follow Up    Length of Stay (days): 20    Expected Discharge Date: 04/27/2022     Concerns to be Addressed:       Patient plan of care discussed at interdisciplinary rounds: Yes    Anticipated Discharge Disposition:Pt was evaluated by inpatient hospice team yesterday and did not qualify for GIP Program. GIP will follow and assess daily.  Anticipated Discharge Services: Transportation Services  Anticipated Discharge DME:      Additional Information:  SW met with daughter in pt room and she states that she is under the assumption that GIP will be assessing and if needed, they will consider [facility] .  Nor referral made yet.  Family requests letter by physician indicating that pt is  Here and unresponsive so her POA is able to act on her behalf. Letter on front of chart and request in sticky note.    LADY Covarrubias  [facility]  Care Transitions  [phone]

## 2022-04-27 NOTE — PROGRESS NOTES
Hospice met with patient's daughter, Bennie to further discuss plans for GIP vs discharge to [facility].  Patient is eligible for GIP hospice at this time, however, Bennie would like to discuss the options of GIP VS [facility] with her two brothers prior to deciding.  Candelaria DONNELLY updated hospice and Bennie during our visit that [facility] will likely have bed availability tomorrow.  Bennie is strongly considering this option and will let hospice know ASAP if she would like to pursue discharge tomorrow.  Hospice will follow up with care team following conversation with Bennie.  Please continue plan for discharge to  [facility].    Beverly Galeas RN  [facility]

## 2022-04-27 NOTE — PROGRESS NOTES
[facility]    Medicine Progress Note - Hospitalist Service    Date of Admission:  4/6/2022    Assessment & Plan            Mai Rodriguez is a 88 year old female who presented to the [facility] ER with acute neuro changes and associated fall.  She was felt to have an acute CVA and urgently taken to radiology for embolectomy.    Goals of care discussion was done with patient and her 3 children today.  Patient had a large stroke resulting in severe dysphagia and inability to safely eat or drink.  She was noted to be more short of breath and tachypneic yesterday and was made n.p.o..  Speech pathology evaluated today and recommended keeping her n.p.o. Patient has temporary NJ feeding tube in place.  Patient is at high risk for aspiration with any oral eating as per speech pathology.  Discussed with patient about continuing current cares versus keeping her comfortable with comfort cares only.  Patient told me that she wants to be kept comfortable with comfort cares only and family agrees with the plan.  Timing of starting on comfort care measures this evening versus tomorrow as per family    Family decided that they want to start comfort care measures today  AM .   Continue to hold  tube feeds now keep the NJ tube in and continue free water flushes 100ml Q 4hours for family for patients comfort as she complaints of being thirsty . Will keep NJ tube in continue water flushes only even on comfort cares per family     Discussed with patient and family again this AM and all are agreeable to transition her to comfort care only. Comfort care orders initiated on 4/24/22. 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    - Systolic goal -160 range.      - PT/OT/Speech/SW/CC consults  - Continue close neuro monitoring  - ASA 81mg daily for now   - Repeat NCCT in 2 weeks, if stable then would restart Eliquis 2.5mg BID + stop ASA at that time   - Follow-up with [facility] in 6-8 weeks   - Stroke neuro following peripherally, appreciate input.   - melatonin 2mg at bedtime scheduled to promote sleep  - monitor for delirium  Comfort care only      Severe dysphagia post CVA  Underweight (wt 37.9Kg - BMI 16.8)  - place NG 4/9  - patient stated to MD Lemus and daughter Bennie on 4/9 that she would never want a permanent feeding tube.  - Video swallow completed on 4/12  - appreciate SLP- diet advanced to pureed diet level per slp   - TF switched to night time only per dietitian on 4/15, wean as tolerated  - calorie count  - continue tube feeds and wean as tolerated   - increase in dysphagia 4/18.   She is currently on overnight tube feeding and Pureed dysphagia diet level 4, Speech to continue follow.  Patient does not want any G tube at this time, calorie count and document oral intake starting from today   Dysphagia and tube feeding is the main issue at this time.    Comfort acre only      Probable aspiration pneumonia   Bilateral pleural effusions  Acute Hypoxic respiratory failure: resolved.   On 4/10, New fever, ? Right sided crackles, abdominal xr from 4/9 with clear lung bases. Suspect aspiration pneumonia due to significant oropharyngeal dysphagia.    * CXR-Mild cardiac enlargement. Small bilateral pleural effusions with bibasilar infiltrate/atelectasis greater on the right. Underlying emphysematous change.   * CXR on 4/12-Similar small right effusion and associated compressive atelectasis and or infiltrate, question worsening left-sided pleural effusion and associated atelectasis and/or infiltrate.    *CT chest PE due to increased rr given her immobility following cva- negative for PE, mod b/l pleural effusion and associated compressive atelectasis, mod to severe emphysema  - completed 5 days of Ceftriaxone on 4/14. Discontinued.   - lasix 10mg IV x 1 on 4/12 and 4/13. Lasix 20mg IV x 1 on 4/14, 4/15 and 4/16  Overall improve now, off oxygen and off diuretics.     Comfort  Care only     Abnormal UA  - UA slightly abnormal, doubt UTI.  Urine culture was no growth     Fall  -  No overt majory injury. Pelvis XR w/o acute findings 4/6     Atrial fibrillation with RVR: improved rate control  - HR in 80s-100  - stopped  dilt drip on 4/12  - continue diltiazem 90mg q6hrs  - Hold Eliquis as above  - HR has been stable in last 24-48 hrs in the telemetry was discontinued     Hypothyroidism:  - will resume her Levothyroxine now.      CKD, history of renal stones:  -  Monitor     COPD, smoking history:  -  No overt exacerbation.  Albuterol PRN.  Monitor.     Reflux:  -  Pepcid     Osteoporosis:  -  Monitor, baseline on prolia     HyperNa, resolved  - likely iatrogenic from IVF and from absent PO     Hypoglycemia  - continue tube feeds     Hypokalemia and hypophosphetmia  - replace per protocol     Difficulty sleeping  - related to neurochecks     Goals of care  Pt is comfortcare only with DNR/DNI      Diet: Full Liquid Diet  Clear liquid diet with thickened liquids     DVT Prophylaxis: none as pt is comfort care only   Bob Catheter: Not present  Central Lines: None  Cardiac Monitoring: None  Code Status: No CPR- Do NOT Intubate      Disposition Plan   Expected Discharge: 04/28/2022     Anticipated discharge location:  Awaiting care coordination huddle  Delays:     Comfort Care/Hospice         The patient's care was discussed with the Bedside Nurse, Patient and hospice team     Alesia Sen MD  Hospitalist Service  [facility]      Clinically Significant Risk Factors Present on Admission                           ______________________________________________________________________    Interval History   Patient is resting comfortably in bed     Data reviewed today: I reviewed all medications, new labs and imaging results over the last 24 hours. I personally reviewed no images or EKG's today.    Physical Exam   Vital Signs:     BP: (!) 143/81             Weight: 116 lbs 6.45 oz        General: Patient appears comfortable and in no acute distress.  NJ tube in place.   HEENT: Head is atraumatic, normocephalic.    Neck: Neck is supple  Respiratory: Lungs are clear to auscultation bilaterally with no wheeze or crackles  Cardiovascular: Regular rate , S1 and S2 normal with no murmer or rubs or gallops, edema over the legs  Abdomen:   soft , non tender , non distended and bowel sound present   Skin: No skin rashes or lesions to inspection or palpation.  Neurologic: Left hemiplegia     Data   Recent Labs   Lab 04/24/22  0811 04/24/22  0157 04/23/22  2206 04/22/22  1412 04/22/22  1407   WBC  --   --   --   --  9.8   HGB  --   --   --   --  14.0   MCV  --   --   --   --  101*   PLT  --   --   --   --  246   NA  --   --   --   --  143   POTASSIUM  --   --   --   --  3.5   CHLORIDE  --   --   --   --  110*   CO2  --   --   --   --  26   BUN  --   --   --   --  31*   CR  --   --   --   --  0.74   ANIONGAP  --   --   --   --  7   CYNDEE  --   --   --   --  8.7   GLC 81 90 98   < > 135*   ALBUMIN  --   --   --   --  1.9*   PROTTOTAL  --   --   --   --  5.8*   BILITOTAL  --   --   --   --  0.5   ALKPHOS  --   --   --   --  63   ALT  --   --   --   --  37   AST  --   --   --   --  32    < > = values in this interval not displayed.     No results found for this or any previous visit (from the past 24 hour(s)).  Medications     dextrose       - MEDICATION INSTRUCTIONS -         artificial saliva  2 spray Swish & Spit 4x Daily     aspirin  81 mg Oral or Feeding Tube Daily    Or     aspirin  300 mg Rectal Daily     diltiazem  90 mg  Oral or Feeding Tube Q6H [facility]     melatonin  2 mg Oral or NG Tube At Bedtime     morphine sulfate  10 mg Oral Q4H

## 2022-04-27 NOTE — PLAN OF CARE
Pt is on comfort cares. Pt pain controlled with scheduled morphine, pt denied need for PRN and appeared to be resting comfortably between cares. Repositioned q 2 hours or per patient's request. Discharge/plan pending. Family considering [facility].

## 2022-04-27 NOTE — PROGRESS NOTES
Care Management Follow Up    Length of Stay (days): 21    Expected Discharge Date: 04/28/2022     Concerns to be Addressed:       Patient plan of care discussed at interdisciplinary rounds: Yes    Anticipated Discharge Disposition: comfort care/hospice   Anticipated Discharge Services: Transportation Services  Anticipated Discharge DME:      Patient/family educated on Medicare website which has current facility and service quality ratings: yes  Education Provided on the Discharge Plan:    Patient/Family in Agreement with the Plan: yes  Family expresses interest in making referral to [facility]    Referrals Placed by CM/SW:  ANDRZEJ called admissions at [facility] and was told that family has been in to see facility and spoken to the admissions staff.  Referral information faxed per request to [phone].     Private pay costs discussed: family aware of cost of hospice    Additional Information:  ANDRZEJ spoke with [facility] DON and resent referral. Earliest discharge would be tomorrow.    ANDZREJ spoke with pt's daughter and [facility] staff this afternoon and provided update. [facility] has reassessed pt and determined that she does qualify now for the program.  The family at this time prefers to have pt here in hospital.  ANDRZEJ will continue to follow.    Evonne Fuller [facility]  Care Transitions  [phone]

## 2022-04-27 NOTE — PLAN OF CARE
Oriented X 4, often sleeping between cares. Comfort cares, hospice met with daughter today, see note. Pain controlled with scheduled Morphine, PRN Ativan, repositioning. Incontinent of urine. Mepilex changed to coccyx wound. Daughter, sons present at bedside, very attentive. TF for meds, flushed per order. Possible discharge to [facility] tomorrow. Continue to monitor.

## 2022-04-27 NOTE — PLAN OF CARE
Comfort cares patient. Family considering [facility] and referral sent. Scheduled morphine given with relief. Patient repositioned q 2 hours or per patient's request. [facility] speaking with family this afternoon.

## 2022-04-28 NOTE — PLAN OF CARE
Patient on comfort cares. Rested peacefully. Administered scheduled morphine. Removed NG tube per family request. Incontinent x1. Repositioned when family requesting. Family was present at bedside most of shift. Patient passed at 1335. All belongings returned and body sent to [facility].

## 2022-04-28 NOTE — DEATH PRONOUNCEMENT
MD DEATH PRONOUNCEMENT    Called to pronounce Mia Rodriguez dead.    Physical Exam: Spontaneous respirations absent, Breath sounds absent, Carotid pulse absent and Heart sounds absent    Patient was pronounced dead at 13:35 PM, 2022.    Preliminary Cause of Death: Acute Right MCA CVA with Right Distal M1 thrombus    Active Problems:    Cerebrovascular accident (CVA), unspecified mechanism (H)       Infectious disease present?: NO    Communicable disease present? (examples: HIV, chicken pox, TB, Ebola, CJD) :  NO    Multi-drug resistant organism present? (example: MRSA): NO    Please consider an autopsy if any of the following exist:  NO Unexpected or unexplained death during or following any dental, medical, or surgical diagnostic treatment procedures.   NO Death of mother at or up to seven days after delivery.     NO All  and pediatric deaths.     NO Death where the cause is sufficiently obscure to delay completion of the death certificate.   NO Deaths in which autopsy would confirm a suspected illness/condition that would affect surviving family members or recipients of transplanted organs.     The following deaths must be reported to the 's Office:  NO A death that may be due entirely or in part to any factors other than natural disease (recent surgery, recent trauma, suspected abuse/neglect).   NO A death that may be an accident, suicide, or homicide.     NO Any sudden, unexpected death in which there is no prior history of significant heart disease or any other condition associated with sudden death.   NO A death under suspicious, unusual, or unexpected circumstances.    NO Any death which is apparently due to natural causes but in which the  does not have a personal physician familiar with the patient s medical history, social, or environmental situation or the circumstances of the terminal event.   NO Any death apparently due to Sudden Infant Death Syndrome.     NO  Deaths that occur during, in association with, or as consequences of a diagnostic, therapeutic, or anesthetic procedure.   NO Any death in which a fracture of a major bone has occurred within the past (6) six months.   NO A death of persons note seen by their physician within 120 days of demise.     NO Any death in which the  was an inmate of a public institution or was in the custody of Law Enforcement personnel.   NO  All unexpected deaths of children   NO Solid organ donors   NO Unidentified bodies   NO Deaths of persons whose bodies are to be cremated or otherwise disposed of so that the bodies will later be unavailable for examination;   NO Deaths unattended by a physician outside of a licensed healthcare facility or licensed residential hospice program   NO Deaths occurring within 24 hours of arrival to a health care facility if death is unexpected.    NO Deaths associated with the decedent s employment.   NO Deaths attributed to acts of terrorism.   NO Any death in which there is uncertainty as to whether it is a medical examiner s care should be discussed with the medical investigator.        Body disposition: Autopsy was discussed with family member:  Son in person.  Permission for autopsy was declined.  Body released to the morgue.

## 2022-04-28 NOTE — PLAN OF CARE
Pt here on comfort cares. Scheduled morphine given for pain control. Turned q2h. Pt resting comfortably between cares.

## 2022-04-28 NOTE — PROGRESS NOTES
[facility]  Hospitalist Progress Note   04/28/2022          Assessment and Plan:       Mai Rodriguez is a 88 year old female with history of TIA, CKD, Afib on anticoagulation, HTN, CAD admitted on 4/6/2022 with neurological changes and associated fall.    Hospice care patient -impending death.  Acute Right MCA CVA with Right Distal M1 thrombus  S/P mechanical thrombectomy 4/6/22  Acute SAH  Severe dysphagia post CVA  Underweight (wt 37.9Kg - BMI 16.8), adult failure to thrive.  Presented with fall in kitchen, pressed life alert. Found down with R gaze, L facial droop, L weakness, dysarthric speech.   CTA Head revealed R distal M1 occlusion, possible soft plaque vs tyhrombus R ICA.   CTP shows significant mismatch with small core volume of core infarct.   Not aTNK candidate and underwent mechanical thrombectomy on 4/6.   Post-IR NCCT with small region of hyperdensity in L parietal lobe c/f small hemorrhage.   Prior to admission Eliquis held.  -Patient with severe dysphagia, had temporary feeding tube, extensive goals of care discussion with patient's family by hospitalist team, palliative team, hospice team.  Opted for comfort care.  Discontinued aspirin while on comfort care.  Continue comfort care orders, continue monitoring tonight in the hospital.  Pain management per [facility] hospice team.  Appreciate comanagement.  Requested removal of NG tube per patient's daughter.    Medical issues addressed this hospital stay.  Atrial fibrillation with RVR   Status post acute Hypoxic respiratory failure.  Probable aspiration pneumonia treated.  Bilateral pleural effusions, compressive atelectasis.  Moderate to severe emphysema on imaging.  Electrolyte abnormalities corrected.  Abnormal UA with cultures negative.  Fall prior to admission, no major injury.  Now opted for comfort care.       Medical problems.   CAD.  Osteoporosis  Hypothyroidism  CKD, history of renal stones  COPD, smoking  history:  Reflux     Electrolyte abnormalities -corrected.    Orders Placed This Encounter      Full Liquid Diet      DVT Prophylaxis: On hospice care.  Code Status: No CPR- Do NOT Intubate  Disposition: Expected discharge pending clinical course, impending death    Discussed with patient, bedside RN, hospice RN, patient's daughter by the bedside.  Total time greater than 25min.   More than 70% of time spent in direct patient care, care coordination, caregiver counseling, and formalizing plan of care.     Jacob Horvath MD        Interval History:      Patient lying in bed.  Drowsy, barely responsive to verbal stimuli.  Receiving scheduled morphine for pain control per hospice team management.  Tolerated tube feeds and water flushes.  Family and daughter by the bedside, requesting NG tube to be removed.  Dry eyes.           Physical Exam:        Physical Exam   Pulse:  [106] 106  Resp:  [16-18] 16  BP: (143)/(74) 143/74    Intake/Output Summary (Last 24 hours) at 4/28/2022 1116  Last data filed at 4/28/2022 1000  Gross per 24 hour   Intake 550 ml   Output --   Net 550 ml       Admission Weight: 37.5 kg (82 lb 10.8 oz)  Current Weight: 52.8 kg (116 lb 6.5 oz)    PHYSICAL EXAM  GENERAL: Patient is in no distress.   Oropharynx dry.  NG tube in place.  LUNGS: Respirations unlabored  ABDOMEN: Soft  NEURO: Moving extremities  SKIN: Warm  PSYCHIATRY unresponsive to verbal stimuli.       Medications:          artificial saliva  2 spray Swish & Spit 4x Daily     morphine sulfate  10 mg Oral Q4H     acetaminophen **OR** acetaminophen, acetaminophen, atropine, artificial tears ophthalmic solution, dextrose, glucose **OR** dextrose **OR** glucagon, loperamide, LORazepam, LORazepam, - MEDICATION INSTRUCTIONS -, morphine sulfate, naloxone, naloxone, OLANZapine zydis, ondansetron **OR** ondansetron, prochlorperazine **OR** prochlorperazine **OR** prochlorperazine, senna-docusate         Data:      All new lab and imaging data  was reviewed.

## 2022-04-28 NOTE — PLAN OF CARE
Goal Outcome Evaluation: Ongoing, not progressing.  Comfort cares. Gentle turn/repo q2hrs. Pain management with scheduled po morphine sulfate. Tolerated TF meds & water flushes. Incontinent of urine x1. Plans for possible discharge to hospice today pending pt's family approval .

## 2022-04-29 NOTE — DISCHARGE SUMMARY
Discharge Summary  Hospitalist    Date of Admission:  2022  Date of Discharge:  2022  5:57 PM.   in hospital on comfort care.  Discharging Provider: Jacob Horvath MD, MD    Primary Care Physician   Birgit Cordero  Primary Care Provider Phone Number: [phone]  Primary Care Provider Fax Number: [phone]    PRINCIPAL DIAGNOSIS  Hospice care patient - in hospital on comfort care.  Acute Right MCA CVA with Right Distal M1 thrombus  S/P mechanical thrombectomy 22  Acute SAH  Severe dysphagia post CVA  Underweight (wt 37.9Kg - BMI 16.8), adult failure to thrive.    Medical issues addressed this hospital stay.  Atrial fibrillation with RVR   Status post acute Hypoxic respiratory failure.  Probable aspiration pneumonia treated.  Bilateral pleural effusions, compressive atelectasis.  Moderate to severe emphysema on imaging.  Electrolyte abnormalities corrected.  Abnormal UA with cultures negative.  Fall prior to admission, no major injury.  Electrolyte abnormalities -corrected.     Medical problems.   CAD.  Osteoporosis  Hypothyroidism  CKD, history of renal stones  COPD, smoking history  Reflux    History of Present Illness   Mia Rodriguez is an 88 year old female who presented with fall.    Hospital Course   Mia Rodriguez is a 88 year old female with history of TIA, CKD, Afib on anticoagulation, HTN, CAD admitted on 2022 with neurological changes and associated fall.     Hospice care patient - in hospital on comfort care.  Acute Right MCA CVA with Right Distal M1 thrombus  S/P mechanical thrombectomy 22  Acute SAH  Severe dysphagia post CVA  Underweight (wt 37.9Kg - BMI 16.8), adult failure to thrive.  Presented with fall in kitchen, pressed life alert. Found down with R gaze, L facial droop, L weakness, dysarthric speech.   Despite aggressive medical intervention, minimal improvement in symptoms.  Severe dysphagia, had temporary feeding tube, extensive  goals of care discussion with patient's family by hospitalist team, palliative team, hospice team.  Opted for comfort care.  Patient  in hospital while on comfort care.     Medical issues addressed this hospital stay.  Atrial fibrillation with RVR   Status post acute Hypoxic respiratory failure.  Probable aspiration pneumonia treated.  Bilateral pleural effusions, compressive atelectasis.  Moderate to severe emphysema on imaging.  Electrolyte abnormalities corrected.  Abnormal UA with cultures negative.  Fall prior to admission, no major injury.  Electrolyte abnormalities -corrected.     Medical problems.   CAD.  Osteoporosis  Hypothyroidism  CKD, history of renal stones  COPD, smoking history:  Reflux    Jacob Horvath MD.

## 2023-12-11 NOTE — PROGRESS NOTES
SUBJECTIVE:                                                   Mia Rodriguez is a 87 year old female who presents to clinic today for the following health issue(s):  Patient presents with:  Follow Up          HPI:  Patient had her PCP check her calcium and her creatinine and it was normal in . The only one we are missing is her TSH and phosphorus so those need to be done today  Is on prolia injections  Dexa done today, report not back yet  Lives alone , has a pendant, kids live in town, has stairs in her house  Has help for laundry  Hard to get here for appts, has to pay for a ride so labs out of date, will draw rest today  Return 6 months        Patient's last menstrual period was 1981..     Patient is not sexually active, .  Using hysterectomy for contraception.    reports that she quit smoking about 31 years ago. Her smoking use included cigarettes. She started smoking about 62 years ago. She has a 30.00 pack-year smoking history. She has never used smokeless tobacco.    STD testing offered?  Declined - not sexually active    Health maintenance updated:  yes    Today's PHQ-2 Score:   PHQ-2 (  [employer]) 2011   Q1: Little interest or pleasure in doing things 0   Q2: Feeling down, depressed or hopeless 0   PHQ-2 Score 0     Today's PHQ-9 Score: No flowsheet data found.  Today's TATE-7 Score: No flowsheet data found.    Problem list and histories reviewed & adjusted, as indicated.  Additional history: as documented.    Patient Active Problem List   Diagnosis     Cancer of breast (H)     Transient cerebral ischemia     Benign essential hypertension     Kidney stones     H/O: hysterectomy     Hyperlipidemia with target LDL less than 100     Shortness of breath     Atrial fibrillation (H)     Pulmonary hypertension (H)     COPD exacerbation (H)     Osteoporosis, senile     Nephropathy, obstructive     Sigmoid diverticulitis     Protein-calorie malnutrition (H)     Past Surgical History:  Last office visit: 12/7/23  Next office visit: 5/10/24  Medication verified with last office note.     RX refilled as requested.       Procedure Laterality Date     BIOPSY  2008    Left /RightBreast Biopsy     BIOPSY  2010    Right Breast Biopsy     BREAST SURGERY  2008    Right Breast Lumpectomy     CYSTOSCOPY, DILATE URETHRA, COMBINED  10/5/2012    Procedure: COMBINED CYSTOSCOPY, DILATE URETHRA;  urethral dilation;  Surgeon: Kaushal Harris MD;  Location:  OR     CYSTOSCOPY, RETROGRADES, EXTRACT STONE, COMBINED Right 2015    Procedure: COMBINED CYSTOSCOPY, RETROGRADES, EXTRACT STONE;  Surgeon: Kaushal Harris MD;  Location:  OR     EXCISE LESION LIP N/A 10/22/2014    Procedure: EXCISE LESION LIP;  Surgeon: Brent Jolley MD;  Location:  [facility]     GYN SURGERY      LISA with ovaries intact-fibroid     LASER HOLMIUM LITHOTRIPSY URETER(S), INSERT STENT, COMBINED  10/5/2012    Procedure: COMBINED CYSTOSCOPY, URETEROSCOPY, LASER HOLMIUM LITHOTRIPSY URETER(S), INSERT STENT;  RIGHT URETEROSCOPY ,right ureteral biopsy,WITH LASER HOLMIUM LITHOTRIPSY, INSERT STENT RIGHT URETER;  Surgeon: Kaushal Harris MD;  Location:  OR     LASER HOLMIUM LITHOTRIPSY URETER(S), INSERT STENT, COMBINED Right 2015    Procedure: COMBINED CYSTOSCOPY, URETEROSCOPY, LASER HOLMIUM LITHOTRIPSY URETER(S), INSERT STENT;  Surgeon: Kaushal Harris MD;  Location: [facility] [place]      Social History     Tobacco Use     Smoking status: Former Smoker     Packs/day: 1.00     Years: 30.00     Pack years: 30.00     Types: Cigarettes     Start date:      Quit date: 1989     Years since quittin.6     Smokeless tobacco: Never Used     Tobacco comment: Started:  Stopped:   Substance Use Topics     Alcohol use: No     Alcohol/week: 0.0 standard drinks      Problem (# of Occurrences) Relation (Name,Age of Onset)    Alzheimer Disease (1) Mother    Breast Cancer (1) Maternal Grandmother    C.A.D. (1) Father    Cancer (1) Paternal Grandfather: stomach    Cardiovascular (1) Father    Cerebrovascular Disease (1) Father     "Osteoporosis (1) Mother    Unknown/Adopted (2) Maternal Grandfather, Paternal Grandmother: old age            Current Outpatient Medications   Medication Sig     albuterol (PROAIR HFA/PROVENTIL HFA/VENTOLIN HFA) 108 (90 Base) MCG/ACT inhaler Inhale 2 puffs into the lungs every 4 hours as needed for shortness of breath / dyspnea or wheezing     apixaban ANTICOAGULANT (ELIQUIS) 2.5 MG tablet Take 1 tablet (2.5 mg) by mouth 2 times daily     atorvastatin (LIPITOR) 20 MG tablet Take 1 tablet (20 mg) by mouth At Bedtime     denosumab (PROLIA) 60 MG/ML SOLN Inject 60 mg Subcutaneous. q 6 months     diltiazem ER (DILT-XR) 120 MG 24 hr capsule Take 1 capsule (120 mg) by mouth daily     Current Facility-Administered Medications   Medication     denosumab (PROLIA) injection 60 mg     Allergies   Allergen Reactions     Digoxin Other (See Comments)     Weakness, SOB     Megace [Megestrol Acetate]      Increased LFTs         ROS:  12 point review of systems negative other than symptoms noted below or in the HPI.  No urinary frequency or dysuria, bladder or kidney problems      OBJECTIVE:     /72   Ht 1.499 m (4' 11\")   Wt 36.3 kg (80 lb)   LMP 12/07/1981   BMI 16.16 kg/m    Body mass index is 16.16 kg/m .    Exam:  Constitutional:  Appearance: Well nourished, well developed alert, in no acute distress  Neurologic:  Mental Status:  Oriented X3.  Normal strength and tone, sensory exam grossly normal, mentation intact and speech normal.    Psychiatric:  Mentation appears normal and affect normal/bright.     In-Clinic Test Results:  No results found for this or any previous visit (from the past 24 hour(s)).    ASSESSMENT/PLAN:                                                        ICD-10-CM    1. Osteoporosis, senile  M81.0 Phosphorus     Magnesium       There are no Patient Instructions on file for this visit.    Prolia today  dexa done  Labs done  Return 6 months  Discussed my [facility] in a few months    Sangeeta HAQUE" MD Myla  [facility] [place]

## 2025-06-06 NOTE — TELEPHONE ENCOUNTER
I am not sure what this means. Can you figure it out? Does she just need an appt? EE   The patient is a 29y Female complaining of

## (undated) DEVICE — WIRE GUIDE 0.035"X145CM BENTSON TSFB-35-145-BH

## (undated) DEVICE — PACK CYSTOSCOPY SBA15CYFSI

## (undated) DEVICE — CATH URETERAL OPEN END 6FR AXXCESS

## (undated) DEVICE — BAG CYSTO TABLE DRAIN

## (undated) DEVICE — PAD CHUX UNDERPAD 23X24" 7136

## (undated) DEVICE — GLOVE PROTEXIS W/NEU-THERA 7.5  2D73TE75

## (undated) DEVICE — LINEN TOWEL PACK X5 5464

## (undated) DEVICE — SOL WATER IRRIG 3000ML BAG 2B7117

## (undated) DEVICE — SHEATH URETERAL ACCESS NAVIGATOR 13/15FRX46CM M0062502100

## (undated) RX ORDER — ONDANSETRON 2 MG/ML
INJECTION INTRAMUSCULAR; INTRAVENOUS
Status: DISPENSED
Start: 2022-01-01

## (undated) RX ORDER — LABETALOL HYDROCHLORIDE 5 MG/ML
INJECTION, SOLUTION INTRAVENOUS
Status: DISPENSED
Start: 2022-01-01

## (undated) RX ORDER — METOPROLOL TARTRATE 1 MG/ML
INJECTION, SOLUTION INTRAVENOUS
Status: DISPENSED
Start: 2022-01-01

## (undated) RX ORDER — HEPARIN SODIUM 200 [USP'U]/100ML
INJECTION, SOLUTION INTRAVENOUS
Status: DISPENSED
Start: 2022-01-01

## (undated) RX ORDER — FENTANYL CITRATE 50 UG/ML
INJECTION, SOLUTION INTRAMUSCULAR; INTRAVENOUS
Status: DISPENSED
Start: 2022-01-01

## (undated) RX ORDER — LIDOCAINE HYDROCHLORIDE 10 MG/ML
INJECTION, SOLUTION INFILTRATION; PERINEURAL
Status: DISPENSED
Start: 2022-01-01

## (undated) RX ORDER — REGADENOSON 0.08 MG/ML
INJECTION, SOLUTION INTRAVENOUS
Status: DISPENSED
Start: 2017-03-29